# Patient Record
Sex: MALE | Race: BLACK OR AFRICAN AMERICAN | NOT HISPANIC OR LATINO | Employment: OTHER | RURAL
[De-identification: names, ages, dates, MRNs, and addresses within clinical notes are randomized per-mention and may not be internally consistent; named-entity substitution may affect disease eponyms.]

---

## 2018-03-07 ENCOUNTER — HISTORICAL (OUTPATIENT)
Dept: ADMINISTRATIVE | Facility: HOSPITAL | Age: 78
End: 2018-03-07

## 2018-03-08 LAB
LAB AP CLINICAL INFORMATION: NORMAL
LAB AP COMMENTS: NORMAL
LAB AP DIAGNOSIS - HISTORICAL: NORMAL
LAB AP GROSS PATHOLOGY - HISTORICAL: NORMAL
LAB AP SPECIMEN SUBMITTED - HISTORICAL: NORMAL

## 2020-10-07 ENCOUNTER — HISTORICAL (OUTPATIENT)
Dept: ADMINISTRATIVE | Facility: HOSPITAL | Age: 80
End: 2020-10-07

## 2020-10-07 LAB
CHOLEST SERPL-MCNC: 245 MG/DL
CHOLEST/HDLC SERPL: 4.6 {RATIO}
HDLC SERPL-MCNC: 53 MG/DL
LDLC SERPL CALC-MCNC: 142 MG/DL
TRIGL SERPL-MCNC: 250 MG/DL

## 2020-11-10 ENCOUNTER — HISTORICAL (OUTPATIENT)
Dept: ADMINISTRATIVE | Facility: HOSPITAL | Age: 80
End: 2020-11-10

## 2020-11-10 LAB — SARS-COV+SARS-COV-2 AG RESP QL IA.RAPID: NEGATIVE

## 2021-07-10 ENCOUNTER — HOSPITAL ENCOUNTER (OUTPATIENT)
Facility: HOSPITAL | Age: 81
Discharge: HOME | End: 2021-07-14
Attending: HOSPITALIST | Admitting: HOSPITALIST
Payer: COMMERCIAL

## 2021-07-10 ENCOUNTER — HOSPITAL ENCOUNTER (EMERGENCY)
Facility: HOSPITAL | Age: 81
Discharge: ANOTHER HOSPITAL | End: 2021-07-10
Payer: COMMERCIAL

## 2021-07-10 VITALS
BODY MASS INDEX: 30.61 KG/M2 | OXYGEN SATURATION: 99 % | HEIGHT: 72 IN | WEIGHT: 226 LBS | RESPIRATION RATE: 20 BRPM | HEART RATE: 62 BPM | DIASTOLIC BLOOD PRESSURE: 73 MMHG | TEMPERATURE: 98 F | SYSTOLIC BLOOD PRESSURE: 159 MMHG

## 2021-07-10 DIAGNOSIS — R79.89 ELEVATED TROPONIN: Primary | ICD-10-CM

## 2021-07-10 DIAGNOSIS — Z72.0 TOBACCO USE: ICD-10-CM

## 2021-07-10 DIAGNOSIS — N18.31 STAGE 3A CHRONIC KIDNEY DISEASE: ICD-10-CM

## 2021-07-10 DIAGNOSIS — M10.072 ACUTE IDIOPATHIC GOUT OF LEFT FOOT: ICD-10-CM

## 2021-07-10 DIAGNOSIS — I21.4 NSTEMI (NON-ST ELEVATED MYOCARDIAL INFARCTION): ICD-10-CM

## 2021-07-10 DIAGNOSIS — R29.810 FACIAL DROOP: ICD-10-CM

## 2021-07-10 DIAGNOSIS — I10 HYPERTENSION: ICD-10-CM

## 2021-07-10 DIAGNOSIS — R22.0 LIP SWELLING: ICD-10-CM

## 2021-07-10 DIAGNOSIS — G45.9 TIA (TRANSIENT ISCHEMIC ATTACK): ICD-10-CM

## 2021-07-10 DIAGNOSIS — Q67.0 FACIAL ASYMMETRY: ICD-10-CM

## 2021-07-10 DIAGNOSIS — R07.9 CHEST PAIN: ICD-10-CM

## 2021-07-10 DIAGNOSIS — I10 ESSENTIAL HYPERTENSION: ICD-10-CM

## 2021-07-10 DIAGNOSIS — N17.9 ACUTE RENAL FAILURE SUPERIMPOSED ON CHRONIC KIDNEY DISEASE, UNSPECIFIED CKD STAGE, UNSPECIFIED ACUTE RENAL FAILURE TYPE: ICD-10-CM

## 2021-07-10 DIAGNOSIS — N18.9 ACUTE RENAL FAILURE SUPERIMPOSED ON CHRONIC KIDNEY DISEASE, UNSPECIFIED CKD STAGE, UNSPECIFIED ACUTE RENAL FAILURE TYPE: ICD-10-CM

## 2021-07-10 DIAGNOSIS — E78.2 MIXED HYPERLIPIDEMIA: ICD-10-CM

## 2021-07-10 DIAGNOSIS — R94.31 ABNORMAL ECG DURING EXERCISE STRESS TEST: ICD-10-CM

## 2021-07-10 PROBLEM — M10.071 ACUTE IDIOPATHIC GOUT OF RIGHT FOOT: Status: ACTIVE | Noted: 2021-07-10

## 2021-07-10 PROBLEM — E78.5 HYPERLIPIDEMIA: Status: ACTIVE | Noted: 2021-07-10

## 2021-07-10 PROBLEM — N18.30 CKD (CHRONIC KIDNEY DISEASE) STAGE 3, GFR 30-59 ML/MIN: Status: ACTIVE | Noted: 2021-07-10

## 2021-07-10 LAB
ALBUMIN SERPL BCP-MCNC: 3.7 G/DL (ref 3.5–5)
ALBUMIN/GLOB SERPL: 1 {RATIO}
ALP SERPL-CCNC: 105 U/L (ref 45–115)
ALT SERPL W P-5'-P-CCNC: 22 U/L (ref 16–61)
ANION GAP SERPL CALCULATED.3IONS-SCNC: 11 MMOL/L (ref 7–16)
APTT PPP: 32.9 SECONDS (ref 25.2–37.3)
AST SERPL W P-5'-P-CCNC: 28 U/L (ref 15–37)
BASOPHILS # BLD AUTO: 0.02 K/UL (ref 0–0.2)
BASOPHILS NFR BLD AUTO: 0.4 % (ref 0–1)
BILIRUB SERPL-MCNC: 0.5 MG/DL (ref 0–1.2)
BUN SERPL-MCNC: 28 MG/DL (ref 7–18)
BUN/CREAT SERPL: 14 (ref 6–20)
CALCIUM SERPL-MCNC: 9.2 MG/DL (ref 8.5–10.1)
CHLORIDE SERPL-SCNC: 103 MMOL/L (ref 98–107)
CO2 SERPL-SCNC: 31 MMOL/L (ref 21–32)
CREAT SERPL-MCNC: 1.98 MG/DL (ref 0.7–1.3)
D DIMER PPP FEU-MCNC: 1.78 ΜG/ML (ref 0–0.47)
DIFFERENTIAL METHOD BLD: ABNORMAL
EOSINOPHIL # BLD AUTO: 0.04 K/UL (ref 0–0.5)
EOSINOPHIL NFR BLD AUTO: 0.7 % (ref 1–4)
ERYTHROCYTE [DISTWIDTH] IN BLOOD BY AUTOMATED COUNT: 12.1 % (ref 11.5–14.5)
EST. AVERAGE GLUCOSE BLD GHB EST-MCNC: 107 MG/DL
GLOBULIN SER-MCNC: 3.7 G/DL (ref 2–4)
GLUCOSE SERPL-MCNC: 173 MG/DL (ref 74–106)
HBA1C MFR BLD HPLC: 5.8 % (ref 4.5–6.6)
HCT VFR BLD AUTO: 41.1 % (ref 40–54)
HGB BLD-MCNC: 14.5 G/DL (ref 13.5–18)
INR BLD: 1.03 (ref 0.9–1.1)
LYMPHOCYTES # BLD AUTO: 1.94 K/UL (ref 1–4.8)
LYMPHOCYTES NFR BLD AUTO: 34.5 % (ref 27–41)
MAGNESIUM SERPL-MCNC: 1.9 MG/DL (ref 1.7–2.3)
MCH RBC QN AUTO: 32.7 PG (ref 27–31)
MCHC RBC AUTO-ENTMCNC: 35.3 G/DL (ref 32–36)
MCV RBC AUTO: 92.6 FL (ref 80–96)
MONOCYTES # BLD AUTO: 0.57 K/UL (ref 0–0.8)
MONOCYTES NFR BLD AUTO: 10.1 % (ref 2–6)
MPC BLD CALC-MCNC: 9 FL (ref 9.4–12.4)
NEUTROPHILS # BLD AUTO: 3.06 K/UL (ref 1.8–7.7)
NEUTROPHILS NFR BLD AUTO: 54.3 % (ref 53–65)
PLATELET # BLD AUTO: 291 K/UL (ref 150–400)
POTASSIUM SERPL-SCNC: 4.1 MMOL/L (ref 3.5–5.1)
PROT SERPL-MCNC: 7.4 G/DL (ref 6.4–8.2)
PROTHROMBIN TIME: 13.7 SECONDS (ref 11.7–14.7)
RBC # BLD AUTO: 4.44 M/UL (ref 4.6–6.2)
SODIUM SERPL-SCNC: 141 MMOL/L (ref 136–145)
TROPONIN I SERPL-MCNC: 0.1 NG/ML
TROPONIN I SERPL-MCNC: 0.13 NG/ML
TROPONIN I SERPL-MCNC: 0.14 NG/ML
TSH SERPL DL<=0.005 MIU/L-ACNC: 1.22 UIU/ML (ref 0.36–3.74)
URATE SERPL-MCNC: 7.7 MG/DL (ref 3.5–7.2)
WBC # BLD AUTO: 5.63 K/UL (ref 4.5–11)

## 2021-07-10 PROCEDURE — 83036 HEMOGLOBIN GLYCOSYLATED A1C: CPT | Performed by: HOSPITALIST

## 2021-07-10 PROCEDURE — 85025 COMPLETE CBC W/AUTO DIFF WBC: CPT | Performed by: NURSE PRACTITIONER

## 2021-07-10 PROCEDURE — 99285 PR EMERGENCY DEPT VISIT,LEVEL V: ICD-10-PCS | Mod: ,,, | Performed by: NURSE PRACTITIONER

## 2021-07-10 PROCEDURE — 83735 ASSAY OF MAGNESIUM: CPT | Performed by: NURSE PRACTITIONER

## 2021-07-10 PROCEDURE — 63600175 PHARM REV CODE 636 W HCPCS: Performed by: HOSPITALIST

## 2021-07-10 PROCEDURE — 84550 ASSAY OF BLOOD/URIC ACID: CPT | Performed by: HOSPITALIST

## 2021-07-10 PROCEDURE — 93010 EKG 12-LEAD: ICD-10-PCS | Mod: 77,,, | Performed by: INTERNAL MEDICINE

## 2021-07-10 PROCEDURE — 93005 ELECTROCARDIOGRAM TRACING: CPT

## 2021-07-10 PROCEDURE — 99220 PR INITIAL OBSERVATION CARE,LEVL III: CPT | Mod: ,,, | Performed by: HOSPITALIST

## 2021-07-10 PROCEDURE — 84443 ASSAY THYROID STIM HORMONE: CPT | Performed by: NURSE PRACTITIONER

## 2021-07-10 PROCEDURE — 85610 PROTHROMBIN TIME: CPT | Performed by: NURSE PRACTITIONER

## 2021-07-10 PROCEDURE — 85730 THROMBOPLASTIN TIME PARTIAL: CPT | Performed by: NURSE PRACTITIONER

## 2021-07-10 PROCEDURE — 25000003 PHARM REV CODE 250: Performed by: HOSPITALIST

## 2021-07-10 PROCEDURE — 99220 PR INITIAL OBSERVATION CARE,LEVL III: ICD-10-PCS | Mod: ,,, | Performed by: HOSPITALIST

## 2021-07-10 PROCEDURE — 84484 ASSAY OF TROPONIN QUANT: CPT | Mod: 91 | Performed by: HOSPITALIST

## 2021-07-10 PROCEDURE — 99285 EMERGENCY DEPT VISIT HI MDM: CPT | Mod: ,,, | Performed by: NURSE PRACTITIONER

## 2021-07-10 PROCEDURE — 99285 EMERGENCY DEPT VISIT HI MDM: CPT | Mod: 25

## 2021-07-10 PROCEDURE — 96372 THER/PROPH/DIAG INJ SC/IM: CPT | Mod: 59

## 2021-07-10 PROCEDURE — 80053 COMPREHEN METABOLIC PANEL: CPT | Performed by: NURSE PRACTITIONER

## 2021-07-10 PROCEDURE — 93010 ELECTROCARDIOGRAM REPORT: CPT | Mod: ,,, | Performed by: HOSPITALIST

## 2021-07-10 PROCEDURE — 85378 FIBRIN DEGRADE SEMIQUANT: CPT | Performed by: HOSPITALIST

## 2021-07-10 PROCEDURE — 25000003 PHARM REV CODE 250: Performed by: NURSE PRACTITIONER

## 2021-07-10 PROCEDURE — 84484 ASSAY OF TROPONIN QUANT: CPT | Performed by: NURSE PRACTITIONER

## 2021-07-10 PROCEDURE — 93010 ELECTROCARDIOGRAM REPORT: CPT | Mod: 77,,, | Performed by: INTERNAL MEDICINE

## 2021-07-10 PROCEDURE — 96360 HYDRATION IV INFUSION INIT: CPT

## 2021-07-10 PROCEDURE — G0427 INPT/ED TELECONSULT70: HCPCS | Mod: GT,,, | Performed by: PSYCHIATRY & NEUROLOGY

## 2021-07-10 PROCEDURE — G0427 PR INPT TELEHEALTH CON 70/>M: ICD-10-PCS | Mod: GT,,, | Performed by: PSYCHIATRY & NEUROLOGY

## 2021-07-10 PROCEDURE — 93010 EKG 12-LEAD: ICD-10-PCS | Mod: ,,, | Performed by: HOSPITALIST

## 2021-07-10 PROCEDURE — 36415 COLL VENOUS BLD VENIPUNCTURE: CPT | Performed by: NURSE PRACTITIONER

## 2021-07-10 PROCEDURE — G0378 HOSPITAL OBSERVATION PER HR: HCPCS

## 2021-07-10 PROCEDURE — 94761 N-INVAS EAR/PLS OXIMETRY MLT: CPT

## 2021-07-10 PROCEDURE — 83880 ASSAY OF NATRIURETIC PEPTIDE: CPT | Performed by: HOSPITALIST

## 2021-07-10 PROCEDURE — G0379 DIRECT REFER HOSPITAL OBSERV: HCPCS

## 2021-07-10 RX ORDER — NITROGLYCERIN 0.4 MG/1
0.4 TABLET SUBLINGUAL EVERY 5 MIN PRN
Status: DISCONTINUED | OUTPATIENT
Start: 2021-07-10 | End: 2021-07-14 | Stop reason: HOSPADM

## 2021-07-10 RX ORDER — SODIUM CHLORIDE 9 MG/ML
INJECTION, SOLUTION INTRAVENOUS
Status: COMPLETED | OUTPATIENT
Start: 2021-07-10 | End: 2021-07-10

## 2021-07-10 RX ORDER — ONDANSETRON 4 MG/1
8 TABLET, ORALLY DISINTEGRATING ORAL EVERY 8 HOURS PRN
Status: DISCONTINUED | OUTPATIENT
Start: 2021-07-10 | End: 2021-07-13

## 2021-07-10 RX ORDER — ATORVASTATIN CALCIUM 40 MG/1
40 TABLET, FILM COATED ORAL DAILY
Status: ON HOLD | COMMUNITY
End: 2022-01-02 | Stop reason: HOSPADM

## 2021-07-10 RX ORDER — LISINOPRIL AND HYDROCHLOROTHIAZIDE 20; 25 MG/1; MG/1
1 TABLET ORAL DAILY
Status: ON HOLD | COMMUNITY
End: 2021-07-14 | Stop reason: HOSPADM

## 2021-07-10 RX ORDER — METOPROLOL TARTRATE 25 MG/1
12.5 TABLET ORAL 2 TIMES DAILY
Status: DISCONTINUED | OUTPATIENT
Start: 2021-07-10 | End: 2021-07-12

## 2021-07-10 RX ORDER — HEPARIN SODIUM 5000 [USP'U]/ML
5000 INJECTION, SOLUTION INTRAVENOUS; SUBCUTANEOUS EVERY 12 HOURS
Status: DISCONTINUED | OUTPATIENT
Start: 2021-07-10 | End: 2021-07-14 | Stop reason: HOSPADM

## 2021-07-10 RX ORDER — ACETAMINOPHEN 500 MG
1000 TABLET ORAL EVERY 8 HOURS PRN
Status: DISCONTINUED | OUTPATIENT
Start: 2021-07-10 | End: 2021-07-13

## 2021-07-10 RX ORDER — ATORVASTATIN CALCIUM 40 MG/1
40 TABLET, FILM COATED ORAL DAILY
Status: DISCONTINUED | OUTPATIENT
Start: 2021-07-11 | End: 2021-07-14 | Stop reason: HOSPADM

## 2021-07-10 RX ORDER — SODIUM CHLORIDE 0.9 % (FLUSH) 0.9 %
3 SYRINGE (ML) INJECTION
Status: DISCONTINUED | OUTPATIENT
Start: 2021-07-10 | End: 2021-07-14 | Stop reason: HOSPADM

## 2021-07-10 RX ADMIN — HEPARIN SODIUM 5000 UNITS: 5000 INJECTION INTRAVENOUS; SUBCUTANEOUS at 09:07

## 2021-07-10 RX ADMIN — SODIUM CHLORIDE: 9 INJECTION, SOLUTION INTRAVENOUS at 11:07

## 2021-07-10 RX ADMIN — METOPROLOL TARTRATE 12.5 MG: 25 TABLET, FILM COATED ORAL at 09:07

## 2021-07-11 LAB
ALBUMIN SERPL BCP-MCNC: 3 G/DL (ref 3.5–5)
ALBUMIN/GLOB SERPL: 0.9 {RATIO}
ALP SERPL-CCNC: 103 U/L (ref 45–115)
ALT SERPL W P-5'-P-CCNC: 23 U/L (ref 16–61)
ANION GAP SERPL CALCULATED.3IONS-SCNC: 8 MMOL/L (ref 7–16)
AORTIC ROOT ANNULUS: 2.9 CM
AORTIC VALVE CUSP SEPERATION: 2.2 CM
AST SERPL W P-5'-P-CCNC: 24 U/L (ref 15–37)
AV INDEX (PROSTH): 1.06
AV MEAN GRADIENT: 2 MMHG
AV PEAK GRADIENT: 3 MMHG
AV VALVE AREA: 3.65 CM2
AV VELOCITY RATIO: 0.89
BASOPHILS # BLD AUTO: 0.02 K/UL (ref 0–0.2)
BASOPHILS NFR BLD AUTO: 0.4 % (ref 0–1)
BILIRUB SERPL-MCNC: 0.4 MG/DL (ref 0–1.2)
BILIRUB UR QL STRIP: NEGATIVE
BSA FOR ECHO PROCEDURE: 2.28 M2
BUN SERPL-MCNC: 23 MG/DL (ref 7–18)
BUN/CREAT SERPL: 14 (ref 6–20)
CALCIUM SERPL-MCNC: 8.8 MG/DL (ref 8.5–10.1)
CHLORIDE SERPL-SCNC: 108 MMOL/L (ref 98–107)
CHOLEST SERPL-MCNC: 153 MG/DL (ref 0–200)
CHOLEST/HDLC SERPL: 3.2 {RATIO}
CLARITY UR: CLEAR
CO2 SERPL-SCNC: 30 MMOL/L (ref 21–32)
COLOR UR: NORMAL
CREAT SERPL-MCNC: 1.67 MG/DL (ref 0.7–1.3)
CV ECHO LV RWT: 0.87 CM
DIFFERENTIAL METHOD BLD: ABNORMAL
DOP CALC AO PEAK VEL: 0.9 M/S
DOP CALC AO VTI: 18 CM
DOP CALC LVOT AREA: 3.5 CM2
DOP CALC LVOT DIAMETER: 2.1 CM
DOP CALC LVOT PEAK VEL: 0.8 M/S
DOP CALC LVOT STROKE VOLUME: 65.78 CM3
DOP CALCLVOT PEAK VEL VTI: 19 CM
E WAVE DECELERATION TIME: 171 MSEC
ECHO EF ESTIMATED: 55 %
ECHO LV POSTERIOR WALL: 1.94 CM (ref 0.6–1.1)
EJECTION FRACTION: 65 %
EOSINOPHIL # BLD AUTO: 0.09 K/UL (ref 0–0.5)
EOSINOPHIL NFR BLD AUTO: 1.7 % (ref 1–4)
ERYTHROCYTE [DISTWIDTH] IN BLOOD BY AUTOMATED COUNT: 11.9 % (ref 11.5–14.5)
FRACTIONAL SHORTENING: 27 % (ref 28–44)
GLOBULIN SER-MCNC: 3.4 G/DL (ref 2–4)
GLUCOSE SERPL-MCNC: 122 MG/DL (ref 74–106)
GLUCOSE UR STRIP-MCNC: NEGATIVE MG/DL
HCT VFR BLD AUTO: 38.9 % (ref 40–54)
HDLC SERPL-MCNC: 48 MG/DL (ref 40–60)
HGB BLD-MCNC: 13.1 G/DL (ref 13.5–18)
IMM GRANULOCYTES # BLD AUTO: 0.01 K/UL (ref 0–0.04)
IMM GRANULOCYTES NFR BLD: 0.2 % (ref 0–0.4)
INTERVENTRICULAR SEPTUM: 1.77 CM (ref 0.6–1.1)
IVC OSTIUM: 1.3 CM
KETONES UR STRIP-SCNC: NEGATIVE MG/DL
LDLC SERPL CALC-MCNC: 71 MG/DL
LDLC/HDLC SERPL: 1.5 {RATIO}
LEFT ATRIUM SIZE: 3.8 CM
LEFT INTERNAL DIMENSION IN SYSTOLE: 3.26 CM (ref 2.1–4)
LEFT VENTRICLE MASS INDEX: 169 G/M2
LEFT VENTRICULAR INTERNAL DIMENSION IN DIASTOLE: 4.45 CM (ref 3.5–6)
LEFT VENTRICULAR MASS: 379.34 G
LEUKOCYTE ESTERASE UR QL STRIP: NEGATIVE
LVOT MG: 1.5 MMHG
LYMPHOCYTES # BLD AUTO: 1.92 K/UL (ref 1–4.8)
LYMPHOCYTES NFR BLD AUTO: 36.6 % (ref 27–41)
MCH RBC QN AUTO: 32.9 PG (ref 27–31)
MCHC RBC AUTO-ENTMCNC: 33.7 G/DL (ref 32–36)
MCV RBC AUTO: 97.7 FL (ref 80–96)
MONOCYTES # BLD AUTO: 0.61 K/UL (ref 0–0.8)
MONOCYTES NFR BLD AUTO: 11.6 % (ref 2–6)
MPC BLD CALC-MCNC: 9.7 FL (ref 9.4–12.4)
MV PEAK E VEL: 0.65 M/S
NEUTROPHILS # BLD AUTO: 2.59 K/UL (ref 1.8–7.7)
NEUTROPHILS NFR BLD AUTO: 49.5 % (ref 53–65)
NITRITE UR QL STRIP: NEGATIVE
NONHDLC SERPL-MCNC: 105 MG/DL
NRBC # BLD AUTO: 0 X10E3/UL
NRBC, AUTO (.00): 0 %
NT-PROBNP SERPL-MCNC: 816 PG/ML (ref 1–450)
PH UR STRIP: 6.5 PH UNITS
PISA TR MAX VEL: 2.8 M/S
PLATELET # BLD AUTO: 257 K/UL (ref 150–400)
POTASSIUM SERPL-SCNC: 4.2 MMOL/L (ref 3.5–5.1)
PROT SERPL-MCNC: 6.4 G/DL (ref 6.4–8.2)
PROT UR QL STRIP: NEGATIVE
RA MAJOR: 4 CM
RA PRESSURE: 3 MMHG
RBC # BLD AUTO: 3.98 M/UL (ref 4.6–6.2)
RBC # UR STRIP: NEGATIVE /UL
RIGHT VENTRICULAR END-DIASTOLIC DIMENSION: 3.3 CM
SODIUM SERPL-SCNC: 142 MMOL/L (ref 136–145)
SP GR UR STRIP: <=1.005
TR MAX PG: 31 MMHG
TRICUSPID ANNULAR PLANE SYSTOLIC EXCURSION: 2.2 CM
TRIGL SERPL-MCNC: 169 MG/DL (ref 35–150)
TROPONIN I SERPL-MCNC: 0.12 NG/ML
TV REST PULMONARY ARTERY PRESSURE: 34 MMHG
UROBILINOGEN UR STRIP-ACNC: 1 MG/DL
VLDLC SERPL-MCNC: 34 MG/DL
WBC # BLD AUTO: 5.24 K/UL (ref 4.5–11)

## 2021-07-11 PROCEDURE — 85025 COMPLETE CBC W/AUTO DIFF WBC: CPT | Performed by: HOSPITALIST

## 2021-07-11 PROCEDURE — 25000003 PHARM REV CODE 250: Performed by: HOSPITALIST

## 2021-07-11 PROCEDURE — 81003 URINALYSIS AUTO W/O SCOPE: CPT | Performed by: HOSPITALIST

## 2021-07-11 PROCEDURE — 99225 PR SUBSEQUENT OBSERVATION CARE,LEVEL II: CPT | Mod: ,,, | Performed by: HOSPITALIST

## 2021-07-11 PROCEDURE — 63600175 PHARM REV CODE 636 W HCPCS: Performed by: HOSPITALIST

## 2021-07-11 PROCEDURE — 80053 COMPREHEN METABOLIC PANEL: CPT | Performed by: HOSPITALIST

## 2021-07-11 PROCEDURE — 80061 LIPID PANEL: CPT | Performed by: HOSPITALIST

## 2021-07-11 PROCEDURE — 36415 COLL VENOUS BLD VENIPUNCTURE: CPT | Performed by: HOSPITALIST

## 2021-07-11 PROCEDURE — 84484 ASSAY OF TROPONIN QUANT: CPT | Performed by: HOSPITALIST

## 2021-07-11 PROCEDURE — G0378 HOSPITAL OBSERVATION PER HR: HCPCS

## 2021-07-11 PROCEDURE — 96372 THER/PROPH/DIAG INJ SC/IM: CPT | Mod: 59

## 2021-07-11 PROCEDURE — 25500020 PHARM REV CODE 255: Performed by: HOSPITALIST

## 2021-07-11 PROCEDURE — 99225 PR SUBSEQUENT OBSERVATION CARE,LEVEL II: ICD-10-PCS | Mod: ,,, | Performed by: HOSPITALIST

## 2021-07-11 RX ORDER — LISINOPRIL 10 MG/1
10 TABLET ORAL DAILY
Status: DISCONTINUED | OUTPATIENT
Start: 2021-07-11 | End: 2021-07-12

## 2021-07-11 RX ORDER — HYDRALAZINE HYDROCHLORIDE 25 MG/1
25 TABLET, FILM COATED ORAL EVERY 8 HOURS PRN
Status: DISCONTINUED | OUTPATIENT
Start: 2021-07-11 | End: 2021-07-13

## 2021-07-11 RX ORDER — ASPIRIN 81 MG/1
81 TABLET ORAL DAILY
Status: DISCONTINUED | OUTPATIENT
Start: 2021-07-12 | End: 2021-07-14 | Stop reason: HOSPADM

## 2021-07-11 RX ADMIN — METOPROLOL TARTRATE 12.5 MG: 25 TABLET, FILM COATED ORAL at 08:07

## 2021-07-11 RX ADMIN — ATORVASTATIN CALCIUM 40 MG: 40 TABLET, FILM COATED ORAL at 08:07

## 2021-07-11 RX ADMIN — HEPARIN SODIUM 5000 UNITS: 5000 INJECTION INTRAVENOUS; SUBCUTANEOUS at 08:07

## 2021-07-11 RX ADMIN — LISINOPRIL 10 MG: 10 TABLET ORAL at 12:07

## 2021-07-11 RX ADMIN — IOPAMIDOL 100 ML: 755 INJECTION, SOLUTION INTRAVENOUS at 02:07

## 2021-07-12 LAB
ANION GAP SERPL CALCULATED.3IONS-SCNC: 10 MMOL/L (ref 7–16)
BUN SERPL-MCNC: 21 MG/DL (ref 7–18)
BUN/CREAT SERPL: 13 (ref 6–20)
CALCIUM SERPL-MCNC: 9.3 MG/DL (ref 8.5–10.1)
CHLORIDE SERPL-SCNC: 106 MMOL/L (ref 98–107)
CO2 SERPL-SCNC: 28 MMOL/L (ref 21–32)
CREAT SERPL-MCNC: 1.65 MG/DL (ref 0.7–1.3)
FOLATE SERPL-MCNC: 10.3 NG/ML (ref 3.1–17.5)
GLUCOSE SERPL-MCNC: 133 MG/DL (ref 74–106)
MAGNESIUM SERPL-MCNC: 2.1 MG/DL (ref 1.7–2.3)
PHOSPHATE SERPL-MCNC: 3 MG/DL (ref 2.5–4.5)
POTASSIUM SERPL-SCNC: 4.4 MMOL/L (ref 3.5–5.1)
SODIUM SERPL-SCNC: 140 MMOL/L (ref 136–145)
VIT B12 SERPL-MCNC: 427 PG/ML (ref 193–986)

## 2021-07-12 PROCEDURE — 84100 ASSAY OF PHOSPHORUS: CPT | Performed by: HOSPITALIST

## 2021-07-12 PROCEDURE — 96372 THER/PROPH/DIAG INJ SC/IM: CPT | Mod: 59

## 2021-07-12 PROCEDURE — 99226 PR SUBSEQUENT OBSERVATION CARE,LEVEL III: CPT | Mod: ,,, | Performed by: HOSPITALIST

## 2021-07-12 PROCEDURE — 80048 BASIC METABOLIC PNL TOTAL CA: CPT | Performed by: HOSPITALIST

## 2021-07-12 PROCEDURE — G0378 HOSPITAL OBSERVATION PER HR: HCPCS

## 2021-07-12 PROCEDURE — 36415 COLL VENOUS BLD VENIPUNCTURE: CPT | Performed by: HOSPITALIST

## 2021-07-12 PROCEDURE — 83735 ASSAY OF MAGNESIUM: CPT | Performed by: HOSPITALIST

## 2021-07-12 PROCEDURE — 25000003 PHARM REV CODE 250: Performed by: HOSPITALIST

## 2021-07-12 PROCEDURE — 96374 THER/PROPH/DIAG INJ IV PUSH: CPT

## 2021-07-12 PROCEDURE — 82607 VITAMIN B-12: CPT | Performed by: HOSPITALIST

## 2021-07-12 PROCEDURE — 99226 PR SUBSEQUENT OBSERVATION CARE,LEVEL III: ICD-10-PCS | Mod: ,,, | Performed by: HOSPITALIST

## 2021-07-12 PROCEDURE — 63600175 PHARM REV CODE 636 W HCPCS: Performed by: HOSPITALIST

## 2021-07-12 RX ORDER — LORAZEPAM 2 MG/ML
2 INJECTION INTRAMUSCULAR ONCE
Status: COMPLETED | OUTPATIENT
Start: 2021-07-12 | End: 2021-07-12

## 2021-07-12 RX ORDER — LISINOPRIL 20 MG/1
20 TABLET ORAL DAILY
Status: DISCONTINUED | OUTPATIENT
Start: 2021-07-13 | End: 2021-07-13

## 2021-07-12 RX ORDER — HYDROCHLOROTHIAZIDE 12.5 MG/1
12.5 TABLET ORAL DAILY
Status: DISCONTINUED | OUTPATIENT
Start: 2021-07-12 | End: 2021-07-13

## 2021-07-12 RX ADMIN — LISINOPRIL 10 MG: 10 TABLET ORAL at 09:07

## 2021-07-12 RX ADMIN — ASPIRIN 81 MG: 81 TABLET, COATED ORAL at 09:07

## 2021-07-12 RX ADMIN — ATORVASTATIN CALCIUM 40 MG: 40 TABLET, FILM COATED ORAL at 09:07

## 2021-07-12 RX ADMIN — LORAZEPAM 2 MG: 2 INJECTION INTRAMUSCULAR; INTRAVENOUS at 02:07

## 2021-07-12 RX ADMIN — METOPROLOL TARTRATE 12.5 MG: 25 TABLET, FILM COATED ORAL at 09:07

## 2021-07-12 RX ADMIN — HEPARIN SODIUM 5000 UNITS: 5000 INJECTION INTRAVENOUS; SUBCUTANEOUS at 09:07

## 2021-07-12 RX ADMIN — HYDROCHLOROTHIAZIDE 12.5 MG: 12.5 TABLET ORAL at 05:07

## 2021-07-13 PROBLEM — I21.4 NSTEMI (NON-ST ELEVATED MYOCARDIAL INFARCTION): Status: ACTIVE | Noted: 2021-07-13

## 2021-07-13 PROBLEM — R22.0 LIP SWELLING: Status: RESOLVED | Noted: 2021-07-10 | Resolved: 2021-07-13

## 2021-07-13 LAB
ANION GAP SERPL CALCULATED.3IONS-SCNC: 12 MMOL/L (ref 7–16)
BASOPHILS # BLD AUTO: 0.02 K/UL (ref 0–0.2)
BASOPHILS NFR BLD AUTO: 0.3 % (ref 0–1)
BUN SERPL-MCNC: 21 MG/DL (ref 7–18)
BUN/CREAT SERPL: 13 (ref 6–20)
CALCIUM SERPL-MCNC: 9.3 MG/DL (ref 8.5–10.1)
CHLORIDE SERPL-SCNC: 106 MMOL/L (ref 98–107)
CO2 SERPL-SCNC: 29 MMOL/L (ref 21–32)
CREAT SERPL-MCNC: 1.59 MG/DL (ref 0.7–1.3)
CV STRESS BASE HR: 54 BPM
DIASTOLIC BLOOD PRESSURE: 80 MMHG
DIFFERENTIAL METHOD BLD: ABNORMAL
EOSINOPHIL # BLD AUTO: 0.08 K/UL (ref 0–0.5)
EOSINOPHIL NFR BLD AUTO: 1.3 % (ref 1–4)
ERYTHROCYTE [DISTWIDTH] IN BLOOD BY AUTOMATED COUNT: 12.1 % (ref 11.5–14.5)
GLUCOSE SERPL-MCNC: 138 MG/DL (ref 74–106)
HCT VFR BLD AUTO: 43.6 % (ref 40–54)
HGB BLD-MCNC: 14.7 G/DL (ref 13.5–18)
IMM GRANULOCYTES # BLD AUTO: 0.03 K/UL (ref 0–0.04)
IMM GRANULOCYTES NFR BLD: 0.5 % (ref 0–0.4)
LYMPHOCYTES # BLD AUTO: 1.81 K/UL (ref 1–4.8)
LYMPHOCYTES NFR BLD AUTO: 30 % (ref 27–41)
MCH RBC QN AUTO: 32.7 PG (ref 27–31)
MCHC RBC AUTO-ENTMCNC: 33.7 G/DL (ref 32–36)
MCV RBC AUTO: 96.9 FL (ref 80–96)
MONOCYTES # BLD AUTO: 0.5 K/UL (ref 0–0.8)
MONOCYTES NFR BLD AUTO: 8.3 % (ref 2–6)
MPC BLD CALC-MCNC: 9 FL (ref 9.4–12.4)
NEUTROPHILS # BLD AUTO: 3.59 K/UL (ref 1.8–7.7)
NEUTROPHILS NFR BLD AUTO: 59.6 % (ref 53–65)
NRBC # BLD AUTO: 0 X10E3/UL
NRBC, AUTO (.00): 0 %
OHS CV CPX 1 MINUTE RECOVERY HEART RATE: 63 BPM
OHS CV CPX 85 PERCENT MAX PREDICTED HEART RATE MALE: 118
OHS CV CPX ESTIMATED METS: 5
OHS CV CPX MAX PREDICTED HEART RATE: 139
OHS CV CPX PATIENT IS FEMALE: 0
OHS CV CPX PATIENT IS MALE: 1
OHS CV CPX PEAK DIASTOLIC BLOOD PRESSURE: 82 MMHG
OHS CV CPX PEAK HEAR RATE: 113 BPM
OHS CV CPX PEAK RATE PRESSURE PRODUCT: NORMAL
OHS CV CPX PEAK SYSTOLIC BLOOD PRESSURE: 221 MMHG
OHS CV CPX PERCENT MAX PREDICTED HEART RATE ACHIEVED: 81
OHS CV CPX RATE PRESSURE PRODUCT PRESENTING: NORMAL
PLATELET # BLD AUTO: 256 K/UL (ref 150–400)
POTASSIUM SERPL-SCNC: 4.8 MMOL/L (ref 3.5–5.1)
RBC # BLD AUTO: 4.5 M/UL (ref 4.6–6.2)
SODIUM SERPL-SCNC: 142 MMOL/L (ref 136–145)
STRESS ECHO POST EXERCISE DUR MIN: 3 MINUTES
STRESS ECHO POST EXERCISE DUR SEC: 21 SECONDS
SYSTOLIC BLOOD PRESSURE: 190 MMHG
WBC # BLD AUTO: 6.03 K/UL (ref 4.5–11)

## 2021-07-13 PROCEDURE — 25000003 PHARM REV CODE 250: Performed by: HOSPITALIST

## 2021-07-13 PROCEDURE — 27000080 OPTIME MED/SURG SUP & DEVICES GENERAL CLASSIFICATION: Performed by: STUDENT IN AN ORGANIZED HEALTH CARE EDUCATION/TRAINING PROGRAM

## 2021-07-13 PROCEDURE — 92978 ENDOLUMINL IVUS OCT C 1ST: CPT | Mod: RC | Performed by: STUDENT IN AN ORGANIZED HEALTH CARE EDUCATION/TRAINING PROGRAM

## 2021-07-13 PROCEDURE — 27201423 OPTIME MED/SURG SUP & DEVICES STERILE SUPPLY: Performed by: STUDENT IN AN ORGANIZED HEALTH CARE EDUCATION/TRAINING PROGRAM

## 2021-07-13 PROCEDURE — C1874 STENT, COATED/COV W/DEL SYS: HCPCS | Performed by: STUDENT IN AN ORGANIZED HEALTH CARE EDUCATION/TRAINING PROGRAM

## 2021-07-13 PROCEDURE — 99152 MOD SED SAME PHYS/QHP 5/>YRS: CPT | Performed by: STUDENT IN AN ORGANIZED HEALTH CARE EDUCATION/TRAINING PROGRAM

## 2021-07-13 PROCEDURE — C1725 CATH, TRANSLUMIN NON-LASER: HCPCS | Performed by: STUDENT IN AN ORGANIZED HEALTH CARE EDUCATION/TRAINING PROGRAM

## 2021-07-13 PROCEDURE — 63600175 PHARM REV CODE 636 W HCPCS: Performed by: HOSPITALIST

## 2021-07-13 PROCEDURE — 25500020 PHARM REV CODE 255: Performed by: STUDENT IN AN ORGANIZED HEALTH CARE EDUCATION/TRAINING PROGRAM

## 2021-07-13 PROCEDURE — 92978 PR IVUS, CORONARY, 1ST VESSEL: ICD-10-PCS | Mod: 26,RC,, | Performed by: STUDENT IN AN ORGANIZED HEALTH CARE EDUCATION/TRAINING PROGRAM

## 2021-07-13 PROCEDURE — C1894 INTRO/SHEATH, NON-LASER: HCPCS | Performed by: STUDENT IN AN ORGANIZED HEALTH CARE EDUCATION/TRAINING PROGRAM

## 2021-07-13 PROCEDURE — C1753 CATH, INTRAVAS ULTRASOUND: HCPCS | Performed by: STUDENT IN AN ORGANIZED HEALTH CARE EDUCATION/TRAINING PROGRAM

## 2021-07-13 PROCEDURE — 92928 PR STENT: ICD-10-PCS | Mod: RC,,, | Performed by: STUDENT IN AN ORGANIZED HEALTH CARE EDUCATION/TRAINING PROGRAM

## 2021-07-13 PROCEDURE — 99153 MOD SED SAME PHYS/QHP EA: CPT | Performed by: STUDENT IN AN ORGANIZED HEALTH CARE EDUCATION/TRAINING PROGRAM

## 2021-07-13 PROCEDURE — 85025 COMPLETE CBC W/AUTO DIFF WBC: CPT | Performed by: HOSPITALIST

## 2021-07-13 PROCEDURE — 63600175 PHARM REV CODE 636 W HCPCS: Performed by: STUDENT IN AN ORGANIZED HEALTH CARE EDUCATION/TRAINING PROGRAM

## 2021-07-13 PROCEDURE — 99226 PR SUBSEQUENT OBSERVATION CARE,LEVEL III: CPT | Mod: ,,, | Performed by: HOSPITALIST

## 2021-07-13 PROCEDURE — 80048 BASIC METABOLIC PNL TOTAL CA: CPT | Performed by: HOSPITALIST

## 2021-07-13 PROCEDURE — G0378 HOSPITAL OBSERVATION PER HR: HCPCS

## 2021-07-13 PROCEDURE — 92978 ENDOLUMINL IVUS OCT C 1ST: CPT | Mod: 26,RC,, | Performed by: STUDENT IN AN ORGANIZED HEALTH CARE EDUCATION/TRAINING PROGRAM

## 2021-07-13 PROCEDURE — 25000003 PHARM REV CODE 250: Performed by: STUDENT IN AN ORGANIZED HEALTH CARE EDUCATION/TRAINING PROGRAM

## 2021-07-13 PROCEDURE — 93458 PR CATH PLACE/CORON ANGIO, IMG SUPER/INTERP,W LEFT HEART VENTRICULOGRAPHY: ICD-10-PCS | Mod: 26,XU,, | Performed by: STUDENT IN AN ORGANIZED HEALTH CARE EDUCATION/TRAINING PROGRAM

## 2021-07-13 PROCEDURE — 27800903 OPTIME MED/SURG SUP & DEVICES OTHER IMPLANTS: Performed by: STUDENT IN AN ORGANIZED HEALTH CARE EDUCATION/TRAINING PROGRAM

## 2021-07-13 PROCEDURE — C1769 GUIDE WIRE: HCPCS | Performed by: STUDENT IN AN ORGANIZED HEALTH CARE EDUCATION/TRAINING PROGRAM

## 2021-07-13 PROCEDURE — C1887 CATHETER, GUIDING: HCPCS | Performed by: STUDENT IN AN ORGANIZED HEALTH CARE EDUCATION/TRAINING PROGRAM

## 2021-07-13 PROCEDURE — 99205 PR OFFICE/OUTPT VISIT, NEW, LEVL V, 60-74 MIN: ICD-10-PCS | Mod: 25,,, | Performed by: INTERNAL MEDICINE

## 2021-07-13 PROCEDURE — 96372 THER/PROPH/DIAG INJ SC/IM: CPT

## 2021-07-13 PROCEDURE — 92928 PRQ TCAT PLMT NTRAC ST 1 LES: CPT | Mod: RC,,, | Performed by: STUDENT IN AN ORGANIZED HEALTH CARE EDUCATION/TRAINING PROGRAM

## 2021-07-13 PROCEDURE — 27100168 OPTIME MED/SURG SUP & DEVICES NON-STERILE SUPPLY: Performed by: STUDENT IN AN ORGANIZED HEALTH CARE EDUCATION/TRAINING PROGRAM

## 2021-07-13 PROCEDURE — 93458 L HRT ARTERY/VENTRICLE ANGIO: CPT | Mod: 26,XU,, | Performed by: STUDENT IN AN ORGANIZED HEALTH CARE EDUCATION/TRAINING PROGRAM

## 2021-07-13 PROCEDURE — 93458 L HRT ARTERY/VENTRICLE ANGIO: CPT | Mod: 59 | Performed by: STUDENT IN AN ORGANIZED HEALTH CARE EDUCATION/TRAINING PROGRAM

## 2021-07-13 PROCEDURE — C9600 PERC DRUG-EL COR STENT SING: HCPCS | Mod: RC | Performed by: STUDENT IN AN ORGANIZED HEALTH CARE EDUCATION/TRAINING PROGRAM

## 2021-07-13 PROCEDURE — 99205 OFFICE O/P NEW HI 60 MIN: CPT | Mod: 25,,, | Performed by: INTERNAL MEDICINE

## 2021-07-13 PROCEDURE — 36415 COLL VENOUS BLD VENIPUNCTURE: CPT | Performed by: HOSPITALIST

## 2021-07-13 PROCEDURE — 99226 PR SUBSEQUENT OBSERVATION CARE,LEVEL III: ICD-10-PCS | Mod: ,,, | Performed by: HOSPITALIST

## 2021-07-13 DEVICE — IMPLANTABLE DEVICE: Type: IMPLANTABLE DEVICE | Site: CORONARY | Status: FUNCTIONAL

## 2021-07-13 RX ORDER — ONDANSETRON 4 MG/1
8 TABLET, ORALLY DISINTEGRATING ORAL EVERY 8 HOURS PRN
Status: DISCONTINUED | OUTPATIENT
Start: 2021-07-13 | End: 2021-07-14 | Stop reason: HOSPADM

## 2021-07-13 RX ORDER — FENTANYL CITRATE 50 UG/ML
INJECTION, SOLUTION INTRAMUSCULAR; INTRAVENOUS
Status: DISCONTINUED | OUTPATIENT
Start: 2021-07-13 | End: 2021-07-13 | Stop reason: HOSPADM

## 2021-07-13 RX ORDER — ACETAMINOPHEN 325 MG/1
650 TABLET ORAL EVERY 4 HOURS PRN
Status: DISCONTINUED | OUTPATIENT
Start: 2021-07-13 | End: 2021-07-14 | Stop reason: HOSPADM

## 2021-07-13 RX ORDER — HYDRALAZINE HYDROCHLORIDE 20 MG/ML
10 INJECTION INTRAMUSCULAR; INTRAVENOUS EVERY 6 HOURS PRN
Status: DISCONTINUED | OUTPATIENT
Start: 2021-07-13 | End: 2021-07-14 | Stop reason: HOSPADM

## 2021-07-13 RX ORDER — SODIUM CHLORIDE 450 MG/100ML
INJECTION, SOLUTION INTRAVENOUS
Status: DISCONTINUED | OUTPATIENT
Start: 2021-07-13 | End: 2021-07-13

## 2021-07-13 RX ORDER — LIDOCAINE HYDROCHLORIDE 10 MG/ML
INJECTION, SOLUTION EPIDURAL; INFILTRATION; INTRACAUDAL; PERINEURAL
Status: DISCONTINUED | OUTPATIENT
Start: 2021-07-13 | End: 2021-07-13 | Stop reason: HOSPADM

## 2021-07-13 RX ORDER — HYDRALAZINE HYDROCHLORIDE 20 MG/ML
INJECTION INTRAMUSCULAR; INTRAVENOUS
Status: DISCONTINUED | OUTPATIENT
Start: 2021-07-13 | End: 2021-07-13 | Stop reason: HOSPADM

## 2021-07-13 RX ORDER — MIDAZOLAM HYDROCHLORIDE 1 MG/ML
INJECTION INTRAMUSCULAR; INTRAVENOUS
Status: DISCONTINUED | OUTPATIENT
Start: 2021-07-13 | End: 2021-07-13 | Stop reason: HOSPADM

## 2021-07-13 RX ORDER — AMLODIPINE BESYLATE 2.5 MG/1
2.5 TABLET ORAL DAILY
Status: DISCONTINUED | OUTPATIENT
Start: 2021-07-13 | End: 2021-07-14

## 2021-07-13 RX ORDER — ASPIRIN 325 MG
TABLET ORAL
Status: DISCONTINUED | OUTPATIENT
Start: 2021-07-13 | End: 2021-07-13 | Stop reason: HOSPADM

## 2021-07-13 RX ADMIN — HEPARIN SODIUM 5000 UNITS: 5000 INJECTION INTRAVENOUS; SUBCUTANEOUS at 09:07

## 2021-07-13 RX ADMIN — HYDROCHLOROTHIAZIDE 12.5 MG: 12.5 TABLET ORAL at 09:07

## 2021-07-13 RX ADMIN — ASPIRIN 81 MG: 81 TABLET, COATED ORAL at 09:07

## 2021-07-13 RX ADMIN — TICAGRELOR 90 MG: 90 TABLET ORAL at 10:07

## 2021-07-13 RX ADMIN — ONDANSETRON 8 MG: 4 TABLET, ORALLY DISINTEGRATING ORAL at 10:07

## 2021-07-13 RX ADMIN — HEPARIN SODIUM 5000 UNITS: 5000 INJECTION INTRAVENOUS; SUBCUTANEOUS at 10:07

## 2021-07-13 RX ADMIN — ATORVASTATIN CALCIUM 40 MG: 40 TABLET, FILM COATED ORAL at 09:07

## 2021-07-13 RX ADMIN — LISINOPRIL 20 MG: 20 TABLET ORAL at 09:07

## 2021-07-14 VITALS
HEIGHT: 71 IN | TEMPERATURE: 98 F | WEIGHT: 224.88 LBS | BODY MASS INDEX: 31.48 KG/M2 | OXYGEN SATURATION: 97 % | HEART RATE: 60 BPM | RESPIRATION RATE: 18 BRPM | SYSTOLIC BLOOD PRESSURE: 137 MMHG | DIASTOLIC BLOOD PRESSURE: 59 MMHG

## 2021-07-14 PROBLEM — Q67.0 FACIAL ASYMMETRY: Status: RESOLVED | Noted: 2021-07-10 | Resolved: 2021-07-14

## 2021-07-14 PROBLEM — R79.89 ELEVATED TROPONIN: Status: RESOLVED | Noted: 2021-07-10 | Resolved: 2021-07-14

## 2021-07-14 PROBLEM — M10.072 ACUTE IDIOPATHIC GOUT OF LEFT FOOT: Status: RESOLVED | Noted: 2021-07-10 | Resolved: 2021-07-14

## 2021-07-14 LAB
ANION GAP SERPL CALCULATED.3IONS-SCNC: 17 MMOL/L (ref 7–16)
BUN SERPL-MCNC: 22 MG/DL (ref 7–18)
BUN/CREAT SERPL: 13 (ref 6–20)
CALCIUM SERPL-MCNC: 9.3 MG/DL (ref 8.5–10.1)
CHLORIDE SERPL-SCNC: 102 MMOL/L (ref 98–107)
CO2 SERPL-SCNC: 24 MMOL/L (ref 21–32)
CREAT SERPL-MCNC: 1.72 MG/DL (ref 0.7–1.3)
GLUCOSE SERPL-MCNC: 159 MG/DL (ref 74–106)
POTASSIUM SERPL-SCNC: 5.2 MMOL/L (ref 3.5–5.1)
SODIUM SERPL-SCNC: 138 MMOL/L (ref 136–145)

## 2021-07-14 PROCEDURE — 25000003 PHARM REV CODE 250: Performed by: HOSPITALIST

## 2021-07-14 PROCEDURE — 36415 COLL VENOUS BLD VENIPUNCTURE: CPT | Performed by: NURSE PRACTITIONER

## 2021-07-14 PROCEDURE — 25000003 PHARM REV CODE 250: Performed by: NURSE PRACTITIONER

## 2021-07-14 PROCEDURE — G0378 HOSPITAL OBSERVATION PER HR: HCPCS

## 2021-07-14 PROCEDURE — 96372 THER/PROPH/DIAG INJ SC/IM: CPT | Mod: 59

## 2021-07-14 PROCEDURE — 99217 PR OBSERVATION CARE DISCHARGE: ICD-10-PCS | Mod: ,,, | Performed by: HOSPITALIST

## 2021-07-14 PROCEDURE — 99217 PR OBSERVATION CARE DISCHARGE: CPT | Mod: ,,, | Performed by: HOSPITALIST

## 2021-07-14 PROCEDURE — 25000003 PHARM REV CODE 250: Performed by: STUDENT IN AN ORGANIZED HEALTH CARE EDUCATION/TRAINING PROGRAM

## 2021-07-14 PROCEDURE — 80048 BASIC METABOLIC PNL TOTAL CA: CPT | Performed by: NURSE PRACTITIONER

## 2021-07-14 PROCEDURE — 63600175 PHARM REV CODE 636 W HCPCS: Performed by: HOSPITALIST

## 2021-07-14 RX ORDER — CARVEDILOL 3.12 MG/1
3.12 TABLET ORAL 2 TIMES DAILY
Status: DISCONTINUED | OUTPATIENT
Start: 2021-07-14 | End: 2021-07-14 | Stop reason: HOSPADM

## 2021-07-14 RX ORDER — CARVEDILOL 3.12 MG/1
3.12 TABLET ORAL 2 TIMES DAILY
Qty: 60 TABLET | Refills: 0 | Status: ON HOLD | OUTPATIENT
Start: 2021-07-14 | End: 2022-01-02 | Stop reason: HOSPADM

## 2021-07-14 RX ORDER — NITROGLYCERIN 0.4 MG/1
0.4 TABLET SUBLINGUAL EVERY 5 MIN PRN
Qty: 30 TABLET | Refills: 0 | Status: SHIPPED | OUTPATIENT
Start: 2021-07-14 | End: 2022-02-24

## 2021-07-14 RX ORDER — ASPIRIN 81 MG/1
81 TABLET ORAL DAILY
Qty: 30 TABLET | Refills: 0 | Status: SHIPPED | OUTPATIENT
Start: 2021-07-15

## 2021-07-14 RX ADMIN — AMLODIPINE BESYLATE 2.5 MG: 2.5 TABLET ORAL at 09:07

## 2021-07-14 RX ADMIN — ASPIRIN 81 MG: 81 TABLET, COATED ORAL at 09:07

## 2021-07-14 RX ADMIN — ONDANSETRON 8 MG: 4 TABLET, ORALLY DISINTEGRATING ORAL at 05:07

## 2021-07-14 RX ADMIN — CARVEDILOL 3.12 MG: 3.12 TABLET, FILM COATED ORAL at 10:07

## 2021-07-14 RX ADMIN — HEPARIN SODIUM 5000 UNITS: 5000 INJECTION INTRAVENOUS; SUBCUTANEOUS at 09:07

## 2021-07-14 RX ADMIN — TICAGRELOR 90 MG: 90 TABLET ORAL at 09:07

## 2021-07-14 RX ADMIN — ATORVASTATIN CALCIUM 40 MG: 40 TABLET, FILM COATED ORAL at 09:07

## 2021-07-15 ENCOUNTER — TELEPHONE (OUTPATIENT)
Dept: FAMILY MEDICINE | Facility: CLINIC | Age: 81
End: 2021-07-15

## 2021-07-16 LAB
POC ACTIVATED CLOTTING TIME K: 241 SEC
POC ACTIVATED CLOTTING TIME K: 267 SEC

## 2021-07-21 ENCOUNTER — OFFICE VISIT (OUTPATIENT)
Dept: FAMILY MEDICINE | Facility: CLINIC | Age: 81
End: 2021-07-21
Payer: COMMERCIAL

## 2021-07-21 VITALS
HEART RATE: 63 BPM | DIASTOLIC BLOOD PRESSURE: 64 MMHG | WEIGHT: 221.19 LBS | TEMPERATURE: 97 F | RESPIRATION RATE: 18 BRPM | OXYGEN SATURATION: 98 % | BODY MASS INDEX: 30.97 KG/M2 | HEIGHT: 71 IN | SYSTOLIC BLOOD PRESSURE: 130 MMHG

## 2021-07-21 DIAGNOSIS — I21.4 NSTEMI (NON-ST ELEVATED MYOCARDIAL INFARCTION): ICD-10-CM

## 2021-07-21 DIAGNOSIS — I10 ESSENTIAL HYPERTENSION: Primary | ICD-10-CM

## 2021-07-21 DIAGNOSIS — N18.31 STAGE 3A CHRONIC KIDNEY DISEASE: ICD-10-CM

## 2021-07-21 DIAGNOSIS — I25.2 HISTORY OF MI (MYOCARDIAL INFARCTION): ICD-10-CM

## 2021-07-21 PROCEDURE — 99213 PR OFFICE/OUTPT VISIT, EST, LEVL III, 20-29 MIN: ICD-10-PCS | Mod: ,,, | Performed by: NURSE PRACTITIONER

## 2021-07-21 PROCEDURE — 99213 OFFICE O/P EST LOW 20 MIN: CPT | Mod: ,,, | Performed by: NURSE PRACTITIONER

## 2021-07-25 ENCOUNTER — HOSPITAL ENCOUNTER (EMERGENCY)
Facility: HOSPITAL | Age: 81
Discharge: HOME | End: 2021-07-25
Attending: EMERGENCY MEDICINE
Payer: COMMERCIAL

## 2021-07-25 VITALS
SYSTOLIC BLOOD PRESSURE: 181 MMHG | BODY MASS INDEX: 31.08 KG/M2 | HEART RATE: 60 BPM | DIASTOLIC BLOOD PRESSURE: 89 MMHG | RESPIRATION RATE: 16 BRPM | TEMPERATURE: 99 F | HEIGHT: 71 IN | OXYGEN SATURATION: 99 % | WEIGHT: 222 LBS

## 2021-07-25 DIAGNOSIS — G45.9 TIA (TRANSIENT ISCHEMIC ATTACK): ICD-10-CM

## 2021-07-25 DIAGNOSIS — Z72.0 TOBACCO USE: ICD-10-CM

## 2021-07-25 DIAGNOSIS — I21.4 NSTEMI (NON-ST ELEVATED MYOCARDIAL INFARCTION): ICD-10-CM

## 2021-07-25 DIAGNOSIS — M10.9 ACUTE GOUTY ARTHRITIS: Primary | ICD-10-CM

## 2021-07-25 DIAGNOSIS — I25.2 HISTORY OF MI (MYOCARDIAL INFARCTION): ICD-10-CM

## 2021-07-25 DIAGNOSIS — I10 ESSENTIAL HYPERTENSION: ICD-10-CM

## 2021-07-25 DIAGNOSIS — E78.5 HYPERLIPIDEMIA: ICD-10-CM

## 2021-07-25 DIAGNOSIS — N18.30 CKD (CHRONIC KIDNEY DISEASE) STAGE 3, GFR 30-59 ML/MIN: ICD-10-CM

## 2021-07-25 LAB
ANION GAP SERPL CALCULATED.3IONS-SCNC: 14 MMOL/L (ref 7–16)
BASOPHILS # BLD AUTO: 0.02 K/UL (ref 0–0.2)
BASOPHILS NFR BLD AUTO: 0.3 % (ref 0–1)
BUN SERPL-MCNC: 25 MG/DL (ref 7–18)
BUN/CREAT SERPL: 17 (ref 6–20)
CALCIUM SERPL-MCNC: 8.7 MG/DL (ref 8.5–10.1)
CHLORIDE SERPL-SCNC: 108 MMOL/L (ref 98–107)
CO2 SERPL-SCNC: 24 MMOL/L (ref 21–32)
CREAT SERPL-MCNC: 1.48 MG/DL (ref 0.7–1.3)
DIFFERENTIAL METHOD BLD: ABNORMAL
EOSINOPHIL # BLD AUTO: 0.1 K/UL (ref 0–0.5)
EOSINOPHIL NFR BLD AUTO: 1.3 % (ref 1–4)
ERYTHROCYTE [DISTWIDTH] IN BLOOD BY AUTOMATED COUNT: 11.9 % (ref 11.5–14.5)
GLUCOSE SERPL-MCNC: 98 MG/DL (ref 74–106)
HCT VFR BLD AUTO: 38.6 % (ref 40–54)
HGB BLD-MCNC: 13.4 G/DL (ref 13.5–18)
IMM GRANULOCYTES # BLD AUTO: 0.02 K/UL (ref 0–0.04)
IMM GRANULOCYTES NFR BLD: 0.3 % (ref 0–0.4)
LYMPHOCYTES # BLD AUTO: 1.69 K/UL (ref 1–4.8)
LYMPHOCYTES NFR BLD AUTO: 22.5 % (ref 27–41)
MCH RBC QN AUTO: 32.6 PG (ref 27–31)
MCHC RBC AUTO-ENTMCNC: 34.7 G/DL (ref 32–36)
MCV RBC AUTO: 93.9 FL (ref 80–96)
MONOCYTES # BLD AUTO: 0.79 K/UL (ref 0–0.8)
MONOCYTES NFR BLD AUTO: 10.5 % (ref 2–6)
MPC BLD CALC-MCNC: 8.7 FL (ref 9.4–12.4)
NEUTROPHILS # BLD AUTO: 4.9 K/UL (ref 1.8–7.7)
NEUTROPHILS NFR BLD AUTO: 65.1 % (ref 53–65)
NRBC # BLD AUTO: 0 X10E3/UL
NRBC, AUTO (.00): 0 %
PLATELET # BLD AUTO: 268 K/UL (ref 150–400)
POTASSIUM SERPL-SCNC: 4.3 MMOL/L (ref 3.5–5.1)
RBC # BLD AUTO: 4.11 M/UL (ref 4.6–6.2)
SODIUM SERPL-SCNC: 142 MMOL/L (ref 136–145)
URATE SERPL-MCNC: 7.2 MG/DL (ref 3.5–7.2)
WBC # BLD AUTO: 7.52 K/UL (ref 4.5–11)

## 2021-07-25 PROCEDURE — 85025 COMPLETE CBC W/AUTO DIFF WBC: CPT | Performed by: EMERGENCY MEDICINE

## 2021-07-25 PROCEDURE — 36415 COLL VENOUS BLD VENIPUNCTURE: CPT | Performed by: EMERGENCY MEDICINE

## 2021-07-25 PROCEDURE — 96372 THER/PROPH/DIAG INJ SC/IM: CPT

## 2021-07-25 PROCEDURE — 99283 EMERGENCY DEPT VISIT LOW MDM: CPT | Mod: ,,, | Performed by: EMERGENCY MEDICINE

## 2021-07-25 PROCEDURE — 80048 BASIC METABOLIC PNL TOTAL CA: CPT | Performed by: EMERGENCY MEDICINE

## 2021-07-25 PROCEDURE — 63600175 PHARM REV CODE 636 W HCPCS: Performed by: EMERGENCY MEDICINE

## 2021-07-25 PROCEDURE — 84550 ASSAY OF BLOOD/URIC ACID: CPT | Performed by: EMERGENCY MEDICINE

## 2021-07-25 PROCEDURE — 99284 EMERGENCY DEPT VISIT MOD MDM: CPT

## 2021-07-25 PROCEDURE — 99283 PR EMERGENCY DEPT VISIT,LEVEL III: ICD-10-PCS | Mod: ,,, | Performed by: EMERGENCY MEDICINE

## 2021-07-25 RX ORDER — DEXAMETHASONE SODIUM PHOSPHATE 4 MG/ML
8 INJECTION, SOLUTION INTRA-ARTICULAR; INTRALESIONAL; INTRAMUSCULAR; INTRAVENOUS; SOFT TISSUE
Status: COMPLETED | OUTPATIENT
Start: 2021-07-25 | End: 2021-07-25

## 2021-07-25 RX ORDER — KETOROLAC TROMETHAMINE 30 MG/ML
15 INJECTION, SOLUTION INTRAMUSCULAR; INTRAVENOUS
Status: COMPLETED | OUTPATIENT
Start: 2021-07-25 | End: 2021-07-25

## 2021-07-25 RX ORDER — COLCHICINE 0.6 MG/1
TABLET ORAL
Qty: 30 TABLET | Refills: 11 | Status: SHIPPED | OUTPATIENT
Start: 2021-07-25 | End: 2022-01-26 | Stop reason: SDUPTHER

## 2021-07-25 RX ORDER — KETOROLAC TROMETHAMINE 15 MG/ML
15 INJECTION, SOLUTION INTRAMUSCULAR; INTRAVENOUS
Status: DISCONTINUED | OUTPATIENT
Start: 2021-07-25 | End: 2021-07-25

## 2021-07-25 RX ADMIN — DEXAMETHASONE SODIUM PHOSPHATE 8 MG: 4 INJECTION, SOLUTION INTRAMUSCULAR; INTRAVENOUS at 11:07

## 2021-07-25 RX ADMIN — KETOROLAC TROMETHAMINE 15 MG: 30 INJECTION, SOLUTION INTRAMUSCULAR at 12:07

## 2021-08-09 ENCOUNTER — OFFICE VISIT (OUTPATIENT)
Dept: CARDIOLOGY | Facility: CLINIC | Age: 81
End: 2021-08-09
Payer: COMMERCIAL

## 2021-08-09 VITALS
OXYGEN SATURATION: 98 % | SYSTOLIC BLOOD PRESSURE: 120 MMHG | BODY MASS INDEX: 30.38 KG/M2 | HEIGHT: 71 IN | HEART RATE: 62 BPM | DIASTOLIC BLOOD PRESSURE: 80 MMHG | WEIGHT: 217 LBS

## 2021-08-09 DIAGNOSIS — I10 ESSENTIAL HYPERTENSION: ICD-10-CM

## 2021-08-09 DIAGNOSIS — R07.9 CHEST PAIN, UNSPECIFIED TYPE: Primary | ICD-10-CM

## 2021-08-09 DIAGNOSIS — I25.10 CORONARY ARTERY DISEASE INVOLVING NATIVE CORONARY ARTERY OF NATIVE HEART WITHOUT ANGINA PECTORIS: ICD-10-CM

## 2021-08-09 PROCEDURE — 99213 PR OFFICE/OUTPT VISIT, EST, LEVL III, 20-29 MIN: ICD-10-PCS | Mod: S$PBB,,, | Performed by: INTERNAL MEDICINE

## 2021-08-09 PROCEDURE — 99213 OFFICE O/P EST LOW 20 MIN: CPT | Mod: S$PBB,,, | Performed by: INTERNAL MEDICINE

## 2021-08-09 PROCEDURE — 99214 OFFICE O/P EST MOD 30 MIN: CPT | Mod: PBBFAC | Performed by: INTERNAL MEDICINE

## 2021-08-13 ENCOUNTER — DOCUMENTATION ONLY (OUTPATIENT)
Dept: CARDIOLOGY | Facility: CLINIC | Age: 81
End: 2021-08-13

## 2021-09-04 PROBLEM — I25.10 CORONARY ARTERY DISEASE INVOLVING NATIVE CORONARY ARTERY OF NATIVE HEART WITHOUT ANGINA PECTORIS: Status: ACTIVE | Noted: 2021-09-04

## 2021-09-04 PROBLEM — R07.9 CHEST PAIN: Status: ACTIVE | Noted: 2021-09-04

## 2021-09-28 ENCOUNTER — OFFICE VISIT (OUTPATIENT)
Dept: UROLOGY | Facility: CLINIC | Age: 81
End: 2021-09-28
Payer: COMMERCIAL

## 2021-09-28 VITALS
DIASTOLIC BLOOD PRESSURE: 100 MMHG | HEIGHT: 72 IN | SYSTOLIC BLOOD PRESSURE: 165 MMHG | BODY MASS INDEX: 29.8 KG/M2 | WEIGHT: 220 LBS | HEART RATE: 63 BPM

## 2021-09-28 DIAGNOSIS — N13.8 BENIGN PROSTATIC HYPERPLASIA WITH URINARY OBSTRUCTION: ICD-10-CM

## 2021-09-28 DIAGNOSIS — I10 HYPERTENSION, UNSPECIFIED TYPE: ICD-10-CM

## 2021-09-28 DIAGNOSIS — R97.20 ELEVATED PSA: Primary | ICD-10-CM

## 2021-09-28 DIAGNOSIS — N40.1 BENIGN PROSTATIC HYPERPLASIA WITH URINARY OBSTRUCTION: ICD-10-CM

## 2021-09-28 DIAGNOSIS — N32.0 BLADDER OUTLET OBSTRUCTION: ICD-10-CM

## 2021-09-28 PROCEDURE — 99214 PR OFFICE/OUTPT VISIT, EST, LEVL IV, 30-39 MIN: ICD-10-PCS | Mod: S$PBB,,, | Performed by: UROLOGY

## 2021-09-28 PROCEDURE — 99214 OFFICE O/P EST MOD 30 MIN: CPT | Mod: S$PBB,,, | Performed by: UROLOGY

## 2021-09-28 PROCEDURE — 99214 OFFICE O/P EST MOD 30 MIN: CPT | Mod: PBBFAC | Performed by: UROLOGY

## 2021-11-09 ENCOUNTER — IMMUNIZATION (OUTPATIENT)
Dept: FAMILY MEDICINE | Facility: CLINIC | Age: 81
End: 2021-11-09
Payer: MEDICARE

## 2021-11-09 DIAGNOSIS — Z23 NEED FOR VACCINATION: Primary | ICD-10-CM

## 2021-11-09 PROCEDURE — 91306 COVID-19, MRNA, LNP-S, PF, 100 MCG/0.25 ML DOSE VACCINE (MODERNA BOOSTER): ICD-10-PCS | Mod: ,,, | Performed by: NURSE PRACTITIONER

## 2021-11-09 PROCEDURE — 0064A COVID-19, MRNA, LNP-S, PF, 100 MCG/0.25 ML DOSE VACCINE (MODERNA BOOSTER): CPT | Mod: ,,, | Performed by: NURSE PRACTITIONER

## 2021-11-09 PROCEDURE — 91306 COVID-19, MRNA, LNP-S, PF, 100 MCG/0.25 ML DOSE VACCINE (MODERNA BOOSTER): CPT | Mod: ,,, | Performed by: NURSE PRACTITIONER

## 2021-11-09 PROCEDURE — 0064A COVID-19, MRNA, LNP-S, PF, 100 MCG/0.25 ML DOSE VACCINE (MODERNA BOOSTER): ICD-10-PCS | Mod: ,,, | Performed by: NURSE PRACTITIONER

## 2021-12-30 ENCOUNTER — HOSPITAL ENCOUNTER (INPATIENT)
Facility: HOSPITAL | Age: 81
LOS: 3 days | Discharge: HOME | DRG: 287 | End: 2022-01-02
Attending: FAMILY MEDICINE | Admitting: HOSPITALIST
Payer: COMMERCIAL

## 2021-12-30 DIAGNOSIS — I21.4 NSTEMI (NON-ST ELEVATED MYOCARDIAL INFARCTION): Primary | ICD-10-CM

## 2021-12-30 DIAGNOSIS — G45.9 TIA (TRANSIENT ISCHEMIC ATTACK): ICD-10-CM

## 2021-12-30 DIAGNOSIS — E78.5 HYPERLIPIDEMIA: ICD-10-CM

## 2021-12-30 DIAGNOSIS — M10.9 GOUT, UNSPECIFIED CAUSE, UNSPECIFIED CHRONICITY, UNSPECIFIED SITE: ICD-10-CM

## 2021-12-30 DIAGNOSIS — I10 ESSENTIAL HYPERTENSION: ICD-10-CM

## 2021-12-30 DIAGNOSIS — N32.0 BLADDER OUTLET OBSTRUCTION: ICD-10-CM

## 2021-12-30 DIAGNOSIS — I25.2 HISTORY OF MI (MYOCARDIAL INFARCTION): ICD-10-CM

## 2021-12-30 DIAGNOSIS — Z72.0 TOBACCO USE: ICD-10-CM

## 2021-12-30 DIAGNOSIS — R07.9 CHEST PAIN: ICD-10-CM

## 2021-12-30 DIAGNOSIS — N40.0 BPH (BENIGN PROSTATIC HYPERPLASIA): ICD-10-CM

## 2021-12-30 DIAGNOSIS — I20.0 UNSTABLE ANGINA PECTORIS: ICD-10-CM

## 2021-12-30 DIAGNOSIS — N18.30 CKD (CHRONIC KIDNEY DISEASE) STAGE 3, GFR 30-59 ML/MIN: ICD-10-CM

## 2021-12-30 DIAGNOSIS — N18.31 STAGE 3A CHRONIC KIDNEY DISEASE: ICD-10-CM

## 2021-12-30 DIAGNOSIS — E78.5 HYPERLIPIDEMIA, UNSPECIFIED HYPERLIPIDEMIA TYPE: ICD-10-CM

## 2021-12-30 DIAGNOSIS — I25.10 CORONARY ARTERY DISEASE INVOLVING NATIVE CORONARY ARTERY OF NATIVE HEART WITHOUT ANGINA PECTORIS: ICD-10-CM

## 2021-12-30 DIAGNOSIS — R06.02 SOB (SHORTNESS OF BREATH): ICD-10-CM

## 2021-12-30 DIAGNOSIS — M10.9 GOUT: ICD-10-CM

## 2021-12-30 DIAGNOSIS — N40.0 BENIGN PROSTATIC HYPERPLASIA, UNSPECIFIED WHETHER LOWER URINARY TRACT SYMPTOMS PRESENT: ICD-10-CM

## 2021-12-30 DIAGNOSIS — R97.20 ELEVATED PSA: ICD-10-CM

## 2021-12-30 LAB
ALBUMIN SERPL BCP-MCNC: 3.5 G/DL (ref 3.5–5)
ALBUMIN/GLOB SERPL: 1 {RATIO}
ALP SERPL-CCNC: 97 U/L (ref 45–115)
ALT SERPL W P-5'-P-CCNC: 24 U/L (ref 16–61)
ANION GAP SERPL CALCULATED.3IONS-SCNC: 12 MMOL/L (ref 7–16)
APTT PPP: 28.3 SECONDS (ref 25.2–37.3)
APTT PPP: >200 SECONDS (ref 25.2–37.3)
AST SERPL W P-5'-P-CCNC: 34 U/L (ref 15–37)
BACTERIA #/AREA URNS HPF: NORMAL /HPF
BASOPHILS # BLD AUTO: 0.03 K/UL (ref 0–0.2)
BASOPHILS NFR BLD AUTO: 0.5 % (ref 0–1)
BILIRUB SERPL-MCNC: 1.1 MG/DL (ref 0–1.2)
BILIRUB UR QL STRIP: NEGATIVE
BUN SERPL-MCNC: 19 MG/DL (ref 7–18)
BUN/CREAT SERPL: 13 (ref 6–20)
CALCIUM SERPL-MCNC: 8.8 MG/DL (ref 8.5–10.1)
CHLORIDE SERPL-SCNC: 109 MMOL/L (ref 98–107)
CLARITY UR: CLEAR
CO2 SERPL-SCNC: 23 MMOL/L (ref 21–32)
COLOR UR: ABNORMAL
CREAT SERPL-MCNC: 1.43 MG/DL (ref 0.7–1.3)
DIFFERENTIAL METHOD BLD: ABNORMAL
EOSINOPHIL # BLD AUTO: 0.08 K/UL (ref 0–0.5)
EOSINOPHIL NFR BLD AUTO: 1.3 % (ref 1–4)
ERYTHROCYTE [DISTWIDTH] IN BLOOD BY AUTOMATED COUNT: 12.5 % (ref 11.5–14.5)
FLUAV AG UPPER RESP QL IA.RAPID: NEGATIVE
FLUBV AG UPPER RESP QL IA.RAPID: NEGATIVE
GLOBULIN SER-MCNC: 3.5 G/DL (ref 2–4)
GLUCOSE SERPL-MCNC: 121 MG/DL (ref 74–106)
GLUCOSE UR STRIP-MCNC: NEGATIVE MG/DL
HCT VFR BLD AUTO: 39.8 % (ref 40–54)
HGB BLD-MCNC: 14.2 G/DL (ref 13.5–18)
IMM GRANULOCYTES # BLD AUTO: 0.06 K/UL (ref 0–0.04)
IMM GRANULOCYTES NFR BLD: 1 % (ref 0–0.4)
INR BLD: 1.04 (ref 0.9–1.1)
KETONES UR STRIP-SCNC: NEGATIVE MG/DL
LEUKOCYTE ESTERASE UR QL STRIP: NEGATIVE
LYMPHOCYTES # BLD AUTO: 1.96 K/UL (ref 1–4.8)
LYMPHOCYTES NFR BLD AUTO: 31.4 % (ref 27–41)
MCH RBC QN AUTO: 32.1 PG (ref 27–31)
MCHC RBC AUTO-ENTMCNC: 35.7 G/DL (ref 32–36)
MCV RBC AUTO: 89.8 FL (ref 80–96)
MONOCYTES # BLD AUTO: 0.54 K/UL (ref 0–0.8)
MONOCYTES NFR BLD AUTO: 8.6 % (ref 2–6)
MPC BLD CALC-MCNC: 9 FL (ref 9.4–12.4)
MUCOUS THREADS #/AREA URNS HPF: NORMAL /HPF
NEUTROPHILS # BLD AUTO: 3.58 K/UL (ref 1.8–7.7)
NEUTROPHILS NFR BLD AUTO: 57.2 % (ref 53–65)
NITRITE UR QL STRIP: NEGATIVE
NRBC # BLD AUTO: 0 X10E3/UL
NRBC, AUTO (.00): 0 %
NT-PROBNP SERPL-MCNC: 2316 PG/ML (ref 1–450)
PH UR STRIP: 6 PH UNITS
PLATELET # BLD AUTO: 210 K/UL (ref 150–400)
POTASSIUM SERPL-SCNC: 4.2 MMOL/L (ref 3.5–5.1)
PROT SERPL-MCNC: 7 G/DL (ref 6.4–8.2)
PROT UR QL STRIP: NEGATIVE
PROTHROMBIN TIME: 13.6 SECONDS (ref 11.7–14.7)
RBC # BLD AUTO: 4.43 M/UL (ref 4.6–6.2)
RBC # UR STRIP: ABNORMAL /UL
RBC #/AREA URNS HPF: NORMAL /HPF
SARS-COV+SARS-COV-2 AG RESP QL IA.RAPID: NEGATIVE
SODIUM SERPL-SCNC: 140 MMOL/L (ref 136–145)
SP GR UR STRIP: <=1.005
SQUAMOUS #/AREA URNS LPF: NORMAL /LPF
TRICHOMONAS #/AREA URNS HPF: NORMAL /HPF
TROPONIN I SERPL HS-MCNC: 261.6 PG/ML
TROPONIN I SERPL HS-MCNC: 288.1 PG/ML
TROPONIN I SERPL HS-MCNC: 7.5 PG/ML
UROBILINOGEN UR STRIP-ACNC: 0.2 MG/DL
WBC # BLD AUTO: 6.25 K/UL (ref 4.5–11)
WBC #/AREA URNS HPF: NORMAL /HPF
YEAST #/AREA URNS HPF: NORMAL /HPF

## 2021-12-30 PROCEDURE — 93010 EKG 12-LEAD: ICD-10-PCS | Mod: 77,,, | Performed by: INTERNAL MEDICINE

## 2021-12-30 PROCEDURE — 96375 TX/PRO/DX INJ NEW DRUG ADDON: CPT | Performed by: FAMILY MEDICINE

## 2021-12-30 PROCEDURE — 93010 ELECTROCARDIOGRAM REPORT: CPT | Mod: ,,, | Performed by: HOSPITALIST

## 2021-12-30 PROCEDURE — 83880 ASSAY OF NATRIURETIC PEPTIDE: CPT | Performed by: FAMILY MEDICINE

## 2021-12-30 PROCEDURE — 93010 EKG 12-LEAD: ICD-10-PCS | Mod: ,,, | Performed by: HOSPITALIST

## 2021-12-30 PROCEDURE — 93005 ELECTROCARDIOGRAM TRACING: CPT

## 2021-12-30 PROCEDURE — 93010 ELECTROCARDIOGRAM REPORT: CPT | Mod: 77,,, | Performed by: INTERNAL MEDICINE

## 2021-12-30 PROCEDURE — 25000003 PHARM REV CODE 250: Performed by: FAMILY MEDICINE

## 2021-12-30 PROCEDURE — 63600175 PHARM REV CODE 636 W HCPCS: Performed by: FAMILY MEDICINE

## 2021-12-30 PROCEDURE — 81001 URINALYSIS AUTO W/SCOPE: CPT | Performed by: HOSPITALIST

## 2021-12-30 PROCEDURE — 63600175 PHARM REV CODE 636 W HCPCS: Performed by: HOSPITALIST

## 2021-12-30 PROCEDURE — 99285 EMERGENCY DEPT VISIT HI MDM: CPT | Mod: ,,, | Performed by: FAMILY MEDICINE

## 2021-12-30 PROCEDURE — 85610 PROTHROMBIN TIME: CPT | Performed by: HOSPITALIST

## 2021-12-30 PROCEDURE — 36415 COLL VENOUS BLD VENIPUNCTURE: CPT | Performed by: FAMILY MEDICINE

## 2021-12-30 PROCEDURE — 11000001 HC ACUTE MED/SURG PRIVATE ROOM

## 2021-12-30 PROCEDURE — 25000242 PHARM REV CODE 250 ALT 637 W/ HCPCS: Performed by: FAMILY MEDICINE

## 2021-12-30 PROCEDURE — 99285 EMERGENCY DEPT VISIT HI MDM: CPT | Mod: 25

## 2021-12-30 PROCEDURE — 99223 PR INITIAL HOSPITAL CARE,LEVL III: ICD-10-PCS | Mod: ,,, | Performed by: HOSPITALIST

## 2021-12-30 PROCEDURE — 99223 1ST HOSP IP/OBS HIGH 75: CPT | Mod: ,,, | Performed by: HOSPITALIST

## 2021-12-30 PROCEDURE — 99285 PR EMERGENCY DEPT VISIT,LEVEL V: ICD-10-PCS | Mod: ,,, | Performed by: FAMILY MEDICINE

## 2021-12-30 PROCEDURE — G0378 HOSPITAL OBSERVATION PER HR: HCPCS

## 2021-12-30 PROCEDURE — 25000003 PHARM REV CODE 250: Performed by: HOSPITALIST

## 2021-12-30 PROCEDURE — 84484 ASSAY OF TROPONIN QUANT: CPT | Mod: 91 | Performed by: FAMILY MEDICINE

## 2021-12-30 PROCEDURE — 96374 THER/PROPH/DIAG INJ IV PUSH: CPT | Performed by: FAMILY MEDICINE

## 2021-12-30 PROCEDURE — 87428 SARSCOV & INF VIR A&B AG IA: CPT | Performed by: FAMILY MEDICINE

## 2021-12-30 PROCEDURE — 80053 COMPREHEN METABOLIC PANEL: CPT | Performed by: FAMILY MEDICINE

## 2021-12-30 PROCEDURE — 85025 COMPLETE CBC W/AUTO DIFF WBC: CPT | Performed by: FAMILY MEDICINE

## 2021-12-30 PROCEDURE — 84484 ASSAY OF TROPONIN QUANT: CPT | Performed by: HOSPITALIST

## 2021-12-30 PROCEDURE — 85730 THROMBOPLASTIN TIME PARTIAL: CPT | Performed by: HOSPITALIST

## 2021-12-30 RX ORDER — HYDRALAZINE HYDROCHLORIDE 20 MG/ML
20 INJECTION INTRAMUSCULAR; INTRAVENOUS
Status: COMPLETED | OUTPATIENT
Start: 2021-12-30 | End: 2021-12-30

## 2021-12-30 RX ORDER — ASPIRIN 325 MG
325 TABLET ORAL
Status: DISCONTINUED | OUTPATIENT
Start: 2021-12-30 | End: 2021-12-30

## 2021-12-30 RX ORDER — SODIUM CHLORIDE 0.9 % (FLUSH) 0.9 %
10 SYRINGE (ML) INJECTION
Status: DISCONTINUED | OUTPATIENT
Start: 2021-12-30 | End: 2022-01-02 | Stop reason: HOSPADM

## 2021-12-30 RX ORDER — POLYETHYLENE GLYCOL 3350 17 G/17G
17 POWDER, FOR SOLUTION ORAL DAILY
Status: DISCONTINUED | OUTPATIENT
Start: 2021-12-31 | End: 2022-01-02 | Stop reason: HOSPADM

## 2021-12-30 RX ORDER — PANTOPRAZOLE SODIUM 40 MG/1
40 TABLET, DELAYED RELEASE ORAL DAILY
Status: DISCONTINUED | OUTPATIENT
Start: 2021-12-31 | End: 2022-01-02 | Stop reason: HOSPADM

## 2021-12-30 RX ORDER — ATORVASTATIN CALCIUM 80 MG/1
80 TABLET, FILM COATED ORAL NIGHTLY
Status: DISCONTINUED | OUTPATIENT
Start: 2021-12-30 | End: 2022-01-02 | Stop reason: HOSPADM

## 2021-12-30 RX ORDER — NITROGLYCERIN 0.4 MG/1
0.4 TABLET SUBLINGUAL EVERY 5 MIN PRN
Status: DISCONTINUED | OUTPATIENT
Start: 2021-12-30 | End: 2022-01-02 | Stop reason: HOSPADM

## 2021-12-30 RX ORDER — FUROSEMIDE 20 MG/1
20 TABLET ORAL DAILY
Qty: 15 TABLET | Refills: 0 | Status: SHIPPED | OUTPATIENT
Start: 2021-12-30 | End: 2021-12-30 | Stop reason: CLARIF

## 2021-12-30 RX ORDER — ASPIRIN 325 MG
325 TABLET ORAL
Status: COMPLETED | OUTPATIENT
Start: 2021-12-30 | End: 2021-12-30

## 2021-12-30 RX ORDER — ACETAMINOPHEN 500 MG
1000 TABLET ORAL EVERY 8 HOURS PRN
Status: DISCONTINUED | OUTPATIENT
Start: 2021-12-30 | End: 2022-01-02 | Stop reason: HOSPADM

## 2021-12-30 RX ORDER — PROMETHAZINE HYDROCHLORIDE 25 MG/1
25 TABLET ORAL EVERY 6 HOURS PRN
Status: DISCONTINUED | OUTPATIENT
Start: 2021-12-30 | End: 2022-01-02 | Stop reason: HOSPADM

## 2021-12-30 RX ORDER — FUROSEMIDE 10 MG/ML
40 INJECTION INTRAMUSCULAR; INTRAVENOUS
Status: COMPLETED | OUTPATIENT
Start: 2021-12-30 | End: 2021-12-30

## 2021-12-30 RX ORDER — IBUPROFEN 200 MG
16 TABLET ORAL
Status: DISCONTINUED | OUTPATIENT
Start: 2021-12-30 | End: 2022-01-02 | Stop reason: HOSPADM

## 2021-12-30 RX ORDER — CARVEDILOL 3.12 MG/1
3.12 TABLET ORAL 2 TIMES DAILY
Status: DISCONTINUED | OUTPATIENT
Start: 2021-12-30 | End: 2021-12-31

## 2021-12-30 RX ORDER — NALOXONE HCL 0.4 MG/ML
0.02 VIAL (ML) INJECTION
Status: DISCONTINUED | OUTPATIENT
Start: 2021-12-30 | End: 2022-01-02 | Stop reason: HOSPADM

## 2021-12-30 RX ORDER — NITROGLYCERIN 20 MG/100ML
0-400 INJECTION INTRAVENOUS CONTINUOUS
Status: DISCONTINUED | OUTPATIENT
Start: 2021-12-30 | End: 2021-12-31

## 2021-12-30 RX ORDER — ASPIRIN 81 MG/1
81 TABLET ORAL DAILY
Status: DISCONTINUED | OUTPATIENT
Start: 2021-12-31 | End: 2022-01-02 | Stop reason: HOSPADM

## 2021-12-30 RX ORDER — GLUCAGON 1 MG
1 KIT INJECTION
Status: DISCONTINUED | OUTPATIENT
Start: 2021-12-30 | End: 2022-01-02 | Stop reason: HOSPADM

## 2021-12-30 RX ORDER — COLCHICINE 0.6 MG/1
0.6 TABLET, FILM COATED ORAL DAILY
Status: DISCONTINUED | OUTPATIENT
Start: 2021-12-31 | End: 2022-01-02 | Stop reason: HOSPADM

## 2021-12-30 RX ORDER — ONDANSETRON 2 MG/ML
4 INJECTION INTRAMUSCULAR; INTRAVENOUS EVERY 8 HOURS PRN
Status: DISCONTINUED | OUTPATIENT
Start: 2021-12-30 | End: 2021-12-31

## 2021-12-30 RX ORDER — HEPARIN SODIUM,PORCINE/D5W 25000/250
0-40 INTRAVENOUS SOLUTION INTRAVENOUS CONTINUOUS
Status: DISCONTINUED | OUTPATIENT
Start: 2021-12-30 | End: 2022-01-01

## 2021-12-30 RX ADMIN — ASPIRIN 325 MG ORAL TABLET 325 MG: 325 PILL ORAL at 11:12

## 2021-12-30 RX ADMIN — NITROGLYCERIN 2.5 MCG/MIN: 20 INJECTION INTRAVENOUS at 05:12

## 2021-12-30 RX ADMIN — NITROGLYCERIN 1 INCH: 20 OINTMENT TOPICAL at 03:12

## 2021-12-30 RX ADMIN — HEPARIN SODIUM AND DEXTROSE 34.6 UNITS/KG/HR: 10000; 5 INJECTION INTRAVENOUS at 04:12

## 2021-12-30 RX ADMIN — NITROGLYCERIN 0.4 MG: 0.4 TABLET SUBLINGUAL at 11:12

## 2021-12-30 RX ADMIN — FUROSEMIDE 40 MG: 10 INJECTION INTRAMUSCULAR; INTRAVENOUS at 12:12

## 2021-12-30 RX ADMIN — NITROGLYCERIN 0.4 MG: 0.4 TABLET SUBLINGUAL at 03:12

## 2021-12-30 RX ADMIN — HYDRALAZINE HYDROCHLORIDE 20 MG: 20 INJECTION INTRAMUSCULAR; INTRAVENOUS at 02:12

## 2021-12-30 NOTE — Clinical Note
50 ml of contrast were injected throughout the case. 50 mL of contrast was the total wasted during the case. 100 mL was the total amount used during the case.

## 2021-12-30 NOTE — Clinical Note
The catheter was inserted over the wire into the ostium   left main. An angiography was performed of the left coronary arteries. Multiple views were taken.

## 2021-12-30 NOTE — Clinical Note
Diagnosis: Chest pain [802976]   Future Attending Provider: MATT ANDERSEN [[id number]]   Admitting Provider:: MATT ANDERSEN [[id number]]

## 2021-12-30 NOTE — Clinical Note
A handoff report was given to ELLY Gallo RN 6N, right groin soft and without hematoma, right distal pulse 2+, transferred to bed without difficulty.

## 2021-12-30 NOTE — Clinical Note
An angiography of the  right femoral artery was performed to evaluate for the placement of a closure device.

## 2021-12-30 NOTE — Clinical Note
The right groin was prepped. The site was prepped with ChloraPrep. The site was clipped. The patient was draped. The patient was positioned supine. The patient was secured using safety straps and with ulnar pads.

## 2021-12-30 NOTE — ED PROVIDER NOTES
Encounter Date: 12/30/2021    SCRIBE #1 NOTE: I, Grace Villareal, am scribing for, and in the presence of,  Apurva Linares MD. I have scribed the entire note.       History     Chief Complaint   Patient presents with    Chest Pain     Patient is an 81 year old male who presents to the emergency department with his daughter due to intermittent episodes of chest pain that onset 2 days prior to arrival. Patient states that as of the past 2 days he has experienced multiple episodes of chest pain and shortness of breath. This shortness of breath does not have any worsening nor relieving factors, including lying flat. He also denies any recent cough or other symptoms of illness. Patient reports that he had stents placed by Dr. Torres (cardiologist) in July and currently takes Nitroglycerin and Asprin daily. He denies any history of strokes, diabetes, or any other significnat medical history.     The history is provided by the patient. No  was used.     Review of patient's allergies indicates:   Allergen Reactions    Lisinopril Swelling     Past Medical History:   Diagnosis Date    Benign localized prostatic hyperplasia with lower urinary tract symptoms (LUTS)     Coronary artery disease     Elevated PSA     Hypercholesterolemia     Hyperlipidemia     Hypertension     Obesity (BMI 30-39.9)      Past Surgical History:   Procedure Laterality Date    ANGIOGRAM, CORONARY, WITH LEFT HEART CATHETERIZATION N/A 12/31/2021    Procedure: ANGIOGRAM,CORONARY,WITH LEFT HEART CATHETERIZATION;  Surgeon: Mahad Villaseñor DO;  Location: [facility] CATH LAB;  Service: Cardiology;  Laterality: N/A;    BIOPSY WITH TRANSRECTAL ULTRASOUND (TRUS) GUIDANCE Bilateral 03/07/2018    CHOLECYSTECTOMY      LEFT HEART CATHETERIZATION Left 7/13/2021    Procedure: Left heart cath;  Surgeon: Philip Torres MD;  Location: [facility] CATH LAB;  Service: Cardiology;  Laterality: Left;     Family History   Problem  Relation Age of Onset    No Known Problems Mother     No Known Problems Father     No Known Problems Sister     No Known Problems Brother     No Known Problems Daughter     No Known Problems Son      Social History     Tobacco Use    Smoking status: Former Smoker     Packs/day: 0.50     Quit date: 1988     Years since quittin.5    Smokeless tobacco: Former User     Types: Chew   Substance Use Topics    Alcohol use: Yes     Comment: can of beer one or twice a month    Drug use: Never     Review of Systems   HENT: Negative.    Eyes: Negative.    Respiratory: Positive for shortness of breath. Negative for cough.    Cardiovascular: Positive for chest pain.   Gastrointestinal: Negative.    Genitourinary: Negative.    Musculoskeletal: Negative.    Skin: Negative.    Allergic/Immunologic: Negative.    Neurological: Negative.    Hematological: Negative.    Psychiatric/Behavioral: Negative.    All other systems reviewed and are negative.      Physical Exam     Initial Vitals [21 1057]   BP Pulse Resp Temp SpO2   (!) 214/99 69 (!) 22 99 °F (37.2 °C) 100 %      MAP       --         Physical Exam    Vitals reviewed.  Constitutional: He appears well-developed and well-nourished. No distress.   HENT:   Head: Normocephalic.   Eyes: Conjunctivae are normal. Pupils are equal, round, and reactive to light.   Cardiovascular: Normal rate, regular rhythm, normal heart sounds and intact distal pulses.   Pulmonary/Chest: Breath sounds normal.   Abdominal: Abdomen is soft. Bowel sounds are normal.     Neurological: He is alert and oriented to person, place, and time.   Skin: Skin is warm and dry.   Psychiatric: He has a normal mood and affect.         ED Course   Procedures  Labs Reviewed   COMPREHENSIVE METABOLIC PANEL - Abnormal; Notable for the following components:       Result Value    Chloride 109 (*)     Glucose 121 (*)     BUN 19 (*)     Creatinine 1.43 (*)     eGFR 50 (*)     All other components within  normal limits   NT-PRO NATRIURETIC PEPTIDE - Abnormal; Notable for the following components:    ProBNP 2,316 (*)     All other components within normal limits   CBC WITH DIFFERENTIAL - Abnormal; Notable for the following components:    RBC 4.43 (*)     Hematocrit 39.8 (*)     MCH 32.1 (*)     MPV 9.0 (*)     Monocytes % 8.6 (*)     Immature Granulocytes % 1.0 (*)     Immature Granulocytes, Absolute 0.06 (*)     All other components within normal limits   TROPONIN I - Abnormal; Notable for the following components:    Troponin I High Sensitivity 288.1 (*)     All other components within normal limits   TROPONIN I - Abnormal; Notable for the following components:    Troponin I High Sensitivity 261.6 (*)     All other components within normal limits   URINALYSIS, REFLEX TO URINE CULTURE - Abnormal; Notable for the following components:    Blood, UA Trace-Intact (*)     All other components within normal limits   TROPONIN I - Normal   SARS-COV2 (COVID) W/ FLU ANTIGEN - Normal    Narrative:     Negative SARS-CoV results should not be used as the sole basis for treatment or patient management decisions; negative results should be considered in the context of a patient's recent exposures, history and the presene of clinical signs and symptoms consistent with COVID-19.  Negative results should be treated as presumptive and confirmed by molecular assay, if necessary for patient management.   APTT - Normal   PROTIME-INR - Normal   URINALYSIS, MICROSCOPIC - Normal   CBC W/ AUTO DIFFERENTIAL    Narrative:     The following orders were created for panel order CBC auto differential.  Procedure                               Abnormality         Status                     ---------                               -----------         ------                     CBC with Differential[[id number]]        Abnormal            Final result               Manual Differential[[id number]]                                                            Please view results for these tests on the individual orders.        ECG Results          EKG 12-lead (In process)  Result time 12/30/21 14:31:49    In process by Interface, Lab In [facility] (12/30/21 14:31:49)                 Narrative:    Test Reason : R07.9,    Vent. Rate : 069 BPM     Atrial Rate : 000 BPM     P-R Int : 266 ms          QRS Dur : 130 ms      QT Int : 422 ms       P-R-T Axes : 052 -13 080 degrees     QTc Int : 438 ms    Sinus rhythm  with 1st degree A-V block  IV conduction defect  Lateral ST abnormality  is nonspecific  Abnormal ECG      Referred By: [email or web address]   SELF           Confirmed By:                             Imaging Results          X-Ray Chest AP Portable (Final result)  Result time 12/30/21 11:29:44    Final result by Casey Lorenzo MD (12/30/21 11:29:44)                 Impression:      Cardiomegaly and evidence of CHF      Electronically signed by: Casey Lorenzo  Date:    12/30/2021  Time:    11:29             Narrative:    EXAMINATION:  XR CHEST AP PORTABLE    CLINICAL HISTORY:  Chest Pain;.    TECHNIQUE:  Chest x-ray AP portable semi erect    COMPARISON:  No previous similar    FINDINGS:  There is cardiomegaly and mild pulmonary vascular prominence.  There is no mediastinal mass.  There is mild hazy pulmonary edema in the mid to lower lungs.  There is no significant pleural fluid accumulation.  There is mild to moderate thoracic spondylosis                                 Medications   0.45% NaCl infusion (has no administration in time range)   aspirin tablet 325 mg (325 mg Oral Given 12/30/21 1106)   furosemide injection 40 mg (40 mg Intravenous Given 12/30/21 1257)   hydrALAZINE injection 20 mg (20 mg Intravenous Given 12/30/21 1430)   nitroGLYCERIN 2% TD oint ointment 1 inch (1 inch Transdermal Given 12/30/21 1512)   heparin 25,000 units in dextrose 5% (100 units/ml) IV bolus from bag INITIAL BOLUS (max bolus 4000 units) (4,000 Units Intravenous Bolus from Bag  12/30/21 1615)     Medical Decision Making:   Other:   I have discussed this case with another health care provider.       <> Summary of the Discussion: 15:15: Pt's repeat troponin is significantly elevated from previous. Dr. Pinzon discussed patient admission to the floor with Dr. Rowe, the on-call hospitalist. Dr. Rowe has agreed to admit this patient.             Attending Attestation:           Physician Attestation for Scribe:  Physician Attestation Statement for Scribe #1: I, Apurva Linares M.D., reviewed documentation, as scribed by Grace Villareal in my presence, and it is both accurate and complete.                      Clinical Impression:   Final diagnoses:  [R07.9] Chest pain          ED Disposition Condition    Admit               Apurva Pinzon MD  01/06/22 2016

## 2021-12-30 NOTE — Clinical Note
The catheter was repositioned into the ostium   right coronary artery. An angiography was performed of the right coronary arteries. Multiple views were taken.

## 2021-12-31 LAB
APTT PPP: 40.2 SECONDS (ref 25.2–37.3)
APTT PPP: 65.3 SECONDS (ref 25.2–37.3)
AV INDEX (PROSTH): 0.95
AV VALVE AREA: 3.29 CM2
BASOPHILS # BLD AUTO: 0.01 K/UL (ref 0–0.2)
BASOPHILS NFR BLD AUTO: 0.2 % (ref 0–1)
BSA FOR ECHO PROCEDURE: 2.25 M2
CATH EF ESTIMATED: 60 %
CATH EF QUANTITATIVE: 55 %
CV ECHO LV RWT: 1.09 CM
DIFFERENTIAL METHOD BLD: ABNORMAL
DOP CALC AO VTI: 24.62 CM
DOP CALC LVOT AREA: 3.5 CM2
DOP CALC LVOT DIAMETER: 2.1 CM
DOP CALC LVOT STROKE VOLUME: 81.04 CM3
DOP CALCLVOT PEAK VEL VTI: 23.41 CM
ECHO LV POSTERIOR WALL: 1.9 CM (ref 0.6–1.1)
EJECTION FRACTION: 55 %
EOSINOPHIL # BLD AUTO: 0.06 K/UL (ref 0–0.5)
EOSINOPHIL NFR BLD AUTO: 1 % (ref 1–4)
ERYTHROCYTE [DISTWIDTH] IN BLOOD BY AUTOMATED COUNT: 13 % (ref 11.5–14.5)
FRACTIONAL SHORTENING: 23 % (ref 28–44)
HCT VFR BLD AUTO: 35.1 % (ref 40–54)
HGB BLD-MCNC: 12.3 G/DL (ref 13.5–18)
IMM GRANULOCYTES # BLD AUTO: 0.01 K/UL (ref 0–0.04)
IMM GRANULOCYTES NFR BLD: 0.2 % (ref 0–0.4)
INR BLD: 1.11 (ref 0.9–1.1)
INTERVENTRICULAR SEPTUM: 1.9 CM (ref 0.6–1.1)
IVC DIAMETER: 1.8 CM
LEFT ATRIUM SIZE: 4.7 CM
LEFT INTERNAL DIMENSION IN SYSTOLE: 2.7 CM (ref 2.1–4)
LEFT VENTRICLE MASS INDEX: 131 G/M2
LEFT VENTRICULAR INTERNAL DIMENSION IN DIASTOLE: 3.5 CM (ref 3.5–6)
LEFT VENTRICULAR MASS: 288.59 G
LYMPHOCYTES # BLD AUTO: 1.73 K/UL (ref 1–4.8)
LYMPHOCYTES NFR BLD AUTO: 29.6 % (ref 27–41)
MCH RBC QN AUTO: 31.9 PG (ref 27–31)
MCHC RBC AUTO-ENTMCNC: 35 G/DL (ref 32–36)
MCV RBC AUTO: 91.2 FL (ref 80–96)
MONOCYTES # BLD AUTO: 0.54 K/UL (ref 0–0.8)
MONOCYTES NFR BLD AUTO: 9.2 % (ref 2–6)
MPC BLD CALC-MCNC: 9.6 FL (ref 9.4–12.4)
NEUTROPHILS # BLD AUTO: 3.5 K/UL (ref 1.8–7.7)
NEUTROPHILS NFR BLD AUTO: 59.8 % (ref 53–65)
NRBC # BLD AUTO: 0 X10E3/UL
NRBC, AUTO (.00): 0 %
PLATELET # BLD AUTO: 191 K/UL (ref 150–400)
PROTHROMBIN TIME: 14.3 SECONDS (ref 11.7–14.7)
RBC # BLD AUTO: 3.85 M/UL (ref 4.6–6.2)
T4 SERPL-MCNC: 7.6 ΜG/DL (ref 4.5–12.1)
TROPONIN I SERPL HS-MCNC: 243.2 PG/ML
TSH SERPL DL<=0.005 MIU/L-ACNC: 2.59 UIU/ML (ref 0.36–3.74)
WBC # BLD AUTO: 5.85 K/UL (ref 4.5–11)

## 2021-12-31 PROCEDURE — 63600175 PHARM REV CODE 636 W HCPCS: Performed by: INTERNAL MEDICINE

## 2021-12-31 PROCEDURE — 27201423 OPTIME MED/SURG SUP & DEVICES STERILE SUPPLY: Performed by: INTERNAL MEDICINE

## 2021-12-31 PROCEDURE — 84484 ASSAY OF TROPONIN QUANT: CPT | Performed by: HOSPITALIST

## 2021-12-31 PROCEDURE — 25000003 PHARM REV CODE 250: Performed by: HOSPITALIST

## 2021-12-31 PROCEDURE — 63600175 PHARM REV CODE 636 W HCPCS: Performed by: HOSPITALIST

## 2021-12-31 PROCEDURE — 93458 PR CATH PLACE/CORON ANGIO, IMG SUPER/INTERP,W LEFT HEART VENTRICULOGRAPHY: ICD-10-PCS | Mod: 26,,, | Performed by: INTERNAL MEDICINE

## 2021-12-31 PROCEDURE — 27100168 OPTIME MED/SURG SUP & DEVICES NON-STERILE SUPPLY: Performed by: INTERNAL MEDICINE

## 2021-12-31 PROCEDURE — C1760 CLOSURE DEV, VASC: HCPCS | Performed by: INTERNAL MEDICINE

## 2021-12-31 PROCEDURE — 36415 COLL VENOUS BLD VENIPUNCTURE: CPT | Performed by: HOSPITALIST

## 2021-12-31 PROCEDURE — 99223 PR INITIAL HOSPITAL CARE,LEVL III: ICD-10-PCS | Mod: 25,,, | Performed by: INTERNAL MEDICINE

## 2021-12-31 PROCEDURE — 85730 THROMBOPLASTIN TIME PARTIAL: CPT | Performed by: HOSPITALIST

## 2021-12-31 PROCEDURE — 25500020 PHARM REV CODE 255: Performed by: INTERNAL MEDICINE

## 2021-12-31 PROCEDURE — C1894 INTRO/SHEATH, NON-LASER: HCPCS | Performed by: INTERNAL MEDICINE

## 2021-12-31 PROCEDURE — 93458 L HRT ARTERY/VENTRICLE ANGIO: CPT | Mod: 26,,, | Performed by: INTERNAL MEDICINE

## 2021-12-31 PROCEDURE — 27000080 OPTIME MED/SURG SUP & DEVICES GENERAL CLASSIFICATION: Performed by: INTERNAL MEDICINE

## 2021-12-31 PROCEDURE — 25000003 PHARM REV CODE 250: Performed by: INTERNAL MEDICINE

## 2021-12-31 PROCEDURE — 93458 L HRT ARTERY/VENTRICLE ANGIO: CPT | Performed by: INTERNAL MEDICINE

## 2021-12-31 PROCEDURE — 99232 SBSQ HOSP IP/OBS MODERATE 35: CPT | Mod: ,,, | Performed by: HOSPITALIST

## 2021-12-31 PROCEDURE — 84436 ASSAY OF TOTAL THYROXINE: CPT | Performed by: HOSPITALIST

## 2021-12-31 PROCEDURE — 99152 MOD SED SAME PHYS/QHP 5/>YRS: CPT | Performed by: INTERNAL MEDICINE

## 2021-12-31 PROCEDURE — 99232 PR SUBSEQUENT HOSPITAL CARE,LEVL II: ICD-10-PCS | Mod: ,,, | Performed by: HOSPITALIST

## 2021-12-31 PROCEDURE — 99223 1ST HOSP IP/OBS HIGH 75: CPT | Mod: 25,,, | Performed by: INTERNAL MEDICINE

## 2021-12-31 PROCEDURE — 11000001 HC ACUTE MED/SURG PRIVATE ROOM

## 2021-12-31 PROCEDURE — C1887 CATHETER, GUIDING: HCPCS | Performed by: INTERNAL MEDICINE

## 2021-12-31 PROCEDURE — 27000221 HC OXYGEN, UP TO 24 HOURS

## 2021-12-31 PROCEDURE — 27800903 OPTIME MED/SURG SUP & DEVICES OTHER IMPLANTS: Performed by: INTERNAL MEDICINE

## 2021-12-31 PROCEDURE — 84443 ASSAY THYROID STIM HORMONE: CPT | Performed by: HOSPITALIST

## 2021-12-31 PROCEDURE — 85025 COMPLETE CBC W/AUTO DIFF WBC: CPT | Performed by: HOSPITALIST

## 2021-12-31 RX ORDER — LIDOCAINE HYDROCHLORIDE 10 MG/ML
INJECTION, SOLUTION EPIDURAL; INFILTRATION; INTRACAUDAL; PERINEURAL
Status: DISCONTINUED | OUTPATIENT
Start: 2021-12-31 | End: 2021-12-31 | Stop reason: HOSPADM

## 2021-12-31 RX ORDER — CARVEDILOL 12.5 MG/1
12.5 TABLET ORAL 2 TIMES DAILY
Status: DISCONTINUED | OUTPATIENT
Start: 2021-12-31 | End: 2021-12-31

## 2021-12-31 RX ORDER — FENTANYL CITRATE 50 UG/ML
INJECTION, SOLUTION INTRAMUSCULAR; INTRAVENOUS
Status: DISCONTINUED | OUTPATIENT
Start: 2021-12-31 | End: 2021-12-31 | Stop reason: HOSPADM

## 2021-12-31 RX ORDER — SODIUM CHLORIDE 450 MG/100ML
125 INJECTION, SOLUTION INTRAVENOUS CONTINUOUS
Status: ACTIVE | OUTPATIENT
Start: 2021-12-31 | End: 2021-12-31

## 2021-12-31 RX ORDER — LABETALOL HYDROCHLORIDE 5 MG/ML
10 INJECTION, SOLUTION INTRAVENOUS EVERY 6 HOURS PRN
Status: DISCONTINUED | OUTPATIENT
Start: 2021-12-31 | End: 2022-01-02 | Stop reason: HOSPADM

## 2021-12-31 RX ORDER — LOSARTAN POTASSIUM 50 MG/1
50 TABLET ORAL DAILY
Status: DISCONTINUED | OUTPATIENT
Start: 2021-12-31 | End: 2022-01-02 | Stop reason: HOSPADM

## 2021-12-31 RX ORDER — ONDANSETRON 4 MG/1
8 TABLET, ORALLY DISINTEGRATING ORAL EVERY 8 HOURS PRN
Status: DISCONTINUED | OUTPATIENT
Start: 2021-12-31 | End: 2022-01-02 | Stop reason: HOSPADM

## 2021-12-31 RX ORDER — HYDRALAZINE HYDROCHLORIDE 20 MG/ML
10 INJECTION INTRAMUSCULAR; INTRAVENOUS 3 TIMES DAILY PRN
Status: DISCONTINUED | OUTPATIENT
Start: 2021-12-31 | End: 2022-01-02 | Stop reason: HOSPADM

## 2021-12-31 RX ORDER — ISOSORBIDE MONONITRATE 60 MG/1
60 TABLET, EXTENDED RELEASE ORAL DAILY
Status: DISCONTINUED | OUTPATIENT
Start: 2021-12-31 | End: 2022-01-02 | Stop reason: HOSPADM

## 2021-12-31 RX ORDER — METOPROLOL TARTRATE 1 MG/ML
INJECTION, SOLUTION INTRAVENOUS
Status: DISCONTINUED | OUTPATIENT
Start: 2021-12-31 | End: 2021-12-31 | Stop reason: HOSPADM

## 2021-12-31 RX ORDER — MIDAZOLAM HYDROCHLORIDE 1 MG/ML
INJECTION INTRAMUSCULAR; INTRAVENOUS
Status: DISCONTINUED | OUTPATIENT
Start: 2021-12-31 | End: 2021-12-31 | Stop reason: HOSPADM

## 2021-12-31 RX ORDER — HEPARIN SOD,PORCINE/0.9 % NACL 1000/500ML
INTRAVENOUS SOLUTION INTRAVENOUS
Status: DISCONTINUED | OUTPATIENT
Start: 2021-12-31 | End: 2021-12-31 | Stop reason: HOSPADM

## 2021-12-31 RX ORDER — SODIUM CHLORIDE 9 MG/ML
125 INJECTION, SOLUTION INTRAVENOUS ONCE
Status: CANCELLED | OUTPATIENT
Start: 2021-12-31 | End: 2021-12-31

## 2021-12-31 RX ORDER — ACETAMINOPHEN 325 MG/1
650 TABLET ORAL EVERY 4 HOURS PRN
Status: DISCONTINUED | OUTPATIENT
Start: 2021-12-31 | End: 2022-01-02 | Stop reason: HOSPADM

## 2021-12-31 RX ORDER — SODIUM CHLORIDE 450 MG/100ML
INJECTION, SOLUTION INTRAVENOUS
Status: DISCONTINUED | OUTPATIENT
Start: 2021-12-31 | End: 2022-01-02 | Stop reason: HOSPADM

## 2021-12-31 RX ADMIN — LOSARTAN POTASSIUM 50 MG: 50 TABLET, FILM COATED ORAL at 01:12

## 2021-12-31 RX ADMIN — ISOSORBIDE MONONITRATE 60 MG: 60 TABLET, EXTENDED RELEASE ORAL at 01:12

## 2021-12-31 RX ADMIN — CARVEDILOL 12.5 MG: 12.5 TABLET, FILM COATED ORAL at 01:12

## 2021-12-31 RX ADMIN — TICAGRELOR 90 MG: 90 TABLET ORAL at 09:12

## 2021-12-31 RX ADMIN — ASPIRIN 81 MG: 81 TABLET, COATED ORAL at 09:12

## 2021-12-31 RX ADMIN — PANTOPRAZOLE SODIUM 40 MG: 40 TABLET, DELAYED RELEASE ORAL at 09:12

## 2021-12-31 RX ADMIN — POLYETHYLENE GLYCOL 3350 17 G: 17 POWDER, FOR SOLUTION ORAL at 09:12

## 2021-12-31 RX ADMIN — HEPARIN SODIUM AND DEXTROSE 34.57 UNITS/KG/HR: 10000; 5 INJECTION INTRAVENOUS at 06:12

## 2021-12-31 RX ADMIN — ATORVASTATIN CALCIUM 80 MG: 80 TABLET, FILM COATED ORAL at 08:12

## 2021-12-31 RX ADMIN — COLCHICINE 0.6 MG: 0.6 TABLET, FILM COATED ORAL at 09:12

## 2021-12-31 NOTE — ASSESSMENT & PLAN NOTE
Cardiology consult  Heparin infusion and nitroglycerin infusion started  Chest pain later resolved

## 2021-12-31 NOTE — PLAN OF CARE
Problem: Adult Inpatient Plan of Care  Goal: Plan of Care Review  Outcome: Ongoing, Progressing  Flowsheets (Taken 12/31/2021 0040)  Plan of Care Reviewed With: patient  Goal: Absence of Hospital-Acquired Illness or Injury  Outcome: Ongoing, Progressing  Intervention: Prevent Skin Injury  Flowsheets (Taken 12/31/2021 0040)  Body Position: position changed independently  Skin Protection:   adhesive use limited   incontinence pads utilized   transparent dressing maintained   tubing/devices free from skin contact  Goal: Optimal Comfort and Wellbeing  Outcome: Ongoing, Progressing  Intervention: Monitor Pain and Promote Comfort  Flowsheets (Taken 12/31/2021 0040)  Pain Management Interventions:   pain management plan reviewed with patient/caregiver   relaxation techniques promoted  Intervention: Provide Person-Centered Care  Flowsheets (Taken 12/31/2021 0040)  Trust Relationship/Rapport:   care explained   choices provided   emotional support provided   empathic listening provided   questions answered   questions encouraged   reassurance provided   thoughts/feelings acknowledged      SOB

## 2021-12-31 NOTE — HPI
Chief complaint:  Chest pain    History of present illness:  Mr. Callahan presented to the emergency room with 2 day history of intermittent chest pain.  Granddaughter stated see had had an episode chest pain several days before that that he had reported while she was at work but resolved.  Symptoms seem worse today and came to the emergency department.  Some increased shortness of breath for the vent.  No complained of diaphoresis, nausea or radiation of discomfort.  Chest pain had been lasting a few minutes but today longer.  Had some more persistent chest pain when came to the emergency room and relieved by 2 nitroglycerin sublingual.  States however chest pain had recurred and was having 8 on a 0-10 scale when seen by me initially.  States chest pain had been worse with exertion.  No change in chest pain with oral intake.    [facility]  Summary  1. Three vessel CAD ( 70% LAD, 70% OM1, 99% pRCA)  2. Low left sided filling pressures  3. S/P successful IVUS guided PCI of RCA with two KEHINDE.    Plan:  1. DAPT (aspirin and brillinta) for atleast 1 year  2. Can consider staged PCI of LAD, and OM1 if still symptomatic.   3. Risk factor modification and life style changes.    The procedure log was documented by No documenter listed and verified by Philip Torres MD.   Date: 7/13/2021  Time: 8:39 PM    Review of systems:  States he had been chest pain-free till recently.  Nocturia x3 episodes nightly.  Some issues with his prostate that he has seen Dr. Brown in the past that he was not sure details.  Review of systems otherwise negative.    Past medical history:  -hypertension times 20 years  -coronary artery disease as above mention with stents placed in July this year  -gout  -BPH    Past surgical history:  -Cholecystectomy.    Social history:  Patient lives with wife  No history of tobacco or  alcohol use    Health maintenance issues:  Primary care providers Dr. Huerta  Patient has not had flu shot yet this year  He has  had previous Pneumovax  No prior COVID infection but has had COVID vaccination and had booster shot 2 months ago    Allergies:  Lisinopril causes swelling lips    Family history:  No one with premature coronary artery disease

## 2021-12-31 NOTE — NURSING
0740: pt recd in bed awake and alert. Hepatin and nitro ggt intact as previously charted. Pt denies not sob or cp. States he is comfortable. Pt states he can ambulate on own without issue. Assessment completed.     0836: pt calls out stating he is SOB. RN to bedside. Pt POLY 175/90. Pt states he just got up to go to the bathroom and became sob with ambulation. HR noted to be 54, sats 93%. Heparin and nitro ggt infusing. Will follow up with cardiology     0844: no response from cardiology. Dr Rowe messaged via epic     0945: am meds given. Coreg held for bradycardia. Pt assisted to bathroom for BM. Instructed pt to call when finished.     1030: pt in bed awake. Son at bedside updated. Nitro ggt increased to 10mcg/min per verbal telephone order from Dr Rowe.    1050: Dr Villaseñor at [facility]. Pt consented for heart cath    1125: pt off floor to cath lab.     1255: pt recd back from cathlab on 150mcg/min of nitro titrating to BP. Pt awake. Groin site intact. Nursing supervisor and floor management notified pt is back to floor on titrating nitro.RN remaining at bedside.     1320: nitro ggt down to 25mcg/min.     1440: pt in bed awake and alert. Flat time intact. Groin site clean and dry.     1500:; groin site clean dry and intact. Pt HOB elevated to eat lunch. Denies needs. Family at bedside.     1800: pt in bed awake and alert. Flat time completed. Post cath checks completed. Pt awake and alert. Nadn. Site clean and dry.     1900: report given. Care released.

## 2021-12-31 NOTE — H&P
[facility] Emergency Department  Hospital Medicine  History & Physical    Patient Name: Trung Barboza  MRN: [id number]  Patient Class: IP- Inpatient  Admission Date: 12/30/2021  Attending Physician: Timothy Rowe MD   Primary Care Provider: Lawrence Huerta DO         Patient information was obtained from patient, relative(s), past medical records and ER records.     Subjective:     Principal Problem:Chest pain    Chief Complaint:   Chief Complaint   Patient presents with    Chest Pain        HPI: Chief complaint:  Chest pain    History of present illness:  Mr. Callahan presented to the emergency room with 2 day history of intermittent chest pain.  Granddaughter stated see had had an episode chest pain several days before that that he had reported while she was at work but resolved.  Symptoms seem worse today and came to the emergency department.  Some increased shortness of breath for the vent.  No complained of diaphoresis, nausea or radiation of discomfort.  Chest pain had been lasting a few minutes but today longer.  Had some more persistent chest pain when came to the emergency room and relieved by 2 nitroglycerin sublingual.  States however chest pain had recurred and was having 8 on a 0-10 scale when seen by me initially.  States chest pain had been worse with exertion.  No change in chest pain with oral intake.    [facility]  Summary  1. Three vessel CAD ( 70% LAD, 70% OM1, 99% pRCA)  2. Low left sided filling pressures  3. S/P successful IVUS guided PCI of RCA with two KEHINDE.    Plan:  1. DAPT (aspirin and brillinta) for atleast 1 year  2. Can consider staged PCI of LAD, and OM1 if still symptomatic.   3. Risk factor modification and life style changes.    The procedure log was documented by No documenter listed and verified by Philip Torres MD.   Date: 7/13/2021  Time: 8:39 PM    Review of systems:  States he had been chest pain-free till recently.  Nocturia x3 episodes nightly.  Some issues with  his prostate that he has seen Dr. Brown in the past that he was not sure details.  Review of systems otherwise negative.    Past medical history:  -hypertension times 20 years  -coronary artery disease as above mention with stents placed in July this year  -gout  -BPH    Past surgical history:  -Cholecystectomy.    Social history:  Patient lives with wife  No history of tobacco or  alcohol use    Health maintenance issues:  Primary care providers Dr. Huerta  Patient has not had flu shot yet this year  He has had previous Pneumovax  No prior COVID infection but has had COVID vaccination and had booster shot 2 months ago    Allergies:  Lisinopril causes swelling lips    Family history:  No one with premature coronary artery disease          Past Medical History:   Diagnosis Date    Benign localized prostatic hyperplasia with lower urinary tract symptoms (LUTS)     Coronary artery disease     Elevated PSA     Hypercholesterolemia     Hyperlipidemia     Hypertension     Obesity (BMI 30-39.9)        Past Surgical History:   Procedure Laterality Date    BIOPSY WITH TRANSRECTAL ULTRASOUND (TRUS) GUIDANCE Bilateral 03/07/2018    CHOLECYSTECTOMY      LEFT HEART CATHETERIZATION Left 7/13/2021    Procedure: Left heart cath;  Surgeon: Philip Torres MD;  Location: [facility] CATH LAB;  Service: Cardiology;  Laterality: Left;       Review of patient's allergies indicates:   Allergen Reactions    Lisinopril Swelling       No current facility-administered medications on file prior to encounter.     Current Outpatient Medications on File Prior to Encounter   Medication Sig    aspirin (ECOTRIN) 81 MG EC tablet Take 1 tablet (81 mg total) by mouth once daily.    atorvastatin (LIPITOR) 40 MG tablet Take 40 mg by mouth once daily.    carvediloL (COREG) 3.125 MG tablet Take 1 tablet (3.125 mg total) by mouth 2 (two) times daily.    colchicine (COLCRYS) 0.6 mg tablet TAKE 2 TABLETS TODAY, AND THEN TAKE ONE DAILY UNTIL  GOUT ATTACK RESOLVING.    nitroGLYCERIN (NITROSTAT) 0.4 MG SL tablet Place 1 tablet (0.4 mg total) under the tongue every 5 (five) minutes as needed for Chest pain.    ticagrelor (BRILINTA) 90 mg tablet Take 1 tablet (90 mg total) by mouth 2 (two) times daily.     Family History     Problem Relation (Age of Onset)    No Known Problems Mother, Father, Sister, Brother, Daughter, Son        Tobacco Use    Smoking status: Former Smoker     Packs/day: 0.50     Quit date: 1988     Years since quittin.5    Smokeless tobacco: Former User     Types: Chew   Substance and Sexual Activity    Alcohol use: Yes     Comment: can of beer one or twice a month    Drug use: Never    Sexual activity: Not Currently     Review of Systems   Constitutional: Negative for appetite change, fatigue and fever.   HENT: Negative for congestion, hearing loss and trouble swallowing.    Respiratory: Positive for chest tightness and shortness of breath. Negative for wheezing.    Cardiovascular: Negative for chest pain and palpitations.   Gastrointestinal: Negative for abdominal pain, constipation and nausea.   Genitourinary: Negative for difficulty urinating and dysuria.   Musculoskeletal: Negative for back pain and neck stiffness.   Skin: Negative for pallor and rash.   Neurological: Negative for dizziness, speech difficulty and headaches.   Psychiatric/Behavioral: Negative for confusion and suicidal ideas.     Objective:     Vital Signs (Most Recent):  Temp: 99 °F (37.2 °C) (21 1057)  Pulse: 71 (21 184)  Resp: 13 (21)  BP: (!) 179/74 (21 184)  SpO2: 96 % (21) Vital Signs (24h Range):  Temp:  [99 °F (37.2 °C)] 99 °F (37.2 °C)  Pulse:  [48-71] 71  Resp:  [11-22] 13  SpO2:  [96 %-100 %] 96 %  BP: (119-214)/() 179/74     Weight: 99.8 kg (220 lb)  Body mass index is 29.84 kg/m².    Physical Exam  Vitals reviewed.   Constitutional:       General: He is not in acute distress.  Eyes:       Pupils: Pupils are equal, round, and reactive to light.   Cardiovascular:      Rate and Rhythm: Normal rate and regular rhythm.      Pulses: Normal pulses.   Pulmonary:      Effort: Pulmonary effort is normal. No respiratory distress.      Breath sounds: Normal breath sounds. No wheezing.   Abdominal:      General: Bowel sounds are normal. There is no distension.      Tenderness: There is no abdominal tenderness.   Skin:     General: Skin is warm.   Neurological:      General: No focal deficit present.      Mental Status: He is alert, oriented to person, place, and time and easily aroused. Mental status is at baseline.   Psychiatric:         Mood and Affect: Mood normal.         Behavior: Behavior normal.           CRANIAL NERVES     CN III, IV, VI   Pupils are equal, round, and reactive to light.       Significant Labs:   All pertinent labs within the past 24 hours have been reviewed.  BMP:   Recent Labs   Lab 12/30/21  1114   *      K 4.2   *   CO2 23   BUN 19*   CREATININE 1.43*   CALCIUM 8.8     CBC:   Recent Labs   Lab 12/30/21  1416   WBC 6.25   HGB 14.2   HCT 39.8*        CMP:   Recent Labs   Lab 12/30/21  1114      K 4.2   *   CO2 23   *   BUN 19*   CREATININE 1.43*   CALCIUM 8.8   PROT 7.0   ALBUMIN 3.5   BILITOT 1.1   ALKPHOS 97   AST 34   ALT 24   ANIONGAP 12   EGFRNONAA 50*       Imaging Results          X-Ray Chest AP Portable (Final result)  Result time 12/30/21 11:29:44    Final result by Casey Lorenzo MD (12/30/21 11:29:44)                 Impression:      Cardiomegaly and evidence of CHF      Electronically signed by: Casey Lorenzo  Date:    12/30/2021  Time:    11:29             Narrative:    EXAMINATION:  XR CHEST AP PORTABLE    CLINICAL HISTORY:  Chest Pain;.    TECHNIQUE:  Chest x-ray AP portable semi erect    COMPARISON:  No previous similar    FINDINGS:  There is cardiomegaly and mild pulmonary vascular prominence.  There is no mediastinal  mass.  There is mild hazy pulmonary edema in the mid to lower lungs.  There is no significant pleural fluid accumulation.  There is mild to moderate thoracic spondylosis                                  Assessment/Plan:     * Chest pain  Recurrent but resolved      BPH (benign prostatic hyperplasia)        Coronary artery disease involving native coronary artery of native heart without angina pectoris  Cardiology consult  Heparin infusion and nitroglycerin infusion started  Chest pain later resolved      Gout  No flare up currently      CKD (chronic kidney disease) stage 3, GFR 30-59 ml/min  Monitor      Essential hypertension    Continue current medications and monitor      VTE Risk Mitigation (From admission, onward)         Ordered     IP VTE HIGH RISK PATIENT  Once         12/30/21 1900     heparin 25,000 units in dextrose 5% 250 mL (100 units/mL) infusion LOW INTENSITY nomogram - [facility]  Continuous        Question:  Begin at (in units/kg/hr)  Answer:  12    12/30/21 1607     Place sequential compression device  Until discontinued         12/30/21 1620     heparin 25,000 units in dextrose 5% (100 units/ml) IV bolus from bag INITIAL BOLUS (max bolus 4000 units)  As needed (PRN)        Question:  Heparin Infusion Adjustment (DO NOT MODIFY ANSWER)  Answer:  \\[employer]\epic\Images\Pharmacy\HeparinInfusions\heparin LOW INTENSITY nomogram for [facility] [id number].pdf    12/30/21 1607     heparin 25,000 units in dextrose 5% (100 units/ml) IV bolus from bag - ADDITIONAL PRN BOLUS - 30 units/kg (max bolus 4000 units)  As needed (PRN)        Question:  Heparin Infusion Adjustment (DO NOT MODIFY ANSWER)  Answer:  \\[facility]\epic\Images\Pharmacy\HeparinInfusions\heparin LOW INTENSITY nomogram for [facility] [id number].pdf    12/30/21 1607                   Timothy Rowe MD  Department of Hospital Medicine   [facility] - Emergency Department

## 2021-12-31 NOTE — SUBJECTIVE & OBJECTIVE
Past Medical History:   Diagnosis Date    Benign localized prostatic hyperplasia with lower urinary tract symptoms (LUTS)     Coronary artery disease     Elevated PSA     Hypercholesterolemia     Hyperlipidemia     Hypertension     Obesity (BMI 30-39.9)        Past Surgical History:   Procedure Laterality Date    BIOPSY WITH TRANSRECTAL ULTRASOUND (TRUS) GUIDANCE Bilateral 2018    CHOLECYSTECTOMY      LEFT HEART CATHETERIZATION Left 2021    Procedure: Left heart cath;  Surgeon: Philip Torres MD;  Location: [facility] CATH LAB;  Service: Cardiology;  Laterality: Left;       Review of patient's allergies indicates:   Allergen Reactions    Lisinopril Swelling       No current facility-administered medications on file prior to encounter.     Current Outpatient Medications on File Prior to Encounter   Medication Sig    aspirin (ECOTRIN) 81 MG EC tablet Take 1 tablet (81 mg total) by mouth once daily.    atorvastatin (LIPITOR) 40 MG tablet Take 40 mg by mouth once daily.    carvediloL (COREG) 3.125 MG tablet Take 1 tablet (3.125 mg total) by mouth 2 (two) times daily.    colchicine (COLCRYS) 0.6 mg tablet TAKE 2 TABLETS TODAY, AND THEN TAKE ONE DAILY UNTIL GOUT ATTACK RESOLVING.    nitroGLYCERIN (NITROSTAT) 0.4 MG SL tablet Place 1 tablet (0.4 mg total) under the tongue every 5 (five) minutes as needed for Chest pain.    ticagrelor (BRILINTA) 90 mg tablet Take 1 tablet (90 mg total) by mouth 2 (two) times daily.     Family History     Problem Relation (Age of Onset)    No Known Problems Mother, Father, Sister, Brother, Daughter, Son        Tobacco Use    Smoking status: Former Smoker     Packs/day: 0.50     Quit date: 1988     Years since quittin.5    Smokeless tobacco: Former User     Types: Chew   Substance and Sexual Activity    Alcohol use: Yes     Comment: can of beer one or twice a month    Drug use: Never    Sexual activity: Not Currently     Review of Systems    Constitutional: Negative for appetite change, fatigue and fever.   HENT: Negative for congestion, hearing loss and trouble swallowing.    Respiratory: Positive for chest tightness and shortness of breath. Negative for wheezing.    Cardiovascular: Negative for chest pain and palpitations.   Gastrointestinal: Negative for abdominal pain, constipation and nausea.   Genitourinary: Negative for difficulty urinating and dysuria.   Musculoskeletal: Negative for back pain and neck stiffness.   Skin: Negative for pallor and rash.   Neurological: Negative for dizziness, speech difficulty and headaches.   Psychiatric/Behavioral: Negative for confusion and suicidal ideas.     Objective:     Vital Signs (Most Recent):  Temp: 99 °F (37.2 °C) (12/30/21 1057)  Pulse: 71 (12/30/21 1846)  Resp: 13 (12/30/21 1846)  BP: (!) 179/74 (12/30/21 1846)  SpO2: 96 % (12/30/21 1846) Vital Signs (24h Range):  Temp:  [99 °F (37.2 °C)] 99 °F (37.2 °C)  Pulse:  [48-71] 71  Resp:  [11-22] 13  SpO2:  [96 %-100 %] 96 %  BP: (119-214)/() 179/74     Weight: 99.8 kg (220 lb)  Body mass index is 29.84 kg/m².    Physical Exam  Vitals reviewed.   Constitutional:       General: He is not in acute distress.  Eyes:      Pupils: Pupils are equal, round, and reactive to light.   Cardiovascular:      Rate and Rhythm: Normal rate and regular rhythm.      Pulses: Normal pulses.   Pulmonary:      Effort: Pulmonary effort is normal. No respiratory distress.      Breath sounds: Normal breath sounds. No wheezing.   Abdominal:      General: Bowel sounds are normal. There is no distension.      Tenderness: There is no abdominal tenderness.   Skin:     General: Skin is warm.   Neurological:      General: No focal deficit present.      Mental Status: He is alert, oriented to person, place, and time and easily aroused. Mental status is at baseline.   Psychiatric:         Mood and Affect: Mood normal.         Behavior: Behavior normal.           CRANIAL NERVES     CN  III, IV, VI   Pupils are equal, round, and reactive to light.       Significant Labs:   All pertinent labs within the past 24 hours have been reviewed.  BMP:   Recent Labs   Lab 12/30/21  1114   *      K 4.2   *   CO2 23   BUN 19*   CREATININE 1.43*   CALCIUM 8.8     CBC:   Recent Labs   Lab 12/30/21  1416   WBC 6.25   HGB 14.2   HCT 39.8*        CMP:   Recent Labs   Lab 12/30/21  1114      K 4.2   *   CO2 23   *   BUN 19*   CREATININE 1.43*   CALCIUM 8.8   PROT 7.0   ALBUMIN 3.5   BILITOT 1.1   ALKPHOS 97   AST 34   ALT 24   ANIONGAP 12   EGFRNONAA 50*       Imaging Results          X-Ray Chest AP Portable (Final result)  Result time 12/30/21 11:29:44    Final result by Casey Lorenzo MD (12/30/21 11:29:44)                 Impression:      Cardiomegaly and evidence of CHF      Electronically signed by: Casey Lorenzo  Date:    12/30/2021  Time:    11:29             Narrative:    EXAMINATION:  XR CHEST AP PORTABLE    CLINICAL HISTORY:  Chest Pain;.    TECHNIQUE:  Chest x-ray AP portable semi erect    COMPARISON:  No previous similar    FINDINGS:  There is cardiomegaly and mild pulmonary vascular prominence.  There is no mediastinal mass.  There is mild hazy pulmonary edema in the mid to lower lungs.  There is no significant pleural fluid accumulation.  There is mild to moderate thoracic spondylosis

## 2021-12-31 NOTE — CONSULTS
Cardiology Consultation Note    Referring Physician: Dr. Huerta  Primary Care Physician:Reason for Referral: Chest pain    Chief Complaint: Chest pain    History of Present Illness: PT presents to ER with several week history of mid sternal chest pain, 8/10 at most severe, associated with shortness of breath, lasting five minutes with rest resolving pain.  He has taken sl ntg several times in last two weeks for prolonged chest pain, with relief.  Pt repots chest pain is the same as he experienced before his previous MI 7/2021.    Past Cardiac History:  NSTEMI with previous PCI with KEHINDE x 2 RCA, residual moderate stenosis in LAD and CX.     Echocardiogram:   Results for orders placed during the hospital encounter of 07/10/21    Echo    Interpretation Summary  · The left ventricle is normal in size with mild concentric hypertrophy and normal systolic function.  · The estimated ejection fraction is 65%.  · Normal left ventricular diastolic function.  · Normal right ventricular size with normal right ventricular systolic function.  · Mild mitral regurgitation.  · Mild tricuspid regurgitation.  · Normal central venous pressure (3 mmHg).      Stress Test:  Results for orders placed during the hospital encounter of 07/10/21    Nuclear Stress Test    Interpretation Summary    The EKG portion of this study is positive for ischemia.    The patient reported chest pain during the stress test.    During stress, occasional PVCs are noted.       Last catheterization:  Results for orders placed during the hospital encounter of 07/10/21    Cardiac catheterization    Conclusion  1. Three vessel CAD ( 70% LAD, 70% OM1, 99% pRCA)  2. Low left sided filling pressures  3. S/P successful IVUS guided PCI of RCA with two KEHINDE.    Plan:  1. DAPT (aspirin and brillinta) for atleast 1 year  2. Can consider staged PCI of LAD, and OM1 if still symptomatic.  3. Risk factor modification and life style changes.    The procedure log was  documented by No documenter listed and verified by Philip Torres MD.    Date: 2021  Time: 8:39 PM       Past Medical History :   Past Medical History:   Diagnosis Date    Benign localized prostatic hyperplasia with lower urinary tract symptoms (LUTS)     Coronary artery disease     Elevated PSA     Hypercholesterolemia     Hyperlipidemia     Hypertension     Obesity (BMI 30-39.9)         Past Surgical History:  Past Surgical History:   Procedure Laterality Date    BIOPSY WITH TRANSRECTAL ULTRASOUND (TRUS) GUIDANCE Bilateral 2018    CHOLECYSTECTOMY      LEFT HEART CATHETERIZATION Left 2021    Procedure: Left heart cath;  Surgeon: Philip Torres MD;  Location: [facility] CATH LAB;  Service: Cardiology;  Laterality: Left;        Family History: no premature CVD or SCD  Family History   Problem Relation Age of Onset    No Known Problems Mother     No Known Problems Father     No Known Problems Sister     No Known Problems Brother     No Known Problems Daughter     No Known Problems Son         Social History:   Social History     Socioeconomic History    Marital status:    Occupational History    Occupation: retired   Tobacco Use    Smoking status: Former Smoker     Packs/day: 0.50     Quit date: 1988     Years since quittin.5    Smokeless tobacco: Former User     Types: Chew   Substance and Sexual Activity    Alcohol use: Yes     Comment: can of beer one or twice a month    Drug use: Never    Sexual activity: Not Currently        Medications:  Current Facility-Administered Medications   Medication Dose Route Frequency Provider Last Rate Last Admin    acetaminophen tablet 1,000 mg  1,000 mg Oral Q8H PRN Timothy Rowe MD        aspirin EC tablet 81 mg  81 mg Oral Daily Timothy Rowe MD   81 mg at 21 0946    atorvastatin tablet 80 mg  80 mg Oral QHS Timothy Rowe MD        carvediloL tablet 3.125 mg  3.125 mg Oral BID Timothy Rowe MD         colchicine capsule/tablet 0.6 mg  0.6 mg Oral Daily Timothy Rowe MD   0.6 mg at 12/31/21 0945    dextrose 50% injection 12.5 g  12.5 g Intravenous PRN Timothy Rowe MD        dextrose 50% injection 25 g  25 g Intravenous PRN Timothy Rowe MD        glucagon (human recombinant) injection 1 mg  1 mg Intramuscular PRN Timothy Rowe MD        glucose chewable tablet 16 g  16 g Oral PRN Timothy Rowe MD        heparin 25,000 units in dextrose 5% (100 units/ml) IV bolus from bag - ADDITIONAL PRN BOLUS - 30 units/kg (max bolus 4000 units)  30 Units/kg (Adjusted) Intravenous PRN Timothy Rowe MD        heparin 25,000 units in dextrose 5% (100 units/ml) IV bolus from bag INITIAL BOLUS (max bolus 4000 units)  46.2 Units/kg (Adjusted) Intravenous PRN Timothy Rowe MD        heparin 25,000 units in dextrose 5% 250 mL (100 units/mL) infusion LOW INTENSITY nomogram - [facility]  0-40 Units/kg/hr (Adjusted) Intravenous Continuous Timothy Rowe MD 29.9 mL/hr at 12/31/21 0633 34.566 Units/kg/hr at 12/31/21 0633    naloxone 0.4 mg/mL injection 0.02 mg  0.02 mg Intravenous PRN Timothy Rowe MD        nitroGLYCERIN 50 mg in dextrose 5 % 250 mL infusion (TITRATING)  0-400 mcg/min Intravenous Continuous Timothy Rowe MD 3 mL/hr at 12/31/21 1030 10 mcg/min at 12/31/21 1030    nitroGLYCERIN SL tablet 0.4 mg  0.4 mg Sublingual Q5 Min PRN Timothy Rowe MD   0.4 mg at 12/30/21 1544    ondansetron injection 4 mg  4 mg Intravenous Q8H PRN Timothy Rowe MD        pantoprazole EC tablet 40 mg  40 mg Oral Daily Timothy Rowe MD   40 mg at 12/31/21 0900    polyethylene glycol packet 17 g  17 g Oral Daily Timothy Rowe MD   17 g at 12/31/21 0945    promethazine tablet 25 mg  25 mg Oral Q6H PRN Timothy Rowe MD        sodium chloride 0.9% flush 10 mL  10 mL Intravenous PRN Timothy Rowe MD        ticagrelor tablet 90 mg  90 mg Oral BID Timothy Rowe MD   90 mg at 12/31/21 0937        Allergies:   Review of patient's  allergies indicates:   Allergen Reactions    Lisinopril Swelling        Review of Systems:   Review of Systems   Constitutional: Negative.    HENT: Negative.    Eyes: Negative.    Respiratory: Positive for shortness of breath.    Cardiovascular: Positive for chest pain and palpitations.   Gastrointestinal: Positive for heartburn.   Genitourinary: Negative.    Musculoskeletal: Negative.    Skin: Negative.    Neurological: Negative.    Endo/Heme/Allergies: Negative.    Psychiatric/Behavioral: Negative.        Physical Exam:  VS: BP (!) 172/77   Pulse (!) 55   Temp 98 °F (36.7 °C)   Resp 18   Ht 6' (1.829 m)   Wt 99.3 kg (219 lb)   SpO2 99%   BMI 29.70 kg/m²   Physical Exam   HENT:   Head: Normocephalic and atraumatic.   Right Ear: Tympanic membrane normal.   Left Ear: Tympanic membrane normal.   Nose: Nose normal.   Mouth/Throat: Mucous membranes are moist. Oropharynx is clear.   Eyes: Pupils are equal, round, and reactive to light. Conjunctivae are normal.   Neck: Carotid bruit is not present.   Cardiovascular: Normal rate, regular rhythm and normal pulses. Exam reveals no gallop and no friction rub.   No murmur heard.  Pulmonary/Chest: Effort normal and breath sounds normal.   Abdominal: Soft. Normal appearance and bowel sounds are normal.   Musculoskeletal:         General: Normal range of motion.      Cervical back: Normal range of motion and neck supple. No tenderness.      Right lower leg: No edema.      Left lower leg: No edema.   Neurological: He is alert. Gait normal.   Skin: Skin is warm and dry. Capillary refill takes less than 2 seconds.        Data:  Diagnostic studies reviewed up until the time of this note.     ECG and cardiac telemetry reviewed.     Assessment and Plan:    1. NSTEMI: continues to have chest pain on and off, despite medical tx, known residual stenosis on LAD and CX, recommend LHC/POss, risks and benefits discussed, pt elects to proceed.  2. CAD: severe three vessel CAD  3.  Hypertension: controlled  4. Hyperlipidemia: on statin tx  5. Morbid obesity, with unsuccessful weight loss.             Mahad Villaseñor DO, FACC, FACOI  [facility]

## 2022-01-01 LAB
APTT PPP: 33.2 SECONDS (ref 25.2–37.3)
BASOPHILS # BLD AUTO: 0.03 K/UL (ref 0–0.2)
BASOPHILS NFR BLD AUTO: 0.5 % (ref 0–1)
DIFFERENTIAL METHOD BLD: ABNORMAL
EOSINOPHIL # BLD AUTO: 0.11 K/UL (ref 0–0.5)
EOSINOPHIL NFR BLD AUTO: 1.9 % (ref 1–4)
ERYTHROCYTE [DISTWIDTH] IN BLOOD BY AUTOMATED COUNT: 13.1 % (ref 11.5–14.5)
HCT VFR BLD AUTO: 39.6 % (ref 40–54)
HGB BLD-MCNC: 13.8 G/DL (ref 13.5–18)
IMM GRANULOCYTES # BLD AUTO: 0.03 K/UL (ref 0–0.04)
IMM GRANULOCYTES NFR BLD: 0.5 % (ref 0–0.4)
LYMPHOCYTES # BLD AUTO: 1.83 K/UL (ref 1–4.8)
LYMPHOCYTES NFR BLD AUTO: 31.1 % (ref 27–41)
MCH RBC QN AUTO: 32.4 PG (ref 27–31)
MCHC RBC AUTO-ENTMCNC: 34.8 G/DL (ref 32–36)
MCV RBC AUTO: 93 FL (ref 80–96)
MONOCYTES # BLD AUTO: 0.62 K/UL (ref 0–0.8)
MONOCYTES NFR BLD AUTO: 10.5 % (ref 2–6)
MPC BLD CALC-MCNC: 9.6 FL (ref 9.4–12.4)
NEUTROPHILS # BLD AUTO: 3.27 K/UL (ref 1.8–7.7)
NEUTROPHILS NFR BLD AUTO: 55.5 % (ref 53–65)
NRBC # BLD AUTO: 0 X10E3/UL
NRBC, AUTO (.00): 0 %
PLATELET # BLD AUTO: 210 K/UL (ref 150–400)
RBC # BLD AUTO: 4.26 M/UL (ref 4.6–6.2)
WBC # BLD AUTO: 5.89 K/UL (ref 4.5–11)

## 2022-01-01 PROCEDURE — 25000003 PHARM REV CODE 250: Performed by: HOSPITALIST

## 2022-01-01 PROCEDURE — 99232 PR SUBSEQUENT HOSPITAL CARE,LEVL II: ICD-10-PCS | Mod: ,,, | Performed by: HOSPITALIST

## 2022-01-01 PROCEDURE — 94761 N-INVAS EAR/PLS OXIMETRY MLT: CPT

## 2022-01-01 PROCEDURE — 85730 THROMBOPLASTIN TIME PARTIAL: CPT | Performed by: HOSPITALIST

## 2022-01-01 PROCEDURE — 11000001 HC ACUTE MED/SURG PRIVATE ROOM

## 2022-01-01 PROCEDURE — 36415 COLL VENOUS BLD VENIPUNCTURE: CPT | Performed by: HOSPITALIST

## 2022-01-01 PROCEDURE — 99232 SBSQ HOSP IP/OBS MODERATE 35: CPT | Mod: ,,, | Performed by: HOSPITALIST

## 2022-01-01 PROCEDURE — 25000003 PHARM REV CODE 250: Performed by: INTERNAL MEDICINE

## 2022-01-01 PROCEDURE — 85025 COMPLETE CBC W/AUTO DIFF WBC: CPT | Performed by: HOSPITALIST

## 2022-01-01 RX ORDER — AMLODIPINE BESYLATE 5 MG/1
5 TABLET ORAL DAILY
Status: DISCONTINUED | OUTPATIENT
Start: 2022-01-01 | End: 2022-01-02 | Stop reason: HOSPADM

## 2022-01-01 RX ADMIN — PANTOPRAZOLE SODIUM 40 MG: 40 TABLET, DELAYED RELEASE ORAL at 08:01

## 2022-01-01 RX ADMIN — COLCHICINE 0.6 MG: 0.6 TABLET, FILM COATED ORAL at 08:01

## 2022-01-01 RX ADMIN — ACETAMINOPHEN 1000 MG: 500 TABLET ORAL at 08:01

## 2022-01-01 RX ADMIN — ISOSORBIDE MONONITRATE 60 MG: 60 TABLET, EXTENDED RELEASE ORAL at 08:01

## 2022-01-01 RX ADMIN — POLYETHYLENE GLYCOL 3350 17 G: 17 POWDER, FOR SOLUTION ORAL at 08:01

## 2022-01-01 RX ADMIN — LOSARTAN POTASSIUM 50 MG: 50 TABLET, FILM COATED ORAL at 08:01

## 2022-01-01 RX ADMIN — ASPIRIN 81 MG: 81 TABLET, COATED ORAL at 08:01

## 2022-01-01 RX ADMIN — ATORVASTATIN CALCIUM 80 MG: 80 TABLET, FILM COATED ORAL at 09:01

## 2022-01-01 RX ADMIN — AMLODIPINE BESYLATE 5 MG: 5 TABLET ORAL at 12:01

## 2022-01-01 NOTE — HOSPITAL COURSE
12/31- SOB with exertion. No prolonged CP. LHC done and CV surgery to evaluate. Family in room. No other issues. Discussed with Dr Villaseñor earlier.    01/01 - Plan admit [facility] 01/06 for CABG. No CP but MCCALLUM. BP up. Adjust meds. Possible home if doing well prior to reporting for surgery.    01/02-- Mr. Barboza was admitted on 12/30/21 after presenting to the ED with 2 day history of intermittent chest pain. He endorsed shortness of breath and worsening chest pain with exertion; however, denies diaphoresis, nausea, or radiation of discomfort. He had a LHC in July of 2021 with 2 stents placed and three vessel CAD noted at that time. He was placed on Brilinta for one year. On admission, high sensitivity troponin was elevated and cardiology was consulted which led to repeat LHC due to NSTEMI. During LHC no interventions were performed and severe three vessel CAD with progression of disease in distal RCA was noted. Dr. Shen reviewed case and discussed CABG with patient. Patient agreed to that plan of care and patient will admitted on 1/6/22 to [facility] for CABG on 1/7/22. Patient has been instructed to hold Brilinta for surgery preparation.

## 2022-01-01 NOTE — NURSING
Rec'd awake in the bed. Resp even nonlabored. Denies any Cp or sob. O2 in use. VS stable. Afebrile. NAD.Right groin puncture site c/d/i. No bleeding or hematoma.Fall prevention measures in progress

## 2022-01-01 NOTE — NURSING
Awake in the bed. Resp even nonlabored. No CP or SOB during the night. Tegaderm drsg to right groin d/i. Pulses palpable in BLE. Skin w/d. VS stable. Afebrile. No acute changes during the night

## 2022-01-01 NOTE — SUBJECTIVE & OBJECTIVE
Past Medical History:   Diagnosis Date    Benign localized prostatic hyperplasia with lower urinary tract symptoms (LUTS)     Coronary artery disease     Elevated PSA     Hypercholesterolemia     Hyperlipidemia     Hypertension     Obesity (BMI 30-39.9)        Past Surgical History:   Procedure Laterality Date    BIOPSY WITH TRANSRECTAL ULTRASOUND (TRUS) GUIDANCE Bilateral 2018    CHOLECYSTECTOMY      LEFT HEART CATHETERIZATION Left 2021    Procedure: Left heart cath;  Surgeon: Philip Torres MD;  Location: [facility] CATH LAB;  Service: Cardiology;  Laterality: Left;       Review of patient's allergies indicates:   Allergen Reactions    Lisinopril Swelling       No current facility-administered medications on file prior to encounter.     Current Outpatient Medications on File Prior to Encounter   Medication Sig    aspirin (ECOTRIN) 81 MG EC tablet Take 1 tablet (81 mg total) by mouth once daily.    atorvastatin (LIPITOR) 40 MG tablet Take 40 mg by mouth once daily.    carvediloL (COREG) 3.125 MG tablet Take 1 tablet (3.125 mg total) by mouth 2 (two) times daily.    colchicine (COLCRYS) 0.6 mg tablet TAKE 2 TABLETS TODAY, AND THEN TAKE ONE DAILY UNTIL GOUT ATTACK RESOLVING.    nitroGLYCERIN (NITROSTAT) 0.4 MG SL tablet Place 1 tablet (0.4 mg total) under the tongue every 5 (five) minutes as needed for Chest pain.    ticagrelor (BRILINTA) 90 mg tablet Take 1 tablet (90 mg total) by mouth 2 (two) times daily.     Family History     Problem Relation (Age of Onset)    No Known Problems Mother, Father, Sister, Brother, Daughter, Son        Tobacco Use    Smoking status: Former Smoker     Packs/day: 0.50     Quit date: 1988     Years since quittin.5    Smokeless tobacco: Former User     Types: Chew   Substance and Sexual Activity    Alcohol use: Yes     Comment: can of beer one or twice a month    Drug use: Never    Sexual activity: Not Currently     Review of Systems    Constitutional: Negative for appetite change, fatigue and fever.   HENT: Negative for congestion, hearing loss and trouble swallowing.    Respiratory: Positive for chest tightness and shortness of breath. Negative for wheezing.    Cardiovascular: Negative for chest pain and palpitations.   Gastrointestinal: Negative for abdominal pain, constipation and nausea.   Genitourinary: Negative for difficulty urinating and dysuria.   Musculoskeletal: Negative for back pain and neck stiffness.   Skin: Negative for pallor and rash.   Neurological: Negative for dizziness, speech difficulty and headaches.   Psychiatric/Behavioral: Negative for confusion and suicidal ideas.     Objective:     Vital Signs (Most Recent):  Temp: 98.1 °F (36.7 °C) (12/31/21 1900)  Pulse: (!) 57 (12/31/21 1900)  Resp: 20 (12/31/21 1900)  BP: (!) 165/81 (12/31/21 1900)  SpO2: 100 % (12/31/21 1900) Vital Signs (24h Range):  Temp:  [98 °F (36.7 °C)-98.2 °F (36.8 °C)] 98.1 °F (36.7 °C)  Pulse:  [42-62] 57  Resp:  [18-20] 20  SpO2:  [97 %-100 %] 100 %  BP: (126-172)/() 165/81     Weight: 99.3 kg (219 lb)  Body mass index is 29.7 kg/m².    Physical Exam  Vitals reviewed.   Constitutional:       General: He is not in acute distress.  Eyes:      Pupils: Pupils are equal, round, and reactive to light.   Cardiovascular:      Rate and Rhythm: Normal rate and regular rhythm.      Pulses: Normal pulses.   Pulmonary:      Effort: Pulmonary effort is normal. No respiratory distress.      Breath sounds: Normal breath sounds. No wheezing.   Abdominal:      General: Bowel sounds are normal. There is no distension.      Tenderness: There is no abdominal tenderness.   Skin:     General: Skin is warm.   Neurological:      General: No focal deficit present.      Mental Status: He is alert, oriented to person, place, and time and easily aroused. Mental status is at baseline.   Psychiatric:         Mood and Affect: Mood normal.         Behavior: Behavior normal.            CRANIAL NERVES     CN III, IV, VI   Pupils are equal, round, and reactive to light.       Significant Labs:   All pertinent labs within the past 24 hours have been reviewed.  BMP:   Recent Labs   Lab 12/30/21  1114   *      K 4.2   *   CO2 23   BUN 19*   CREATININE 1.43*   CALCIUM 8.8     CBC:   Recent Labs   Lab 12/30/21  1416 12/31/21  0249   WBC 6.25 5.85   HGB 14.2 12.3*   HCT 39.8* 35.1*    191     CMP:   Recent Labs   Lab 12/30/21  1114      K 4.2   *   CO2 23   *   BUN 19*   CREATININE 1.43*   CALCIUM 8.8   PROT 7.0   ALBUMIN 3.5   BILITOT 1.1   ALKPHOS 97   AST 34   ALT 24   ANIONGAP 12   EGFRNONAA 50*       Imaging Results          X-Ray Chest AP Portable (Final result)  Result time 12/30/21 11:29:44    Final result by Casey Lorenzo MD (12/30/21 11:29:44)                 Impression:      Cardiomegaly and evidence of CHF      Electronically signed by: Casey Lorenzo  Date:    12/30/2021  Time:    11:29             Narrative:    EXAMINATION:  XR CHEST AP PORTABLE    CLINICAL HISTORY:  Chest Pain;.    TECHNIQUE:  Chest x-ray AP portable semi erect    COMPARISON:  No previous similar    FINDINGS:  There is cardiomegaly and mild pulmonary vascular prominence.  There is no mediastinal mass.  There is mild hazy pulmonary edema in the mid to lower lungs.  There is no significant pleural fluid accumulation.  There is mild to moderate thoracic spondylosis

## 2022-01-01 NOTE — PROGRESS NOTES
[address] Medicine  Progress Note    Patient Name: Trung Barboza  MRN: [id number]  Patient Class: IP- Inpatient   Admission Date: 12/30/2021  Length of Stay: 1 days  Attending Physician: Timothy Rowe MD  Primary Care Provider: Lawrence Huerta DO        Subjective:     Principal Problem:Chest pain        HPI:  Chief complaint:  Chest pain    History of present illness:  Mr. Callahan presented to the emergency room with 2 day history of intermittent chest pain.  Granddaughter stated see had had an episode chest pain several days before that that he had reported while she was at work but resolved.  Symptoms seem worse today and came to the emergency department.  Some increased shortness of breath for the vent.  No complained of diaphoresis, nausea or radiation of discomfort.  Chest pain had been lasting a few minutes but today longer.  Had some more persistent chest pain when came to the emergency room and relieved by 2 nitroglycerin sublingual.  States however chest pain had recurred and was having 8 on a 0-10 scale when seen by me initially.  States chest pain had been worse with exertion.  No change in chest pain with oral intake.    [facility]  Summary  1. Three vessel CAD ( 70% LAD, 70% OM1, 99% pRCA)  2. Low left sided filling pressures  3. S/P successful IVUS guided PCI of RCA with two KEHINDE.    Plan:  1. DAPT (aspirin and brillinta) for atleast 1 year  2. Can consider staged PCI of LAD, and OM1 if still symptomatic.   3. Risk factor modification and life style changes.    The procedure log was documented by No documenter listed and verified by Philip Torres MD.   Date: 7/13/2021  Time: 8:39 PM    Review of systems:  States he had been chest pain-free till recently.  Nocturia x3 episodes nightly.  Some issues with his prostate that he has seen Dr. Brown in the past that he was not sure details.  Review of systems otherwise negative.    Past medical  history:  -hypertension times 20 years  -coronary artery disease as above mention with stents placed in July this year  -gout  -BPH    Past surgical history:  -Cholecystectomy.    Social history:  Patient lives with wife  No history of tobacco or  alcohol use    Health maintenance issues:  Primary care providers Dr. Huerta  Patient has not had flu shot yet this year  He has had previous Pneumovax  No prior COVID infection but has had COVID vaccination and had booster shot 2 months ago    Allergies:  Lisinopril causes swelling lips    Family history:  No one with premature coronary artery disease          Overview/Hospital Course:  12/31- SOB with exertion. No prolonged CP. LHC done and CV surgery to evaluate. Family in room. No other issues. Discussed with Dr Vilalseñor earlier.      Past Medical History:   Diagnosis Date    Benign localized prostatic hyperplasia with lower urinary tract symptoms (LUTS)     Coronary artery disease     Elevated PSA     Hypercholesterolemia     Hyperlipidemia     Hypertension     Obesity (BMI 30-39.9)        Past Surgical History:   Procedure Laterality Date    BIOPSY WITH TRANSRECTAL ULTRASOUND (TRUS) GUIDANCE Bilateral 03/07/2018    CHOLECYSTECTOMY      LEFT HEART CATHETERIZATION Left 7/13/2021    Procedure: Left heart cath;  Surgeon: Philip Torres MD;  Location[facility] C[facility] LAB;  Service: Cardiology;  Laterality: Left;       Review of patient's allergies indicates:   Allergen Reactions    Lisinopril Swelling       No current facility-administered medications on file prior to encounter.     Current Outpatient Medications on File Prior to Encounter   Medication Sig    aspirin (ECOTRIN) 81 MG EC tablet Take 1 tablet (81 mg total) by mouth once daily.    atorvastatin (LIPITOR) 40 MG tablet Take 40 mg by mouth once daily.    carvediloL (COREG) 3.125 MG tablet Take 1 tablet (3.125 mg total) by mouth 2 (two) times daily.    colchicine (COLCRYS) 0.6 mg tablet TAKE 2  TABLETS TODAY, AND THEN TAKE ONE DAILY UNTIL GOUT ATTACK RESOLVING.    nitroGLYCERIN (NITROSTAT) 0.4 MG SL tablet Place 1 tablet (0.4 mg total) under the tongue every 5 (five) minutes as needed for Chest pain.    ticagrelor (BRILINTA) 90 mg tablet Take 1 tablet (90 mg total) by mouth 2 (two) times daily.     Family History     Problem Relation (Age of Onset)    No Known Problems Mother, Father, Sister, Brother, Daughter, Son        Tobacco Use    Smoking status: Former Smoker     Packs/day: 0.50     Quit date: 1988     Years since quittin.5    Smokeless tobacco: Former User     Types: Chew   Substance and Sexual Activity    Alcohol use: Yes     Comment: can of beer one or twice a month    Drug use: Never    Sexual activity: Not Currently     Review of Systems   Constitutional: Negative for appetite change, fatigue and fever.   HENT: Negative for congestion, hearing loss and trouble swallowing.    Respiratory: Positive for chest tightness and shortness of breath. Negative for wheezing.    Cardiovascular: Negative for chest pain and palpitations.   Gastrointestinal: Negative for abdominal pain, constipation and nausea.   Genitourinary: Negative for difficulty urinating and dysuria.   Musculoskeletal: Negative for back pain and neck stiffness.   Skin: Negative for pallor and rash.   Neurological: Negative for dizziness, speech difficulty and headaches.   Psychiatric/Behavioral: Negative for confusion and suicidal ideas.     Objective:     Vital Signs (Most Recent):  Temp: 98.1 °F (36.7 °C) (21)  Pulse: (!) 57 (21)  Resp: 20 (21)  BP: (!) 165/81 (21)  SpO2: 100 % (21) Vital Signs (24h Range):  Temp:  [98 °F (36.7 °C)-98.2 °F (36.8 °C)] 98.1 °F (36.7 °C)  Pulse:  [42-62] 57  Resp:  [18-20] 20  SpO2:  [97 %-100 %] 100 %  BP: (126-172)/() 165/81     Weight: 99.3 kg (219 lb)  Body mass index is 29.7 kg/m².    Physical Exam  Vitals reviewed.    Constitutional:       General: He is not in acute distress.  Eyes:      Pupils: Pupils are equal, round, and reactive to light.   Cardiovascular:      Rate and Rhythm: Normal rate and regular rhythm.      Pulses: Normal pulses.   Pulmonary:      Effort: Pulmonary effort is normal. No respiratory distress.      Breath sounds: Normal breath sounds. No wheezing.   Abdominal:      General: Bowel sounds are normal. There is no distension.      Tenderness: There is no abdominal tenderness.   Skin:     General: Skin is warm.   Neurological:      General: No focal deficit present.      Mental Status: He is alert, oriented to person, place, and time and easily aroused. Mental status is at baseline.   Psychiatric:         Mood and Affect: Mood normal.         Behavior: Behavior normal.           CRANIAL NERVES     CN III, IV, VI   Pupils are equal, round, and reactive to light.       Significant Labs:   All pertinent labs within the past 24 hours have been reviewed.  BMP:   Recent Labs   Lab 12/30/21  1114   *      K 4.2   *   CO2 23   BUN 19*   CREATININE 1.43*   CALCIUM 8.8     CBC:   Recent Labs   Lab 12/30/21  1416 12/31/21  0249   WBC 6.25 5.85   HGB 14.2 12.3*   HCT 39.8* 35.1*    191     CMP:   Recent Labs   Lab 12/30/21  1114      K 4.2   *   CO2 23   *   BUN 19*   CREATININE 1.43*   CALCIUM 8.8   PROT 7.0   ALBUMIN 3.5   BILITOT 1.1   ALKPHOS 97   AST 34   ALT 24   ANIONGAP 12   EGFRNONAA 50*       Imaging Results          X-Ray Chest AP Portable (Final result)  Result time 12/30/21 11:29:44    Final result by Casey Lorenzo MD (12/30/21 11:29:44)                 Impression:      Cardiomegaly and evidence of CHF      Electronically signed by: Casey Lorenzo  Date:    12/30/2021  Time:    11:29             Narrative:    EXAMINATION:  XR CHEST AP PORTABLE    CLINICAL HISTORY:  Chest Pain;.    TECHNIQUE:  Chest x-ray AP portable semi erect    COMPARISON:  No previous  similar    FINDINGS:  There is cardiomegaly and mild pulmonary vascular prominence.  There is no mediastinal mass.  There is mild hazy pulmonary edema in the mid to lower lungs.  There is no significant pleural fluid accumulation.  There is mild to moderate thoracic spondylosis                                    Assessment/Plan:      * Chest pain  Recurrent but resolved      BPH (benign prostatic hyperplasia)        Coronary artery disease involving native coronary artery of native heart without angina pectoris  Cardiology consult  Heparin infusion and nitroglycerin infusion started  Chest pain later resolved      Gout  No flare up currently      CKD (chronic kidney disease) stage 3, GFR 30-59 ml/min  Monitor      Essential hypertension    Continue current medications and monitor      VTE Risk Mitigation (From admission, onward)         Ordered     IP VTE HIGH RISK PATIENT  Once         12/30/21 1900     heparin 25,000 units in dextrose 5% 250 mL (100 units/mL) infusion LOW INTENSITY nomogram - [facility]  Continuous        Question:  Begin at (in units/kg/hr)  Answer:  12    12/30/21 1607     Place sequential compression device  Until discontinued         12/30/21 1620     heparin 25,000 units in dextrose 5% (100 units/ml) IV bolus from bag INITIAL BOLUS (max bolus 4000 units)  As needed (PRN)        Question:  Heparin Infusion Adjustment (DO NOT MODIFY ANSWER)  Answer:  [email or web address]\[email or web address]\Images\Pharmacy\HeparinInfusions\heparin LOW INTENSITY nomogram for [facility] [id number]    12/30/21 1607     heparin 25,000 units in dextrose 5% (100 units/ml) IV bolus from bag - ADDITIONAL PRN BOLUS - 30 units/kg (max bolus 4000 units)  As needed (PRN)        Question:  Heparin Infusion Adjustment (DO NOT MODIFY ANSWER)  Answer:  \[email or web address]\[email or web address]\Images\Pharmacy\HeparinInfusions\heparin LOW INTENSITY nomogram for [facility] [id number]    12/30/21 1607                Discharge Planning   SYED:      Code Status: Full Code   Is the patient  medically ready for discharge?:     Reason for patient still in hospital (select all that apply): Patient trending condition and Laboratory test                     Timothy Rowe MD  Department of Hospital Medicine   [address]

## 2022-01-02 VITALS
WEIGHT: 218.94 LBS | HEIGHT: 72 IN | RESPIRATION RATE: 18 BRPM | SYSTOLIC BLOOD PRESSURE: 169 MMHG | OXYGEN SATURATION: 95 % | TEMPERATURE: 98 F | HEART RATE: 64 BPM | BODY MASS INDEX: 29.65 KG/M2 | DIASTOLIC BLOOD PRESSURE: 77 MMHG

## 2022-01-02 PROBLEM — R07.9 CHEST PAIN: Status: RESOLVED | Noted: 2021-09-04 | Resolved: 2022-01-02

## 2022-01-02 PROCEDURE — 25000003 PHARM REV CODE 250: Performed by: INTERNAL MEDICINE

## 2022-01-02 PROCEDURE — 99239 PR HOSPITAL DISCHARGE DAY,>30 MIN: ICD-10-PCS | Mod: ,,, | Performed by: HOSPITALIST

## 2022-01-02 PROCEDURE — 25000003 PHARM REV CODE 250: Performed by: HOSPITALIST

## 2022-01-02 PROCEDURE — 99239 HOSP IP/OBS DSCHRG MGMT >30: CPT | Mod: ,,, | Performed by: HOSPITALIST

## 2022-01-02 RX ORDER — HYDROCHLOROTHIAZIDE 12.5 MG/1
12.5 TABLET ORAL DAILY
Status: DISCONTINUED | OUTPATIENT
Start: 2022-01-02 | End: 2022-01-02 | Stop reason: HOSPADM

## 2022-01-02 RX ORDER — ATORVASTATIN CALCIUM 80 MG/1
80 TABLET, FILM COATED ORAL NIGHTLY
Qty: 90 TABLET | Refills: 3 | Status: SHIPPED | OUTPATIENT
Start: 2022-01-02 | End: 2023-02-10 | Stop reason: SDUPTHER

## 2022-01-02 RX ORDER — PANTOPRAZOLE SODIUM 40 MG/1
40 TABLET, DELAYED RELEASE ORAL DAILY
Qty: 30 TABLET | Refills: 0 | Status: SHIPPED | OUTPATIENT
Start: 2022-01-03 | End: 2022-01-26 | Stop reason: SDUPTHER

## 2022-01-02 RX ORDER — HYDROCHLOROTHIAZIDE 12.5 MG/1
12.5 TABLET ORAL DAILY
Qty: 30 TABLET | Refills: 3 | Status: SHIPPED | OUTPATIENT
Start: 2022-01-03 | End: 2022-06-13 | Stop reason: SDUPTHER

## 2022-01-02 RX ORDER — AMLODIPINE BESYLATE 5 MG/1
5 TABLET ORAL DAILY
Qty: 30 TABLET | Refills: 3 | Status: SHIPPED | OUTPATIENT
Start: 2022-01-03 | End: 2022-04-20 | Stop reason: SDUPTHER

## 2022-01-02 RX ORDER — ISOSORBIDE MONONITRATE 60 MG/1
60 TABLET, EXTENDED RELEASE ORAL DAILY
Qty: 30 TABLET | Refills: 3 | Status: SHIPPED | OUTPATIENT
Start: 2022-01-03 | End: 2022-02-24

## 2022-01-02 RX ORDER — LOSARTAN POTASSIUM 50 MG/1
50 TABLET ORAL DAILY
Qty: 90 TABLET | Refills: 3 | Status: SHIPPED | OUTPATIENT
Start: 2022-01-03 | End: 2022-01-26 | Stop reason: ALTCHOICE

## 2022-01-02 RX ADMIN — AMLODIPINE BESYLATE 5 MG: 5 TABLET ORAL at 09:01

## 2022-01-02 RX ADMIN — HYDROCHLOROTHIAZIDE 12.5 MG: 12.5 TABLET ORAL at 09:01

## 2022-01-02 RX ADMIN — LOSARTAN POTASSIUM 50 MG: 50 TABLET, FILM COATED ORAL at 09:01

## 2022-01-02 RX ADMIN — ISOSORBIDE MONONITRATE 60 MG: 60 TABLET, EXTENDED RELEASE ORAL at 09:01

## 2022-01-02 RX ADMIN — PANTOPRAZOLE SODIUM 40 MG: 40 TABLET, DELAYED RELEASE ORAL at 09:01

## 2022-01-02 RX ADMIN — COLCHICINE 0.6 MG: 0.6 TABLET, FILM COATED ORAL at 09:01

## 2022-01-02 RX ADMIN — ASPIRIN 81 MG: 81 TABLET, COATED ORAL at 09:01

## 2022-01-02 NOTE — ASSESSMENT & PLAN NOTE
Cardiology consulted  Chest pain later resolved  -[facility] showed   Summary       · The left ventricular systolic function was normal.  · The left ventricular end diastolic pressure was normal.  · The pre-procedure left ventricular end diastolic pressure was 11.  · The ejection fraction was calculated to be 55%.  · The ejection fraction was greater than 55% by visual estimate.  · The Acute Mrg lesion was 80% stenosed.  · The Dist RCA lesion was 50% stenosed.  · The 1st Diag lesion was 75% stenosed.  · The Ost LAD to Prox LAD lesion was 80% stenosed.  · The 2nd Diag lesion was 80% stenosed.  · The Prox Cx to Mid Cx lesion was 80% stenosed.  · The 2nd Mrg lesion was 60% stenosed.  · The estimated blood loss was none.  · There was three vessel coronary artery disease.     Severe three vessel CAD, progression of disease in distal RCA, consider CABG versus multiple vessel PCI, normal LV  Recommendations     Consult CV Surgery.     Dr. Shen discussed CABG with patient; and plan is for surgery on 1/7/2022 with admittance on 1/6/2022

## 2022-01-02 NOTE — PROGRESS NOTES
[address] Medicine  Progress Note    Patient Name: Trung Barboza  MRN: [id number]  Patient Class: IP- Inpatient   Admission Date: 12/30/2021  Length of Stay: 2 days  Attending Physician: Timothy Rowe MD  Primary Care Provider: Lawrence Huerta DO        Subjective:     Principal Problem:Chest pain        HPI:  Chief complaint:  Chest pain    History of present illness:  Mr. Callahan presented to the emergency room with 2 day history of intermittent chest pain.  Granddaughter stated see had had an episode chest pain several days before that that he had reported while she was at work but resolved.  Symptoms seem worse today and came to the emergency department.  Some increased shortness of breath for the vent.  No complained of diaphoresis, nausea or radiation of discomfort.  Chest pain had been lasting a few minutes but today longer.  Had some more persistent chest pain when came to the emergency room and relieved by 2 nitroglycerin sublingual.  States however chest pain had recurred and was having 8 on a 0-10 scale when seen by me initially.  States chest pain had been worse with exertion.  No change in chest pain with oral intake.    [facility]  Summary  1. Three vessel CAD ( 70% LAD, 70% OM1, 99% pRCA)  2. Low left sided filling pressures  3. S/P successful IVUS guided PCI of RCA with two KEHINDE.    Plan:  1. DAPT (aspirin and brillinta) for atleast 1 year  2. Can consider staged PCI of LAD, and OM1 if still symptomatic.   3. Risk factor modification and life style changes.    The procedure log was documented by No documenter listed and verified by Philip Torres MD.   Date: 7/13/2021  Time: 8:39 PM    Review of systems:  States he had been chest pain-free till recently.  Nocturia x3 episodes nightly.  Some issues with his prostate that he has seen Dr. Brown in the past that he was not sure details.  Review of systems otherwise negative.    Past medical  history:  -hypertension times 20 years  -coronary artery disease as above mention with stents placed in July this year  -gout  -BPH    Past surgical history:  -Cholecystectomy.    Social history:  Patient lives with wife  No history of tobacco or  alcohol use    Health maintenance issues:  Primary care providers Dr. Huerta  Patient has not had flu shot yet this year  He has had previous Pneumovax  No prior COVID infection but has had COVID vaccination and had booster shot 2 months ago    Allergies:  Lisinopril causes swelling lips    Family history:  No one with premature coronary artery disease          Overview/Hospital Course:  12/31- SOB with exertion. No prolonged CP. LHC done and CV surgery to evaluate. Family in room. No other issues. Discussed with Dr Villaseñor earlier.    01/01 - Plan admit [facility] 01/06 for CABG. No CP but MCCALLUM. BP up. Adjust meds. Possible home if doing well prior to reporting for surgery.      Past Medical History:   Diagnosis Date    Benign localized prostatic hyperplasia with lower urinary tract symptoms (LUTS)     Coronary artery disease     Elevated PSA     Hypercholesterolemia     Hyperlipidemia     Hypertension     Obesity (BMI 30-39.9)        Past Surgical History:   Procedure Laterality Date    BIOPSY WITH TRANSRECTAL ULTRASOUND (TRUS) GUIDANCE Bilateral 03/07/2018    CHOLECYSTECTOMY      LEFT HEART CATHETERIZATION Left 7/13/2021    Procedure: Left heart cath;  Surgeon: Philip Torres MD;  Location: [facility] CATH LAB;  Service: Cardiology;  Laterality: Left;       Review of patient's allergies indicates:   Allergen Reactions    Lisinopril Swelling       No current facility-administered medications on file prior to encounter.     Current Outpatient Medications on File Prior to Encounter   Medication Sig    aspirin (ECOTRIN) 81 MG EC tablet Take 1 tablet (81 mg total) by mouth once daily.    atorvastatin (LIPITOR) 40 MG tablet Take 40 mg by mouth once daily.     carvediloL (COREG) 3.125 MG tablet Take 1 tablet (3.125 mg total) by mouth 2 (two) times daily.    colchicine (COLCRYS) 0.6 mg tablet TAKE 2 TABLETS TODAY, AND THEN TAKE ONE DAILY UNTIL GOUT ATTACK RESOLVING.    nitroGLYCERIN (NITROSTAT) 0.4 MG SL tablet Place 1 tablet (0.4 mg total) under the tongue every 5 (five) minutes as needed for Chest pain.    ticagrelor (BRILINTA) 90 mg tablet Take 1 tablet (90 mg total) by mouth 2 (two) times daily.     Family History     Problem Relation (Age of Onset)    No Known Problems Mother, Father, Sister, Brother, Daughter, Son        Tobacco Use    Smoking status: Former Smoker     Packs/day: 0.50     Quit date: 1988     Years since quittin.5    Smokeless tobacco: Former User     Types: Chew   Substance and Sexual Activity    Alcohol use: Yes     Comment: can of beer one or twice a month    Drug use: Never    Sexual activity: Not Currently     Review of Systems   Constitutional: Negative for appetite change, fatigue and fever.   HENT: Negative for congestion, hearing loss and trouble swallowing.    Respiratory: Positive for chest tightness and shortness of breath. Negative for wheezing.    Cardiovascular: Negative for chest pain and palpitations.   Gastrointestinal: Negative for abdominal pain, constipation and nausea.   Genitourinary: Negative for difficulty urinating and dysuria.   Musculoskeletal: Negative for back pain and neck stiffness.   Skin: Negative for pallor and rash.   Neurological: Negative for dizziness, speech difficulty and headaches.   Psychiatric/Behavioral: Negative for confusion and suicidal ideas.     Objective:     Vital Signs (Most Recent):  Temp: 96.1 °F (35.6 °C) (22)  Pulse: (!) 56 (22)  Resp: 18 (22)  BP: (!) 140/67 (22)  SpO2: 99 % (22) Vital Signs (24h Range):  Temp:  [96.1 °F (35.6 °C)-97.9 °F (36.6 °C)] 96.1 °F (35.6 °C)  Pulse:  [51-62] 56  Resp:  [18-20] 18  SpO2:  [98 %-99  %] 99 %  BP: (140-182)/(67-84) 140/67     Weight: 99.3 kg (218 lb 14.7 oz)  Body mass index is 29.69 kg/m².    Physical Exam  Vitals reviewed.   Constitutional:       General: He is not in acute distress.  Eyes:      Pupils: Pupils are equal, round, and reactive to light.   Cardiovascular:      Rate and Rhythm: Normal rate and regular rhythm.      Pulses: Normal pulses.   Pulmonary:      Effort: Pulmonary effort is normal. No respiratory distress.      Breath sounds: Normal breath sounds. No wheezing.   Abdominal:      General: Bowel sounds are normal. There is no distension.      Tenderness: There is no abdominal tenderness.   Skin:     General: Skin is warm.   Neurological:      General: No focal deficit present.      Mental Status: He is alert, oriented to person, place, and time and easily aroused. Mental status is at baseline.   Psychiatric:         Mood and Affect: Mood normal.         Behavior: Behavior normal.           CRANIAL NERVES     CN III, IV, VI   Pupils are equal, round, and reactive to light.       Significant Labs:   All pertinent labs within the past 24 hours have been reviewed.  BMP:   No results for input(s): GLU, NA, K, CL, CO2, BUN, CREATININE, CALCIUM, MG in the last 48 hours.  CBC:   Recent Labs   Lab 12/31/21  0249 01/01/22  0447   WBC 5.85 5.89   HGB 12.3* 13.8   HCT 35.1* 39.6*    210     CMP:   No results for input(s): NA, K, CL, CO2, GLU, BUN, CREATININE, CALCIUM, PROT, ALBUMIN, BILITOT, ALKPHOS, AST, ALT, ANIONGAP, EGFRNONAA in the last 48 hours.    Invalid input(s): ESTGFAFR[place]    Imaging Results          X-Ray Chest AP Portable (Final result)  Result time 12/30/21 11:29:44    Final result by Casey Lorenzo MD (12/30/21 11:29:44)                 Impression:      Cardiomegaly and evidence of CHF      Electronically signed by: Casey Lorenzo  Date:    12/30/2021  Time:    11:29             Narrative:    EXAMINATION:  XR CHEST AP PORTABLE    CLINICAL HISTORY:  Chest  Pain;.    TECHNIQUE:  Chest x-ray AP portable semi erect    COMPARISON:  No previous similar    FINDINGS:  There is cardiomegaly and mild pulmonary vascular prominence.  There is no mediastinal mass.  There is mild hazy pulmonary edema in the mid to lower lungs.  There is no significant pleural fluid accumulation.  There is mild to moderate thoracic spondylosis                                  Intake/Output - Last 3 Shifts       12/31 0700  01/01 0659 01/01 0700  01/02 0659    P.O. 240     Total Intake(mL/kg) 240 (2.4)     Urine (mL/kg/hr) 700 (0.3)     Stool 0     Total Output 700     Net -460           Urine Occurrence 2 x             Assessment/Plan:      * Chest pain  Recurrent but resolved      BPH (benign prostatic hyperplasia)        Coronary artery disease involving native coronary artery of native heart without angina pectoris  Cardiology consult  Heparin infusion and nitroglycerin infusion started  Chest pain later resolved      Gout  No flare up currently      CKD (chronic kidney disease) stage 3, GFR 30-59 ml/min  Monitor      Essential hypertension    Continue current medications and monitor      VTE Risk Mitigation (From admission, onward)         Ordered     IP VTE HIGH RISK PATIENT  Once         12/30/21 1900     Place sequential compression device  Until discontinued         12/30/21 1620                Discharge Planning   SYED:      Code Status: Full Code   Is the patient medically ready for discharge?:     Reason for patient still in hospital (select all that apply): Laboratory test, Treatment and Imaging                     Timothy Rowe MD  Department of Hospital Medicine   [address]

## 2022-01-02 NOTE — PLAN OF CARE
Problem: Adult Inpatient Plan of Care  Goal: Plan of Care Review  Outcome: Ongoing, Progressing  Goal: Patient-Specific Goal (Individualized)  Outcome: Ongoing, Progressing  Goal: Absence of Hospital-Acquired Illness or Injury  Outcome: Ongoing, Progressing  Goal: Optimal Comfort and Wellbeing  Outcome: Ongoing, Progressing  Goal: Readiness for Transition of Care  Outcome: Ongoing, Progressing     Problem: Fall Injury Risk  Goal: Absence of Fall and Fall-Related Injury  Outcome: Ongoing, Progressing

## 2022-01-02 NOTE — DISCHARGE SUMMARY
[address] Medicine  Discharge Summary      Patient Name: Trung Barboza  MRN: [id number]  Patient Class: IP- Inpatient  Admission Date: 12/30/2021  Hospital Length of Stay: 3 days  Discharge Date and Time:  01/02/2022 10:20 AM  Attending Physician: Timothy Rowe MD   Discharging Provider: JONAH Zhong  Primary Care Provider: Lawrence Huerat DO      HPI:   Chief complaint:  Chest pain    History of present illness:  Mr. Callahan presented to the emergency room with 2 day history of intermittent chest pain.  Granddaughter stated see had had an episode chest pain several days before that that he had reported while she was at work but resolved.  Symptoms seem worse today and came to the emergency department.  Some increased shortness of breath for the vent.  No complained of diaphoresis, nausea or radiation of discomfort.  Chest pain had been lasting a few minutes but today longer.  Had some more persistent chest pain when came to the emergency room and relieved by 2 nitroglycerin sublingual.  States however chest pain had recurred and was having 8 on a 0-10 scale when seen by me initially.  States chest pain had been worse with exertion.  No change in chest pain with oral intake.    [facility]  Summary  1. Three vessel CAD ( 70% LAD, 70% OM1, 99% pRCA)  2. Low left sided filling pressures  3. S/P successful IVUS guided PCI of RCA with two KEHINDE.    Plan:  1. DAPT (aspirin and brillinta) for atleast 1 year  2. Can consider staged PCI of LAD, and OM1 if still symptomatic.   3. Risk factor modification and life style changes.    The procedure log was documented by No documenter listed and verified by Philip Torres MD.   Date: 7/13/2021  Time: 8:39 PM    Review of systems:  States he had been chest pain-free till recently.  Nocturia x3 episodes nightly.  Some issues with his prostate that he has seen Dr. Brown in the past that he was not sure details.  Review of systems  otherwise negative.    Past medical history:  -hypertension times 20 years  -coronary artery disease as above mention with stents placed in July this year  -gout  -BPH    Past surgical history:  -Cholecystectomy.    Social history:  Patient lives with wife  No history of tobacco or  alcohol use    Health maintenance issues:  Primary care providers Dr. Huerta  Patient has not had flu shot yet this year  He has had previous Pneumovax  No prior COVID infection but has had COVID vaccination and had booster shot 2 months ago    Allergies:  Lisinopril causes swelling lips    Family history:  No one with premature coronary artery disease          Procedure(s) (LRB):  ANGIOGRAM,CORONARY,WITH LEFT HEART CATHETERIZATION (N/A)      Hospital Course:   12/31- SOB with exertion. No prolonged CP. LHC done and CV surgery to evaluate. Family in room. No other issues. Discussed with Dr Villaseñor earlier.    01/01 - Plan admit [facility] 01/06 for CABG. No CP but MCCALLUM. BP up. Adjust meds. Possible home if doing well prior to reporting for surgery.    01/02-- Mr. Barboza was admitted on 12/30/21 after presenting to the ED with 2 day history of intermittent chest pain. He endorsed shortness of breath and worsening chest pain with exertion; however, denies diaphoresis, nausea, or radiation of discomfort. He had a LHC in July of 2021 with 2 stents placed and three vessel CAD noted at that time. He was placed on Brilinta for one year. On admission, high sensitivity troponin was elevated and cardiology was consulted which led to repeat LHC due to NSTEMI. During LHC no interventions were performed and severe three vessel CAD with progression of disease in distal RCA was noted. Dr. Shen reviewed case and discussed CABG with patient. Patient agreed to that plan of care and patient will admitted on 1/6/22 to [facility] for CABG on 1/7/22. Patient has been instructed to hold Brilinta for surgery preparation.        Goals of Care Treatment Preferences:  Code  Status: Full Code      Consults:   Consults (From admission, onward)        Status Ordering Provider     Inpatient consult to Cardiothoracic Surgery  Once        Provider:  (Not yet assigned)    Acknowledged ODESSA MELVIN     Inpatient consult to Cardiology  Once        Provider:  DO Inés Ovalle RON C          Coronary artery disease involving native coronary artery of native heart without angina pectoris  Cardiology consulted  Chest pain later resolved  -[facility] showed   Summary       · The left ventricular systolic function was normal.  · The left ventricular end diastolic pressure was normal.  · The pre-procedure left ventricular end diastolic pressure was 11.  · The ejection fraction was calculated to be 55%.  · The ejection fraction was greater than 55% by visual estimate.  · The Acute Mrg lesion was 80% stenosed.  · The Dist RCA lesion was 50% stenosed.  · The 1st Diag lesion was 75% stenosed.  · The Ost LAD to Prox LAD lesion was 80% stenosed.  · The 2nd Diag lesion was 80% stenosed.  · The Prox Cx to Mid Cx lesion was 80% stenosed.  · The 2nd Mrg lesion was 60% stenosed.  · The estimated blood loss was none.  · There was three vessel coronary artery disease.     Severe three vessel CAD, progression of disease in distal RCA, consider CABG versus multiple vessel PCI, normal LV  Recommendations     Consult CV Surgery.     Dr. Shen discussed CABG with patient; and plan is for surgery on 1/7/2022 with admittance on 1/6/2022    Gout  No flare up currently; Continue Colchicine 0.6 mg PO daily       CKD (chronic kidney disease) stage 3, GFR 30-59 ml/min  Creatinine trending down and is 1.43      Essential hypertension  Patient is currently taking   -Amlodipine 5 mg po daily  -Hydrochlorothiazide 12.5 mg PO daily  -Imdur 60 mg 24 hour tablet PO daily  -Losartan 50 mg PO daily  -Lipitor 80 mg PO daily  -ASA 81 mg PO daily       Final Active Diagnoses:    Diagnosis Date Noted POA    BPH  (benign prostatic hyperplasia) [N40.0] 09/28/2021 Yes    Coronary artery disease involving native coronary artery of native heart without angina pectoris [I25.10] 09/04/2021 Yes    Gout [M10.9] 07/25/2021 Yes    Essential hypertension [I10] 07/10/2021 Yes    CKD (chronic kidney disease) stage 3, GFR 30-59 ml/min [N18.30] 07/10/2021 Yes      Problems Resolved During this Admission:    Diagnosis Date Noted Date Resolved POA    PRINCIPAL PROBLEM:  Chest pain [R07.9] 09/04/2021 01/02/2022 Yes       Discharged Condition: stable    Disposition: Home or Self Care    Follow Up:   Follow-up Information     Gabriel Shen MD On 1/6/2022.    Specialties: Cardiothoracic Surgery, Vascular Surgery  Why: For admission  Contact information:  [address]  [address] [facility]  [facility] [address]  [phone]             Lawrence Huerta DO In 1 month.    Specialty: Family Medicine  Why: Hospital follow up after surgery   Contact information:  [address]  [facility] [address]  [phone]                       Patient Instructions:      Diet Cardiac     Notify your health care provider if you experience any of the following:  difficulty breathing or increased cough     Notify your health care provider if you experience any of the following:  increased confusion or weakness     Notify your health care provider if you experience any of the following:  persistent dizziness, light-headedness, or visual disturbances     Activity as tolerated       Significant Diagnostic Studies: Labs: All labs within the past 24 hours have been reviewed    Pending Diagnostic Studies:     Procedure Component Value Units Date/Time    EKG 12-lead [[id number]]     Order Status: Sent Lab Status: No result     EKG 12-lead [[id number]] Collected: 12/30/21 1058    Order Status: Sent Lab Status: In process Updated: 12/30/21 1431    Narrative:      Test Reason : R07.9,    Vent. Rate : 069 BPM     Atrial Rate : 000  BPM     P-R Int : 266 ms          QRS Dur : 130 ms      QT Int : 422 ms       P-R-T Axes : 052 -13 080 degrees     QTc Int : 438 ms    Sinus rhythm  with 1st degree A-V block  IV conduction defect  Lateral ST abnormality  is nonspecific  Abnormal ECG      Referred By: NOLVIA   SELF           Confirmed By:          Medications:  Reconciled Home Medications:      Medication List      START taking these medications    amLODIPine 5 MG tablet  Commonly known as: NORVASC  Take 1 tablet (5 mg total) by mouth once daily.  Start taking on: January 3, 2022     hydroCHLOROthiazide 12.5 MG Tab  Commonly known as: HYDRODIURIL  Take 1 tablet (12.5 mg total) by mouth once daily.  Start taking on: January 3, 2022     isosorbide mononitrate 60 MG 24 hr tablet  Commonly known as: IMDUR  Take 1 tablet (60 mg total) by mouth once daily.  Start taking on: January 3, 2022     losartan 50 MG tablet  Commonly known as: COZAAR  Take 1 tablet (50 mg total) by mouth once daily.  Start taking on: January 3, 2022     pantoprazole 40 MG tablet  Commonly known as: PROTONIX  Take 1 tablet (40 mg total) by mouth once daily.  Start taking on: January 3, 2022        CHANGE how you take these medications    atorvastatin 80 MG tablet  Commonly known as: LIPITOR  Take 1 tablet (80 mg total) by mouth every evening.  What changed:   · medication strength  · how much to take  · when to take this        CONTINUE taking these medications    aspirin 81 MG EC tablet  Commonly known as: ECOTRIN  Take 1 tablet (81 mg total) by mouth once daily.     colchicine 0.6 mg tablet  Commonly known as: COLCRYS  TAKE 2 TABLETS TODAY, AND THEN TAKE ONE DAILY UNTIL GOUT ATTACK RESOLVING.     nitroGLYCERIN 0.4 MG SL tablet  Commonly known as: NITROSTAT  Place 1 tablet (0.4 mg total) under the tongue every 5 (five) minutes as needed for Chest pain.        STOP taking these medications    carvediloL 3.125 MG tablet  Commonly known as: COREG     ticagrelor 90 mg  tablet  Commonly known as: BRILINTA            Indwelling Lines/Drains at time of discharge:   Lines/Drains/Airways     None                 Time spent on the discharge of patient: Greater than 30 minutes         JONAH Zhong  Department of Hospital Medicine  [address]

## 2022-01-02 NOTE — SUBJECTIVE & OBJECTIVE
Past Medical History:   Diagnosis Date    Benign localized prostatic hyperplasia with lower urinary tract symptoms (LUTS)     Coronary artery disease     Elevated PSA     Hypercholesterolemia     Hyperlipidemia     Hypertension     Obesity (BMI 30-39.9)        Past Surgical History:   Procedure Laterality Date    BIOPSY WITH TRANSRECTAL ULTRASOUND (TRUS) GUIDANCE Bilateral 2018    CHOLECYSTECTOMY      LEFT HEART CATHETERIZATION Left 2021    Procedure: Left heart cath;  Surgeon: Philip Torres MD;  Location: [facility] CATH LAB;  Service: Cardiology;  Laterality: Left;       Review of patient's allergies indicates:   Allergen Reactions    Lisinopril Swelling       No current facility-administered medications on file prior to encounter.     Current Outpatient Medications on File Prior to Encounter   Medication Sig    aspirin (ECOTRIN) 81 MG EC tablet Take 1 tablet (81 mg total) by mouth once daily.    atorvastatin (LIPITOR) 40 MG tablet Take 40 mg by mouth once daily.    carvediloL (COREG) 3.125 MG tablet Take 1 tablet (3.125 mg total) by mouth 2 (two) times daily.    colchicine (COLCRYS) 0.6 mg tablet TAKE 2 TABLETS TODAY, AND THEN TAKE ONE DAILY UNTIL GOUT ATTACK RESOLVING.    nitroGLYCERIN (NITROSTAT) 0.4 MG SL tablet Place 1 tablet (0.4 mg total) under the tongue every 5 (five) minutes as needed for Chest pain.    ticagrelor (BRILINTA) 90 mg tablet Take 1 tablet (90 mg total) by mouth 2 (two) times daily.     Family History     Problem Relation (Age of Onset)    No Known Problems Mother, Father, Sister, Brother, Daughter, Son        Tobacco Use    Smoking status: Former Smoker     Packs/day: 0.50     Quit date: 1988     Years since quittin.5    Smokeless tobacco: Former User     Types: Chew   Substance and Sexual Activity    Alcohol use: Yes     Comment: can of beer one or twice a month    Drug use: Never    Sexual activity: Not Currently     Review of Systems    Constitutional: Negative for appetite change, fatigue and fever.   HENT: Negative for congestion, hearing loss and trouble swallowing.    Respiratory: Positive for chest tightness and shortness of breath. Negative for wheezing.    Cardiovascular: Negative for chest pain and palpitations.   Gastrointestinal: Negative for abdominal pain, constipation and nausea.   Genitourinary: Negative for difficulty urinating and dysuria.   Musculoskeletal: Negative for back pain and neck stiffness.   Skin: Negative for pallor and rash.   Neurological: Negative for dizziness, speech difficulty and headaches.   Psychiatric/Behavioral: Negative for confusion and suicidal ideas.     Objective:     Vital Signs (Most Recent):  Temp: 96.1 °F (35.6 °C) (01/01/22 1930)  Pulse: (!) 56 (01/01/22 1930)  Resp: 18 (01/01/22 1930)  BP: (!) 140/67 (01/01/22 1930)  SpO2: 99 % (01/01/22 1930) Vital Signs (24h Range):  Temp:  [96.1 °F (35.6 °C)-97.9 °F (36.6 °C)] 96.1 °F (35.6 °C)  Pulse:  [51-62] 56  Resp:  [18-20] 18  SpO2:  [98 %-99 %] 99 %  BP: (140-182)/(67-84) 140/67     Weight: 99.3 kg (218 lb 14.7 oz)  Body mass index is 29.69 kg/m².    Physical Exam  Vitals reviewed.   Constitutional:       General: He is not in acute distress.  Eyes:      Pupils: Pupils are equal, round, and reactive to light.   Cardiovascular:      Rate and Rhythm: Normal rate and regular rhythm.      Pulses: Normal pulses.   Pulmonary:      Effort: Pulmonary effort is normal. No respiratory distress.      Breath sounds: Normal breath sounds. No wheezing.   Abdominal:      General: Bowel sounds are normal. There is no distension.      Tenderness: There is no abdominal tenderness.   Skin:     General: Skin is warm.   Neurological:      General: No focal deficit present.      Mental Status: He is alert, oriented to person, place, and time and easily aroused. Mental status is at baseline.   Psychiatric:         Mood and Affect: Mood normal.         Behavior: Behavior  normal.           CRANIAL NERVES     CN III, IV, VI   Pupils are equal, round, and reactive to light.       Significant Labs:   All pertinent labs within the past 24 hours have been reviewed.  BMP:   No results for input(s): GLU, NA, K, CL, CO2, BUN, CREATININE, CALCIUM, MG in the last 48 hours.  CBC:   Recent Labs   Lab 12/31/21  0249 01/01/22  0447   WBC 5.85 5.89   HGB 12.3* 13.8   HCT 35.1* 39.6*    210     CMP:   No results for input(s): NA, K, CL, CO2, GLU, BUN, CREATININE, CALCIUM, PROT, ALBUMIN, BILITOT, ALKPHOS, AST, ALT, ANIONGAP, EGFRNONAA in the last 48 hours.    Invalid input(s): ESTGFAFRICA    Imaging Results          X-Ray Chest AP Portable (Final result)  Result time 12/30/21 11:29:44    Final result by Casey Lorenzo MD (12/30/21 11:29:44)                 Impression:      Cardiomegaly and evidence of CHF      Electronically signed by: Casey Lorenzo  Date:    12/30/2021  Time:    11:29             Narrative:    EXAMINATION:  XR CHEST AP PORTABLE    CLINICAL HISTORY:  Chest Pain;.    TECHNIQUE:  Chest x-ray AP portable semi erect    COMPARISON:  No previous similar    FINDINGS:  There is cardiomegaly and mild pulmonary vascular prominence.  There is no mediastinal mass.  There is mild hazy pulmonary edema in the mid to lower lungs.  There is no significant pleural fluid accumulation.  There is mild to moderate thoracic spondylosis

## 2022-01-02 NOTE — ASSESSMENT & PLAN NOTE
Patient is currently taking   -Amlodipine 5 mg po daily  -Hydrochlorothiazide 12.5 mg PO daily  -Imdur 60 mg 24 hour tablet PO daily  -Losartan 50 mg PO daily  -Lipitor 80 mg PO daily  -ASA 81 mg PO daily

## 2022-01-02 NOTE — PLAN OF CARE
Problem: Adult Inpatient Plan of Care  Goal: Plan of Care Review  Outcome: Ongoing, Progressing  Goal: Patient-Specific Goal (Individualized)  Outcome: Ongoing, Progressing  Goal: Absence of Hospital-Acquired Illness or Injury  Outcome: Ongoing, Progressing  Goal: Optimal Comfort and Wellbeing  Outcome: Ongoing, Progressing  Goal: Readiness for Transition of Care  Outcome: Ongoing, Progressing     Problem: Fall Injury Risk  Goal: Absence of Fall and Fall-Related Injury  Outcome: Ongoing, Progressing   Plan of care reviewed and continues

## 2022-01-18 ENCOUNTER — TELEPHONE (OUTPATIENT)
Dept: FAMILY MEDICINE | Facility: CLINIC | Age: 82
End: 2022-01-18
Payer: COMMERCIAL

## 2022-01-18 RX ORDER — OXYCODONE AND ACETAMINOPHEN 5; 325 MG/1; MG/1
1 TABLET ORAL EVERY 6 HOURS PRN
COMMUNITY
Start: 2022-01-17 | End: 2022-09-15 | Stop reason: ALTCHOICE

## 2022-01-18 RX ORDER — TICAGRELOR 90 MG/1
90 TABLET ORAL 2 TIMES DAILY
COMMUNITY
Start: 2022-01-16 | End: 2022-06-13 | Stop reason: SDUPTHER

## 2022-01-18 RX ORDER — POTASSIUM CHLORIDE 750 MG/1
10 TABLET, EXTENDED RELEASE ORAL DAILY
COMMUNITY
Start: 2022-01-16 | End: 2022-01-26 | Stop reason: SDUPTHER

## 2022-01-18 RX ORDER — FUROSEMIDE 20 MG/1
20 TABLET ORAL DAILY
COMMUNITY
Start: 2022-01-16 | End: 2022-01-26 | Stop reason: SDUPTHER

## 2022-01-18 RX ORDER — ASCORBIC ACID 500 MG
1000 TABLET ORAL 2 TIMES DAILY
COMMUNITY
Start: 2022-01-16 | End: 2022-09-15 | Stop reason: ALTCHOICE

## 2022-01-18 NOTE — TELEPHONE ENCOUNTER
Spoke with pt's daughter for TCC call. She reports pt is feeling okay. She denies uncontrolled pain. She reports moderate bleeding at leg wound site and HH nurse is coming today to check it. She vu of all f/u appts, diet, activity and when to notify HH or dr for changes. She denies any needs. Medications reviewed. She vu of medications. Instructed to take meds to all f/u appts.

## 2022-01-26 ENCOUNTER — OFFICE VISIT (OUTPATIENT)
Dept: FAMILY MEDICINE | Facility: CLINIC | Age: 82
End: 2022-01-26
Payer: COMMERCIAL

## 2022-01-26 VITALS
SYSTOLIC BLOOD PRESSURE: 122 MMHG | BODY MASS INDEX: 29.53 KG/M2 | HEART RATE: 80 BPM | HEIGHT: 72 IN | RESPIRATION RATE: 18 BRPM | DIASTOLIC BLOOD PRESSURE: 72 MMHG | OXYGEN SATURATION: 99 % | WEIGHT: 218 LBS

## 2022-01-26 DIAGNOSIS — I25.10 CORONARY ARTERY DISEASE INVOLVING NATIVE CORONARY ARTERY OF NATIVE HEART WITHOUT ANGINA PECTORIS: ICD-10-CM

## 2022-01-26 DIAGNOSIS — I25.2 HISTORY OF MI (MYOCARDIAL INFARCTION): ICD-10-CM

## 2022-01-26 DIAGNOSIS — I10 ESSENTIAL HYPERTENSION: Primary | ICD-10-CM

## 2022-01-26 DIAGNOSIS — E78.5 HYPERLIPIDEMIA, UNSPECIFIED HYPERLIPIDEMIA TYPE: ICD-10-CM

## 2022-01-26 DIAGNOSIS — N18.31 STAGE 3A CHRONIC KIDNEY DISEASE: ICD-10-CM

## 2022-01-26 PROCEDURE — 1111F DSCHRG MED/CURRENT MED MERGE: CPT | Mod: ,,, | Performed by: FAMILY MEDICINE

## 2022-01-26 PROCEDURE — 3074F SYST BP LT 130 MM HG: CPT | Mod: ,,, | Performed by: FAMILY MEDICINE

## 2022-01-26 PROCEDURE — 99495 TRANSJ CARE MGMT MOD F2F 14D: CPT | Mod: ,,, | Performed by: FAMILY MEDICINE

## 2022-01-26 PROCEDURE — 3074F PR MOST RECENT SYSTOLIC BLOOD PRESSURE < 130 MM HG: ICD-10-PCS | Mod: ,,, | Performed by: FAMILY MEDICINE

## 2022-01-26 PROCEDURE — 3078F PR MOST RECENT DIASTOLIC BLOOD PRESSURE < 80 MM HG: ICD-10-PCS | Mod: ,,, | Performed by: FAMILY MEDICINE

## 2022-01-26 PROCEDURE — 1159F PR MEDICATION LIST DOCUMENTED IN MEDICAL RECORD: ICD-10-PCS | Mod: ,,, | Performed by: FAMILY MEDICINE

## 2022-01-26 PROCEDURE — 1111F PR DISCHARGE MEDS RECONCILED W/ CURRENT OUTPATIENT MED LIST: ICD-10-PCS | Mod: ,,, | Performed by: FAMILY MEDICINE

## 2022-01-26 PROCEDURE — 1159F MED LIST DOCD IN RCRD: CPT | Mod: ,,, | Performed by: FAMILY MEDICINE

## 2022-01-26 PROCEDURE — 1160F PR REVIEW ALL MEDS BY PRESCRIBER/CLIN PHARMACIST DOCUMENTED: ICD-10-PCS | Mod: ,,, | Performed by: FAMILY MEDICINE

## 2022-01-26 PROCEDURE — 99495 TCM SERVICES (MODERATE COMPLEXITY): ICD-10-PCS | Mod: ,,, | Performed by: FAMILY MEDICINE

## 2022-01-26 PROCEDURE — 1160F RVW MEDS BY RX/DR IN RCRD: CPT | Mod: ,,, | Performed by: FAMILY MEDICINE

## 2022-01-26 PROCEDURE — 3078F DIAST BP <80 MM HG: CPT | Mod: ,,, | Performed by: FAMILY MEDICINE

## 2022-01-26 RX ORDER — POTASSIUM CHLORIDE 750 MG/1
10 TABLET, EXTENDED RELEASE ORAL DAILY
Qty: 30 TABLET | Refills: 2 | Status: SHIPPED | OUTPATIENT
Start: 2022-01-26 | End: 2022-04-20 | Stop reason: SDUPTHER

## 2022-01-26 RX ORDER — PANTOPRAZOLE SODIUM 40 MG/1
40 TABLET, DELAYED RELEASE ORAL DAILY
Qty: 30 TABLET | Refills: 2 | Status: SHIPPED | OUTPATIENT
Start: 2022-01-26 | End: 2022-04-20 | Stop reason: SDUPTHER

## 2022-01-26 RX ORDER — FUROSEMIDE 20 MG/1
20 TABLET ORAL DAILY
Qty: 30 TABLET | Refills: 2 | Status: SHIPPED | OUTPATIENT
Start: 2022-01-26 | End: 2022-05-10 | Stop reason: SDUPTHER

## 2022-01-26 RX ORDER — COLCHICINE 0.6 MG/1
TABLET ORAL
Qty: 30 TABLET | Refills: 11 | Status: SHIPPED | OUTPATIENT
Start: 2022-01-26 | End: 2022-06-30 | Stop reason: SDUPTHER

## 2022-01-26 NOTE — PROGRESS NOTES
"It   Lawrence Huerta DO   [address]  [place]  [address]      PATIENT NAME: Trung Barboza  : 1940  DATE: 22  MRN: [id number]      Billing Provider: Lawrence Huerta DO  Level of Service:   Patient PCP Information     Provider PCP Type    Lawrence Huerta DO General          Reason for Visit / Chief Complaint: Hospital Follow Up (TCC visit, Pt was discharged from [facility]. Had "open heart surgery" performed by Dr. Qureshi. Currently on services with [facility]. )       Update PCP  Update Chief Complaint         History of Present Illness / Problem Focused Workflow     Trung Barboza presents to the clinic with Hospital Follow Up (TCC visit, Pt was discharged from [facility]. Had "open heart surgery" performed by Dr. Qureshi. Currently on services with [facility]. )     Patient status post CABG at [facility].  He is doing well.  His wounds are healing well except he has some pain at the lower edge of his incision on his right leg.  There is minimal drainage.  He has is sutures removed.  Patient is to follow-up with his surgeon next week.  Patient's medications were reviewed and reconciled.  Having no chest pain shortness of breath or palpitations PND or orthopnea.      Review of Systems     Review of Systems   Constitutional: Negative for activity change, appetite change, chills, fatigue and fever.   HENT: Negative for nasal congestion, ear discharge, ear pain, mouth dryness, mouth sores, postnasal drip, sinus pressure/congestion, sore throat and voice change.    Eyes: Negative for pain, discharge, redness, itching and visual disturbance.   Respiratory: Negative for apnea, cough, chest tightness, shortness of breath and wheezing.    Cardiovascular: Negative for chest pain, palpitations and leg swelling.   Gastrointestinal: Negative for abdominal distention, abdominal pain, anal bleeding, blood in stool, change in bowel habit, constipation, diarrhea, " nausea, vomiting, reflux and change in bowel habit.   Endocrine: Negative for cold intolerance, heat intolerance, polydipsia, polyphagia and polyuria.   Genitourinary: Negative for difficulty urinating, enuresis, erectile dysfunction, frequency, genital sores, hematuria, hot flashes, menstrual irregularity, urgency and vaginal dryness.   Musculoskeletal: Negative for arthralgias, back pain, gait problem, leg pain, myalgias and neck pain.   Integumentary:  Positive for wound. Negative for rash, mole/lesion, breast mass, breast discharge and breast tenderness.   Allergic/Immunologic: Negative for environmental allergies and food allergies.   Neurological: Negative for dizziness, vertigo, tremors, seizures, syncope, facial asymmetry, speech difficulty, weakness, light-headedness, numbness, headaches, disturbances in coordination, memory loss and coordination difficulties.   Hematological: Negative for adenopathy. Does not bruise/bleed easily.   Psychiatric/Behavioral: Negative for agitation, behavioral problems, confusion, decreased concentration, dysphoric mood, hallucinations, self-injury, sleep disturbance and suicidal ideas. The patient is not nervous/anxious and is not hyperactive.    Breast: Negative for mass and tenderness      Medical / Social / Family History     Past Medical History:   Diagnosis Date    Benign localized prostatic hyperplasia with lower urinary tract symptoms (LUTS)     Coronary artery disease     Elevated PSA     Hypercholesterolemia     Hyperlipidemia     Hypertension     Obesity (BMI 30-39.9)        Past Surgical History:   Procedure Laterality Date    ANGIOGRAM, CORONARY, WITH LEFT HEART CATHETERIZATION N/A 12/31/2021    Procedure: ANGIOGRAM,CORONARY,WITH LEFT HEART CATHETERIZATION;  Surgeon: Mahad Villaseñor DO;  Location: [facility] CATH LAB;  Service: Cardiology;  Laterality: N/A;    BIOPSY WITH TRANSRECTAL ULTRASOUND (TRUS) GUIDANCE Bilateral 03/07/2018    CHOLECYSTECTOMY       CORONARY ARTERY BYPASS GRAFT  01/07/2022    [facility] Qureshi    LEFT HEART CATHETERIZATION Left 7/13/2021    Procedure: Left heart cath;  Surgeon: Philip Torres MD;  Location: [facility] CATH LAB;  Service: Cardiology;  Laterality: Left;       Social History    reports that he quit smoking about 33 years ago. He smoked 0.50 packs per day. He has quit using smokeless tobacco.  His smokeless tobacco use included chew. He reports current alcohol use. He reports that he does not use drugs.    Family History  's family history includes No Known Problems in his brother, daughter, father, mother, sister, and son.    Medications and Allergies     Medications  Outpatient Medications Marked as Taking for the 1/26/22 encounter (Office Visit) with Lawrence Huerta, DO   Medication Sig Dispense Refill    amLODIPine (NORVASC) 5 MG tablet Take 1 tablet (5 mg total) by mouth once daily. 30 tablet 3    ascorbic acid, vitamin C, (VITAMIN C) 500 MG tablet Take 1,000 mg by mouth 2 (two) times daily.      aspirin (ECOTRIN) 81 MG EC tablet Take 1 tablet (81 mg total) by mouth once daily. 30 tablet 0    atorvastatin (LIPITOR) 80 MG tablet Take 1 tablet (80 mg total) by mouth every evening. 90 tablet 3    BRILINTA 90 mg tablet Take 90 mg by mouth 2 (two) times daily.      hydroCHLOROthiazide (HYDRODIURIL) 12.5 MG Tab Take 1 tablet (12.5 mg total) by mouth once daily. 30 tablet 3    isosorbide mononitrate (IMDUR) 60 MG 24 hr tablet Take 1 tablet (60 mg total) by mouth once daily. 30 tablet 3    oxyCODONE-acetaminophen (PERCOCET) 5-325 mg per tablet Take 1 tablet by mouth every 6 (six) hours as needed.      [DISCONTINUED] colchicine (COLCRYS) 0.6 mg tablet TAKE 2 TABLETS TODAY, AND THEN TAKE ONE DAILY UNTIL GOUT ATTACK RESOLVING. (Patient taking differently: Take 0.6 mg by mouth as needed. TAKE 2 TABLETS TODAY, AND THEN TAKE ONE DAILY UNTIL GOUT ATTACK RESOLVING.) 30 tablet 11    [DISCONTINUED] furosemide  (LASIX) 20 MG tablet Take 20 mg by mouth once daily.      [DISCONTINUED] pantoprazole (PROTONIX) 40 MG tablet Take 1 tablet (40 mg total) by mouth once daily. 30 tablet 0    [DISCONTINUED] potassium chloride (KLOR-CON) 10 MEQ TbSR Take 10 mEq by mouth once daily.         Allergies  Review of patient's allergies indicates:   Allergen Reactions    Lisinopril Swelling       Physical Examination     Vitals:    01/26/22 1506   BP: 122/72   Pulse: 80   Resp: 18     Physical Exam  Constitutional:       General: He is not in acute distress.     Appearance: Normal appearance. He is obese.   HENT:      Head: Normocephalic.      Nose: Nose normal.      Mouth/Throat:      Mouth: Mucous membranes are moist.      Pharynx: Oropharynx is clear. No oropharyngeal exudate or posterior oropharyngeal erythema.   Eyes:      General: No scleral icterus.        Right eye: No discharge.         Left eye: No discharge.      Conjunctiva/sclera: Conjunctivae normal.      Pupils: Pupils are equal, round, and reactive to light.   Neck:      Vascular: Carotid bruit present.   Cardiovascular:      Rate and Rhythm: Normal rate and regular rhythm.      Pulses: Normal pulses.      Heart sounds: Normal heart sounds. No murmur heard.  No friction rub. No gallop.    Pulmonary:      Effort: Pulmonary effort is normal. No respiratory distress.      Breath sounds: Normal breath sounds. No wheezing.   Abdominal:      General: Abdomen is flat. Bowel sounds are normal. There is no distension.      Tenderness: There is no abdominal tenderness.   Musculoskeletal:         General: Normal range of motion.      Cervical back: Normal range of motion and neck supple.      Right lower leg: No edema.      Left lower leg: No edema.   Skin:     General: Skin is warm.      Capillary Refill: Capillary refill takes less than 2 seconds.      Findings: Lesion present.   Neurological:      General: No focal deficit present.      Mental Status: He is alert and oriented to  person, place, and time. Mental status is at baseline.      Cranial Nerves: No cranial nerve deficit.      Sensory: No sensory deficit.      Motor: No weakness.      Coordination: Coordination normal.      Gait: Gait normal.      Deep Tendon Reflexes: Reflexes normal.   Psychiatric:         Mood and Affect: Mood normal.         Behavior: Behavior normal.         Thought Content: Thought content normal.         Judgment: Judgment normal.               Lab Results   Component Value Date    WBC 5.89 01/01/2022    HGB 13.8 01/01/2022    HCT 39.6 (L) 01/01/2022    MCV 93.0 01/01/2022     01/01/2022          Sodium   Date Value Ref Range Status   12/30/2021 140 136 - 145 mmol/L Final     Potassium   Date Value Ref Range Status   12/30/2021 4.2 3.5 - 5.1 mmol/L Final     Chloride   Date Value Ref Range Status   12/30/2021 109 (H) 98 - 107 mmol/L Final     CO2   Date Value Ref Range Status   12/30/2021 23 21 - 32 mmol/L Final     Glucose   Date Value Ref Range Status   12/30/2021 121 (H) 74 - 106 mg/dL Final     BUN   Date Value Ref Range Status   12/30/2021 19 (H) 7 - 18 mg/dL Final     Creatinine   Date Value Ref Range Status   12/30/2021 1.43 (H) 0.70 - 1.30 mg/dL Final     Calcium   Date Value Ref Range Status   12/30/2021 8.8 8.5 - 10.1 mg/dL Final     Total Protein   Date Value Ref Range Status   12/30/2021 7.0 6.4 - 8.2 g/dL Final     Albumin   Date Value Ref Range Status   12/30/2021 3.5 3.5 - 5.0 g/dL Final     Bilirubin, Total   Date Value Ref Range Status   12/30/2021 1.1 0.0 - 1.2 mg/dL Final     Alk Phos   Date Value Ref Range Status   12/30/2021 97 45 - 115 U/L Final     AST   Date Value Ref Range Status   12/30/2021 34 15 - 37 U/L Final     ALT   Date Value Ref Range Status   12/30/2021 24 16 - 61 U/L Final     Anion Gap   Date Value Ref Range Status   12/30/2021 12 7 - 16 mmol/L Final     eGFR    Date Value Ref Range Status   07/25/2021 59 (L) >=60 mL/min/1.73m² Final     eGFR   Date  Value Ref Range Status   12/30/2021 50 (L) >=60 mL/min/1.73m² Final      Cardiac catheterization  · The left ventricular systolic function was normal.  · The left ventricular end diastolic pressure was normal.  · The pre-procedure left ventricular end diastolic pressure was 11.  · The ejection fraction was calculated to be 55%.  · The ejection fraction was greater than 55% by visual estimate.  · The Acute Mrg lesion was 80% stenosed.  · The Dist RCA lesion was 50% stenosed.  · The 1st Diag lesion was 75% stenosed.  · The Ost LAD to Prox LAD lesion was 80% stenosed.  · The 2nd Diag lesion was 80% stenosed.  · The Prox Cx to Mid Cx lesion was 80% stenosed.  · The 2nd Mrg lesion was 60% stenosed.  · The estimated blood loss was none.  · There was three vessel coronary artery disease.     The procedure log was documented by No documenter listed and verified by   Mahad Villaseñor DO.    Date: 12/31/2021  Time: 12:22 PM\    Severe three vessel CAD, progression of disease in distal RCA, consider   CABG versus multiple vessel PCI, normal LV     Procedures   Assessment and Plan (including Health Maintenance)      Problem List  Smart Sets  Document Outside HM   :    Plan:         Health Maintenance Due   Topic Date Due    TETANUS VACCINE  Never done    Shingles Vaccine (1 of 2) Never done    Pneumococcal Vaccines (Age 65+) (1 of 1 - PPSV23) Never done    Influenza Vaccine (1) Never done       Problem List Items Addressed This Visit        Cardiac/Vascular    Essential hypertension - Primary    Current Assessment & Plan     Blood pressure currently well controlled.  No change in medications at this time         Hyperlipidemia    Current Assessment & Plan     Will monitor his lipids every 6 months but he is to maintain his current medications.  Follow-up         History of MI (myocardial infarction)    Current Assessment & Plan     Recent history of an MI with the bypass surgery.  Patient is in today for follow-up in doing  well.  No chest pain.  He has maintained his medications without any side effects.  The wounds are healing well.  Follow-up in 1 month         Coronary artery disease involving native coronary artery of native heart without angina pectoris    Current Assessment & Plan     Patient is in for follow-up from hospitalization and we have reconciled all his medications.  He did have bypass surgery is doing well.  He has no chest pain and his wounds are healing well.  No change in medications at this time.  Follow-up in 1 month and follow-up with surgery on his wounds            Renal/    CKD (chronic kidney disease) stage 3, GFR 30-59 ml/min    Current Assessment & Plan     Patient has chronic kidney disease is stable.  No change in medications at this time.  He is to avoid NSAIDs.  Follow-up in 1 month or p.r.n.               Health Maintenance Topics with due status: Not Due       Topic Last Completion Date    Lipid Panel 07/11/2021       Future Appointments   Date Time Provider Department Center   2/21/2022  9:15 AM Mahad Villaseñor DO [facility] CARD [facility]   2/28/2022 10:15 AM Lawrence Huerta DO [facility]   6/23/2022  3:00 PM [facility]   9/27/2022  8:30 AM Kb Brown Jr., MD [facility] UROL [facility]        Follow up in about 4 weeks (around 2/23/2022).     Signature:  DO [facility]  [address] [place] [address]  [place]  [address]    Date of encounter: 1/26/22

## 2022-01-27 NOTE — ASSESSMENT & PLAN NOTE
Patient is in for follow-up from hospitalization and we have reconciled all his medications.  He did have bypass surgery is doing well.  He has no chest pain and his wounds are healing well.  No change in medications at this time.  Follow-up in 1 month and follow-up with surgery on his wounds

## 2022-01-27 NOTE — ASSESSMENT & PLAN NOTE
Patient has chronic kidney disease is stable.  No change in medications at this time.  He is to avoid NSAIDs.  Follow-up in 1 month or p.r.n.

## 2022-01-27 NOTE — ASSESSMENT & PLAN NOTE
Recent history of an MI with the bypass surgery.  Patient is in today for follow-up in doing well.  No chest pain.  He has maintained his medications without any side effects.  The wounds are healing well.  Follow-up in 1 month

## 2022-02-18 NOTE — PROGRESS NOTES
Cardiology Clinic Note:    PCP: Lawrence Huerta DO    REFERRING PHYSICIAN: Lawrence Huerta DO    CHIEF COMPLAINT:      HISTORY OF PRESENT ILLNESS:  Trung Barboza is a 82 y.o. male who presents for evaluation of chest pain.  Pt underwent PCI with KEHINDE RCA 12/2021 and  CABG 1/2022.  He denies chest pain, tightness, pressure or squeezing, symptoms resolved post CABG.  Patient states he is feeling better.  He continues to experience mild sternal wound pain with movement, however is improving.  Patient states he walks daily and does exercise with therapy.    Pt has draining wound to right lower extremity.  States he saw Dr. Shen 3 weeks ago.     Review of Systems   Constitutional: Negative for diaphoresis, malaise/fatigue, night sweats and weight gain.   HENT: Negative for congestion, ear pain, hearing loss, nosebleeds and sore throat.    Eyes: Negative for blurred vision, double vision, pain, photophobia and visual disturbance.   Cardiovascular: Positive for chest pain. Negative for claudication, dyspnea on exertion, irregular heartbeat, leg swelling, near-syncope, orthopnea, palpitations and syncope.   Respiratory: Negative for cough, shortness of breath, sleep disturbances due to breathing, snoring and wheezing.    Endocrine: Negative for cold intolerance, heat intolerance, polydipsia, polyphagia and polyuria.   Hematologic/Lymphatic: Negative for bleeding problem. Does not bruise/bleed easily.   Skin: Negative for dry skin, flushing, itching, rash and skin cancer.   Musculoskeletal: Negative for arthritis, back pain, falls, joint pain, muscle cramps, muscle weakness and myalgias.   Gastrointestinal: Negative for abdominal pain, change in bowel habit, constipation, diarrhea, dysphagia, heartburn, nausea and vomiting.   Genitourinary: Negative for bladder incontinence, dysuria, flank pain, frequency and nocturia.   Neurological: Negative for dizziness, focal weakness, headaches, light-headedness, loss of balance,  numbness, paresthesias and seizures.   Psychiatric/Behavioral: Negative for depression, memory loss and substance abuse. The patient is not nervous/anxious.    Allergic/Immunologic: Negative for environmental allergies.          PAST MEDICAL HISTORY:  Past Medical History:   Diagnosis Date    Benign localized prostatic hyperplasia with lower urinary tract symptoms (LUTS)     Coronary artery disease     Elevated PSA     Hypercholesterolemia     Hyperlipidemia     Hypertension     Obesity (BMI 30-39.9)        PAST SURGICAL HISTORY:  Past Surgical History:   Procedure Laterality Date    ANGIOGRAM, CORONARY, WITH LEFT HEART CATHETERIZATION N/A 2021    Procedure: ANGIOGRAM,CORONARY,WITH LEFT HEART CATHETERIZATION;  Surgeon: Mahad Villaseñor DO;  Location: [facility] CATH LAB;  Service: Cardiology;  Laterality: N/A;    BIOPSY WITH TRANSRECTAL ULTRASOUND (TRUS) GUIDANCE Bilateral 2018    CHOLECYSTECTOMY      CORONARY ARTERY BYPASS GRAFT  2022    [facility]-Dr. Qureshi    LEFT HEART CATHETERIZATION Left 2021    Procedure: Left heart cath;  Surgeon: Philip Torres MD;  Location: [facility] CATH LAB;  Service: Cardiology;  Laterality: Left;       SOCIAL HISTORY:  Social History     Socioeconomic History    Marital status:    Occupational History    Occupation: retired   Tobacco Use    Smoking status: Former Smoker     Packs/day: 0.50     Quit date: 1988     Years since quittin.6    Smokeless tobacco: Former User     Types: Chew   Substance and Sexual Activity    Alcohol use: Yes     Comment: can of beer one or twice a month    Drug use: Never    Sexual activity: Not Currently       FAMILY HISTORY:  Family History   Problem Relation Age of Onset    No Known Problems Mother     No Known Problems Father     No Known Problems Sister     No Known Problems Brother     No Known Problems Daughter     No Known Problems Son        ALLERGIES:  Allergies as of  02/21/2022 - Reviewed 02/21/2022   Allergen Reaction Noted    Lisinopril Swelling 07/13/2021         MEDICATIONS:  Current Outpatient Medications on File Prior to Visit   Medication Sig Dispense Refill    amLODIPine (NORVASC) 5 MG tablet Take 1 tablet (5 mg total) by mouth once daily. 30 tablet 3    ascorbic acid, vitamin C, (VITAMIN C) 500 MG tablet Take 1,000 mg by mouth 2 (two) times daily.      aspirin (ECOTRIN) 81 MG EC tablet Take 1 tablet (81 mg total) by mouth once daily. 30 tablet 0    atorvastatin (LIPITOR) 80 MG tablet Take 1 tablet (80 mg total) by mouth every evening. 90 tablet 3    furosemide (LASIX) 20 MG tablet Take 1 tablet (20 mg total) by mouth once daily. 30 tablet 2    hydroCHLOROthiazide (HYDRODIURIL) 12.5 MG Tab Take 1 tablet (12.5 mg total) by mouth once daily. 30 tablet 3    isosorbide mononitrate (IMDUR) 60 MG 24 hr tablet Take 1 tablet (60 mg total) by mouth once daily. 30 tablet 3    pantoprazole (PROTONIX) 40 MG tablet Take 1 tablet (40 mg total) by mouth once daily. 30 tablet 2    potassium chloride (KLOR-CON) 10 MEQ TbSR Take 1 tablet (10 mEq total) by mouth once daily. 30 tablet 2    BRILINTA 90 mg tablet Take 90 mg by mouth 2 (two) times daily.      colchicine (COLCRYS) 0.6 mg tablet TAKE 2 TABLETS TODAY, AND THEN TAKE ONE DAILY UNTIL GOUT ATTACK RESOLVING. 30 tablet 11    nitroGLYCERIN (NITROSTAT) 0.4 MG SL tablet Place 1 tablet (0.4 mg total) under the tongue every 5 (five) minutes as needed for Chest pain. (Patient not taking: No sig reported) 30 tablet 0    oxyCODONE-acetaminophen (PERCOCET) 5-325 mg per tablet Take 1 tablet by mouth every 6 (six) hours as needed.       No current facility-administered medications on file prior to visit.          PHYSICAL EXAM:  Blood pressure 122/68, pulse 87, height 6' (1.829 m), weight 94.3 kg (208 lb), SpO2 98 %.  Wt Readings from Last 3 Encounters:   02/21/22 94.3 kg (208 lb)   01/26/22 98.9 kg (218 lb)   01/01/22 99.3 kg (218  lb 14.7 oz)      Body mass index is 28.21 kg/m².    Physical Exam  Vitals and nursing note reviewed.   Constitutional:       Appearance: Normal appearance. He is normal weight.   HENT:      Head: Normocephalic and atraumatic.      Right Ear: External ear normal.      Left Ear: External ear normal.   Eyes:      General: No scleral icterus.        Right eye: No discharge.         Left eye: No discharge.      Extraocular Movements: Extraocular movements intact.      Conjunctiva/sclera: Conjunctivae normal.      Pupils: Pupils are equal, round, and reactive to light.   Cardiovascular:      Rate and Rhythm: Normal rate and regular rhythm.      Pulses: Normal pulses.      Heart sounds: Normal heart sounds. No murmur heard.    No friction rub. No gallop.   Pulmonary:      Effort: Pulmonary effort is normal.      Breath sounds: Normal breath sounds. No wheezing, rhonchi or rales.   Chest:      Chest wall: No tenderness.   Abdominal:      General: Abdomen is flat. Bowel sounds are normal. There is no distension.      Palpations: Abdomen is soft.      Tenderness: There is no abdominal tenderness. There is no guarding or rebound.   Musculoskeletal:         General: No swelling or tenderness. Normal range of motion.      Cervical back: Normal range of motion and neck supple.   Skin:     General: Skin is warm and dry.      Findings: No erythema or rash.      Comments: Draining wound to right lower extremity, vein harvest site.    Neurological:      General: No focal deficit present.      Mental Status: He is alert and oriented to person, place, and time.      Cranial Nerves: No cranial nerve deficit.      Motor: No weakness.      Gait: Gait normal.   Psychiatric:         Mood and Affect: Mood normal.         Behavior: Behavior normal.         Thought Content: Thought content normal.         Judgment: Judgment normal.          LABS REVIEWED:  Lab Results   Component Value Date    WBC 5.89 01/01/2022    RBC 4.26 (L) 01/01/2022     HGB 13.8 01/01/2022    HCT 39.6 (L) 01/01/2022    MCV 93.0 01/01/2022    MCH 32.4 (H) 01/01/2022    MCHC 34.8 01/01/2022    RDW 13.1 01/01/2022     01/01/2022    MPV 9.6 01/01/2022    NRBC 0.0 01/01/2022    INR 1.11 (H) 12/31/2021     Lab Results   Component Value Date     12/30/2021    K 4.2 12/30/2021     (H) 12/30/2021    CO2 23 12/30/2021    BUN 19 (H) 12/30/2021    MG 2.1 07/12/2021    PHOS 3.0 07/12/2021     Lab Results   Component Value Date    AST 34 12/30/2021    ALT 24 12/30/2021     Lab Results   Component Value Date     (H) 12/30/2021    HGBA1C 5.8 07/10/2021     Lab Results   Component Value Date    CHOL 153 07/11/2021    HDL 48 07/11/2021    TRIG 169 (H) 07/11/2021    CHOLHDL 3.2 07/11/2021       CARDIAC STUDIES REVIEWED:  [facility] 12/31/21 -  Three vessel CAD ( 70% LAD, 70% OM1, 99% pRCA)  S/P successful IVUS guided PCI of RCA with two KEHINDE.   1/22/22 - CABG, Dr. Shen    OTHER IMAGING STUDIES REVIEWED:    ASSESSMENT:   There are no diagnoses linked to this encounter.      PLAN:  1. CAD - severe 3 vessel disease, post CABG x 3, 1/22/22, chest pain has resolved. Pt declines cardiac rehab.  Discontinue Imdur   2. Right lower extremityy, healing wound, vein harvest site, following with Dr. Shen  3.  Hypertension controlled   3. Hyperlipidemia, on statin, following with primary care.

## 2022-02-21 ENCOUNTER — DOCUMENTATION ONLY (OUTPATIENT)
Dept: CARDIOLOGY | Facility: CLINIC | Age: 82
End: 2022-02-21
Payer: COMMERCIAL

## 2022-02-21 ENCOUNTER — OFFICE VISIT (OUTPATIENT)
Dept: CARDIOLOGY | Facility: CLINIC | Age: 82
End: 2022-02-21
Payer: COMMERCIAL

## 2022-02-21 VITALS
WEIGHT: 208 LBS | SYSTOLIC BLOOD PRESSURE: 122 MMHG | DIASTOLIC BLOOD PRESSURE: 68 MMHG | HEART RATE: 87 BPM | BODY MASS INDEX: 28.17 KG/M2 | OXYGEN SATURATION: 98 % | HEIGHT: 72 IN

## 2022-02-21 DIAGNOSIS — E78.5 HYPERLIPIDEMIA, UNSPECIFIED HYPERLIPIDEMIA TYPE: ICD-10-CM

## 2022-02-21 DIAGNOSIS — I10 ESSENTIAL HYPERTENSION: ICD-10-CM

## 2022-02-21 DIAGNOSIS — I25.10 CORONARY ARTERY DISEASE INVOLVING NATIVE CORONARY ARTERY OF NATIVE HEART WITHOUT ANGINA PECTORIS: Primary | ICD-10-CM

## 2022-02-21 PROCEDURE — 3074F SYST BP LT 130 MM HG: CPT | Mod: CPTII,,, | Performed by: INTERNAL MEDICINE

## 2022-02-21 PROCEDURE — 1160F PR REVIEW ALL MEDS BY PRESCRIBER/CLIN PHARMACIST DOCUMENTED: ICD-10-PCS | Mod: CPTII,,, | Performed by: INTERNAL MEDICINE

## 2022-02-21 PROCEDURE — 3074F PR MOST RECENT SYSTOLIC BLOOD PRESSURE < 130 MM HG: ICD-10-PCS | Mod: CPTII,,, | Performed by: INTERNAL MEDICINE

## 2022-02-21 PROCEDURE — 99214 OFFICE O/P EST MOD 30 MIN: CPT | Mod: S$PBB,,, | Performed by: INTERNAL MEDICINE

## 2022-02-21 PROCEDURE — 99214 PR OFFICE/OUTPT VISIT, EST, LEVL IV, 30-39 MIN: ICD-10-PCS | Mod: S$PBB,,, | Performed by: INTERNAL MEDICINE

## 2022-02-21 PROCEDURE — 1159F PR MEDICATION LIST DOCUMENTED IN MEDICAL RECORD: ICD-10-PCS | Mod: CPTII,,, | Performed by: INTERNAL MEDICINE

## 2022-02-21 PROCEDURE — 1160F RVW MEDS BY RX/DR IN RCRD: CPT | Mod: CPTII,,, | Performed by: INTERNAL MEDICINE

## 2022-02-21 PROCEDURE — 3078F DIAST BP <80 MM HG: CPT | Mod: CPTII,,, | Performed by: INTERNAL MEDICINE

## 2022-02-21 PROCEDURE — 3078F PR MOST RECENT DIASTOLIC BLOOD PRESSURE < 80 MM HG: ICD-10-PCS | Mod: CPTII,,, | Performed by: INTERNAL MEDICINE

## 2022-02-21 PROCEDURE — 99214 OFFICE O/P EST MOD 30 MIN: CPT | Mod: PBBFAC | Performed by: INTERNAL MEDICINE

## 2022-02-21 PROCEDURE — 1159F MED LIST DOCD IN RCRD: CPT | Mod: CPTII,,, | Performed by: INTERNAL MEDICINE

## 2022-02-21 NOTE — PROGRESS NOTES
Pt  Imdur was d/c'd today. Called [facility] pharmacy to stop the order.

## 2022-02-22 ENCOUNTER — TELEPHONE (OUTPATIENT)
Dept: CARDIOLOGY | Facility: CLINIC | Age: 82
End: 2022-02-22
Payer: COMMERCIAL

## 2022-02-23 ENCOUNTER — OFFICE VISIT (OUTPATIENT)
Dept: FAMILY MEDICINE | Facility: CLINIC | Age: 82
End: 2022-02-23
Payer: COMMERCIAL

## 2022-02-23 VITALS
HEIGHT: 72 IN | BODY MASS INDEX: 28.28 KG/M2 | HEART RATE: 80 BPM | OXYGEN SATURATION: 99 % | WEIGHT: 208.81 LBS | DIASTOLIC BLOOD PRESSURE: 78 MMHG | RESPIRATION RATE: 18 BRPM | SYSTOLIC BLOOD PRESSURE: 136 MMHG | TEMPERATURE: 98 F

## 2022-02-23 DIAGNOSIS — I25.2 HISTORY OF MI (MYOCARDIAL INFARCTION): ICD-10-CM

## 2022-02-23 DIAGNOSIS — I10 ESSENTIAL HYPERTENSION: ICD-10-CM

## 2022-02-23 DIAGNOSIS — T81.49XA INFECTED SURGICAL WOUND: Primary | ICD-10-CM

## 2022-02-23 PROCEDURE — 3078F DIAST BP <80 MM HG: CPT | Mod: ,,, | Performed by: FAMILY MEDICINE

## 2022-02-23 PROCEDURE — 87070 CULTURE, WOUND: ICD-10-PCS | Mod: ,,, | Performed by: CLINICAL MEDICAL LABORATORY

## 2022-02-23 PROCEDURE — 3075F PR MOST RECENT SYSTOLIC BLOOD PRESS GE 130-139MM HG: ICD-10-PCS | Mod: ,,, | Performed by: FAMILY MEDICINE

## 2022-02-23 PROCEDURE — 87077 CULTURE, WOUND: ICD-10-PCS | Mod: ,,, | Performed by: CLINICAL MEDICAL LABORATORY

## 2022-02-23 PROCEDURE — 1159F PR MEDICATION LIST DOCUMENTED IN MEDICAL RECORD: ICD-10-PCS | Mod: ,,, | Performed by: FAMILY MEDICINE

## 2022-02-23 PROCEDURE — 3075F SYST BP GE 130 - 139MM HG: CPT | Mod: ,,, | Performed by: FAMILY MEDICINE

## 2022-02-23 PROCEDURE — 99214 PR OFFICE/OUTPT VISIT, EST, LEVL IV, 30-39 MIN: ICD-10-PCS | Mod: ,,, | Performed by: FAMILY MEDICINE

## 2022-02-23 PROCEDURE — 87186 SC STD MICRODIL/AGAR DIL: CPT | Mod: ,,, | Performed by: CLINICAL MEDICAL LABORATORY

## 2022-02-23 PROCEDURE — 3078F PR MOST RECENT DIASTOLIC BLOOD PRESSURE < 80 MM HG: ICD-10-PCS | Mod: ,,, | Performed by: FAMILY MEDICINE

## 2022-02-23 PROCEDURE — 87077 CULTURE AEROBIC IDENTIFY: CPT | Mod: ,,, | Performed by: CLINICAL MEDICAL LABORATORY

## 2022-02-23 PROCEDURE — 99214 OFFICE O/P EST MOD 30 MIN: CPT | Mod: ,,, | Performed by: FAMILY MEDICINE

## 2022-02-23 PROCEDURE — 87070 CULTURE OTHR SPECIMN AEROBIC: CPT | Mod: ,,, | Performed by: CLINICAL MEDICAL LABORATORY

## 2022-02-23 PROCEDURE — 87186 CULTURE, WOUND: ICD-10-PCS | Mod: ,,, | Performed by: CLINICAL MEDICAL LABORATORY

## 2022-02-23 PROCEDURE — 1159F MED LIST DOCD IN RCRD: CPT | Mod: ,,, | Performed by: FAMILY MEDICINE

## 2022-02-23 RX ORDER — SULFAMETHOXAZOLE AND TRIMETHOPRIM 800; 160 MG/1; MG/1
1 TABLET ORAL 2 TIMES DAILY
Qty: 20 TABLET | Refills: 0 | Status: SHIPPED | OUTPATIENT
Start: 2022-02-23 | End: 2022-03-28 | Stop reason: ALTCHOICE

## 2022-02-23 RX ORDER — COLLAGENASE SANTYL 250 [ARB'U]/G
OINTMENT TOPICAL DAILY
Qty: 30 G | Refills: 1 | Status: SHIPPED | OUTPATIENT
Start: 2022-02-23 | End: 2022-09-15 | Stop reason: ALTCHOICE

## 2022-02-23 NOTE — ASSESSMENT & PLAN NOTE
Recent history of MI with the bypass surgery and doing well.  No chest pain other than pain with deep breathing at times.  No change in medications.  Continue his current meds.  Follow-up here in 1 month

## 2022-02-23 NOTE — ASSESSMENT & PLAN NOTE
Patient is status post CABG in his right leg with a graft came from he has 2 open areas on the lower leg he 1 that is 4 cm the other 1 is is 6 cm.  It has brownish yellow exudate in the wound and it is open in both areas.  There is no increased heat around the area but there is some mild tenderness around the area.  Nonhealing surgical wound requiring debridement with Santyl now and will refer to Wound Care.  Culture was obtained of wet exudate from the inferior wound.  Started Bactrim DS twice daily.  Discussed this with the wound care nurse-practitioner.  He is to follow-up with her tomorrow.  Will dress the wound today in the clinic using calcium alginate and Santyl.  Follow-up with us in 2 weeks.

## 2022-02-23 NOTE — PROGRESS NOTES
Lawrence Huerta DO   [address]  [place]  [address]      PATIENT NAME: Trung Barboza  : 1940  DATE: 22  MRN: [id number]      Billing Provider: Lawrence Huerta DO  Level of Service:   Patient PCP Information     Provider PCP Type    Lawrence Huerta DO General          Reason for Visit / Chief Complaint: Wound Check (Had surgery back in 2022; upper part of incision on leg has healed, but lower part is still opened and drainage. Very painful. Does see would care in [place] 2022)       Update PCP  Update Chief Complaint         History of Present Illness / Problem Focused Workflow     Trung Barboza presents to the clinic with Wound Check (Had surgery back in 2022; upper part of incision on leg has healed, but lower part is still opened and drainage. Very painful. Does see would care in [place] 2022)     Patient presents today with the open wound on his right leg from his surgery last month.  He had bypass surgery and had graft from his right leg and wounds are open now.  He has had some drainage from them.  Home health has been taking care of the wounds and has been seeing his surgeon.  He denies any pain or swelling in the legs.  He has had some drainage from the wounds.  He denies any chest pain shortness of breath palpitations other than pain when he takes deep breaths.  He denies any PND orthopnea.      Review of Systems     Review of Systems   Constitutional: Negative for activity change, appetite change, chills, fatigue and fever.   HENT: Negative for nasal congestion, ear discharge, ear pain, mouth dryness, mouth sores, postnasal drip, sinus pressure/congestion, sore throat and voice change.    Eyes: Negative for pain, discharge, redness, itching and visual disturbance.   Respiratory: Negative for apnea, cough, chest tightness, shortness of breath and wheezing.    Cardiovascular: Negative for chest pain, palpitations and leg  swelling.   Gastrointestinal: Negative for abdominal distention, abdominal pain, anal bleeding, blood in stool, change in bowel habit, constipation, diarrhea, nausea, vomiting, reflux and change in bowel habit.   Endocrine: Negative for cold intolerance, heat intolerance, polydipsia, polyphagia and polyuria.   Genitourinary: Negative for difficulty urinating, enuresis, erectile dysfunction, frequency, genital sores, hematuria and urgency.   Musculoskeletal: Negative for arthralgias, back pain, gait problem, leg pain, myalgias and neck pain.   Integumentary:  Positive for color change and wound. Negative for rash, mole/lesion, breast mass and breast discharge.   Allergic/Immunologic: Negative for environmental allergies and food allergies.   Neurological: Negative for dizziness, vertigo, tremors, seizures, syncope, facial asymmetry, speech difficulty, weakness, light-headedness, numbness, headaches, disturbances in coordination, memory loss and coordination difficulties.   Hematological: Negative for adenopathy. Does not bruise/bleed easily.   Psychiatric/Behavioral: Negative for agitation, behavioral problems, confusion, decreased concentration, dysphoric mood, hallucinations, self-injury, sleep disturbance and suicidal ideas. The patient is not nervous/anxious and is not hyperactive.    Breast: Negative for mass      Medical / Social / Family History     Past Medical History:   Diagnosis Date    Benign localized prostatic hyperplasia with lower urinary tract symptoms (LUTS)     Coronary artery disease     Elevated PSA     Hypercholesterolemia     Hyperlipidemia     Hypertension     Obesity (BMI 30-39.9)        Past Surgical History:   Procedure Laterality Date    ANGIOGRAM, CORONARY, WITH LEFT HEART CATHETERIZATION N/A 12/31/2021    Procedure: ANGIOGRAM,CORONARY,WITH LEFT HEART CATHETERIZATION;  Surgeon: Mahad Villaseñor DO;  [facility] LAB;  Service: Cardiology;  Laterality: N/A;    BIOPSY  WITH TRANSRECTAL ULTRASOUND (TRUS) GUIDANCE Bilateral 03/07/2018    CHOLECYSTECTOMY      CORONARY ARTERY BYPASS GRAFT  01/07/2022    [facility]-Dr. Qureshi    LEFT HEART CATHETERIZATION Left 7/13/2021    Procedure: Left heart cath;  Surgeon: Philip Torres MD;  Location: [facility] CATH LAB;  Service: Cardiology;  Laterality: Left;       Social History    reports that he quit smoking about 33 years ago. He smoked 0.50 packs per day. He has quit using smokeless tobacco.  His smokeless tobacco use included chew. He reports current alcohol use. He reports that he does not use drugs.    Family History  's family history includes No Known Problems in his brother, daughter, father, mother, sister, and son.    Medications and Allergies     Medications  Outpatient Medications Marked as Taking for the 2/23/22 encounter (Office Visit) with Lawrence Huerta, DO   Medication Sig Dispense Refill    amLODIPine (NORVASC) 5 MG tablet Take 1 tablet (5 mg total) by mouth once daily. 30 tablet 3    ascorbic acid, vitamin C, (VITAMIN C) 500 MG tablet Take 1,000 mg by mouth 2 (two) times daily.      aspirin (ECOTRIN) 81 MG EC tablet Take 1 tablet (81 mg total) by mouth once daily. 30 tablet 0    atorvastatin (LIPITOR) 80 MG tablet Take 1 tablet (80 mg total) by mouth every evening. 90 tablet 3    BRILINTA 90 mg tablet Take 90 mg by mouth 2 (two) times daily.      colchicine (COLCRYS) 0.6 mg tablet TAKE 2 TABLETS TODAY, AND THEN TAKE ONE DAILY UNTIL GOUT ATTACK RESOLVING. 30 tablet 11    furosemide (LASIX) 20 MG tablet Take 1 tablet (20 mg total) by mouth once daily. 30 tablet 2    hydroCHLOROthiazide (HYDRODIURIL) 12.5 MG Tab Take 1 tablet (12.5 mg total) by mouth once daily. 30 tablet 3    oxyCODONE-acetaminophen (PERCOCET) 5-325 mg per tablet Take 1 tablet by mouth every 6 (six) hours as needed.      pantoprazole (PROTONIX) 40 MG tablet Take 1 tablet (40 mg total) by mouth once daily. 30 tablet 2     potassium chloride (KLOR-CON) 10 MEQ TbSR Take 1 tablet (10 mEq total) by mouth once daily. 30 tablet 2       Allergies  Review of patient's allergies indicates:   Allergen Reactions    Lisinopril Swelling       Physical Examination     Vitals:    02/23/22 1311   BP: 136/78   Pulse: 80   Resp: 18   Temp: 97.7 °F (36.5 °C)     Physical Exam  Constitutional:       General: He is not in acute distress.     Appearance: Normal appearance. He is normal weight. He is not toxic-appearing.   HENT:      Head: Normocephalic.      Nose: Nose normal.      Mouth/Throat:      Mouth: Mucous membranes are moist.      Pharynx: Oropharynx is clear. No oropharyngeal exudate or posterior oropharyngeal erythema.   Eyes:      General: No scleral icterus.        Right eye: No discharge.         Left eye: No discharge.      Conjunctiva/sclera: Conjunctivae normal.      Pupils: Pupils are equal, round, and reactive to light.   Neck:      Vascular: No carotid bruit.   Cardiovascular:      Rate and Rhythm: Normal rate and regular rhythm.      Pulses: Normal pulses.      Heart sounds: Normal heart sounds. No murmur heard.    No friction rub.      Comments: Midline sternal scar normal without any drainage  Pulmonary:      Effort: Pulmonary effort is normal. No respiratory distress.      Breath sounds: Normal breath sounds. No wheezing.   Abdominal:      General: Abdomen is flat. Bowel sounds are normal.      Tenderness: There is no abdominal tenderness.   Musculoskeletal:         General: Tenderness present. Normal range of motion.      Cervical back: Normal range of motion and neck supple.      Right lower leg: No edema.      Left lower leg: No edema.      Comments: Her right lower leg there was noted to be 1 4 cm long open wound on the medial aspect of the calf and a closed space of approximately 4 cm between that and the 2nd wound on the lower leg that is proximally 6 cm.  Both her proximally 2 cm wide.  There is yellow brownish exudate with  clear to yellow drainage coming from the inferior wound.  Culture was obtained.   Skin:     General: Skin is warm.      Capillary Refill: Capillary refill takes less than 2 seconds.      Findings: Lesion present.   Neurological:      General: No focal deficit present.      Mental Status: He is alert and oriented to person, place, and time. Mental status is at baseline.      Cranial Nerves: No cranial nerve deficit.      Sensory: No sensory deficit.      Motor: No weakness.      Coordination: Coordination normal.      Gait: Gait normal.      Deep Tendon Reflexes: Reflexes normal.   Psychiatric:         Mood and Affect: Mood normal.         Behavior: Behavior normal.         Thought Content: Thought content normal.         Judgment: Judgment normal.               Lab Results   Component Value Date    WBC 5.89 01/01/2022    HGB 13.8 01/01/2022    HCT 39.6 (L) 01/01/2022    MCV 93.0 01/01/2022     01/01/2022          Sodium   Date Value Ref Range Status   12/30/2021 140 136 - 145 mmol/L Final     Potassium   Date Value Ref Range Status   12/30/2021 4.2 3.5 - 5.1 mmol/L Final     Chloride   Date Value Ref Range Status   12/30/2021 109 (H) 98 - 107 mmol/L Final     CO2   Date Value Ref Range Status   12/30/2021 23 21 - 32 mmol/L Final     Glucose   Date Value Ref Range Status   12/30/2021 121 (H) 74 - 106 mg/dL Final     BUN   Date Value Ref Range Status   12/30/2021 19 (H) 7 - 18 mg/dL Final     Creatinine   Date Value Ref Range Status   12/30/2021 1.43 (H) 0.70 - 1.30 mg/dL Final     Calcium   Date Value Ref Range Status   12/30/2021 8.8 8.5 - 10.1 mg/dL Final     Total Protein   Date Value Ref Range Status   12/30/2021 7.0 6.4 - 8.2 g/dL Final     Albumin   Date Value Ref Range Status   12/30/2021 3.5 3.5 - 5.0 g/dL Final     Bilirubin, Total   Date Value Ref Range Status   12/30/2021 1.1 0.0 - 1.2 mg/dL Final     Alk Phos   Date Value Ref Range Status   12/30/2021 97 45 - 115 U/L Final     AST   Date Value Ref  Range Status   12/30/2021 34 15 - 37 U/L Final     ALT   Date Value Ref Range Status   12/30/2021 24 16 - 61 U/L Final     Anion Gap   Date Value Ref Range Status   12/30/2021 12 7 - 16 mmol/L Final     eGFR    Date Value Ref Range Status   07/25/2021 59 (L) >=60 mL/min/1.73m² Final     eGFR   Date Value Ref Range Status   12/30/2021 50 (L) >=60 mL/min/1.73m² Final      Cardiac catheterization  · The left ventricular systolic function was normal.  · The left ventricular end diastolic pressure was normal.  · The pre-procedure left ventricular end diastolic pressure was 11.  · The ejection fraction was calculated to be 55%.  · The ejection fraction was greater than 55% by visual estimate.  · The Acute Mrg lesion was 80% stenosed.  · The Dist RCA lesion was 50% stenosed.  · The 1st Diag lesion was 75% stenosed.  · The Ost LAD to Prox LAD lesion was 80% stenosed.  · The 2nd Diag lesion was 80% stenosed.  · The Prox Cx to Mid Cx lesion was 80% stenosed.  · The 2nd Mrg lesion was 60% stenosed.  · The estimated blood loss was none.  · There was three vessel coronary artery disease.     The procedure log was documented by No documenter listed and verified by   Mahad Villaseñor DO.    Date: 12/31/2021  Time: 12:22 PM\    Severe three vessel CAD, progression of disease in distal RCA, consider   CABG versus multiple vessel PCI, normal LV     Procedures   Assessment and Plan (including Health Maintenance)      Problem List  Smart Sets  Document Outside HM   :    Plan:         Health Maintenance Due   Topic Date Due    TETANUS VACCINE  Never done    Shingles Vaccine (1 of 2) Never done    Pneumococcal Vaccines (Age 65+) (1 of 1 - PPSV23) Never done    Influenza Vaccine (1) Never done       Problem List Items Addressed This Visit        Cardiac/Vascular    Essential hypertension    Current Assessment & Plan     Blood pressure is well controlled.  No change in medication.  Follow-up here in 3 months.            History of MI (myocardial infarction)    Current Assessment & Plan     Recent history of MI with the bypass surgery and doing well.  No chest pain other than pain with deep breathing at times.  No change in medications.  Continue his current meds.  Follow-up here in 1 month              ID    Infected surgical wound - Primary    Current Assessment & Plan     Patient is status post CABG in his right leg with a graft came from he has 2 open areas on the lower leg he 1 that is 4 cm the other 1 is is 6 cm.  It has brownish yellow exudate in the wound and it is open in both areas.  There is no increased heat around the area but there is some mild tenderness around the area.  Nonhealing surgical wound requiring debridement with Santyl now and will refer to Wound Care.  Culture was obtained of wet exudate from the inferior wound.  Started Bactrim DS twice daily.  Discussed this with the wound care nurse-practitioner.  He is to follow-up with her tomorrow.  Will dress the wound today in the clinic using calcium alginate and Santyl.  Follow-up with us in 2 weeks.             Relevant Medications    sulfamethoxazole-trimethoprim 800-160mg (BACTRIM DS) 800-160 mg Tab    collagenase (SANTYL) ointment    Other Relevant Orders    Culture, Wound    Ambulatory referral/consult to Wound Clinic    Basic Metabolic Panel    Hemoglobin A1C    CBC Auto Differential    Sedimentation Rate    C-Reactive Protein          Health Maintenance Topics with due status: Not Due       Topic Last Completion Date    Lipid Panel 07/11/2021       Future Appointments   Date Time Provider Department Center   2/28/2022 10:15 AM Lawrence Huerta DO [facility] FAMMED [place]   6/23/2022  3:00 PM AWV NURSE [place] [facility] FAMMED [place]   8/24/2022  8:30 AM Mahad Villaseñor DO [facility] C[facility] [place] [facility]   9/27/2022  8:30 AM Kb Brown Jr., MD [facility]        Follow up in about 4 weeks (around 3/23/2022).     Signature:  Lawrence Huerta,    [address] [id number]  [place]  [address]    Date of encounter: 2/23/22

## 2022-02-23 NOTE — LETTER
February 23, 2022      [facility]  [address]  [place] [address]  Phone: [phone]  Fax: [phone]       Patient: Trung Barboza   YOB: 1940  Date of Visit: 02/23/2022    To Whom It May Concern:    Milena Barboza  was at [facility] on 02/23/2022. The patient may return to work/school on 02/25/2022 with no restrictions. If you have any questions or concerns, or if I can be of further assistance, please do not hesitate to contact me.Jennifer Page excused from work to care for patient.    Sincerely,    Miranda Cordova LPN

## 2022-02-24 ENCOUNTER — CLINICAL SUPPORT (OUTPATIENT)
Dept: WOUND CARE | Facility: HOSPITAL | Age: 82
End: 2022-02-24
Attending: NURSE PRACTITIONER
Payer: COMMERCIAL

## 2022-02-24 VITALS
RESPIRATION RATE: 20 BRPM | DIASTOLIC BLOOD PRESSURE: 64 MMHG | SYSTOLIC BLOOD PRESSURE: 104 MMHG | TEMPERATURE: 98 F | HEART RATE: 82 BPM

## 2022-02-24 DIAGNOSIS — T81.89XA NON-HEALING SURGICAL WOUND, INITIAL ENCOUNTER: ICD-10-CM

## 2022-02-24 DIAGNOSIS — I10 ESSENTIAL HYPERTENSION: Primary | ICD-10-CM

## 2022-02-24 LAB
ALBUMIN SERPL BCP-MCNC: 3.4 G/DL (ref 3.5–5)
ALBUMIN/GLOB SERPL: 0.8 {RATIO}
ALP SERPL-CCNC: 178 U/L (ref 45–115)
ALT SERPL W P-5'-P-CCNC: 24 U/L (ref 16–61)
ANION GAP SERPL CALCULATED.3IONS-SCNC: 14 MMOL/L (ref 7–16)
AST SERPL W P-5'-P-CCNC: 24 U/L (ref 15–37)
BASOPHILS # BLD AUTO: 0.01 K/UL (ref 0–0.2)
BASOPHILS NFR BLD AUTO: 0.1 % (ref 0–1)
BILIRUB SERPL-MCNC: 0.7 MG/DL (ref 0–1.2)
BUN SERPL-MCNC: 30 MG/DL (ref 7–18)
BUN/CREAT SERPL: 15 (ref 6–20)
CALCIUM SERPL-MCNC: 9.1 MG/DL (ref 8.5–10.1)
CHLORIDE SERPL-SCNC: 101 MMOL/L (ref 98–107)
CO2 SERPL-SCNC: 26 MMOL/L (ref 21–32)
CREAT SERPL-MCNC: 2.04 MG/DL (ref 0.7–1.3)
DIFFERENTIAL METHOD BLD: ABNORMAL
EOSINOPHIL # BLD AUTO: 0.15 K/UL (ref 0–0.5)
EOSINOPHIL NFR BLD AUTO: 1.9 % (ref 1–4)
ERYTHROCYTE [DISTWIDTH] IN BLOOD BY AUTOMATED COUNT: 13.8 % (ref 11.5–14.5)
ERYTHROCYTE [SEDIMENTATION RATE] IN BLOOD BY WESTERGREN METHOD: 55 MM/HR (ref 0–20)
GLOBULIN SER-MCNC: 4.1 G/DL (ref 2–4)
GLUCOSE SERPL-MCNC: 151 MG/DL (ref 74–106)
HCT VFR BLD AUTO: 34.9 % (ref 40–54)
HGB BLD-MCNC: 12 G/DL (ref 13.5–18)
LYMPHOCYTES # BLD AUTO: 0.65 K/UL (ref 1–4.8)
LYMPHOCYTES NFR BLD AUTO: 8 % (ref 27–41)
MCH RBC QN AUTO: 31.3 PG (ref 27–31)
MCHC RBC AUTO-ENTMCNC: 34.4 G/DL (ref 32–36)
MCV RBC AUTO: 91.1 FL (ref 80–96)
MONOCYTES # BLD AUTO: 0.54 K/UL (ref 0–0.8)
MONOCYTES NFR BLD AUTO: 6.7 % (ref 2–6)
MPC BLD CALC-MCNC: 9.4 FL (ref 9.4–12.4)
NEUTROPHILS # BLD AUTO: 6.75 K/UL (ref 1.8–7.7)
NEUTROPHILS NFR BLD AUTO: 83.3 % (ref 53–65)
PLATELET # BLD AUTO: 290 K/UL (ref 150–400)
POTASSIUM SERPL-SCNC: 4.3 MMOL/L (ref 3.5–5.1)
PREALB SERPL NEPH-MCNC: 31 MG/DL (ref 20–40)
PROT SERPL-MCNC: 7.5 G/DL (ref 6.4–8.2)
RBC # BLD AUTO: 3.83 M/UL (ref 4.6–6.2)
SODIUM SERPL-SCNC: 137 MMOL/L (ref 136–145)
WBC # BLD AUTO: 8.1 K/UL (ref 4.5–11)

## 2022-02-24 PROCEDURE — 36415 COLL VENOUS BLD VENIPUNCTURE: CPT

## 2022-02-24 PROCEDURE — 80053 COMPREHEN METABOLIC PANEL: CPT

## 2022-02-24 PROCEDURE — 84134 ASSAY OF PREALBUMIN: CPT

## 2022-02-24 PROCEDURE — 99203 OFFICE O/P NEW LOW 30 MIN: CPT | Performed by: NURSE PRACTITIONER

## 2022-02-24 PROCEDURE — 85025 COMPLETE CBC W/AUTO DIFF WBC: CPT

## 2022-02-24 PROCEDURE — 85651 RBC SED RATE NONAUTOMATED: CPT

## 2022-02-24 PROCEDURE — 99213 OFFICE O/P EST LOW 20 MIN: CPT

## 2022-02-24 NOTE — PROGRESS NOTES
WOUND CARE, [facility]  [address]  [place] [address]     PATIENT NAME: Trung Barboza  : 1940  DATE: 22  MRN: [id number]      Billing Provider: WOUND CARE, [facility]  Level of Service: AR OFFICE/OUTPT VISIT, FLORESITA MAYNARD III, 30-44 MIN  Patient PCP Information     Provider PCP Type    Lawrence Huerta DO General          Reason for Visit / Chief Complaint: No chief complaint on file.       Update PCP  Update Chief Complaint         History of Present Illness / Problem Focused Workflow     Trung Barboza presents to the clinic for evaluation of post up wound RLE; s/p CABG with vein harvest RLE. Was seen by PCP yesterday, wound culture obtained and pt started on Bactrim DS and santyl to wound bed.   [facility]  Duration: 2022  Severity: 4/10  Context: S/P vein harvest RLE  Modifiers: CAD, HTN, hyperlipidemia.      Review of Systems     Review of Systems   Constitutional: Negative.  Negative for activity change, appetite change, chills and fever.   Respiratory: Negative for shortness of breath.    Cardiovascular: Negative for chest pain.   Gastrointestinal: Negative for abdominal pain, nausea and vomiting.   Integumentary:  Positive for wound (RLE).   Neurological: Negative for weakness and headaches.        Medical / Social / Family History     Past Medical History:   Diagnosis Date    Benign localized prostatic hyperplasia with lower urinary tract symptoms (LUTS)     Coronary artery disease     Elevated PSA     Hypercholesterolemia     Hyperlipidemia     Hypertension     Obesity (BMI 30-39.9)        Past Surgical History:   Procedure Laterality Date    ANGIOGRAM, CORONARY, WITH LEFT HEART CATHETERIZATION N/A 2021    Procedure: ANGIOGRAM,CORONARY,WITH LEFT HEART CATHETERIZATION;  Surgeon: Mahad Villaseñor DO;  Location[facility] C[facility] LAB;  Service: Cardiology;  Laterality: N/A;    BIOPSY WITH TRANSRECTAL ULTRASOUND (TRUS) GUIDANCE Bilateral  03/07/2018    CHOLECYSTECTOMY      CORONARY ARTERY BYPASS GRAFT  01/07/2022    [facility]-Dr. Qureshi    LEFT HEART CATHETERIZATION Left 7/13/2021    Procedure: Left heart cath;  Surgeon: Philip Torres MD;  Location: [facility] CATH LAB;  Service: Cardiology;  Laterality: Left;       Social History    reports that he quit smoking about 33 years ago. He smoked 0.50 packs per day. He has quit using smokeless tobacco.  His smokeless tobacco use included chew. He reports current alcohol use. He reports that he does not use drugs.    Family History  's family history includes No Known Problems in his brother, daughter, father, mother, sister, and son.    Medications and Allergies     Medications  No outpatient medications have been marked as taking for the 2/24/22 encounter (Clinical Support) with WOUND CARE, [facility].       Allergies  Review of patient's allergies indicates:   Allergen Reactions    Lisinopril Swelling       Physical Examination   There were no vitals filed for this visit.   Physical Exam  Constitutional:       Appearance: Normal appearance.   Eyes:      Pupils: Pupils are equal, round, and reactive to light.   Cardiovascular:      Rate and Rhythm: Normal rate and regular rhythm.      Pulses: Normal pulses.      Heart sounds: Normal heart sounds.   Pulmonary:      Effort: Pulmonary effort is normal.      Breath sounds: Normal breath sounds.   Abdominal:      General: Bowel sounds are normal.   Skin:     Findings: Wound present.          Neurological:      Mental Status: He is alert.          Assessment and Plan (including Health Maintenance)      Problem List  Smart Sets  Document Outside HM   :    Plan: See wound docs for treatment and plan. Clean daily with NS and apply hydrofera blue to wound bed and cover with abd pads, change daily or more often if soiled.        Health Maintenance Due   Topic Date Due    TETANUS VACCINE  Never done    Shingles Vaccine (1 of 2) Never done     Pneumococcal Vaccines (Age 65+) (1 of 1 - PPSV23) Never done    Influenza Vaccine (1) Never done       Problem List Items Addressed This Visit        Cardiac/Vascular    Essential hypertension - Primary    Relevant Orders    CBC Auto Differential    Comprehensive Metabolic Panel      Other Visit Diagnoses     Non-healing surgical wound, initial encounter        Relevant Orders    CBC Auto Differential    Comprehensive Metabolic Panel    Sedimentation Rate    Prealbumin        Essential hypertension  -     CBC Auto Differential; Future; Expected date: 02/24/2022  -     Comprehensive Metabolic Panel; Future; Expected date: 02/24/2022    Non-healing surgical wound, initial encounter  -     CBC Auto Differential; Future; Expected date: 02/24/2022  -     Comprehensive Metabolic Panel; Future; Expected date: 02/24/2022  -     Sedimentation Rate; Future; Expected date: 02/24/2022  -     Prealbumin; Future; Expected date: 02/24/2022       Health Maintenance Topics with due status: Not Due       Topic Last Completion Date    Lipid Panel 07/11/2021           Future Appointments   Date Time Provider Department Center   2/28/2022 10:15 AM Lawrence Huerta DO [facility] FAMMED [place]   6/23/2022  3:00 PM AW[facility] NURSE [place] [facility] FAMMED [place]   8/24/2022  8:30 AM Mahad Villaseñor DO [facility] C[facility]   9/27/2022  8:30 AM Kb Brown Jr., MD [facility]        Follow up in about 1 week (around 3/3/2022).     Signature:  JONAH Alvarez    Date of encounter: 2/24/22

## 2022-02-25 DIAGNOSIS — R19.7 DIARRHEA, UNSPECIFIED TYPE: ICD-10-CM

## 2022-02-25 DIAGNOSIS — R11.2 NON-INTRACTABLE VOMITING WITH NAUSEA, UNSPECIFIED VOMITING TYPE: Primary | ICD-10-CM

## 2022-02-25 RX ORDER — ONDANSETRON 4 MG/1
4 TABLET, ORALLY DISINTEGRATING ORAL
Qty: 30 TABLET | Refills: 0 | Status: SHIPPED | OUTPATIENT
Start: 2022-02-25 | End: 2022-03-28

## 2022-02-25 RX ORDER — BISMUTH SUBSALICYLATE 525 MG/30ML
15 LIQUID ORAL EVERY 6 HOURS PRN
Qty: 473 ML | Refills: 0 | Status: SHIPPED | OUTPATIENT
Start: 2022-02-25 | End: 2022-03-28 | Stop reason: ALTCHOICE

## 2022-02-25 RX ORDER — LOPERAMIDE HYDROCHLORIDE 2 MG/1
2 CAPSULE ORAL EVERY 6 HOURS PRN
Qty: 60 CAPSULE | Refills: 0 | Status: SHIPPED | OUTPATIENT
Start: 2022-02-25 | End: 2022-03-28 | Stop reason: ALTCHOICE

## 2022-02-25 NOTE — TELEPHONE ENCOUNTER
"Patient's son called stating that patient is having some nausea,vomiting and diarrhea with antibiotics. Also spoke with patient and he reports having some "shakes". Spoke with Dr. Huerta regarding some problems with patient's medication. He gave new orders for Kaopectate 15cc every 6 hours,Imodium 2mg every 6 hours and zofran 4mg every 6 hours.Continue current medicines.  "

## 2022-02-25 NOTE — TELEPHONE ENCOUNTER
I spoke with patient daughter and gave her information regarding new medication. Patient is to go to the ER if he gets any worse.Voiced understanding

## 2022-02-27 LAB
MICROORGANISM SPEC CULT: ABNORMAL
MICROORGANISM SPEC CULT: ABNORMAL

## 2022-02-28 ENCOUNTER — OFFICE VISIT (OUTPATIENT)
Dept: FAMILY MEDICINE | Facility: CLINIC | Age: 82
End: 2022-02-28
Payer: COMMERCIAL

## 2022-02-28 VITALS
HEART RATE: 65 BPM | BODY MASS INDEX: 28.26 KG/M2 | DIASTOLIC BLOOD PRESSURE: 80 MMHG | WEIGHT: 208.63 LBS | OXYGEN SATURATION: 99 % | SYSTOLIC BLOOD PRESSURE: 110 MMHG | HEIGHT: 72 IN | TEMPERATURE: 97 F | RESPIRATION RATE: 18 BRPM

## 2022-02-28 DIAGNOSIS — N18.31 STAGE 3A CHRONIC KIDNEY DISEASE: ICD-10-CM

## 2022-02-28 DIAGNOSIS — R73.9 HYPERGLYCEMIA: ICD-10-CM

## 2022-02-28 DIAGNOSIS — T81.49XA INFECTED SURGICAL WOUND: Primary | ICD-10-CM

## 2022-02-28 LAB
ANION GAP SERPL CALCULATED.3IONS-SCNC: 12 MMOL/L (ref 7–16)
BUN SERPL-MCNC: 35 MG/DL (ref 7–18)
BUN/CREAT SERPL: 13 (ref 6–20)
CALCIUM SERPL-MCNC: 9 MG/DL (ref 8.5–10.1)
CHLORIDE SERPL-SCNC: 99 MMOL/L (ref 98–107)
CO2 SERPL-SCNC: 25 MMOL/L (ref 21–32)
CREAT SERPL-MCNC: 2.65 MG/DL (ref 0.7–1.3)
GLUCOSE SERPL-MCNC: 122 MG/DL (ref 74–106)
POTASSIUM SERPL-SCNC: 4.4 MMOL/L (ref 3.5–5.1)
SODIUM SERPL-SCNC: 132 MMOL/L (ref 136–145)

## 2022-02-28 PROCEDURE — 1159F MED LIST DOCD IN RCRD: CPT | Mod: ,,, | Performed by: FAMILY MEDICINE

## 2022-02-28 PROCEDURE — 83036 HEMOGLOBIN GLYCOSYLATED A1C: CPT | Mod: GZ,,, | Performed by: CLINICAL MEDICAL LABORATORY

## 2022-02-28 PROCEDURE — 80048 BASIC METABOLIC PANEL: ICD-10-PCS | Mod: ,,, | Performed by: CLINICAL MEDICAL LABORATORY

## 2022-02-28 PROCEDURE — 3074F PR MOST RECENT SYSTOLIC BLOOD PRESSURE < 130 MM HG: ICD-10-PCS | Mod: ,,, | Performed by: FAMILY MEDICINE

## 2022-02-28 PROCEDURE — 1101F PT FALLS ASSESS-DOCD LE1/YR: CPT | Mod: ,,, | Performed by: FAMILY MEDICINE

## 2022-02-28 PROCEDURE — 83036 HEMOGLOBIN A1C: ICD-10-PCS | Mod: GZ,,, | Performed by: CLINICAL MEDICAL LABORATORY

## 2022-02-28 PROCEDURE — 3288F FALL RISK ASSESSMENT DOCD: CPT | Mod: ,,, | Performed by: FAMILY MEDICINE

## 2022-02-28 PROCEDURE — 80048 BASIC METABOLIC PNL TOTAL CA: CPT | Mod: ,,, | Performed by: CLINICAL MEDICAL LABORATORY

## 2022-02-28 PROCEDURE — 1159F PR MEDICATION LIST DOCUMENTED IN MEDICAL RECORD: ICD-10-PCS | Mod: ,,, | Performed by: FAMILY MEDICINE

## 2022-02-28 PROCEDURE — 3079F DIAST BP 80-89 MM HG: CPT | Mod: ,,, | Performed by: FAMILY MEDICINE

## 2022-02-28 PROCEDURE — 3079F PR MOST RECENT DIASTOLIC BLOOD PRESSURE 80-89 MM HG: ICD-10-PCS | Mod: ,,, | Performed by: FAMILY MEDICINE

## 2022-02-28 PROCEDURE — 3074F SYST BP LT 130 MM HG: CPT | Mod: ,,, | Performed by: FAMILY MEDICINE

## 2022-02-28 PROCEDURE — 1101F PR PT FALLS ASSESS DOC 0-1 FALLS W/OUT INJ PAST YR: ICD-10-PCS | Mod: ,,, | Performed by: FAMILY MEDICINE

## 2022-02-28 PROCEDURE — 3288F PR FALLS RISK ASSESSMENT DOCUMENTED: ICD-10-PCS | Mod: ,,, | Performed by: FAMILY MEDICINE

## 2022-02-28 PROCEDURE — 99214 PR OFFICE/OUTPT VISIT, EST, LEVL IV, 30-39 MIN: ICD-10-PCS | Mod: ,,, | Performed by: FAMILY MEDICINE

## 2022-02-28 PROCEDURE — 99214 OFFICE O/P EST MOD 30 MIN: CPT | Mod: ,,, | Performed by: FAMILY MEDICINE

## 2022-02-28 RX ORDER — ISOSORBIDE MONONITRATE 60 MG/1
TABLET, EXTENDED RELEASE ORAL
COMMUNITY
End: 2022-09-15 | Stop reason: ALTCHOICE

## 2022-02-28 RX ORDER — AMOXICILLIN AND CLAVULANATE POTASSIUM 875; 125 MG/1; MG/1
1 TABLET, FILM COATED ORAL 2 TIMES DAILY
Qty: 20 TABLET | Refills: 0 | Status: SHIPPED | OUTPATIENT
Start: 2022-02-28 | End: 2022-03-28 | Stop reason: ALTCHOICE

## 2022-02-28 NOTE — PROGRESS NOTES
Lawrence Huerta DO   [address]  [place]  [address]      PATIENT NAME: Trung Barboza  : 1940  DATE: 22  MRN: [id number]      Billing Provider: Lawrence Huerta DO  Level of Service:   Patient PCP Information     Provider PCP Type    Lawrence Huerta DO General          Reason for Visit / Chief Complaint: Wound Infection (Patient has had bandage on since 2022.) and Medication Reaction (Patient states antibiotic  Bactrim is making him feel weak and sick to his stomach. Patient states his blood pressure was up. Patient stated dizziness started day after taking.)       Update PCP  Update Chief Complaint         History of Present Illness / Problem Focused Workflow     Trung Barboza presents to the clinic with Wound Infection (Patient has had bandage on since 2022.) and Medication Reaction (Patient states antibiotic  Bactrim is making him feel weak and sick to his stomach. Patient states his blood pressure was up. Patient stated dizziness started day after taking.)     Patient is in today for follow-up on his wound care.  Was noted that the the 2 bacteria that grew out Klebsiella pneumoniae and Staph coccus aureus were sensitive to Bactrim DS.  Patient is having difficulty with nausea with the Bactrim DS so we switched him to Augmentin.  Patient has been dressing the wound daily with the silver jez dressing.  He denies fever chills.  He only complains of minimal pain.      Review of Systems     Review of Systems   Constitutional: Negative for activity change, appetite change, chills, fatigue and fever.   HENT: Negative for nasal congestion, ear discharge, ear pain, mouth dryness, mouth sores, postnasal drip, sinus pressure/congestion, sore throat and voice change.    Eyes: Negative for pain, discharge, redness, itching and visual disturbance.   Respiratory: Negative for apnea, cough, chest tightness, shortness of breath and wheezing.     Cardiovascular: Negative for chest pain, palpitations and leg swelling.   Gastrointestinal: Negative for abdominal distention, abdominal pain, anal bleeding, blood in stool, change in bowel habit, constipation, diarrhea, nausea, vomiting, reflux and change in bowel habit.   Endocrine: Negative for cold intolerance, heat intolerance, polydipsia, polyphagia and polyuria.   Genitourinary: Negative for difficulty urinating, enuresis, erectile dysfunction, frequency, genital sores, hematuria and urgency.   Musculoskeletal: Negative for arthralgias, back pain, gait problem, leg pain, myalgias and neck pain.   Integumentary:  Positive for color change and wound. Negative for rash, mole/lesion, breast mass and breast discharge.   Allergic/Immunologic: Negative for environmental allergies and food allergies.   Neurological: Negative for dizziness, vertigo, tremors, seizures, syncope, facial asymmetry, speech difficulty, weakness, light-headedness, numbness, headaches, disturbances in coordination, memory loss and coordination difficulties.   Hematological: Negative for adenopathy. Does not bruise/bleed easily.   Psychiatric/Behavioral: Negative for agitation, behavioral problems, confusion, decreased concentration, dysphoric mood, hallucinations, self-injury, sleep disturbance and suicidal ideas. The patient is not nervous/anxious and is not hyperactive.    Breast: Negative for mass      Medical / Social / Family History     Past Medical History:   Diagnosis Date    Benign localized prostatic hyperplasia with lower urinary tract symptoms (LUTS)     Coronary artery disease     Elevated PSA     Hypercholesterolemia     Hyperlipidemia     Hypertension     Obesity (BMI 30-39.9)        Past Surgical History:   Procedure Laterality Date    ANGIOGRAM, CORONARY, WITH LEFT HEART CATHETERIZATION N/A 12/31/2021    Procedure: ANGIOGRAM,CORONARY,WITH LEFT HEART CATHETERIZATION;  Surgeon: Mahad Villaseñor DO;  Location: [facility]  CATH LAB;  Service: Cardiology;  Laterality: N/A;    BIOPSY WITH TRANSRECTAL ULTRASOUND (TRUS) GUIDANCE Bilateral 03/07/2018    CHOLECYSTECTOMY      CORONARY ARTERY BYPASS GRAFT  01/07/2022    [facility]-Dr. Qureshi    LEFT HEART CATHETERIZATION Left 7/13/2021    Procedure: Left heart cath;  Surgeon: Philip Torres MD;  Location: [facility] CATH LAB;  Service: Cardiology;  Laterality: Left;       Social History    reports that he quit smoking about 33 years ago. He smoked 0.50 packs per day. He has quit using smokeless tobacco.  His smokeless tobacco use included chew. He reports current alcohol use. He reports that he does not use drugs.    Family History  's family history includes No Known Problems in his brother, daughter, father, mother, sister, and son.    Medications and Allergies     Medications  Outpatient Medications Marked as Taking for the 2/28/22 encounter (Office Visit) with Lawrence Huerta, DO   Medication Sig Dispense Refill    amLODIPine (NORVASC) 5 MG tablet Take 1 tablet (5 mg total) by mouth once daily. 30 tablet 3    ascorbic acid, vitamin C, (VITAMIN C) 500 MG tablet Take 1,000 mg by mouth 2 (two) times daily.      aspirin (ECOTRIN) 81 MG EC tablet Take 1 tablet (81 mg total) by mouth once daily. 30 tablet 0    atorvastatin (LIPITOR) 80 MG tablet Take 1 tablet (80 mg total) by mouth every evening. 90 tablet 3    bismuth subsalicylate (KAOPECTATE, BISMUTH SUBSALICY,) 262 mg/15 mL suspension Take 15 mLs by mouth every 6 (six) hours as needed for Indigestion. 473 mL 0    BRILINTA 90 mg tablet Take 90 mg by mouth 2 (two) times daily.      colchicine (COLCRYS) 0.6 mg tablet TAKE 2 TABLETS TODAY, AND THEN TAKE ONE DAILY UNTIL GOUT ATTACK RESOLVING. 30 tablet 11    furosemide (LASIX) 20 MG tablet Take 1 tablet (20 mg total) by mouth once daily. 30 tablet 2    hydroCHLOROthiazide (HYDRODIURIL) 12.5 MG Tab Take 1 tablet (12.5 mg total) by mouth once daily. 30 tablet 3     isosorbide mononitrate (IMDUR) 60 MG 24 hr tablet Take by mouth.      loperamide (IMODIUM) 2 mg capsule Take 1 capsule (2 mg total) by mouth every 6 (six) hours as needed for Diarrhea. 60 capsule 0    ondansetron (ZOFRAN-ODT) 4 MG TbDL Take 1 tablet (4 mg total) by mouth every 6 to 8 hours as needed (vomiting/nausea). 30 tablet 0    oxyCODONE-acetaminophen (PERCOCET) 5-325 mg per tablet Take 1 tablet by mouth every 6 (six) hours as needed.      pantoprazole (PROTONIX) 40 MG tablet Take 1 tablet (40 mg total) by mouth once daily. 30 tablet 2    potassium chloride (KLOR-CON) 10 MEQ TbSR Take 1 tablet (10 mEq total) by mouth once daily. 30 tablet 2    sulfamethoxazole-trimethoprim 800-160mg (BACTRIM DS) 800-160 mg Tab Take 1 tablet by mouth 2 (two) times daily. 20 tablet 0       Allergies  Review of patient's allergies indicates:   Allergen Reactions    Lisinopril Swelling       Physical Examination     Vitals:    02/28/22 1015   BP: 110/80   Pulse: 65   Resp: 18   Temp: 97.1 °F (36.2 °C)     Physical Exam  Constitutional:       Appearance: Normal appearance. He is normal weight.   Musculoskeletal:      Comments: Wounds on his lower leg open on the read right medial calf the upper 4 cm the lower 6 cm with granulation tissue but yellow white exudate in both the wound beds.  No increased heat around the wounds.  Noted culture results.  Patient has Klebsiella pneumoniae as well as Staph aureus growing out of the wounds.   Skin:     Findings: Lesion present.   Neurological:      General: No focal deficit present.      Mental Status: He is alert and oriented to person, place, and time. Mental status is at baseline.   Psychiatric:         Mood and Affect: Mood normal.         Behavior: Behavior normal.         Thought Content: Thought content normal.         Judgment: Judgment normal.               Lab Results   Component Value Date    WBC 8.10 02/24/2022    HGB 12.0 (L) 02/24/2022    HCT 34.9 (L) 02/24/2022    MCV  91.1 02/24/2022     02/24/2022          Sodium   Date Value Ref Range Status   02/24/2022 137 136 - 145 mmol/L Final     Potassium   Date Value Ref Range Status   02/24/2022 4.3 3.5 - 5.1 mmol/L Final     Chloride   Date Value Ref Range Status   02/24/2022 101 98 - 107 mmol/L Final     CO2   Date Value Ref Range Status   02/24/2022 26 21 - 32 mmol/L Final     Glucose   Date Value Ref Range Status   02/24/2022 151 (H) 74 - 106 mg/dL Final     BUN   Date Value Ref Range Status   02/24/2022 30 (H) 7 - 18 mg/dL Final     Creatinine   Date Value Ref Range Status   02/24/2022 2.04 (H) 0.70 - 1.30 mg/dL Final     Calcium   Date Value Ref Range Status   02/24/2022 9.1 8.5 - 10.1 mg/dL Final     Total Protein   Date Value Ref Range Status   02/24/2022 7.5 6.4 - 8.2 g/dL Final     Albumin   Date Value Ref Range Status   02/24/2022 3.4 (L) 3.5 - 5.0 g/dL Final     Bilirubin, Total   Date Value Ref Range Status   02/24/2022 0.7 0.0 - 1.2 mg/dL Final     Alk Phos   Date Value Ref Range Status   02/24/2022 178 (H) 45 - 115 U/L Final     AST   Date Value Ref Range Status   02/24/2022 24 15 - 37 U/L Final     ALT   Date Value Ref Range Status   02/24/2022 24 16 - 61 U/L Final     Anion Gap   Date Value Ref Range Status   02/24/2022 14 7 - 16 mmol/L Final     eGFR    Date Value Ref Range Status   07/25/2021 59 (L) >=60 mL/min/1.73m² Final     eGFR   Date Value Ref Range Status   02/24/2022 33 (L) >=60 mL/min/1.73m² Final   She has   Cardiac catheterization  · The left ventricular systolic function was normal.  · The left ventricular end diastolic pressure was normal.  · The pre-procedure left ventricular end diastolic pressure was 11.  · The ejection fraction was calculated to be 55%.  · The ejection fraction was greater than 55% by visual estimate.  · The Acute Mrg lesion was 80% stenosed.  · The Dist RCA lesion was 50% stenosed.  · The 1st Diag lesion was 75% stenosed.  · The Ost LAD to Prox LAD lesion was  80% stenosed.  · The 2nd Diag lesion was 80% stenosed.  · The Prox Cx to Mid Cx lesion was 80% stenosed.  · The 2nd Mrg lesion was 60% stenosed.  · The estimated blood loss was none.  · There was three vessel coronary artery disease.     The procedure log was documented by No documenter listed and verified by   Mahad Villaseñor DO.    Date: 12/31/2021  Time: 12:22 PM\    Severe three vessel CAD, progression of disease in distal RCA, consider   CABG versus multiple vessel PCI, normal LV     Procedures   Assessment and Plan (including Health Maintenance)      Problem List  Smart Sets  Document Outside HM   :    Plan:         Health Maintenance Due   Topic Date Due    TETANUS VACCINE  Never done    Shingles Vaccine (1 of 2) Never done    Pneumococcal Vaccines (Age 65+) (1 of 1 - PPSV23) Never done    Influenza Vaccine (1) Never done       Problem List Items Addressed This Visit        Renal/    CKD (chronic kidney disease) stage 3, GFR 30-59 ml/min    Current Assessment & Plan     No change in his EG are significantly.  His will monitor for renal failure.  Will check his diabetes status.              ID    Infected surgical wound - Primary    Current Assessment & Plan     Patient not able to tolerate the Bactrim DS.  We will switch him to Augmentin 1 twice a day.  Will check an A1c as well as the BMP on him today.  He is to continue debridement of the wound and wound care on Thursday TS.  We will have home health see him for wound care the next 3 days using Santyl daily with calcium alginate dressing.  Follow-up with us in 1 month or p.r.n..  Follow-up wound care weekly.  Wounds today are essentially unchanged from when seen previously and still have some whitish exudate in the wounds themselves.           Relevant Medications    amoxicillin-clavulanate 875-125mg (AUGMENTIN) 875-125 mg per tablet    Other Relevant Orders    Hemoglobin A1C    Basic Metabolic Panel       Endocrine    Hyperglycemia    Current  Assessment & Plan     Have noted a trend that his blood sugars have been in the 125-150 range on more than 1 occasion.  Will draw hemoglobin A1c on him today.  May need to treat him for diabetes after we get the results back.                 Health Maintenance Topics with due status: Not Due       Topic Last Completion Date    Lipid Panel 07/11/2021       Future Appointments   Date Time Provider Department Center   3/3/2022 11:00 AM WOUND CARE, [facility] [place]   3/7/2022  3:30 PM Lawrence Huerta DO [facility] [place]   6/23/2022  3[facility]00 [facility] [place]   8/24/2022  8:30 AM Mahad Villaseñor DO [facility]   9/27/2022  8:30 AM Kb Brown Jr., MD [facility]        Follow up in about 4 weeks (around 3/28/2022).     Signature:  DO [facility]  [id number] [place] [address], [place]  [address]    Date of encounter: 2/28/22

## 2022-02-28 NOTE — ASSESSMENT & PLAN NOTE
Have noted a trend that his blood sugars have been in the 125-150 range on more than 1 occasion.  Will draw hemoglobin A1c on him today.  May need to treat him for diabetes after we get the results back.

## 2022-02-28 NOTE — ASSESSMENT & PLAN NOTE
No change in his EG are significantly.  His will monitor for renal failure.  Will check his diabetes status.

## 2022-02-28 NOTE — ASSESSMENT & PLAN NOTE
Patient not able to tolerate the Bactrim DS.  We will switch him to Augmentin 1 twice a day.  Will check an A1c as well as the BMP on him today.  He is to continue debridement of the wound and wound care on Thursday TS.  We will have home health see him for wound care the next 3 days using Santyl daily with calcium alginate dressing.  Follow-up with us in 1 month or p.r.n..  Follow-up wound care weekly.  Wounds today are essentially unchanged from when seen previously and still have some whitish exudate in the wounds themselves.

## 2022-03-01 LAB
EST. AVERAGE GLUCOSE BLD GHB EST-MCNC: 134 MG/DL
HBA1C MFR BLD HPLC: 6.6 % (ref 4.5–6.6)

## 2022-03-02 DIAGNOSIS — N18.31 STAGE 3A CHRONIC KIDNEY DISEASE: Primary | ICD-10-CM

## 2022-03-03 ENCOUNTER — CLINICAL SUPPORT (OUTPATIENT)
Dept: WOUND CARE | Facility: HOSPITAL | Age: 82
End: 2022-03-03
Attending: NURSE PRACTITIONER
Payer: COMMERCIAL

## 2022-03-03 VITALS
TEMPERATURE: 97 F | HEART RATE: 67 BPM | RESPIRATION RATE: 18 BRPM | SYSTOLIC BLOOD PRESSURE: 169 MMHG | DIASTOLIC BLOOD PRESSURE: 87 MMHG

## 2022-03-03 DIAGNOSIS — T81.89XA NON-HEALING SURGICAL WOUND, INITIAL ENCOUNTER: Primary | ICD-10-CM

## 2022-03-03 PROCEDURE — 11042 DBRDMT SUBQ TIS 1ST 20SQCM/<: CPT

## 2022-03-03 PROCEDURE — 11042 DBRDMT SUBQ TIS 1ST 20SQCM/<: CPT | Performed by: NURSE PRACTITIONER

## 2022-03-03 NOTE — PROGRESS NOTES
WOUND CARE, [facility]  [address]  [place] [address]     PATIENT NAME: Trung Barboza  : 1940  DATE: 3/3/22  MRN: [id number]      Billing Provider: WOUND CARE, [facility]  Level of Service:   Patient PCP Information     Provider PCP Type    Lawrence Huerta DO General          Reason for Visit / Chief Complaint: Wound Care       Update PCP  Update Chief Complaint         History of Present Illness / Problem Focused Workflow     Trung Barboza presents to the clinic for evaluation of post up wound RLE; s/p CABG with vein harvest RLE. Was seen by PCP yesterday, wound culture obtained and pt started on Bactrim DS and santyl to wound bed.   [facility]  Duration: 2022  Severity: 4/10  Context: S/P vein harvest RLE  Modifiers: CAD, HTN, hyperlipidemia.      Review of Systems     Review of Systems   Constitutional: Negative.  Negative for activity change, appetite change, chills and fever.   Respiratory: Negative for shortness of breath.    Cardiovascular: Negative for chest pain.   Gastrointestinal: Negative for abdominal pain, nausea and vomiting.   Integumentary:  Positive for wound (RLE).   Neurological: Negative for weakness and headaches.        Medical / Social / Family History     Past Medical History:   Diagnosis Date    Benign localized prostatic hyperplasia with lower urinary tract symptoms (LUTS)     Coronary artery disease     Elevated PSA     Hypercholesterolemia     Hyperlipidemia     Hypertension     Obesity (BMI 30-39.9)        Past Surgical History:   Procedure Laterality Date    ANGIOGRAM, CORONARY, WITH LEFT HEART CATHETERIZATION N/A 2021    Procedure: ANGIOGRAM,CORONARY,WITH LEFT HEART CATHETERIZATION;  Surgeon: Mahad Villaseñor DO;  [facility] C[facility] LAB;  Service: Cardiology;  Laterality: N/A;    BIOPSY WITH TRANSRECTAL ULTRASOUND (TRUS) GUIDANCE Bilateral 2018    CHOLECYSTECTOMY      CORONARY ARTERY BYPASS  GRAFT  01/07/2022    [facility]-Dr. Qureshi    LEFT HEART CATHETERIZATION Left 7/13/2021    Procedure: Left heart cath;  Surgeon: Philip Torres MD;  Location: [facility] CATH LAB;  Service: Cardiology;  Laterality: Left;       Social History    reports that he quit smoking about 33 years ago. He smoked 0.50 packs per day. He has quit using smokeless tobacco.  His smokeless tobacco use included chew. He reports current alcohol use. He reports that he does not use drugs.    Family History  's family history includes No Known Problems in his brother, daughter, father, mother, sister, and son.    Medications and Allergies     Medications  No outpatient medications have been marked as taking for the 3/3/22 encounter (Clinical Support) with WOUND CARE, [facility].       Allergies  Review of patient's allergies indicates:   Allergen Reactions    Lisinopril Swelling       Physical Examination     Vitals:    03/03/22 1213   BP: (!) 169/87   Pulse: 67   Resp: 18   Temp: 97 °F (36.1 °C)      Physical Exam  Constitutional:       Appearance: Normal appearance.   Eyes:      Pupils: Pupils are equal, round, and reactive to light.   Cardiovascular:      Rate and Rhythm: Normal rate and regular rhythm.      Pulses: Normal pulses.      Heart sounds: Normal heart sounds.   Pulmonary:      Effort: Pulmonary effort is normal.      Breath sounds: Normal breath sounds.   Abdominal:      General: Bowel sounds are normal.   Skin:     Findings: Wound present.          Neurological:      Mental Status: He is alert.          Assessment and Plan (including Health Maintenance)      Problem List  Smart Sets  Document Outside HM   :    Plan: See wound docs for treatment and plan. Clean daily with NS and apply hydrofera blue to wound bed and cover with abd pads, change daily or more often if soiled.        Health Maintenance Due   Topic Date Due    TETANUS VACCINE  Never done    Shingles Vaccine (1 of 2) Never done     Pneumococcal Vaccines (Age 65+) (1 of 1 - PPSV23) Never done    Influenza Vaccine (1) Never done       Problem List Items Addressed This Visit    None     Visit Diagnoses     Non-healing surgical wound, initial encounter    -  Primary    Relevant Orders    US Lower Extrem Arteries Bilat with SHADY (xpd)        Non-healing surgical wound, initial encounter  -     US Lower Extrem Arteries Bilat with SHADY (xpd); Future; Expected date: 03/03/2022       Health Maintenance Topics with due status: Not Due       Topic Last Completion Date    Lipid Panel 07/11/2021           Future Appointments   Date Time Provider Department Center   3/10/2022 11:30 AM [facility] [place] [facility]   3/10/2022 12:30 PM [address] [place]   6/23/2022  3:00 PM [facility] [place]   8/24/2022  8:30 AM Mahad Villaseñor DO [facility]   9/27/2022  8:30 AM Kb Brown Jr., MD [facility]        Follow up in about 1 week (around 3/10/2022).     Signature:  JONAH Alvarez    Date of encounter: 3/3/22

## 2022-03-07 ENCOUNTER — OFFICE VISIT (OUTPATIENT)
Dept: FAMILY MEDICINE | Facility: CLINIC | Age: 82
End: 2022-03-07
Payer: COMMERCIAL

## 2022-03-07 VITALS
RESPIRATION RATE: 18 BRPM | OXYGEN SATURATION: 98 % | TEMPERATURE: 98 F | WEIGHT: 203.38 LBS | HEART RATE: 74 BPM | HEIGHT: 72 IN | BODY MASS INDEX: 27.55 KG/M2 | SYSTOLIC BLOOD PRESSURE: 120 MMHG | DIASTOLIC BLOOD PRESSURE: 78 MMHG

## 2022-03-07 DIAGNOSIS — T81.49XA INFECTED SURGICAL WOUND: Primary | ICD-10-CM

## 2022-03-07 PROCEDURE — 99213 PR OFFICE/OUTPT VISIT, EST, LEVL III, 20-29 MIN: ICD-10-PCS | Mod: ,,, | Performed by: FAMILY MEDICINE

## 2022-03-07 PROCEDURE — 3078F DIAST BP <80 MM HG: CPT | Mod: ,,, | Performed by: FAMILY MEDICINE

## 2022-03-07 PROCEDURE — 3074F PR MOST RECENT SYSTOLIC BLOOD PRESSURE < 130 MM HG: ICD-10-PCS | Mod: ,,, | Performed by: FAMILY MEDICINE

## 2022-03-07 PROCEDURE — 99213 OFFICE O/P EST LOW 20 MIN: CPT | Mod: ,,, | Performed by: FAMILY MEDICINE

## 2022-03-07 PROCEDURE — 3074F SYST BP LT 130 MM HG: CPT | Mod: ,,, | Performed by: FAMILY MEDICINE

## 2022-03-07 PROCEDURE — 3078F PR MOST RECENT DIASTOLIC BLOOD PRESSURE < 80 MM HG: ICD-10-PCS | Mod: ,,, | Performed by: FAMILY MEDICINE

## 2022-03-07 NOTE — ASSESSMENT & PLAN NOTE
Surgical wounds right leg from vein harvest are granulating.  He is to have Doppler ultrasounds with ABIs this week.  Likely will need referral to vascular surgery.  No changes in his treatment at this time.  Maintain his antibiotics.  Follow-up 1 week

## 2022-03-07 NOTE — PROGRESS NOTES
Lawrence Huerta DO   [address] [place]  [address]      PATIENT NAME: Trung Barboza  : 1940  DATE: 3/7/22  MRN: [id number]      Billing Provider: Lawrence Huerta DO  Level of Service:   Patient PCP Information     Provider PCP Type    Lawrence Huerta DO General          Reason for Visit / Chief Complaint: Wound Infection (Follow on wound. )       Update PCP  Update Chief Complaint         History of Present Illness / Problem Focused Workflow     Trung Barboza presents to the clinic with Wound Infection (Follow on wound. )     Is patient is in for follow-up on wound infection and dehiscence in his right lower leg from where his vein graft for his bypass surgery was done.  He is seen wound care and has been debrided last week.  He had use Santyl for a couple of days and then debrided in the Wound Clinic.  ABIs a been schedule for this week.  Patient denies any fever chills or increasing pain.      Review of Systems     Review of Systems   Constitutional: Negative for activity change, appetite change, chills, fatigue and fever.   HENT: Negative for nasal congestion, ear discharge, ear pain, mouth dryness, mouth sores, postnasal drip, sinus pressure/congestion, sore throat and voice change.    Eyes: Negative for pain, discharge, redness, itching and visual disturbance.   Respiratory: Negative for apnea, cough, chest tightness, shortness of breath and wheezing.    Cardiovascular: Negative for chest pain, palpitations and leg swelling.   Gastrointestinal: Negative for abdominal distention, abdominal pain, anal bleeding, blood in stool, change in bowel habit, constipation, diarrhea, nausea, vomiting, reflux and change in bowel habit.   Endocrine: Negative for cold intolerance, heat intolerance, polydipsia, polyphagia and polyuria.   Genitourinary: Negative for difficulty urinating, enuresis, erectile dysfunction, frequency, genital sores, hematuria and urgency.    Musculoskeletal: Negative for arthralgias, back pain, gait problem, leg pain, myalgias and neck pain.   Integumentary:  Positive for wound. Negative for rash, mole/lesion, breast mass and breast discharge.   Allergic/Immunologic: Negative for environmental allergies and food allergies.   Neurological: Negative for dizziness, vertigo, tremors, seizures, syncope, facial asymmetry, speech difficulty, weakness, light-headedness, numbness, headaches, disturbances in coordination, memory loss and coordination difficulties.   Hematological: Negative for adenopathy. Does not bruise/bleed easily.   Psychiatric/Behavioral: Negative for agitation, behavioral problems, confusion, decreased concentration, dysphoric mood, hallucinations, self-injury, sleep disturbance and suicidal ideas. The patient is not nervous/anxious and is not hyperactive.    Breast: Negative for mass      Medical / Social / Family History     Past Medical History:   Diagnosis Date    Benign localized prostatic hyperplasia with lower urinary tract symptoms (LUTS)     Coronary artery disease     Elevated PSA     Hypercholesterolemia     Hyperlipidemia     Hypertension     Obesity (BMI 30-39.9)        Past Surgical History:   Procedure Laterality Date    ANGIOGRAM, CORONARY, WITH LEFT HEART CATHETERIZATION N/A 12/31/2021    Procedure: ANGIOGRAM,CORONARY,WITH LEFT HEART CATHETERIZATION;  Surgeon: Mahad Villaseñor DO;  Location: [facility] CATH LAB;  Service: Cardiology;  Laterality: N/A;    BIOPSY WITH TRANSRECTAL ULTRASOUND (TRUS) GUIDANCE Bilateral 03/07/2018    CHOLECYSTECTOMY      CORONARY ARTERY BYPASS GRAFT  01/07/2022    [facility]-Dr. Qureshi    LEFT HEART CATHETERIZATION Left 7/13/2021    Procedure: Left heart cath;  Surgeon: Philip Torres MD;  Location: [facility] CATH LAB;  Service: Cardiology;  Laterality: Left;       Social History    reports that he quit smoking about 33 years ago. He smoked 0.50 packs per day. He has  quit using smokeless tobacco.  His smokeless tobacco use included chew. He reports current alcohol use. He reports that he does not use drugs.    Family History  MrBelle's family history includes No Known Problems in his brother, daughter, father, mother, sister, and son.    Medications and Allergies     Medications  Outpatient Medications Marked as Taking for the 3/7/22 encounter (Office Visit) with Lawrence Huerta, DO   Medication Sig Dispense Refill    amLODIPine (NORVASC) 5 MG tablet Take 1 tablet (5 mg total) by mouth once daily. 30 tablet 3    amoxicillin-clavulanate 875-125mg (AUGMENTIN) 875-125 mg per tablet Take 1 tablet by mouth 2 (two) times daily. 20 tablet 0    ascorbic acid, vitamin C, (VITAMIN C) 500 MG tablet Take 1,000 mg by mouth 2 (two) times daily.      aspirin (ECOTRIN) 81 MG EC tablet Take 1 tablet (81 mg total) by mouth once daily. 30 tablet 0    atorvastatin (LIPITOR) 80 MG tablet Take 1 tablet (80 mg total) by mouth every evening. 90 tablet 3    bismuth subsalicylate (KAOPECTATE, BISMUTH SUBSALICY,) 262 mg/15 mL suspension Take 15 mLs by mouth every 6 (six) hours as needed for Indigestion. 473 mL 0    BRILINTA 90 mg tablet Take 90 mg by mouth 2 (two) times daily.      colchicine (COLCRYS) 0.6 mg tablet TAKE 2 TABLETS TODAY, AND THEN TAKE ONE DAILY UNTIL GOUT ATTACK RESOLVING. 30 tablet 11    collagenase (SANTYL) ointment Apply topically once daily. 30 g 1    furosemide (LASIX) 20 MG tablet Take 1 tablet (20 mg total) by mouth once daily. 30 tablet 2    hydroCHLOROthiazide (HYDRODIURIL) 12.5 MG Tab Take 1 tablet (12.5 mg total) by mouth once daily. 30 tablet 3    isosorbide mononitrate (IMDUR) 60 MG 24 hr tablet Take by mouth.      loperamide (IMODIUM) 2 mg capsule Take 1 capsule (2 mg total) by mouth every 6 (six) hours as needed for Diarrhea. 60 capsule 0    ondansetron (ZOFRAN-ODT) 4 MG TbDL Take 1 tablet (4 mg total) by mouth every 6 to 8 hours as needed (vomiting/nausea). 30  tablet 0    oxyCODONE-acetaminophen (PERCOCET) 5-325 mg per tablet Take 1 tablet by mouth every 6 (six) hours as needed.      pantoprazole (PROTONIX) 40 MG tablet Take 1 tablet (40 mg total) by mouth once daily. 30 tablet 2    potassium chloride (KLOR-CON) 10 MEQ TbSR Take 1 tablet (10 mEq total) by mouth once daily. 30 tablet 2       Allergies  Review of patient's allergies indicates:   Allergen Reactions    Lisinopril Swelling       Physical Examination     Vitals:    03/07/22 1540   BP: 120/78   Pulse: 74   Resp: 18   Temp: 98.4 °F (36.9 °C)     Physical Exam  Constitutional:       General: He is not in acute distress.     Appearance: Normal appearance. He is normal weight.   Musculoskeletal:      Comments: Right lower leg there are 2 wounds 1 is 4 cm in 1 that is 7 cm along the medial aspect of the calf.  There is a granulation tissue with minimal yellow exudate in the wound itself.  The edges of the wounds are curled up and granulating.   Neurological:      General: No focal deficit present.      Mental Status: He is alert and oriented to person, place, and time. Mental status is at baseline.   Psychiatric:         Mood and Affect: Mood normal.         Behavior: Behavior normal.         Thought Content: Thought content normal.         Judgment: Judgment normal.               Lab Results   Component Value Date    WBC 8.10 02/24/2022    HGB 12.0 (L) 02/24/2022    HCT 34.9 (L) 02/24/2022    MCV 91.1 02/24/2022     02/24/2022          Sodium   Date Value Ref Range Status   02/28/2022 132 (L) 136 - 145 mmol/L Final     Potassium   Date Value Ref Range Status   02/28/2022 4.4 3.5 - 5.1 mmol/L Final     Chloride   Date Value Ref Range Status   02/28/2022 99 98 - 107 mmol/L Final     CO2   Date Value Ref Range Status   02/28/2022 25 21 - 32 mmol/L Final     Glucose   Date Value Ref Range Status   02/28/2022 122 (H) 74 - 106 mg/dL Final     BUN   Date Value Ref Range Status   02/28/2022 35 (H) 7 - 18 mg/dL  Final     Creatinine   Date Value Ref Range Status   02/28/2022 2.65 (H) 0.70 - 1.30 mg/dL Final     Calcium   Date Value Ref Range Status   02/28/2022 9.0 8.5 - 10.1 mg/dL Final     Total Protein   Date Value Ref Range Status   02/24/2022 7.5 6.4 - 8.2 g/dL Final     Albumin   Date Value Ref Range Status   02/24/2022 3.4 (L) 3.5 - 5.0 g/dL Final     Bilirubin, Total   Date Value Ref Range Status   02/24/2022 0.7 0.0 - 1.2 mg/dL Final     Alk Phos   Date Value Ref Range Status   02/24/2022 178 (H) 45 - 115 U/L Final     AST   Date Value Ref Range Status   02/24/2022 24 15 - 37 U/L Final     ALT   Date Value Ref Range Status   02/24/2022 24 16 - 61 U/L Final     Anion Gap   Date Value Ref Range Status   02/28/2022 12 7 - 16 mmol/L Final     eGFR    Date Value Ref Range Status   07/25/2021 59 (L) >=60 mL/min/1.73m² Final     eGFR   Date Value Ref Range Status   02/28/2022 25 (L) >=60 mL/min/1.73m² Final      Cardiac catheterization  · The left ventricular systolic function was normal.  · The left ventricular end diastolic pressure was normal.  · The pre-procedure left ventricular end diastolic pressure was 11.  · The ejection fraction was calculated to be 55%.  · The ejection fraction was greater than 55% by visual estimate.  · The Acute Mrg lesion was 80% stenosed.  · The Dist RCA lesion was 50% stenosed.  · The 1st Diag lesion was 75% stenosed.  · The Ost LAD to Prox LAD lesion was 80% stenosed.  · The 2nd Diag lesion was 80% stenosed.  · The Prox Cx to Mid Cx lesion was 80% stenosed.  · The 2nd Mrg lesion was 60% stenosed.  · The estimated blood loss was none.  · There was three vessel coronary artery disease.     The procedure log was documented by No documenter listed and verified by   Mahad Villaseñor DO.    Date: 12/31/2021  Time: 12:22 PM\    Severe three vessel CAD, progression of disease in distal RCA, consider   CABG versus multiple vessel PCI, normal LV     Procedures   Assessment and Plan  (including Health Maintenance)      Problem List  Smart Sets  Document Outside HM   :    Plan:         Health Maintenance Due   Topic Date Due    TETANUS VACCINE  Never done    Shingles Vaccine (1 of 2) Never done    Pneumococcal Vaccines (Age 65+) (1 of 1 - PPSV23) Never done    Influenza Vaccine (1) Never done       Problem List Items Addressed This Visit        ID    Infected surgical wound - Primary    Current Assessment & Plan     Surgical wounds right leg from vein harvest are granulating.  He is to have Doppler ultrasounds with ABIs this week.  Likely will need referral to vascular surgery.  No changes in his treatment at this time.  Maintain his antibiotics.  Follow-up 1 week                 Health Maintenance Topics with due status: Not Due       Topic Last Completion Date    Lipid Panel 07/11/2021       Future Appointments   Date Time Provider Department Center   3/10/2022 11:30 AM WOUND CARE, [facility] [place]    3/10/2022 12:30 PM [facility] [address] [place] [address] USND [place]   6/23/2022  3:00 PM AWV NURSE [facility] MARY [place]   8/24/2022  8:30 AM Mahad Villaseñor DO [facility]   9/27/2022  8:30 AM Kb Brown Jr., MD [facility] UROL [facility]        Follow up in about 1 week (around 3/14/2022).     Signature:  DO [facility]  [address] [place] [address], [place]  [address]    Date of encounter: 3/7/22

## 2022-03-10 ENCOUNTER — HOSPITAL ENCOUNTER (OUTPATIENT)
Dept: RADIOLOGY | Facility: HOSPITAL | Age: 82
Discharge: HOME | End: 2022-03-10
Attending: NURSE PRACTITIONER
Payer: COMMERCIAL

## 2022-03-10 ENCOUNTER — CLINICAL SUPPORT (OUTPATIENT)
Dept: WOUND CARE | Facility: HOSPITAL | Age: 82
End: 2022-03-10
Attending: NURSE PRACTITIONER
Payer: COMMERCIAL

## 2022-03-10 VITALS
RESPIRATION RATE: 18 BRPM | SYSTOLIC BLOOD PRESSURE: 152 MMHG | DIASTOLIC BLOOD PRESSURE: 83 MMHG | TEMPERATURE: 97 F | HEART RATE: 77 BPM

## 2022-03-10 DIAGNOSIS — T81.89XA NON-HEALING SURGICAL WOUND, INITIAL ENCOUNTER: ICD-10-CM

## 2022-03-10 DIAGNOSIS — T81.89XD NON-HEALING SURGICAL WOUND, SUBSEQUENT ENCOUNTER: Primary | ICD-10-CM

## 2022-03-10 PROCEDURE — 11042 DBRDMT SUBQ TIS 1ST 20SQCM/<: CPT

## 2022-03-10 PROCEDURE — 11045 DBRDMT SUBQ TISS EACH ADDL: CPT | Performed by: NURSE PRACTITIONER

## 2022-03-10 PROCEDURE — 93925 LOWER EXTREMITY STUDY: CPT | Mod: TC

## 2022-03-10 PROCEDURE — 11042 DBRDMT SUBQ TIS 1ST 20SQCM/<: CPT | Performed by: NURSE PRACTITIONER

## 2022-03-10 NOTE — PROGRESS NOTES
WOUND CARE, [facility]  [address]  [place] [address]     PATIENT NAME: Trung Barboza  : 1940  DATE: 3/10/22  MRN: [id number]      Billing Provider: WOUND CARE, [facility]  Level of Service:   Patient PCP Information     Provider PCP Type    Lawrence Huerta DO General          Reason for Visit / Chief Complaint: No chief complaint on file.       Update PCP  Update Chief Complaint         History of Present Illness / Problem Focused Workflow     Trung Barboza presents to the clinic for evaluation of post up wound RLE; s/p CABG with vein harvest RLE. Was seen by PCP 2 weeks ago, wound culture obtained and pt started on Bactrim DS and santyl to wound bed. Bactrim DS was causing nausea and was switched to Augmentin.  Pt has [facility].  Duration: 2022  Severity: 4/10  Context: S/P vein harvest RLE  Modifiers: CAD, HTN, hyperlipidemia.      Review of Systems     Review of Systems   Constitutional: Negative.  Negative for activity change, appetite change, chills and fever.   Respiratory: Negative for shortness of breath.    Cardiovascular: Negative for chest pain.   Gastrointestinal: Negative for abdominal pain, nausea and vomiting.   Integumentary:  Positive for wound (RLE).   Neurological: Negative for weakness and headaches.        Medical / Social / Family History     Past Medical History:   Diagnosis Date    Benign localized prostatic hyperplasia with lower urinary tract symptoms (LUTS)     Coronary artery disease     Elevated PSA     Hypercholesterolemia     Hyperlipidemia     Hypertension     Obesity (BMI 30-39.9)        Past Surgical History:   Procedure Laterality Date    ANGIOGRAM, CORONARY, WITH LEFT HEART CATHETERIZATION N/A 2021    Procedure: ANGIOGRAM,CORONARY,WITH LEFT HEART CATHETERIZATION;  Surgeon: Mahad Villaseñor DO;  Location: [facility] CATH LAB;  Service: Cardiology;  Laterality: N/A;    BIOPSY WITH TRANSRECTAL ULTRASOUND (TRUS)  GUIDANCE Bilateral 03/07/2018    CHOLECYSTECTOMY      CORONARY ARTERY BYPASS GRAFT  01/07/2022    [facility]-Dr. Qureshi    LEFT HEART CATHETERIZATION Left 7/13/2021    Procedure: Left heart cath;  Surgeon: Philip Torres MD;  Location: [facility] CATH LAB;  Service: Cardiology;  Laterality: Left;       Social History    reports that he quit smoking about 33 years ago. He smoked 0.50 packs per day. He has quit using smokeless tobacco.  His smokeless tobacco use included chew. He reports current alcohol use. He reports that he does not use drugs.    Family History  's family history includes No Known Problems in his brother, daughter, father, mother, sister, and son.    Medications and Allergies     Medications  No outpatient medications have been marked as taking for the 3/10/22 encounter (Clinical Support) with WOUND CARE, [facility].       Allergies  Review of patient's allergies indicates:   Allergen Reactions    Lisinopril Swelling       Physical Examination     There were no vitals filed for this visit.   Physical Exam  Constitutional:       Appearance: Normal appearance.   Eyes:      Pupils: Pupils are equal, round, and reactive to light.   Cardiovascular:      Rate and Rhythm: Normal rate and regular rhythm.      Pulses:           Dorsalis pedis pulses are 1+ on the right side.      Heart sounds: Normal heart sounds.   Pulmonary:      Effort: Pulmonary effort is normal.      Breath sounds: Normal breath sounds.   Abdominal:      General: Bowel sounds are normal.   Musculoskeletal:      Right lower leg: No edema.      Left lower leg: No edema.   Skin:     Findings: Wound present.          Neurological:      Mental Status: He is alert.          Assessment and Plan (including Health Maintenance)      Problem List  Smart Sets  Document Outside HM   :    Plan: See wound docs for treatment and plan. Clean daily with NS and apply santyl to wound bed and cover with silvercel and abd pads, change  daily or more often if soiled.  Pt to have arterial doppler today.        Health Maintenance Due   Topic Date Due    TETANUS VACCINE  Never done    Shingles Vaccine (1 of 2) Never done    Pneumococcal Vaccines (Age 65+) (1 of 1 - PPSV23) Never done    Influenza Vaccine (1) Never done       Problem List Items Addressed This Visit    None       There are no diagnoses linked to this encounter.   Health Maintenance Topics with due status: Not Due       Topic Last Completion Date    Lipid Panel 07/11/2021           Future Appointments   Date Time Provider Department Center   3/10/2022 11:30 AM [facility] [place]   3/10/2022 12:30 PM [facility] US[facility] USND [place]   6/23/2022  3:00 PM AWV NURSE [facility] FAMMED [place]   8/24/2022  8:30 AM Mahad Villaseñor DO [facility] CARD [facility]   9/27/2022  8:30 AM Kb Brown Jr., MD [facility] UROL [facility]        Follow up in about 1 week (around 3/17/2022).     Signature:  JONAH Alvarez    Date of encounter: 3/10/22

## 2022-03-14 ENCOUNTER — OFFICE VISIT (OUTPATIENT)
Dept: FAMILY MEDICINE | Facility: CLINIC | Age: 82
End: 2022-03-14
Payer: COMMERCIAL

## 2022-03-14 VITALS
BODY MASS INDEX: 27.9 KG/M2 | RESPIRATION RATE: 20 BRPM | HEIGHT: 72 IN | TEMPERATURE: 98 F | WEIGHT: 206 LBS | HEART RATE: 72 BPM | OXYGEN SATURATION: 99 % | SYSTOLIC BLOOD PRESSURE: 120 MMHG | DIASTOLIC BLOOD PRESSURE: 78 MMHG

## 2022-03-14 DIAGNOSIS — I10 ESSENTIAL HYPERTENSION: Primary | ICD-10-CM

## 2022-03-14 DIAGNOSIS — Z95.1 HX OF CABG: ICD-10-CM

## 2022-03-14 DIAGNOSIS — T81.49XA INFECTED SURGICAL WOUND: ICD-10-CM

## 2022-03-14 PROBLEM — N18.4 CKD (CHRONIC KIDNEY DISEASE) STAGE 4, GFR 15-29 ML/MIN: Status: ACTIVE | Noted: 2021-07-10

## 2022-03-14 PROCEDURE — 1159F MED LIST DOCD IN RCRD: CPT | Mod: ,,, | Performed by: FAMILY MEDICINE

## 2022-03-14 PROCEDURE — 99214 OFFICE O/P EST MOD 30 MIN: CPT | Mod: ,,, | Performed by: FAMILY MEDICINE

## 2022-03-14 PROCEDURE — 3074F PR MOST RECENT SYSTOLIC BLOOD PRESSURE < 130 MM HG: ICD-10-PCS | Mod: ,,, | Performed by: FAMILY MEDICINE

## 2022-03-14 PROCEDURE — 1159F PR MEDICATION LIST DOCUMENTED IN MEDICAL RECORD: ICD-10-PCS | Mod: ,,, | Performed by: FAMILY MEDICINE

## 2022-03-14 PROCEDURE — 99214 PR OFFICE/OUTPT VISIT, EST, LEVL IV, 30-39 MIN: ICD-10-PCS | Mod: ,,, | Performed by: FAMILY MEDICINE

## 2022-03-14 PROCEDURE — 3078F DIAST BP <80 MM HG: CPT | Mod: ,,, | Performed by: FAMILY MEDICINE

## 2022-03-14 PROCEDURE — 3074F SYST BP LT 130 MM HG: CPT | Mod: ,,, | Performed by: FAMILY MEDICINE

## 2022-03-14 PROCEDURE — 3078F PR MOST RECENT DIASTOLIC BLOOD PRESSURE < 80 MM HG: ICD-10-PCS | Mod: ,,, | Performed by: FAMILY MEDICINE

## 2022-03-14 NOTE — ASSESSMENT & PLAN NOTE
Patient is status post CABG with poorly healing vein graft harvest sites.  No recurrence of chest pain.

## 2022-03-14 NOTE — ASSESSMENT & PLAN NOTE
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  Avoid NSAIDs and will need to monitor closely if he is placed on any further antibiotics.

## 2022-03-14 NOTE — PROGRESS NOTES
"   Lawrence Huerta DO   [address] [place]  [address]      PATIENT NAME: Trung Barboza  : 1940  DATE: 3/14/22  MRN: [id number]      Billing Provider: Lawrence Huerta DO  Level of Service:   Patient PCP Information     Provider PCP Type    Lawrence Huerta DO General          Reason for Visit / Chief Complaint: Wound Check (Follow up wound to right lower leg. Patient went to wound care at [facility] on 22. He does report some pain at times. "Stinging pain".)       Update PCP  Update Chief Complaint         History of Present Illness / Problem Focused Workflow     Trung Barboza presents to the clinic with Wound Check (Follow up wound to right lower leg. Patient went to wound care at [facility] on 22. He does report some pain at times. "Stinging pain".)     Patient is in today with the continued drainage from the wounds on his legs but the he states they are having sharp pains at times.  He denies any fever or chills associated with an but does have some drainage.  Patient is continuing to see wound care who put him on Santyl for debridement.      Review of Systems     Review of Systems   Constitutional: Negative for activity change, appetite change, chills, fatigue and fever.   HENT: Negative for nasal congestion, ear discharge, ear pain, mouth dryness, mouth sores, postnasal drip, sinus pressure/congestion, sore throat and voice change.    Eyes: Negative for pain, discharge, redness, itching and visual disturbance.   Respiratory: Positive for shortness of breath. Negative for apnea, cough, chest tightness and wheezing.    Cardiovascular: Positive for chest pain. Negative for palpitations and leg swelling.        Chest wall pain   Gastrointestinal: Negative for abdominal distention, abdominal pain, anal bleeding, blood in stool, change in bowel habit, constipation, diarrhea, nausea, vomiting, reflux and change in bowel habit.   Endocrine: Negative " for cold intolerance, heat intolerance, polydipsia, polyphagia and polyuria.   Genitourinary: Negative for difficulty urinating, enuresis, erectile dysfunction, frequency, genital sores, hematuria and urgency.   Musculoskeletal: Negative for arthralgias, back pain, gait problem, leg pain, myalgias and neck pain.   Integumentary:  Positive for wound. Negative for rash, mole/lesion, breast mass and breast discharge.   Allergic/Immunologic: Negative for environmental allergies and food allergies.   Neurological: Positive for weakness, disturbances in coordination and coordination difficulties. Negative for dizziness, vertigo, tremors, seizures, syncope, facial asymmetry, speech difficulty, light-headedness, numbness, headaches and memory loss.   Hematological: Negative for adenopathy. Does not bruise/bleed easily.   Psychiatric/Behavioral: Negative for agitation, behavioral problems, confusion, decreased concentration, dysphoric mood, hallucinations, self-injury, sleep disturbance and suicidal ideas. The patient is not nervous/anxious and is not hyperactive.    Breast: Negative for mass      Medical / Social / Family History     Past Medical History:   Diagnosis Date    Benign localized prostatic hyperplasia with lower urinary tract symptoms (LUTS)     Coronary artery disease     Elevated PSA     Hypercholesterolemia     Hyperlipidemia     Hypertension     Obesity (BMI 30-39.9)        Past Surgical History:   Procedure Laterality Date    ANGIOGRAM, CORONARY, WITH LEFT HEART CATHETERIZATION N/A 12/31/2021    Procedure: ANGIOGRAM,CORONARY,WITH LEFT HEART CATHETERIZATION;  Surgeon: Mahad Villaseñor DO;  Location: [facility] CATH LAB;  Service: Cardiology;  Laterality: N/A;    BIOPSY WITH TRANSRECTAL ULTRASOUND (TRUS) GUIDANCE Bilateral 03/07/2018    CHOLECYSTECTOMY      CORONARY ARTERY BYPASS GRAFT  01/07/2022    [facility]-Dr. Qureshi    LEFT HEART CATHETERIZATION Left 7/13/2021    Procedure: Left heart  cath;  Surgeon: Philip Torres MD;  Location: [facility] CATH LAB;  Service: Cardiology;  Laterality: Left;       Social History    reports that he quit smoking about 33 years ago. He smoked 0.50 packs per day. He has quit using smokeless tobacco.  His smokeless tobacco use included chew. He reports current alcohol use. He reports that he does not use drugs.    Family History  's family history includes No Known Problems in his brother, daughter, father, mother, sister, and son.    Medications and Allergies     Medications  Outpatient Medications Marked as Taking for the 3/14/22 encounter (Office Visit) with Lawrence Huerta, DO   Medication Sig Dispense Refill    amLODIPine (NORVASC) 5 MG tablet Take 1 tablet (5 mg total) by mouth once daily. 30 tablet 3    ascorbic acid, vitamin C, (VITAMIN C) 500 MG tablet Take 1,000 mg by mouth 2 (two) times daily.      aspirin (ECOTRIN) 81 MG EC tablet Take 1 tablet (81 mg total) by mouth once daily. 30 tablet 0    atorvastatin (LIPITOR) 80 MG tablet Take 1 tablet (80 mg total) by mouth every evening. 90 tablet 3    BRILINTA 90 mg tablet Take 90 mg by mouth 2 (two) times daily.      colchicine (COLCRYS) 0.6 mg tablet TAKE 2 TABLETS TODAY, AND THEN TAKE ONE DAILY UNTIL GOUT ATTACK RESOLVING. 30 tablet 11    collagenase (SANTYL) ointment Apply topically once daily. 30 g 1    furosemide (LASIX) 20 MG tablet Take 1 tablet (20 mg total) by mouth once daily. 30 tablet 2    hydroCHLOROthiazide (HYDRODIURIL) 12.5 MG Tab Take 1 tablet (12.5 mg total) by mouth once daily. 30 tablet 3    pantoprazole (PROTONIX) 40 MG tablet Take 1 tablet (40 mg total) by mouth once daily. 30 tablet 2    potassium chloride (KLOR-CON) 10 MEQ TbSR Take 1 tablet (10 mEq total) by mouth once daily. 30 tablet 2       Allergies  Review of patient's allergies indicates:   Allergen Reactions    Lisinopril Swelling       Physical Examination     Vitals:    03/14/22 1330   BP: 120/78   Pulse:  72   Resp: 20   Temp: 97.9 °F (36.6 °C)     Physical Exam  Constitutional:       Appearance: Normal appearance.   HENT:      Head: Normocephalic.      Nose: Nose normal.      Mouth/Throat:      Mouth: Mucous membranes are moist.      Pharynx: Oropharynx is clear. No oropharyngeal exudate or posterior oropharyngeal erythema.   Eyes:      General: No scleral icterus.        Right eye: No discharge.         Left eye: No discharge.      Conjunctiva/sclera: Conjunctivae normal.      Pupils: Pupils are equal, round, and reactive to light.   Neck:      Vascular: No carotid bruit.   Cardiovascular:      Rate and Rhythm: Normal rate and regular rhythm.      Pulses: Normal pulses.      Heart sounds: Murmur heard.   Pulmonary:      Effort: Pulmonary effort is normal.      Breath sounds: Normal breath sounds. No wheezing.   Abdominal:      General: Abdomen is flat. Bowel sounds are normal.      Tenderness: There is no abdominal tenderness.   Musculoskeletal:         General: Normal range of motion.      Cervical back: Normal range of motion and neck supple.      Right lower leg: No edema.      Left lower leg: No edema.   Skin:     General: Skin is warm.      Capillary Refill: Capillary refill takes less than 2 seconds.      Findings: Lesion present.      Comments: Right lower leg medial aspect there is 2 wounds that are granulating 1 is 4 cm 1 that is 6 cm on the medial calf surgical wounds.  There is some yellowish exudate which was partially removed with peroxide and Q-tips is well as gauze and then Santyl was applied to the wounds.  Good granulation tissue was noted ABIs were noted in he had SHADY of .81 on the right and 0.80 on the left   Neurological:      General: No focal deficit present.      Mental Status: He is alert and oriented to person, place, and time.      Cranial Nerves: No cranial nerve deficit.      Sensory: No sensory deficit.      Motor: No weakness.      Coordination: Coordination normal.      Gait: Gait  abnormal.      Deep Tendon Reflexes: Reflexes normal.   Psychiatric:         Mood and Affect: Mood normal.         Behavior: Behavior normal.         Thought Content: Thought content normal.         Judgment: Judgment normal.     She          Lab Results   Component Value Date    WBC 8.10 02/24/2022    HGB 12.0 (L) 02/24/2022    HCT 34.9 (L) 02/24/2022    MCV 91.1 02/24/2022     02/24/2022          Sodium   Date Value Ref Range Status   02/28/2022 132 (L) 136 - 145 mmol/L Final     Potassium   Date Value Ref Range Status   02/28/2022 4.4 3.5 - 5.1 mmol/L Final     Chloride   Date Value Ref Range Status   02/28/2022 99 98 - 107 mmol/L Final     CO2   Date Value Ref Range Status   02/28/2022 25 21 - 32 mmol/L Final     Glucose   Date Value Ref Range Status   02/28/2022 122 (H) 74 - 106 mg/dL Final     BUN   Date Value Ref Range Status   02/28/2022 35 (H) 7 - 18 mg/dL Final     Creatinine   Date Value Ref Range Status   02/28/2022 2.65 (H) 0.70 - 1.30 mg/dL Final     Calcium   Date Value Ref Range Status   02/28/2022 9.0 8.5 - 10.1 mg/dL Final     Total Protein   Date Value Ref Range Status   02/24/2022 7.5 6.4 - 8.2 g/dL Final     Albumin   Date Value Ref Range Status   02/24/2022 3.4 (L) 3.5 - 5.0 g/dL Final     Bilirubin, Total   Date Value Ref Range Status   02/24/2022 0.7 0.0 - 1.2 mg/dL Final     Alk Phos   Date Value Ref Range Status   02/24/2022 178 (H) 45 - 115 U/L Final     AST   Date Value Ref Range Status   02/24/2022 24 15 - 37 U/L Final     ALT   Date Value Ref Range Status   02/24/2022 24 16 - 61 U/L Final     Anion Gap   Date Value Ref Range Status   02/28/2022 12 7 - 16 mmol/L Final     eGFR    Date Value Ref Range Status   07/25/2021 59 (L) >=60 mL/min/1.73m² Final     eGFR   Date Value Ref Range Status   02/28/2022 25 (L) >=60 mL/min/1.73m² Final      US Lower Extrem Arteries Bilat with SAHDY (xpd)  Narrative: EXAMINATION:  US ARTERIAL LOWER EXTREMITY BILAT WITH SHADY  (XPD)    CLINICAL HISTORY:  Other complications of procedures, not elsewhere classified, initial encounter    TECHNIQUE:  Ankle-brachial indices were calculated following measurement of the brachial systolic as well as the posterior tibial and dorsalis pedis systolic pressures.  Additionally, real-time ultrasound as well as Doppler spectral waveform analysis and color flow imaging was performed of the large vessels of both lower extremities.    COMPARISON:  None.    FINDINGS:  Ankle brachial index on the right measures 0.88.  SHADY on the left measures 0.8.    There is no vascular occlusion on the right or the left.  Triphasic waveforms are seen proximally on both sides with spectral broadening seen below the knee bilaterally as well as atherosclerotic disease.  The waveforms have a more broadened and biphasic appearance on the left.  There is poor flow but detectable flow seen of the left posterior tibial artery in the anterior tibial artery on the left.  Impression: Mild-to-moderate lower extremity atherosclerotic disease more so on the left below the knee.    Electronically signed by: Gabriel Vidales  Date:    03/10/2022  Time:    14:10     Procedures   Assessment and Plan (including Health Maintenance)      Problem List  Smart Sets  Document Outside HM   :    Plan:         Health Maintenance Due   Topic Date Due    TETANUS VACCINE  Never done    Shingles Vaccine (1 of 2) Never done    Pneumococcal Vaccines (Age 65+) (1 of 1 - PPSV23) Never done    Influenza Vaccine (1) Never done       Problem List Items Addressed This Visit        Cardiac/Vascular    Essential hypertension - Primary    Current Assessment & Plan     Blood pressure is well controlled on current medications.  No change in medicines.  Follow-up every 3 months.           Hx of CABG    Current Assessment & Plan     Patient is status post CABG with poorly healing vein graft harvest sites.  No recurrence of chest pain.              ID    Infected surgical  wound    Current Assessment & Plan     Patient is in today for follow-up on his wound and appears to be improved with increased granulation in the wounds and decreased depth of the wound.  Wound was then debrided using cause and Q-tips along with some peroxide.  Wound was then dressed with the Santyl and covered with calcium alginate.  Daily wound care and home health is coming to his home.  He is to follow-up with wound care on Thursday of this week.  Did view the Dopplers that shows some decreased flow left and right with the left being more obstructed than the right.  The ABIs on the right were 0.81 and on the left 0.80.  Will discuss with Dr. Yarbrough.                 Health Maintenance Topics with due status: Not Due       Topic Last Completion Date    Lipid Panel 07/11/2021       Future Appointments   Date Time Provider Department Center   3/17/2022 10:30 AM WOUND CARE, [facility] OP [facility] [place]   3/28/2022  2:15 PM Lawrence Huerta DO [facility] MARY [place]   6/23/2022  3:00 PM [facility]   8/24/2022  8:30 AM Mahad Villaseñor DO [facility] CARD [facility]   9/27/2022  8:30 AM Kb Brown Jr., MD [facility] UROL [facility]        Follow up in about 4 weeks (around 4/11/2022).     Signature:  DO [facility]  [address] [place] [address]  [place]  [address]    Date of encounter: 3/14/22

## 2022-03-14 NOTE — ASSESSMENT & PLAN NOTE
Blood pressure is well controlled on current medications.  No change in medicines.  Follow-up every 3 months.

## 2022-03-14 NOTE — ASSESSMENT & PLAN NOTE
Patient is in today for follow-up on his wound and appears to be improved with increased granulation in the wounds and decreased depth of the wound.  Wound was then debrided using cause and Q-tips along with some peroxide.  Wound was then dressed with the Santyl and covered with calcium alginate.  Daily wound care and home health is coming to his home.  He is to follow-up with wound care on Thursday of this week.  Did view the Dopplers that shows some decreased flow left and right with the left being more obstructed than the right.  The ABIs on the right were 0.81 and on the left 0.80.  Will discuss with Dr. Yarbrough.

## 2022-03-17 ENCOUNTER — CLINICAL SUPPORT (OUTPATIENT)
Dept: WOUND CARE | Facility: HOSPITAL | Age: 82
End: 2022-03-17
Attending: NURSE PRACTITIONER
Payer: COMMERCIAL

## 2022-03-17 VITALS — RESPIRATION RATE: 20 BRPM | HEART RATE: 85 BPM | DIASTOLIC BLOOD PRESSURE: 81 MMHG | SYSTOLIC BLOOD PRESSURE: 169 MMHG

## 2022-03-17 DIAGNOSIS — T81.89XD NON-HEALING SURGICAL WOUND, SUBSEQUENT ENCOUNTER: Primary | ICD-10-CM

## 2022-03-17 PROCEDURE — 11042 DBRDMT SUBQ TIS 1ST 20SQCM/<: CPT | Performed by: NURSE PRACTITIONER

## 2022-03-17 PROCEDURE — 11042 DBRDMT SUBQ TIS 1ST 20SQCM/<: CPT

## 2022-03-17 NOTE — PROGRESS NOTES
WOUND CARE, [facility]  [address]  [place] [address]     PATIENT NAME: Trung Barboza  : 1940  DATE: 3/17/22  MRN: [id number]      Billing Provider: WOUND CARE, [facility]  Level of Service:   Patient PCP Information     Provider PCP Type    Lawrence Huerta DO General          Reason for Visit / Chief Complaint: No chief complaint on file.       Update PCP  Update Chief Complaint         History of Present Illness / Problem Focused Workflow     Trung Barboza presents to the clinic for evaluation of post up wound RLE; s/p CABG with vein harvest RLE. Was seen by PCP 3 weeks ago, [facility]. Has completed abx  Duration: 2022  Severity: 4/10  Context: S/P vein harvest RLE  Modifiers: CAD, HTN, hyperlipidemia.      Review of Systems     Review of Systems   Constitutional: Negative.  Negative for activity change, appetite change, chills and fever.   Respiratory: Negative for shortness of breath.    Cardiovascular: Negative for chest pain.   Gastrointestinal: Negative for abdominal pain, nausea and vomiting.   Integumentary:  Positive for wound (RLE).   Neurological: Negative for weakness and headaches.        Medical / Social / Family History     Past Medical History:   Diagnosis Date    Benign localized prostatic hyperplasia with lower urinary tract symptoms (LUTS)     Coronary artery disease     Elevated PSA     Hypercholesterolemia     Hyperlipidemia     Hypertension     Obesity (BMI 30-39.9)        Past Surgical History:   Procedure Laterality Date    ANGIOGRAM, CORONARY, WITH LEFT HEART CATHETERIZATION N/A 2021    Procedure: ANGIOGRAM,CORONARY,WITH LEFT HEART CATHETERIZATION;  Surgeon: Mahad Villaseñor DO;  Location: [facility] CATH LAB;  Service: Cardiology;  Laterality: N/A;    BIOPSY WITH TRANSRECTAL ULTRASOUND (TRUS) GUIDANCE Bilateral 2018    CHOLECYSTECTOMY      [facility]    LEFT HEART CATHETERIZATION Left 7/13/2021    Procedure: Left heart cath;  Surgeon: Philip Torres MD;  Location: [facility] CATH LAB;  Service: Cardiology;  Laterality: Left;       Social History    reports that he quit smoking about 33 years ago. He smoked 0.50 packs per day. He has quit using smokeless tobacco.  His smokeless tobacco use included chew. He reports current alcohol use. He reports that he does not use drugs.    Family History  's family history includes No Known Problems in his brother, daughter, father, mother, sister, and son.    Medications and Allergies     Medications  No outpatient medications have been marked as taking for the 3/17/22 encounter (Clinical Support) with WOUND CARE, [facility].       Allergies  Review of patient's allergies indicates:   Allergen Reactions    Lisinopril Swelling       Physical Examination     There were no vitals filed for this visit.   Physical Exam  Constitutional:       Appearance: Normal appearance.   Eyes:      Pupils: Pupils are equal, round, and reactive to light.   Cardiovascular:      Rate and Rhythm: Normal rate and regular rhythm.      Pulses:           Dorsalis pedis pulses are 1+ on the right side.      Heart sounds: Normal heart sounds.   Pulmonary:      Effort: Pulmonary effort is normal.      Breath sounds: Normal breath sounds.   Abdominal:      General: Bowel sounds are normal.   Musculoskeletal:      Right lower leg: No edema.      Left lower leg: No edema.   Skin:     Findings: Wound present.             Comments: Wound Assessment(s)  Wound #1 Right, Superior Lower Leg is a chronic Full Thickness Surgical Wound and has received a status of Not Healed. Initial wound encounter measurements are 8.5cm length x 1.5cm width x 0.4cm depth, with an area of 12.75 sq cm and a volume of 5.1 cubic cm. Necrotic adipose is exposed. No tunneling has been noted. No sinus tract has been noted. No undermining has been noted. There is a  moderate amount of purulent drainage noted which has no odor. The patient reports a wound pain of level 0/10. The wound margin is well defined. Wound bed has Yes epithelialization, No eschar, Yes slough, Yes bright red, pink, spongy granulation.  The periwound skin exhibited: Hemosiderosis. The periwound skin did not exhibit: Brawny Induration, Edema, Excoriation, Induration, Callus, Crepitus, Fluctuance, Friable, Rash, Denuded, Dry/Scaly, Moist, Maceration, Atrophie [place], Cyanosis, Ecchymosis, Erythema, Pallor, Rubor. The temperature of the periwound skin is WNL. Periwound skin does not exhibit signs or symptoms of infection. Local Pulse is Weak.    Wound #2 Right, Inferior Lower Leg is a chronic Full Thickness Surgical Wound and has received a status of Not Healed. Initial wound encounter measurements are 5.4cm length x 1.1cm width x 0.3cm depth, with an area of 5.94 sq cm and a volume of 1.782 cubic cm. Necrotic adipose is exposed. No tunneling has been noted. No sinus tract has been noted. No undermining has been noted. There is a moderate amount of purulent drainage noted which has no odor. The patient reports a wound pain of level 0/10. The wound margin is well defined. Wound bed has No epithelialization, No eschar, Yes slough, Yes bright red, pink, firm granulation.  The periwound skin exhibited: Edema, Hemosiderosis. The periwound skin did not exhibit: Brawny Induration, Excoriation, Induration, Callus, Crepitus, Fluctuance, Friable, Rash, Denuded, Dry/Scaly, Moist, Maceration, Atrophie [place], Cyanosis, Ecchymosis, Erythema, Pallor, Rubor. The temperature of the periwound skin is WNL. Periwound skin does not exhibit signs or symptoms of infection. Local Pulse is Weak.     Neurological:      Mental Status: He is alert.          Assessment and Plan (including Health Maintenance)      Problem List  Smart Sets  Document Outside HM   :    Plan: See wound docs for treatment and plan. Clean daily with NS and  apply santyl to wound bed and cover with silvercel and abd pads, change daily or more often if soiled.  SHADY's on the right 0.88, Left 0.8- pt is suppose to follow up with Dr. Qureshi.        Health Maintenance Due   Topic Date Due    TETANUS VACCINE  Never done    Shingles Vaccine (1 of 2) Never done    Pneumococcal Vaccines (Age 65+) (1 of 1 - PPSV23) Never done    Influenza Vaccine (1) Never done       Problem List Items Addressed This Visit    None     Visit Diagnoses     Non-healing surgical wound, subsequent encounter    -  Primary        Non-healing surgical wound, subsequent encounter       Health Maintenance Topics with due status: Not Due       Topic Last Completion Date    Lipid Panel 07/11/2021           Future Appointments   Date Time Provider Department Center   3/28/2022  2:15 PM Lawrence Huerta DO [facility] FAM[facility] [place]   6/23/2022  3:00 PM AWV NURSE [place] FAM[facility] [place]   8/24/2022  8:30 AM Mahad Villaseñor DO [facility] CARD [facility]   9/27/2022  8:30 AM Kb Brown Jr., MD [facility] UROL [facility]        Follow up in about 1 week (around 3/24/2022).     Signature:  JONAH Alvarez    Date of encounter: 3/17/22

## 2022-03-23 ENCOUNTER — TELEPHONE (OUTPATIENT)
Dept: FAMILY MEDICINE | Facility: CLINIC | Age: 82
End: 2022-03-23
Payer: COMMERCIAL

## 2022-03-23 DIAGNOSIS — I70.203 ATHEROSCLEROSIS OF ARTERY OF BOTH LOWER EXTREMITIES: Primary | ICD-10-CM

## 2022-03-23 NOTE — TELEPHONE ENCOUNTER
Dr. Huerta recommended patient see Dr. Del Rosario for abnormal imaging(SHADY) in patient's legs. Patient's daughter notified that we would be doing a referral for patient. She asked that we contact her with appointment due to patient's memory is poor.

## 2022-03-24 ENCOUNTER — CLINICAL SUPPORT (OUTPATIENT)
Dept: WOUND CARE | Facility: HOSPITAL | Age: 82
End: 2022-03-24
Attending: NURSE PRACTITIONER
Payer: COMMERCIAL

## 2022-03-24 VITALS
SYSTOLIC BLOOD PRESSURE: 170 MMHG | RESPIRATION RATE: 20 BRPM | TEMPERATURE: 97 F | DIASTOLIC BLOOD PRESSURE: 90 MMHG | HEART RATE: 79 BPM

## 2022-03-24 DIAGNOSIS — T81.89XD NON-HEALING SURGICAL WOUND, SUBSEQUENT ENCOUNTER: Primary | ICD-10-CM

## 2022-03-24 PROCEDURE — 99212 OFFICE O/P EST SF 10 MIN: CPT

## 2022-03-24 PROCEDURE — 99213 OFFICE O/P EST LOW 20 MIN: CPT | Performed by: NURSE PRACTITIONER

## 2022-03-24 NOTE — PROGRESS NOTES
WOUND CARE, [facility]  [address]  [place] [address]     PATIENT NAME: Trung Barboza  : 1940  DATE: 3/24/22  MRN: [id number]      Billing Provider: WOUND CARE, [facility]  Level of Service:   Patient PCP Information     Provider PCP Type    Lawrence Huerta DO General          Reason for Visit / Chief Complaint: No chief complaint on file.       Update PCP  Update Chief Complaint         History of Present Illness / Problem Focused Workflow     Trung Barboza presents to the clinic for evaluation of post up wound RLE; s/p CABG with vein harvest RLE. Was seen by PCP 5 weeks ago, Pt has [facility]. Has completed abx  Duration: 2022  Severity: 4/10  Context: S/P vein harvest RLE  Modifiers: CAD, HTN, hyperlipidemia.      Review of Systems     Review of Systems   Constitutional: Negative.  Negative for activity change, appetite change, chills and fever.   Respiratory: Negative for shortness of breath.    Cardiovascular: Negative for chest pain.   Gastrointestinal: Negative for abdominal pain, nausea and vomiting.   Integumentary:  Positive for wound (RLE).   Neurological: Negative for weakness and headaches.        Medical / Social / Family History     Past Medical History:   Diagnosis Date    Benign localized prostatic hyperplasia with lower urinary tract symptoms (LUTS)     Coronary artery disease     Elevated PSA     Hypercholesterolemia     Hyperlipidemia     Hypertension     Obesity (BMI 30-39.9)        Past Surgical History:   Procedure Laterality Date    ANGIOGRAM, CORONARY, WITH LEFT HEART CATHETERIZATION N/A 2021    Procedure: ANGIOGRAM,CORONARY,WITH LEFT HEART CATHETERIZATION;  Surgeon: Mahad Villaseñor DO;  Location: [facility] CATH LAB;  Service: Cardiology;  Laterality: N/A;    BIOPSY WITH TRANSRECTAL ULTRASOUND (TRUS) GUIDANCE Bilateral 2018    CHOLECYSTECTOMY      [facility]    LEFT HEART CATHETERIZATION Left 7/13/2021    Procedure: Left heart cath;  Surgeon: Philip Torres MD;  Location: [facility] CATH LAB;  Service: Cardiology;  Laterality: Left;       Social History    reports that he quit smoking about 33 years ago. He smoked 0.50 packs per day. He has quit using smokeless tobacco.  His smokeless tobacco use included chew. He reports current alcohol use. He reports that he does not use drugs.    Family History  's family history includes No Known Problems in his brother, daughter, father, mother, sister, and son.    Medications and Allergies     Medications  No outpatient medications have been marked as taking for the 3/24/22 encounter (Clinical Support) with WOUND CARE, [facility].       Allergies  Review of patient's allergies indicates:   Allergen Reactions    Lisinopril Swelling       Physical Examination     There were no vitals filed for this visit.   Physical Exam  Constitutional:       Appearance: Normal appearance.   Eyes:      Pupils: Pupils are equal, round, and reactive to light.   Cardiovascular:      Rate and Rhythm: Normal rate and regular rhythm.      Pulses:           Dorsalis pedis pulses are 1+ on the right side.      Heart sounds: Normal heart sounds.   Pulmonary:      Effort: Pulmonary effort is normal.      Breath sounds: Normal breath sounds.   Abdominal:      General: Bowel sounds are normal.   Musculoskeletal:      Right lower leg: No edema.      Left lower leg: No edema.   Skin:     Findings: Wound present.             Comments: Wound Assessment(s)  Wound #1 Right, Superior Lower Leg is a chronic Full Thickness Surgical Wound and has received a status of Not Healed. Initial wound encounter measurements are 8.9cm length x 1.5cm width x 0.3cm depth, with an area of 13.35 sq cm and a volume of 4.005 cubic cm. Necrotic adipose is exposed. No tunneling has been noted. No sinus tract has been noted. No undermining has been noted. There is a  moderate amount of purulent drainage noted which has no odor. The patient reports a wound pain of level 0/10. The wound margin is well defined. Wound bed has Yes epithelialization, No eschar, Yes slough, Yes bright red, pink, spongy granulation.  The periwound skin exhibited: Hemosiderosis. The periwound skin did not exhibit: Brawny Induration, Edema, Excoriation, Induration, Callus, Crepitus, Fluctuance, Friable, Rash, Denuded, Dry/Scaly, Moist, Maceration, Atrophie Senath, Cyanosis, Ecchymosis, Erythema, Pallor, Rubor. The temperature of the periwound skin is WNL. Periwound skin does not exhibit signs or symptoms of infection. Local Pulse is Weak.    Wound #2 Right, Inferior Lower Leg is a chronic Full Thickness Surgical Wound and has received a status of Not Healed. Initial wound encounter measurements are 5.6cm length x 1.5cm width x 0.3cm depth, with an area of 8.4 sq cm and a volume of 2.52 cubic cm. Necrotic adipose is exposed. No tunneling has been noted. No sinus tract has been noted. No undermining has been noted. There is a moderate amount of purulent drainage noted which has no odor. The patient reports a wound pain of level 0/10. The wound margin is well defined. Wound bed has No epithelialization, No eschar, Yes slough, Yes bright red, pink, firm granulation.  The periwound skin exhibited: Edema, Hemosiderosis. The periwound skin did not exhibit: Brawny Induration, Excoriation, Induration, Callus, Crepitus, Fluctuance, Friable, Rash, Denuded, Dry/Scaly, Moist, Maceration, Atrophie Senath, Cyanosis, Ecchymosis, Erythema, Pallor, Rubor. The temperature of the periwound skin is WNL. Periwound skin does not exhibit signs or symptoms of infection. Local Pulse is   Neurological:      Mental Status: He is alert.          Assessment and Plan (including Health Maintenance)      Problem List  Smart Sets  Document Outside HM   :    Plan: See wound docs for treatment and plan. Clean daily with NS and apply  santyl to wound bed and cover with silvercel and abd pads, change daily or more often if soiled.  SHADY's on the right 0.88, Left 0.8- pt to follow up with Dr. Del Rosario next week.        Health Maintenance Due   Topic Date Due    TETANUS VACCINE  Never done    Shingles Vaccine (1 of 2) Never done    Pneumococcal Vaccines (Age 65+) (1 of 1 - PPSV23) Never done    Influenza Vaccine (1) Never done       Problem List Items Addressed This Visit    None     Visit Diagnoses     Non-healing surgical wound, subsequent encounter    -  Primary        Non-healing surgical wound, subsequent encounter       Health Maintenance Topics with due status: Not Due       Topic Last Completion Date    Lipid Panel 07/11/2021           Future Appointments   Date Time Provider Department Center   3/28/2022  2:15 PM [facility] [place]   4/7/2022  9:00 AM Margareth Del Rosario MD [facility]   6/23/2022  3:00 PM AWV NURSE [place] [facility] [place]   8/24/2022  8:30 AM Mahad Villaseñor DO [facility] CARD [facility]   9/27/2022  8:30 AM Kb Brown Jr., MD [facility] UROL [facility]        Follow up in about 2 weeks (around 4/7/2022).     Signature:  JONAH Alvarez    Date of encounter: 3/24/22

## 2022-03-28 ENCOUNTER — OFFICE VISIT (OUTPATIENT)
Dept: FAMILY MEDICINE | Facility: CLINIC | Age: 82
End: 2022-03-28
Payer: COMMERCIAL

## 2022-03-28 VITALS
WEIGHT: 206 LBS | HEIGHT: 72 IN | SYSTOLIC BLOOD PRESSURE: 130 MMHG | TEMPERATURE: 98 F | BODY MASS INDEX: 27.9 KG/M2 | RESPIRATION RATE: 22 BRPM | DIASTOLIC BLOOD PRESSURE: 74 MMHG | OXYGEN SATURATION: 98 % | HEART RATE: 80 BPM

## 2022-03-28 DIAGNOSIS — T81.49XA INFECTED SURGICAL WOUND: Primary | ICD-10-CM

## 2022-03-28 PROCEDURE — 87186 SC STD MICRODIL/AGAR DIL: CPT | Mod: ,,, | Performed by: CLINICAL MEDICAL LABORATORY

## 2022-03-28 PROCEDURE — 87070 CULTURE, WOUND: ICD-10-PCS | Mod: ,,, | Performed by: CLINICAL MEDICAL LABORATORY

## 2022-03-28 PROCEDURE — 3078F DIAST BP <80 MM HG: CPT | Mod: ,,, | Performed by: FAMILY MEDICINE

## 2022-03-28 PROCEDURE — 3075F SYST BP GE 130 - 139MM HG: CPT | Mod: ,,, | Performed by: FAMILY MEDICINE

## 2022-03-28 PROCEDURE — 87070 CULTURE OTHR SPECIMN AEROBIC: CPT | Mod: ,,, | Performed by: CLINICAL MEDICAL LABORATORY

## 2022-03-28 PROCEDURE — 87077 CULTURE AEROBIC IDENTIFY: CPT | Mod: ,,, | Performed by: CLINICAL MEDICAL LABORATORY

## 2022-03-28 PROCEDURE — 87186 CULTURE, WOUND: ICD-10-PCS | Mod: ,,, | Performed by: CLINICAL MEDICAL LABORATORY

## 2022-03-28 PROCEDURE — 3078F PR MOST RECENT DIASTOLIC BLOOD PRESSURE < 80 MM HG: ICD-10-PCS | Mod: ,,, | Performed by: FAMILY MEDICINE

## 2022-03-28 PROCEDURE — 1159F PR MEDICATION LIST DOCUMENTED IN MEDICAL RECORD: ICD-10-PCS | Mod: ,,, | Performed by: FAMILY MEDICINE

## 2022-03-28 PROCEDURE — 3075F PR MOST RECENT SYSTOLIC BLOOD PRESS GE 130-139MM HG: ICD-10-PCS | Mod: ,,, | Performed by: FAMILY MEDICINE

## 2022-03-28 PROCEDURE — 99214 PR OFFICE/OUTPT VISIT, EST, LEVL IV, 30-39 MIN: ICD-10-PCS | Mod: ,,, | Performed by: FAMILY MEDICINE

## 2022-03-28 PROCEDURE — 87077 CULTURE, WOUND: ICD-10-PCS | Mod: ,,, | Performed by: CLINICAL MEDICAL LABORATORY

## 2022-03-28 PROCEDURE — 99214 OFFICE O/P EST MOD 30 MIN: CPT | Mod: ,,, | Performed by: FAMILY MEDICINE

## 2022-03-28 PROCEDURE — 1159F MED LIST DOCD IN RCRD: CPT | Mod: ,,, | Performed by: FAMILY MEDICINE

## 2022-03-28 RX ORDER — CLINDAMYCIN HYDROCHLORIDE 300 MG/1
300 CAPSULE ORAL 3 TIMES DAILY
Qty: 21 CAPSULE | Refills: 0 | Status: SHIPPED | OUTPATIENT
Start: 2022-03-28 | End: 2022-09-15 | Stop reason: ALTCHOICE

## 2022-03-30 LAB — MICROORGANISM SPEC CULT: ABNORMAL

## 2022-03-30 NOTE — PROGRESS NOTES
Lawrence Huerta DO   [address] [place]  [address]      PATIENT NAME: Trung Barboza  : 1940  DATE: 3/28/22  MRN: [id number]      Billing Provider: Lawrence Huerta DO  Level of Service:   Patient PCP Information     Provider PCP Type    Lawrence Huerta DO General          Reason for Visit / Chief Complaint: Wound Check (Right lower leg wound check. Patient has an appointment scheduled with Dr. Del Rosario 2022)       Update PCP  Update Chief Complaint         History of Present Illness / Problem Focused Workflow     Trung Barboza presents to the clinic with Wound Check (Right lower leg wound check. Patient has an appointment scheduled with Dr. Del Rosario 2022)     Patient is in today for follow-up on his wounds on his the right lower leg where he had surgery for bypass.  Continues to have drainage and some now pain and redness around the wounds.  He does see wound care every week.  He is due to see Dr. Haji a vascular surgeon within the next 2 weeks.    Wound Check        Review of Systems     Review of Systems   Constitutional: Negative for activity change, appetite change, chills, fatigue and fever.   HENT: Negative for nasal congestion, ear discharge, ear pain, mouth dryness, mouth sores, postnasal drip, sinus pressure/congestion, sore throat and voice change.    Eyes: Negative for pain, discharge, redness, itching and visual disturbance.   Respiratory: Negative for apnea, cough, chest tightness, shortness of breath and wheezing.    Cardiovascular: Positive for chest pain. Negative for palpitations and leg swelling.        Patient has some burning in his left chest wall from the surgery.   Gastrointestinal: Negative for abdominal distention, abdominal pain, anal bleeding, blood in stool, change in bowel habit, constipation, diarrhea, nausea, vomiting, reflux and change in bowel habit.   Endocrine: Negative for cold intolerance, heat intolerance, polydipsia,  polyphagia and polyuria.   Genitourinary: Negative for difficulty urinating, enuresis, erectile dysfunction, frequency, genital sores, hematuria and urgency.   Musculoskeletal: Positive for leg pain. Negative for arthralgias, back pain, gait problem, myalgias and neck pain.   Integumentary:  Positive for wound. Negative for rash, mole/lesion, breast mass and breast discharge.   Allergic/Immunologic: Negative for environmental allergies and food allergies.   Neurological: Negative for dizziness, vertigo, tremors, seizures, syncope, facial asymmetry, speech difficulty, weakness, light-headedness, numbness, headaches, disturbances in coordination, memory loss and coordination difficulties.   Hematological: Negative for adenopathy. Does not bruise/bleed easily.   Psychiatric/Behavioral: Negative for agitation, behavioral problems, confusion, decreased concentration, dysphoric mood, hallucinations, self-injury, sleep disturbance and suicidal ideas. The patient is not nervous/anxious and is not hyperactive.    Breast: Negative for mass      Medical / Social / Family History     Past Medical History:   Diagnosis Date    Benign localized prostatic hyperplasia with lower urinary tract symptoms (LUTS)     Coronary artery disease     Elevated PSA     Hypercholesterolemia     Hyperlipidemia     Hypertension     Obesity (BMI 30-39.9)        Past Surgical History:   Procedure Laterality Date    ANGIOGRAM, CORONARY, WITH LEFT HEART CATHETERIZATION N/A 12/31/2021    Procedure: ANGIOGRAM,CORONARY,WITH LEFT HEART CATHETERIZATION;  Surgeon: Mahad Villaseñor DO;  Location: [facility] CATH LAB;  Service: Cardiology;  Laterality: N/A;    BIOPSY WITH TRANSRECTAL ULTRASOUND (TRUS) GUIDANCE Bilateral 03/07/2018    CHOLECYSTECTOMY      CORONARY ARTERY BYPASS GRAFT  [facility]/07/20[facility]    [facility]Dr[facility] Qureshi    LEFT HEART CATHETERIZATION Left 7/13/2021    Procedure: Left heart cath;  Surgeon: Philip Torres MD;  Location: [facility] CATH LAB;  Service: Cardiology;  Laterality: Left;       Social History    reports that he quit smoking about 33 years ago. He smoked 0.50 packs per day. He has quit using smokeless tobacco.  His smokeless tobacco use included chew. He reports current alcohol use. He reports that he does not use drugs.    Family History  's family history includes No Known Problems in his brother, daughter, father, mother, sister, and son.    Medications and Allergies     Medications  Outpatient Medications Marked as Taking for the 3/28/22 encounter (Office Visit) with Lawrence Huerta, DO   Medication Sig Dispense Refill    amLODIPine (NORVASC) 5 MG tablet Take 1 tablet (5 mg total) by mouth once daily. 30 tablet 3    ascorbic acid, vitamin C, (VITAMIN C) 500 MG tablet Take 1,000 mg by mouth 2 (two) times daily.      aspirin (ECOTRIN) 81 MG EC tablet Take 1 tablet (81 mg total) by mouth once daily. 30 tablet 0    atorvastatin (LIPITOR) 80 MG tablet Take 1 tablet (80 mg total) by mouth every evening. 90 tablet 3    BRILINTA 90 mg tablet Take 90 mg by mouth 2 (two) times daily.      colchicine (COLCRYS) 0.6 mg tablet TAKE 2 TABLETS TODAY, AND THEN TAKE ONE DAILY UNTIL GOUT ATTACK RESOLVING. 30 tablet 11    collagenase (SANTYL) ointment Apply topically once daily. 30 g 1    furosemide (LASIX) 20 MG tablet Take 1 tablet (20 mg total) by mouth once daily. 30 tablet 2    hydroCHLOROthiazide (HYDRODIURIL) 12.5 MG Tab Take 1 tablet (12.5 mg total) by mouth once daily. 30 tablet 3    pantoprazole (PROTONIX) 40 MG tablet Take 1 tablet (40 mg total) by mouth once daily. 30 tablet 2    potassium chloride (KLOR-CON) 10 MEQ TbSR Take 1 tablet (10 mEq total) by mouth once daily. 30 tablet 2       Allergies  Review of patient's allergies indicates:   Allergen Reactions    Lisinopril Swelling       Physical Examination     Vitals:    03/28/22 1450   BP: 130/74   Pulse: 80   Resp: (!) 22   Temp: 98 °F (36.7 °C)     Physical  Exam  Constitutional:       General: He is not in acute distress.     Appearance: Normal appearance. He is normal weight.   HENT:      Head: Normocephalic.      Nose: Nose normal.      Mouth/Throat:      Mouth: Mucous membranes are moist.      Pharynx: Oropharynx is clear. No oropharyngeal exudate or posterior oropharyngeal erythema.   Eyes:      General: No scleral icterus.        Right eye: No discharge.         Left eye: No discharge.      Conjunctiva/sclera: Conjunctivae normal.      Pupils: Pupils are equal, round, and reactive to light.   Cardiovascular:      Rate and Rhythm: Normal rate and regular rhythm.      Pulses: Normal pulses.      Heart sounds: Normal heart sounds. No murmur heard.  Pulmonary:      Effort: Pulmonary effort is normal.      Breath sounds: Normal breath sounds. No wheezing.   Abdominal:      General: Abdomen is flat. Bowel sounds are normal.      Tenderness: There is abdominal tenderness.   Musculoskeletal:         General: Signs of injury present. Normal range of motion.   Skin:     General: Skin is warm.      Capillary Refill: Capillary refill takes less than 2 seconds.      Findings: Lesion present.      Comments: Patient has 2 lesions on his right medial calf 1 that is the 5 cm 1 at 7 cm that it is open with yellow exudate and some granulation tissue noted in both of them.  Depth this approximately 1 cm.  Culture was obtained of the yellow exudate.     Neurological:      General: No focal deficit present.      Mental Status: He is alert and oriented to person, place, and time.   Psychiatric:         Mood and Affect: Mood normal.         Behavior: Behavior normal.               Lab Results   Component Value Date    WBC 8.10 02/24/2022    HGB 12.0 (L) 02/24/2022    HCT 34.9 (L) 02/24/2022    MCV 91.1 02/24/2022     02/24/2022          Sodium   Date Value Ref Range Status   02/28/2022 132 (L) 136 - 145 mmol/L Final     Potassium   Date Value Ref Range Status   02/28/2022 4.4 3.5  - 5.1 mmol/L Final     Chloride   Date Value Ref Range Status   02/28/2022 99 98 - 107 mmol/L Final     CO2   Date Value Ref Range Status   02/28/2022 25 21 - 32 mmol/L Final     Glucose   Date Value Ref Range Status   02/28/2022 122 (H) 74 - 106 mg/dL Final     BUN   Date Value Ref Range Status   02/28/2022 35 (H) 7 - 18 mg/dL Final     Creatinine   Date Value Ref Range Status   02/28/2022 2.65 (H) 0.70 - 1.30 mg/dL Final     Calcium   Date Value Ref Range Status   02/28/2022 9.0 8.5 - 10.1 mg/dL Final     Total Protein   Date Value Ref Range Status   02/24/2022 7.5 6.4 - 8.2 g/dL Final     Albumin   Date Value Ref Range Status   02/24/2022 3.4 (L) 3.5 - 5.0 g/dL Final     Bilirubin, Total   Date Value Ref Range Status   02/24/2022 0.7 0.0 - 1.2 mg/dL Final     Alk Phos   Date Value Ref Range Status   02/24/2022 178 (H) 45 - 115 U/L Final     AST   Date Value Ref Range Status   02/24/2022 24 15 - 37 U/L Final     ALT   Date Value Ref Range Status   02/24/2022 24 16 - 61 U/L Final     Anion Gap   Date Value Ref Range Status   02/28/2022 12 7 - 16 mmol/L Final     eGFR    Date Value Ref Range Status   07/25/2021 59 (L) >=60 mL/min/1.73m² Final     eGFR   Date Value Ref Range Status   02/28/2022 25 (L) >=60 mL/min/1.73m² Final      US Lower Extrem Arteries Bilat with SHADY (xpd)  Narrative: EXAMINATION:  US ARTERIAL LOWER EXTREMITY BILAT WITH SHADY (XPD)    CLINICAL HISTORY:  Other complications of procedures, not elsewhere classified, initial encounter    TECHNIQUE:  Ankle-brachial indices were calculated following measurement of the brachial systolic as well as the posterior tibial and dorsalis pedis systolic pressures.  Additionally, real-time ultrasound as well as Doppler spectral waveform analysis and color flow imaging was performed of the large vessels of both lower extremities.    COMPARISON:  None.    FINDINGS:  Ankle brachial index on the right measures 0.88.  SHADY on the left measures  0.8.    There is no vascular occlusion on the right or the left.  Triphasic waveforms are seen proximally on both sides with spectral broadening seen below the knee bilaterally as well as atherosclerotic disease.  The waveforms have a more broadened and biphasic appearance on the left.  There is poor flow but detectable flow seen of the left posterior tibial artery in the anterior tibial artery on the left.  Impression: Mild-to-moderate lower extremity atherosclerotic disease more so on the left below the knee.    Electronically signed by: Gabriel Vidales  Date:    03/10/2022  Time:    14:10     Procedures   Assessment and Plan (including Health Maintenance)      Problem List  Smart Sets  Document Outside HM   :    Plan:         Health Maintenance Due   Topic Date Due    TETANUS VACCINE  Never done    Shingles Vaccine (1 of 2) Never done    Pneumococcal Vaccines (Age 65+) (1 of 1 - PPSV23) Never done    Influenza Vaccine (1) Never done       Problem List Items Addressed This Visit        ID    Infected surgical wound - Primary    Current Assessment & Plan     Patient is to see Dr. Haji within 2 weeks.  Did obtain a culture of the wound today.  Will start him on clindamycin 3 times daily for 7 days..  I suspicion may still have an infection but healing is still not taking place and likely due to vascular compromise.  Follow-up after he has seen Dr. Haji.  Continue seeing wound care.  Will place the patient on silver jez dressing daily.           Relevant Medications    clindamycin (CLEOCIN) 300 MG capsule    Other Relevant Orders    Culture, Wound (Completed)          Health Maintenance Topics with due status: Not Due       Topic Last Completion Date    Lipid Panel 07/11/2021       Future Appointments   Date Time Provider Department Center   4/7/2022  9:00 AM Margareth Del Rosario MD [facility]   6/23/2022  3:00 PM AWV NURSE [facility]   8/24/2022  8:30 AM Mahad Villaseñor DO [facility]   9/27/2022  8:30 AM Kb Brown Jr., MD [facility]        Follow up in about 4 weeks (around 4/25/2022).     Signature:  Lawrence Huerta [address] [place]  [address]    Date of encounter: 3/28/22

## 2022-03-30 NOTE — ASSESSMENT & PLAN NOTE
Patient is to see Dr. Haji within 2 weeks.  Did obtain a culture of the wound today.  Will start him on clindamycin 3 times daily for 7 days..  I suspicion may still have an infection but healing is still not taking place and likely due to vascular compromise.  Follow-up after he has seen Dr. Haji.  Continue seeing wound care.  Will place the patient on silver jez dressing daily.

## 2022-03-31 ENCOUNTER — CLINICAL SUPPORT (OUTPATIENT)
Dept: WOUND CARE | Facility: HOSPITAL | Age: 82
End: 2022-03-31
Attending: NURSE PRACTITIONER
Payer: COMMERCIAL

## 2022-03-31 VITALS
SYSTOLIC BLOOD PRESSURE: 122 MMHG | TEMPERATURE: 98 F | HEART RATE: 75 BPM | RESPIRATION RATE: 18 BRPM | DIASTOLIC BLOOD PRESSURE: 80 MMHG

## 2022-03-31 DIAGNOSIS — T81.89XD NON-HEALING SURGICAL WOUND, SUBSEQUENT ENCOUNTER: Primary | ICD-10-CM

## 2022-03-31 PROCEDURE — 11042 DBRDMT SUBQ TIS 1ST 20SQCM/<: CPT

## 2022-03-31 PROCEDURE — 11042 DBRDMT SUBQ TIS 1ST 20SQCM/<: CPT | Performed by: NURSE PRACTITIONER

## 2022-03-31 NOTE — PROGRESS NOTES
WOUND CARE, [facility]  [address]  [place] [address]     PATIENT NAME: Trung Barboza  : 1940  DATE: 3/31/22  MRN: [id number]      Billing Provider: WOUND CARE, [facility]  Level of Service:   Patient PCP Information     Provider PCP Type    Lawrence Huerta DO General          Reason for Visit / Chief Complaint: No chief complaint on file.       Update PCP  Update Chief Complaint         History of Present Illness / Problem Focused Workflow     Trung Barboza presents to the clinic for evaluation of post up wound RLE; s/p CABG with vein harvest RLE. Was seen by PCP 5 weeks ago, Pt has [facility]. Has completed abx  Duration: 2022  Severity: 4/10  Context: S/P vein harvest RLE  Modifiers: CAD, HTN, hyperlipidemia.      Review of Systems     Review of Systems   Constitutional: Negative.  Negative for activity change, appetite change, chills and fever.   Respiratory: Negative for shortness of breath.    Cardiovascular: Negative for chest pain.   Gastrointestinal: Negative for abdominal pain, nausea and vomiting.   Integumentary:  Positive for wound (RLE).   Neurological: Negative for weakness and headaches.        Medical / Social / Family History     Past Medical History:   Diagnosis Date    Benign localized prostatic hyperplasia with lower urinary tract symptoms (LUTS)     Coronary artery disease     Elevated PSA     Hypercholesterolemia     Hyperlipidemia     Hypertension     Obesity (BMI 30-39.9)        Past Surgical History:   Procedure Laterality Date    ANGIOGRAM, CORONARY, WITH LEFT HEART CATHETERIZATION N/A 2021    Procedure: ANGIOGRAM,CORONARY,WITH LEFT HEART CATHETERIZATION;  Surgeon: Mahad Villaseñor DO;  Location: [facility] CATH LAB;  Service: Cardiology;  Laterality: N/A;    BIOPSY WITH TRANSRECTAL ULTRASOUND (TRUS) GUIDANCE Bilateral 2018    CHOLECYSTECTOMY      [facility]    LEFT HEART CATHETERIZATION Left 7/13/2021    Procedure: Left heart cath;  Surgeon: Philip Torres MD;  Location: [facility] CATH LAB;  Service: Cardiology;  Laterality: Left;       Social History    reports that he quit smoking about 33 years ago. He smoked 0.50 packs per day. He has quit using smokeless tobacco.  His smokeless tobacco use included chew. He reports current alcohol use. He reports that he does not use drugs.    Family History  's family history includes No Known Problems in his brother, daughter, father, mother, sister, and son.    Medications and Allergies     Medications  No outpatient medications have been marked as taking for the 3/31/22 encounter (Clinical Support) with WOUND CARE, [facility].       Allergies  Review of patient's allergies indicates:   Allergen Reactions    Lisinopril Swelling       Physical Examination     There were no vitals filed for this visit.   Physical Exam  Constitutional:       Appearance: Normal appearance.   Eyes:      Pupils: Pupils are equal, round, and reactive to light.   Cardiovascular:      Rate and Rhythm: Normal rate and regular rhythm.      Pulses:           Dorsalis pedis pulses are 1+ on the right side.      Heart sounds: Normal heart sounds.   Pulmonary:      Effort: Pulmonary effort is normal.      Breath sounds: Normal breath sounds.   Abdominal:      General: Bowel sounds are normal.   Musculoskeletal:      Right lower leg: No edema.      Left lower leg: No edema.   Skin:     Findings: Wound present.             Comments: See wound docs note for assessment and pictures   Neurological:      Mental Status: He is alert.          Assessment and Plan (including Health Maintenance)      Problem List  Smart Sets  Document Outside HM   :    Plan: See wound docs for treatment and plan. Clean daily with NS  and cover with silvercel and abd pads, change daily or more often if soiled.  SHADY's on the right 0.88, Left 0.8- pt to follow up with   Miguel Ángel next week.        Health Maintenance Due   Topic Date Due    TETANUS VACCINE  Never done    Shingles Vaccine (1 of 2) Never done    Pneumococcal Vaccines (Age 65+) (1 of 1 - PPSV23) Never done    Influenza Vaccine (1) Never done       Problem List Items Addressed This Visit    None     Visit Diagnoses     Non-healing surgical wound, subsequent encounter    -  Primary        Non-healing surgical wound, subsequent encounter       Health Maintenance Topics with due status: Not Due       Topic Last Completion Date    Lipid Panel 07/11/2021           Future Appointments   Date Time Provider Department Center   4/7/2022  9:00 AM Margareth Del Rosario MD [facility]   6/23/2022  3:00 PM AWV NURSE DEC[facility] MARY Godinez [place]   8/24/2022  8:30 AM Mahad Villaseñor DO [facility]   9/27/2022  8:30 AM Kb Brown Jr., MD [facility]        Follow up in about 2 weeks (around 4/14/2022).     Signature:  JONAH Alvarez    Date of encounter: 3/31/22

## 2022-04-07 ENCOUNTER — OFFICE VISIT (OUTPATIENT)
Dept: SURGERY | Facility: CLINIC | Age: 82
End: 2022-04-07
Payer: COMMERCIAL

## 2022-04-07 VITALS — BODY MASS INDEX: 27.9 KG/M2 | WEIGHT: 206 LBS | HEIGHT: 72 IN

## 2022-04-07 DIAGNOSIS — I70.203 ATHEROSCLEROSIS OF ARTERY OF BOTH LOWER EXTREMITIES: ICD-10-CM

## 2022-04-07 DIAGNOSIS — I73.9 PVD (PERIPHERAL VASCULAR DISEASE): Primary | ICD-10-CM

## 2022-04-07 PROCEDURE — 1159F MED LIST DOCD IN RCRD: CPT | Mod: CPTII,,, | Performed by: SURGERY

## 2022-04-07 PROCEDURE — 1160F PR REVIEW ALL MEDS BY PRESCRIBER/CLIN PHARMACIST DOCUMENTED: ICD-10-PCS | Mod: CPTII,,, | Performed by: SURGERY

## 2022-04-07 PROCEDURE — 1160F RVW MEDS BY RX/DR IN RCRD: CPT | Mod: CPTII,,, | Performed by: SURGERY

## 2022-04-07 PROCEDURE — 99215 OFFICE O/P EST HI 40 MIN: CPT | Mod: PBBFAC | Performed by: SURGERY

## 2022-04-07 PROCEDURE — 99203 OFFICE O/P NEW LOW 30 MIN: CPT | Mod: S$PBB,,, | Performed by: SURGERY

## 2022-04-07 PROCEDURE — 99203 PR OFFICE/OUTPT VISIT, NEW, LEVL III, 30-44 MIN: ICD-10-PCS | Mod: S$PBB,,, | Performed by: SURGERY

## 2022-04-07 PROCEDURE — 1159F PR MEDICATION LIST DOCUMENTED IN MEDICAL RECORD: ICD-10-PCS | Mod: CPTII,,, | Performed by: SURGERY

## 2022-04-07 NOTE — PROGRESS NOTES
Subjective:       Patient ID: Trung Barboza is a 82 y.o. male.    Chief Complaint: Pre-op Exam  New patient.  Said previous CABG with vein harvest right leg.  She had a persistent wound right leg since least January.  She has some noninvasive studies at outlying facility with no digital pressures joint ABIs of 0.8 bilaterally.  On exam he has got a biphasic week posterior tibial on the right do not hear dorsalis pedis.  Got no cellulitis he has got a 1 cm wound along the vein harvest site that extends into separate areas for proximally 5 cm each  family history includes No Known Problems in his brother, daughter, father, mother, sister, and son.  Past Medical History:   Diagnosis Date    Benign localized prostatic hyperplasia with lower urinary tract symptoms (LUTS)     Coronary artery disease     Elevated PSA     Hypercholesterolemia     Hyperlipidemia     Hypertension     Obesity (BMI 30-39.9)       Past Surgical History:   Procedure Laterality Date    ANGIOGRAM, CORONARY, WITH LEFT HEART CATHETERIZATION N/A 12/31/2021    Procedure: ANGIOGRAM,CORONARY,WITH LEFT HEART CATHETERIZATION;  Surgeon: Mahad Villaseñor DO;  Location: [facility] CATH LAB;  Service: Cardiology;  Laterality: N/A;    BIOPSY WITH TRANSRECTAL ULTRASOUND (TRUS) GUIDANCE Bilateral 03/07/2018    CHOLECYSTECTOMY      CORONARY ARTERY BYPASS GRAFT  01/07/2022    [facility]-Dr. Qureshi    LEFT HEART CATHETERIZATION Left 7/13/2021    Procedure: Left heart cath;  Surgeon: Philip Torres MD;  Location: [facility] CATH LAB;  Service: Cardiology;  Laterality: Left;       reports that he quit smoking about 33 years ago. He smoked 0.50 packs per day. He has quit using smokeless tobacco.  His smokeless tobacco use included chew. He reports current alcohol use. He reports that he does not use drugs.   HPI  Review of Systems      Objective:      Ht 6' (1.829 m)   Wt 93.4 kg (206 lb)   BMI 27.94 kg/m²    Physical Exam  Constitutional:        Appearance: Normal appearance.   Cardiovascular:      Rate and Rhythm: Normal rate.      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      Breath sounds: No stridor.   Skin:     General: Skin is warm and dry.      Capillary Refill: Capillary refill takes less than 2 seconds.   Neurological:      General: No focal deficit present.      Mental Status: He is alert.   Psychiatric:         Mood and Affect: Mood normal.         Behavior: Behavior normal.         Thought Content: Thought content normal.         Judgment: Judgment normal.           Assessment:       1. PVD (peripheral vascular disease)    2. Atherosclerosis of artery of both lower extremities        Plan:       ABIs with toe brachial indices

## 2022-04-12 ENCOUNTER — HOSPITAL ENCOUNTER (OUTPATIENT)
Dept: RADIOLOGY | Facility: HOSPITAL | Age: 82
Discharge: HOME | End: 2022-04-12
Attending: SURGERY
Payer: COMMERCIAL

## 2022-04-12 DIAGNOSIS — I73.9 PVD (PERIPHERAL VASCULAR DISEASE): ICD-10-CM

## 2022-04-12 PROCEDURE — 93923 UPR/LXTR ART STDY 3+ LVLS: CPT | Mod: TC

## 2022-04-12 PROCEDURE — 93923 UPR/LXTR ART STDY 3+ LVLS: CPT | Mod: 26,,, | Performed by: SURGERY

## 2022-04-12 PROCEDURE — 93923 US ARTERIAL DOPPLER EXAM: ICD-10-PCS | Mod: 26,,, | Performed by: SURGERY

## 2022-04-14 ENCOUNTER — CLINICAL SUPPORT (OUTPATIENT)
Dept: WOUND CARE | Facility: HOSPITAL | Age: 82
End: 2022-04-14
Attending: NURSE PRACTITIONER
Payer: COMMERCIAL

## 2022-04-14 VITALS
RESPIRATION RATE: 18 BRPM | TEMPERATURE: 98 F | SYSTOLIC BLOOD PRESSURE: 119 MMHG | HEART RATE: 78 BPM | DIASTOLIC BLOOD PRESSURE: 78 MMHG

## 2022-04-14 DIAGNOSIS — T81.89XD NON-HEALING SURGICAL WOUND, SUBSEQUENT ENCOUNTER: Primary | ICD-10-CM

## 2022-04-14 PROCEDURE — 11042 DBRDMT SUBQ TIS 1ST 20SQCM/<: CPT

## 2022-04-14 PROCEDURE — 11042 DBRDMT SUBQ TIS 1ST 20SQCM/<: CPT | Performed by: NURSE PRACTITIONER

## 2022-04-14 NOTE — PROGRESS NOTES
WOUND CARE, [facility]  [address]  [place] [address]     PATIENT NAME: Trung Barboza  : 1940  DATE: 22  MRN: [id number]      Billing Provider: WOUND CARE, On license of [facility]  Level of Service:   Patient PCP Information     Provider PCP Type    Lawrence Huerta DO General          Reason for Visit / Chief Complaint: No chief complaint on file.       Update PCP  Update Chief Complaint         History of Present Illness / Problem Focused Workflow     Trung Barboza presents to the clinic for evaluation of post up wound RLE; s/p CABG with vein harvest RLE.  Pt has [employer].   Duration: 2022  Severity: 4/10  Context: S/P vein harvest RLE  Modifiers: CAD, HTN, hyperlipidemia.      Review of Systems     Review of Systems   Constitutional: Negative.  Negative for activity change, appetite change, chills and fever.   Respiratory: Negative for shortness of breath.    Cardiovascular: Negative for chest pain.   Gastrointestinal: Negative for abdominal pain, nausea and vomiting.   Integumentary:  Positive for wound (RLE).   Neurological: Negative for weakness and headaches.        Medical / Social / Family History     Past Medical History:   Diagnosis Date    Benign localized prostatic hyperplasia with lower urinary tract symptoms (LUTS)     Coronary artery disease     Elevated PSA     Hypercholesterolemia     Hyperlipidemia     Hypertension     Obesity (BMI 30-39.9)        Past Surgical History:   Procedure Laterality Date    ANGIOGRAM, CORONARY, WITH LEFT HEART CATHETERIZATION N/A 2021    Procedure: ANGIOGRAM,CORONARY,WITH LEFT HEART CATHETERIZATION;  Surgeon: Mahad Villaseñor DO;  Location: [facility] CATH LAB;  Service: Cardiology;  Laterality: N/A;    BIOPSY WITH TRANSRECTAL ULTRASOUND (TRUS) GUIDANCE Bilateral 2018    CHOLECYSTECTOMY      CORONARY ARTERY [facility]PASS GRAFT  20[facility]    [facility]    LEFT HEART CATHETERIZATION Left  7/13/2021    Procedure: Left heart cath;  Surgeon: Philip Torres MD;  Location: [facility] CATH LAB;  Service: Cardiology;  Laterality: Left;       Social History    reports that he quit smoking about 33 years ago. He smoked 0.50 packs per day. He has quit using smokeless tobacco.  His smokeless tobacco use included chew. He reports current alcohol use. He reports that he does not use drugs.    Family History  's family history includes No Known Problems in his brother, daughter, father, mother, sister, and son.    Medications and Allergies     Medications  No outpatient medications have been marked as taking for the 4/14/22 encounter (Clinical Support) with WOUND CARE, [facility].       Allergies  Review of patient's allergies indicates:   Allergen Reactions    Lisinopril Swelling       Physical Examination     There were no vitals filed for this visit.   Physical Exam  Constitutional:       Appearance: Normal appearance.   Eyes:      Pupils: Pupils are equal, round, and reactive to light.   Cardiovascular:      Rate and Rhythm: Normal rate and regular rhythm.      Pulses:           Dorsalis pedis pulses are 1+ on the right side.      Heart sounds: Normal heart sounds.   Pulmonary:      Effort: Pulmonary effort is normal.      Breath sounds: Normal breath sounds.   Abdominal:      General: Bowel sounds are normal.   Musculoskeletal:      Right lower leg: No edema.      Left lower leg: No edema.   Skin:     Findings: Wound present.             Comments: See wound docs note for assessment and pictures   Neurological:      Mental Status: He is alert.          Assessment and Plan (including Health Maintenance)      Problem List  Smart Sets  Document Outside HM   :    Plan: See wound docs for treatment and plan. Clean daily with NS  and cover with silvercel and abd pads, change daily or more often if soiled.  SHADY's on the right 0.88, Left 0.8; keep follow up appt with vascular.        Health Maintenance Due    Topic Date Due    TETANUS VACCINE  Never done    Shingles Vaccine (1 of 2) Never done    Pneumococcal Vaccines (Age 65+) (1 - PCV) Never done    Influenza Vaccine (1) Never done       Problem List Items Addressed This Visit    None     Visit Diagnoses     Non-healing surgical wound, subsequent encounter    -  Primary        Non-healing surgical wound, subsequent encounter       Health Maintenance Topics with due status: Not Due       Topic Last Completion Date    Lipid Panel 07/11/2021           Future Appointments   Date Time Provider Department Center   4/27/2022  2:00 PM Margareth Del Rosario MD [facility]   6/23/2022  3:00 PM AWV NURSE DEC[facility] [place]   8/24/2022  8:30 AM Mahad Villaseñor DO [facility] C[facility]   9/27/2022  8:30 AM Kb Brown Jr., MD [facility]        Follow up in about 1 week (around 4/21/2022).     Signature:  JONAH Alvarez    Date of encounter: 4/14/22

## 2022-04-20 DIAGNOSIS — E87.6 DIURETIC-INDUCED HYPOKALEMIA: ICD-10-CM

## 2022-04-20 DIAGNOSIS — T50.2X5A DIURETIC-INDUCED HYPOKALEMIA: ICD-10-CM

## 2022-04-20 DIAGNOSIS — I10 ESSENTIAL HYPERTENSION: Primary | ICD-10-CM

## 2022-04-20 DIAGNOSIS — K21.9 GASTROESOPHAGEAL REFLUX DISEASE, UNSPECIFIED WHETHER ESOPHAGITIS PRESENT: ICD-10-CM

## 2022-04-21 ENCOUNTER — CLINICAL SUPPORT (OUTPATIENT)
Dept: WOUND CARE | Facility: HOSPITAL | Age: 82
End: 2022-04-21
Attending: NURSE PRACTITIONER
Payer: COMMERCIAL

## 2022-04-21 VITALS
DIASTOLIC BLOOD PRESSURE: 91 MMHG | RESPIRATION RATE: 18 BRPM | SYSTOLIC BLOOD PRESSURE: 164 MMHG | TEMPERATURE: 97 F | HEART RATE: 80 BPM

## 2022-04-21 DIAGNOSIS — T81.89XD NON-HEALING SURGICAL WOUND, SUBSEQUENT ENCOUNTER: Primary | ICD-10-CM

## 2022-04-21 PROCEDURE — 11042 DBRDMT SUBQ TIS 1ST 20SQCM/<: CPT

## 2022-04-21 PROCEDURE — 11042 DBRDMT SUBQ TIS 1ST 20SQCM/<: CPT | Performed by: NURSE PRACTITIONER

## 2022-04-21 RX ORDER — AMLODIPINE BESYLATE 5 MG/1
5 TABLET ORAL DAILY
Qty: 90 TABLET | Refills: 1 | Status: SHIPPED | OUTPATIENT
Start: 2022-04-21 | End: 2022-09-15 | Stop reason: SDUPTHER

## 2022-04-21 RX ORDER — PANTOPRAZOLE SODIUM 40 MG/1
40 TABLET, DELAYED RELEASE ORAL DAILY
Qty: 90 TABLET | Refills: 1 | Status: SHIPPED | OUTPATIENT
Start: 2022-04-21 | End: 2022-05-03

## 2022-04-21 RX ORDER — POTASSIUM CHLORIDE 750 MG/1
10 TABLET, EXTENDED RELEASE ORAL DAILY
Qty: 90 TABLET | Refills: 1 | Status: SHIPPED | OUTPATIENT
Start: 2022-04-21 | End: 2022-09-15 | Stop reason: SDUPTHER

## 2022-04-25 NOTE — PROGRESS NOTES
WOUND CARE, [facility]  [address]  [place] [address]     PATIENT NAME: Trung Barboza  : 1940  DATE: 22  MRN: [id number]      Billing Provider: WOUND CARE, [facility]  Level of Service:   Patient PCP Information     Provider PCP Type    Lawrence Huerta DO General          Reason for Visit / Chief Complaint: Wound Care       Update PCP  Update Chief Complaint         History of Present Illness / Problem Focused Workflow     Trung Barboza presents to the clinic for evaluation of post up wound RLE; s/p CABG with vein harvest RLE.  Pt has [employer].   Duration: 2022  Severity: 4/10  Context: S/P vein harvest RLE  Modifiers: CAD, HTN, hyperlipidemia.      Review of Systems     Review of Systems   Constitutional: Negative.  Negative for activity change, appetite change, chills and fever.   Respiratory: Negative for shortness of breath.    Cardiovascular: Negative for chest pain.   Gastrointestinal: Negative for abdominal pain, nausea and vomiting.   Integumentary:  Positive for wound (RLE).   Neurological: Negative for weakness and headaches.        Medical / Social / Family History     Past Medical History:   Diagnosis Date    Benign localized prostatic hyperplasia with lower urinary tract symptoms (LUTS)     Coronary artery disease     Elevated PSA     Hypercholesterolemia     Hyperlipidemia     Hypertension     Obesity (BMI 30-39.9)        Past Surgical History:   Procedure Laterality Date    ANGIOGRAM, CORONARY, WITH LEFT HEART CATHETERIZATION N/A 2021    Procedure: ANGIOGRAM,CORONARY,WITH LEFT HEART CATHETERIZATION;  Surgeon: Mahad Villaseñor DO;  Location: [facility] CATH LAB;  Service: Cardiology;  Laterality: N/A;    BIOPSY WITH TRANSRECTAL ULTRASOUND (TRUS) GUIDANCE Bilateral 2018    CHOLECYSTECTOMY      CORONARY ARTERY BYPASS GRAFT  2022    [facility]Dr. Qureshi    LEFT HEART CATHETERIZATION Left 2021     Procedure: Left heart cath;  Surgeon: Philip Torres MD;  Location: [facility] CATH LAB;  Service: Cardiology;  Laterality: Left;       Social History    reports that he quit smoking about 33 years ago. He smoked 0.50 packs per day. He has quit using smokeless tobacco.  His smokeless tobacco use included chew. He reports current alcohol use. He reports that he does not use drugs.    Family History  's family history includes No Known Problems in his brother, daughter, father, mother, sister, and son.    Medications and Allergies     Medications  No outpatient medications have been marked as taking for the 4/21/22 encounter (Clinical Support) with WOUND CARE, [facility].       Allergies  Review of patient's allergies indicates:   Allergen Reactions    Lisinopril Swelling       Physical Examination     Vitals:    04/21/22 1304   BP: (!) 164/91   Pulse: 80   Resp: 18   Temp: 97 °F (36.1 °C)      Physical Exam  Constitutional:       Appearance: Normal appearance.   Eyes:      Pupils: Pupils are equal, round, and reactive to light.   Cardiovascular:      Rate and Rhythm: Normal rate and regular rhythm.      Pulses:           Dorsalis pedis pulses are 1+ on the right side.      Heart sounds: Normal heart sounds.   Pulmonary:      Effort: Pulmonary effort is normal.      Breath sounds: Normal breath sounds.   Abdominal:      General: Bowel sounds are normal.   Musculoskeletal:      Right lower leg: No edema.      Left lower leg: No edema.   Skin:     Findings: Wound present.             Comments: See wound docs note for assessment and pictures   Neurological:      Mental Status: He is alert.          Assessment and Plan (including Health Maintenance)      Problem List  Smart Sets  Document Outside HM   :    Plan: See wound docs for treatment and plan. Clean daily with NS  and cover with silvercel and abd pads, change daily or more often if soiled.  SHADY's on the right 0.88, Left 0.8; keep follow up appt with  vascular.        Health Maintenance Due   Topic Date Due    TETANUS VACCINE  Never done    Shingles Vaccine (1 of 2) Never done    Pneumococcal Vaccines (Age 65+) (1 - PCV) Never done    Influenza Vaccine (1) Never done       Problem List Items Addressed This Visit    None     Visit Diagnoses     Non-healing surgical wound, subsequent encounter    -  Primary        Non-healing surgical wound, subsequent encounter       Health Maintenance Topics with due status: Not Due       Topic Last Completion Date    Lipid Panel 07/11/2021           Future Appointments   Date Time Provider Department Center   4/27/2022  2:00 PM Margareth Del Rosario MD [facility]   4/28/2022  9:30 AM [facility] [place] [facility]    6/23/2022  3:00 PM [facility] MARY [place]   8/24/2022  8:30 AM Mahad Villaseñor DO [facility]   9/27/2022  8:30 AM Kb Brown Jr., MD [facility]        Follow up in about 1 week (around 4/28/2022).     Signature:  JONAH Alvarez    Date of encounter: 4/21/22

## 2022-04-27 ENCOUNTER — OFFICE VISIT (OUTPATIENT)
Dept: SURGERY | Facility: CLINIC | Age: 82
End: 2022-04-27
Payer: COMMERCIAL

## 2022-04-27 VITALS — WEIGHT: 204 LBS | HEIGHT: 72 IN | BODY MASS INDEX: 27.63 KG/M2

## 2022-04-27 DIAGNOSIS — I73.9 PVD (PERIPHERAL VASCULAR DISEASE): Primary | ICD-10-CM

## 2022-04-27 PROCEDURE — 1160F RVW MEDS BY RX/DR IN RCRD: CPT | Mod: CPTII,,, | Performed by: SURGERY

## 2022-04-27 PROCEDURE — 99213 OFFICE O/P EST LOW 20 MIN: CPT | Mod: PBBFAC | Performed by: SURGERY

## 2022-04-27 PROCEDURE — 1159F PR MEDICATION LIST DOCUMENTED IN MEDICAL RECORD: ICD-10-PCS | Mod: CPTII,,, | Performed by: SURGERY

## 2022-04-27 PROCEDURE — 1160F PR REVIEW ALL MEDS BY PRESCRIBER/CLIN PHARMACIST DOCUMENTED: ICD-10-PCS | Mod: CPTII,,, | Performed by: SURGERY

## 2022-04-27 PROCEDURE — 99212 OFFICE O/P EST SF 10 MIN: CPT | Mod: S$PBB,,, | Performed by: SURGERY

## 2022-04-27 PROCEDURE — 1159F MED LIST DOCD IN RCRD: CPT | Mod: CPTII,,, | Performed by: SURGERY

## 2022-04-27 PROCEDURE — 99212 PR OFFICE/OUTPT VISIT, EST, LEVL II, 10-19 MIN: ICD-10-PCS | Mod: S$PBB,,, | Performed by: SURGERY

## 2022-04-27 NOTE — PROGRESS NOTES
Vascular    Repeat ABIs right 0.73 left 0.73    Vein harvest right medial lower leg upper portion is completely healed and dramatic improvement lower portion.    He is showing improvement we will continue wound care follow-up 3 weeks hold off for any further evaluation or vascular intervention at this time

## 2022-04-28 ENCOUNTER — CLINICAL SUPPORT (OUTPATIENT)
Dept: WOUND CARE | Facility: HOSPITAL | Age: 82
End: 2022-04-28
Attending: NURSE PRACTITIONER
Payer: COMMERCIAL

## 2022-04-28 VITALS
DIASTOLIC BLOOD PRESSURE: 86 MMHG | TEMPERATURE: 98 F | SYSTOLIC BLOOD PRESSURE: 120 MMHG | HEART RATE: 82 BPM | RESPIRATION RATE: 20 BRPM

## 2022-04-28 DIAGNOSIS — T81.89XD NON-HEALING SURGICAL WOUND, SUBSEQUENT ENCOUNTER: Primary | ICD-10-CM

## 2022-04-28 PROCEDURE — 11042 DBRDMT SUBQ TIS 1ST 20SQCM/<: CPT | Performed by: NURSE PRACTITIONER

## 2022-04-28 PROCEDURE — 87070 CULTURE OTHR SPECIMN AEROBIC: CPT | Performed by: NURSE PRACTITIONER

## 2022-04-28 PROCEDURE — 11042 DBRDMT SUBQ TIS 1ST 20SQCM/<: CPT

## 2022-04-28 NOTE — PROGRESS NOTES
WOUND CARE, [facility]  [address]  [place] [address]     PATIENT NAME: Trung Barboza  : 1940  DATE: 22  MRN: [id number]      Billing Provider: WOUND CARE, [facility]  Level of Service:   Patient PCP Information     Provider PCP Type    Lawrence Huerta DO General          Reason for Visit / Chief Complaint: Wound Care       Update PCP  Update Chief Complaint         History of Present Illness / Problem Focused Workflow     Trung Barboza presents to the clinic for evaluation of post up wound RLE; s/p CABG with vein harvest RLE.  Pt has [employer].   Duration: 2022  Severity: 4/10  Context: S/P vein harvest RLE  Modifiers: CAD, HTN, hyperlipidemia.      Review of Systems     Review of Systems   Constitutional: Negative.  Negative for activity change, appetite change, chills and fever.   Respiratory: Negative for shortness of breath.    Cardiovascular: Negative for chest pain.   Gastrointestinal: Negative for abdominal pain, nausea and vomiting.   Integumentary:  Positive for wound (RLE).   Neurological: Negative for weakness and headaches.        Medical / Social / Family History     Past Medical History:   Diagnosis Date    Benign localized prostatic hyperplasia with lower urinary tract symptoms (LUTS)     Coronary artery disease     Elevated PSA     Hypercholesterolemia     Hyperlipidemia     Hypertension     Obesity (BMI 30-39.9)        Past Surgical History:   Procedure Laterality Date    ANGIOGRAM, CORONARY, WITH LEFT HEART CATHETERIZATION N/A 2021    Procedure: ANGIOGRAM,CORONARY,WITH LEFT HEART CATHETERIZATION;  Surgeon: Mahad Villaseñor DO;  Location: [facility] CATH LAB;  Service: Cardiology;  Laterality: N/A;    BIOPSY WITH TRANSRECTAL ULTRASOUND (TRUS) GUIDANCE Bilateral 2018    CHOLECYSTECTOMY      CORONARY ARTERY BYPASS GRAFT  2022    [facility]Dr. Qureshi    LEFT HEART CATHETERIZATION Left 2021     Procedure: Left heart cath;  Surgeon: Philip Torres MD;  Location: [facility] CATH LAB;  Service: Cardiology;  Laterality: Left;       Social History    reports that he quit smoking about 33 years ago. He smoked 0.50 packs per day. He has quit using smokeless tobacco.  His smokeless tobacco use included chew. He reports current alcohol use. He reports that he does not use drugs.    Family History  's family history includes No Known Problems in his brother, daughter, father, mother, sister, and son.    Medications and Allergies     Medications  No outpatient medications have been marked as taking for the 4/28/22 encounter (Clinical Support) with WOUND CARE, [facility].       Allergies  Review of patient's allergies indicates:   Allergen Reactions    Lisinopril Swelling       Physical Examination     Vitals:    04/28/22 1054   BP: 120/86   Pulse: 82   Resp: 20   Temp: 98 °F (36.7 °C)      Physical Exam  Constitutional:       Appearance: Normal appearance.   Eyes:      Pupils: Pupils are equal, round, and reactive to light.   Cardiovascular:      Rate and Rhythm: Normal rate and regular rhythm.      Pulses:           Dorsalis pedis pulses are 1+ on the right side.      Heart sounds: Normal heart sounds.   Pulmonary:      Effort: Pulmonary effort is normal.      Breath sounds: Normal breath sounds.   Abdominal:      General: Bowel sounds are normal.   Musculoskeletal:      Right lower leg: No edema.      Left lower leg: No edema.   Skin:     Findings: Wound present.             Comments: See wound docs note for assessment and pictures   Neurological:      Mental Status: He is alert.          Assessment and Plan (including Health Maintenance)      Problem List  Smart Sets  Document Outside HM   :    Plan: See wound docs for treatment and plan. Clean daily with NS  and cover with silvercel and abd pads, change daily or more often if soiled.  SHADY's on the right 0.88, Left 0.8; keep follow up appt with  vascular.  Tissue culture obtained        Health Maintenance Due   Topic Date Due    TETANUS VACCINE  Never done    Shingles Vaccine (1 of 2) Never done    Pneumococcal Vaccines (Age 65+) (1 - PCV) Never done    Influenza Vaccine (1) Never done       Problem List Items Addressed This Visit    None     Visit Diagnoses     Non-healing surgical wound, subsequent encounter    -  Primary        Non-healing surgical wound, subsequent encounter       Health Maintenance Topics with due status: Not Due       Topic Last Completion Date    Lipid Panel 07/11/2021           Future Appointments   Date Time Provider Department Center   5/18/2022  2:00 PM Margareth Del Rosario MD [facility]   6/23/2022  3:00 PM AWV NURSE [facility] MARY Godinez [place]   8/24/2022  8:30 AM Mahad Villaseñor DO [facility]   9/27/2022  8:30 AM Kb Brown Jr., MD [facility]        Follow up in about 1 week (around 5/5/2022).     Signature:  JONAH Alvarez    Date of encounter: 4/28/22

## 2022-05-01 LAB — BACTERIA TISS AEROBE CULT: ABNORMAL

## 2022-05-02 RX ORDER — CIPROFLOXACIN 500 MG/1
500 TABLET ORAL 2 TIMES DAILY
Qty: 14 TABLET | Refills: 0 | Status: SHIPPED | OUTPATIENT
Start: 2022-05-02 | End: 2022-09-15 | Stop reason: ALTCHOICE

## 2022-05-03 DIAGNOSIS — K21.9 GASTROESOPHAGEAL REFLUX DISEASE, UNSPECIFIED WHETHER ESOPHAGITIS PRESENT: ICD-10-CM

## 2022-05-03 RX ORDER — PANTOPRAZOLE SODIUM 40 MG/1
TABLET, DELAYED RELEASE ORAL
Qty: 30 TABLET | Refills: 0 | Status: SHIPPED | OUTPATIENT
Start: 2022-05-03 | End: 2022-09-15 | Stop reason: SDUPTHER

## 2022-05-05 ENCOUNTER — CLINICAL SUPPORT (OUTPATIENT)
Dept: WOUND CARE | Facility: HOSPITAL | Age: 82
End: 2022-05-05
Attending: NURSE PRACTITIONER
Payer: COMMERCIAL

## 2022-05-05 VITALS
SYSTOLIC BLOOD PRESSURE: 120 MMHG | HEART RATE: 87 BPM | DIASTOLIC BLOOD PRESSURE: 77 MMHG | RESPIRATION RATE: 20 BRPM | TEMPERATURE: 98 F

## 2022-05-05 DIAGNOSIS — T81.89XD NON-HEALING SURGICAL WOUND, SUBSEQUENT ENCOUNTER: Primary | ICD-10-CM

## 2022-05-05 PROCEDURE — 11042 DBRDMT SUBQ TIS 1ST 20SQCM/<: CPT

## 2022-05-05 PROCEDURE — 11042 DBRDMT SUBQ TIS 1ST 20SQCM/<: CPT | Performed by: NURSE PRACTITIONER

## 2022-05-05 NOTE — PROGRESS NOTES
WOUND CARE, [facility]  [address]  [place] [address]     PATIENT NAME: Trung Barboza  : 1940  DATE: 22  MRN: [id number]      Billing Provider: WOUND CARE, [facility]  Level of Service:   Patient PCP Information     Provider PCP Type    Lawrence Huerta DO General          Reason for Visit / Chief Complaint: No chief complaint on file.       Update PCP  Update Chief Complaint         History of Present Illness / Problem Focused Workflow     Trung Barboza presents to the clinic for evaluation of post up wound RLE; s/p CABG with vein harvest RLE.  Pt has PSafe.   Duration: 2022  Severity: 4/10  Context: S/P vein harvest RLE  Modifiers: CAD, HTN, hyperlipidemia.      Review of Systems     Review of Systems   Constitutional: Negative.  Negative for activity change, appetite change, chills and fever.   Respiratory: Negative for shortness of breath.    Cardiovascular: Negative for chest pain.   Gastrointestinal: Negative for abdominal pain, nausea and vomiting.   Integumentary:  Positive for wound (RLE).   Neurological: Negative for weakness and headaches.        Medical / Social / Family History     Past Medical History:   Diagnosis Date    Benign localized prostatic hyperplasia with lower urinary tract symptoms (LUTS)     Coronary artery disease     Elevated PSA     Hypercholesterolemia     Hyperlipidemia     Hypertension     Obesity (BMI 30-39.9)        Past Surgical History:   Procedure Laterality Date    ANGIOGRAM, CORONARY, WITH LEFT HEART CATHETERIZATION N/A 2021    Procedure: ANGIOGRAM,CORONARY,WITH LEFT HEART CATHETERIZATION;  Surgeon: Mahad Villaseñor DO;  Location: [facility] CATH LAB;  Service: Cardiology;  Laterality: N/A;    BIOPSY WITH TRANSRECTAL ULTRASOUND (TRUS) GUIDANCE Bilateral 2018    CHOLECYSTECTOMY      CORONARY ARTERY BYPASS GRAFT  2022    [facility]-Dr. Qureshi    LEFT HEART CATHETERIZATION Left  7/13/2021    Procedure: Left heart cath;  Surgeon: Philip Torres MD;  Location: [facility] CATH LAB;  Service: Cardiology;  Laterality: Left;       Social History    reports that he quit smoking about 33 years ago. He smoked 0.50 packs per day. He has quit using smokeless tobacco.  His smokeless tobacco use included chew. He reports current alcohol use. He reports that he does not use drugs.    Family History  's family history includes No Known Problems in his brother, daughter, father, mother, sister, and son.    Medications and Allergies     Medications  No outpatient medications have been marked as taking for the 5/5/22 encounter (Clinical Support) with WOUND CARE, [facility].       Allergies  Review of patient's allergies indicates:   Allergen Reactions    Lisinopril Swelling       Physical Examination     There were no vitals filed for this visit.   Physical Exam  Constitutional:       Appearance: Normal appearance.   Eyes:      Pupils: Pupils are equal, round, and reactive to light.   Cardiovascular:      Rate and Rhythm: Normal rate and regular rhythm.      Pulses:           Dorsalis pedis pulses are 1+ on the right side.      Heart sounds: Normal heart sounds.   Pulmonary:      Effort: Pulmonary effort is normal.      Breath sounds: Normal breath sounds.   Abdominal:      General: Bowel sounds are normal.   Musculoskeletal:      Right lower leg: No edema.      Left lower leg: No edema.   Skin:     Findings: Wound present.             Comments: See wound docs note for assessment and pictures  #1 wound healed   Neurological:      Mental Status: He is alert.          Assessment and Plan (including Health Maintenance)      Problem List  Smart Sets  Document Outside HM   :    Plan: See wound docs for treatment and plan. Clean daily with NS  and cover with silvercel and abd pads, change daily or more often if soiled.  SHADY's on the right 0.88, Left 0.8; keep follow up appt with vascular.  Continue  antibiotics        Health Maintenance Due   Topic Date Due    TETANUS VACCINE  Never done    Shingles Vaccine (1 of 2) Never done    Pneumococcal Vaccines (Age 65+) (1 - PCV) Never done    COVID-19 Vaccine (4 - Booster for Moderna series) 03/09/2022       Problem List Items Addressed This Visit    None     Visit Diagnoses     Non-healing surgical wound, subsequent encounter    -  Primary        Non-healing surgical wound, subsequent encounter       Health Maintenance Topics with due status: Not Due       Topic Last Completion Date    Lipid Panel 07/11/2021    Influenza Vaccine Not Due           Future Appointments   Date Time Provider Department Center   5/5/2022  9:30 AM [facility], [facility]    5/18/2022  2:00 PM [facility] Del [facility]   6/23/2022  3:00 PM [facility]SE [facility] [place]   8/24/2022  8:30 AM Mahad Villaseñor DO [facility] C[facility]   9/27/2022  8:30 AM Kb Brown Jr., MD [facility]        Follow up in about 2 weeks (around 5/19/2022).     Signature:  JONAH Alvarez    Date of encounter: 5/5/22

## 2022-05-10 DIAGNOSIS — I10 ESSENTIAL HYPERTENSION: Primary | ICD-10-CM

## 2022-05-10 DIAGNOSIS — N18.31 STAGE 3A CHRONIC KIDNEY DISEASE: ICD-10-CM

## 2022-05-11 RX ORDER — FUROSEMIDE 20 MG/1
20 TABLET ORAL DAILY
Qty: 30 TABLET | Refills: 2 | Status: SHIPPED | OUTPATIENT
Start: 2022-05-11 | End: 2022-05-18

## 2022-05-17 DIAGNOSIS — N18.31 STAGE 3A CHRONIC KIDNEY DISEASE: ICD-10-CM

## 2022-05-17 DIAGNOSIS — I10 ESSENTIAL HYPERTENSION: ICD-10-CM

## 2022-05-18 RX ORDER — FUROSEMIDE 20 MG/1
TABLET ORAL
Qty: 30 TABLET | Refills: 0 | Status: SHIPPED | OUTPATIENT
Start: 2022-05-18 | End: 2022-09-27 | Stop reason: SDUPTHER

## 2022-05-19 ENCOUNTER — CLINICAL SUPPORT (OUTPATIENT)
Dept: WOUND CARE | Facility: HOSPITAL | Age: 82
End: 2022-05-19
Attending: NURSE PRACTITIONER
Payer: COMMERCIAL

## 2022-05-19 VITALS
RESPIRATION RATE: 18 BRPM | SYSTOLIC BLOOD PRESSURE: 122 MMHG | HEART RATE: 67 BPM | DIASTOLIC BLOOD PRESSURE: 80 MMHG | TEMPERATURE: 97 F

## 2022-05-19 DIAGNOSIS — T81.89XD NON-HEALING SURGICAL WOUND, SUBSEQUENT ENCOUNTER: Primary | ICD-10-CM

## 2022-05-19 PROCEDURE — 99213 OFFICE O/P EST LOW 20 MIN: CPT | Performed by: NURSE PRACTITIONER

## 2022-05-19 PROCEDURE — 99499 UNLISTED E&M SERVICE: CPT | Performed by: NURSE PRACTITIONER

## 2022-05-19 PROCEDURE — 97597 DBRDMT OPN WND 1ST 20 CM/<: CPT | Performed by: NURSE PRACTITIONER

## 2022-05-19 PROCEDURE — 97597 DBRDMT OPN WND 1ST 20 CM/<: CPT

## 2022-05-19 NOTE — PROGRESS NOTES
WOUND CARE, [facility]  [address]  [place] [address]     PATIENT NAME: Trung Barboza  : 1940  DATE: 22  MRN: [id number]      Billing Provider: WOUND CARE, [facility]  Level of Service:   Patient PCP Information     Provider PCP Type    Lawrence Huerta DO General          Reason for Visit / Chief Complaint: No chief complaint on file.       Update PCP  Update Chief Complaint         History of Present Illness / Problem Focused Workflow     Trung Barboza presents to the clinic for evaluation of post up wound RLE; s/p CABG with vein harvest RLE.  Pt has Semadic.   Duration: 2022  Severity: 4/10  Context: S/P vein harvest RLE  Modifiers: CAD, HTN, hyperlipidemia.      Review of Systems     Review of Systems   Constitutional: Negative.  Negative for activity change, appetite change, chills and fever.   Respiratory: Negative for shortness of breath.    Cardiovascular: Negative for chest pain.   Gastrointestinal: Negative for abdominal pain, nausea and vomiting.   Integumentary:  Positive for wound (RLE).   Neurological: Negative for weakness and headaches.        Medical / Social / Family History     Past Medical History:   Diagnosis Date    Benign localized prostatic hyperplasia with lower urinary tract symptoms (LUTS)     Coronary artery disease     Elevated PSA     Hypercholesterolemia     Hyperlipidemia     Hypertension     Obesity (BMI 30-39.9)        Past Surgical History:   Procedure Laterality Date    ANGIOGRAM, CORONARY, WITH LEFT HEART CATHETERIZATION N/A 2021    Procedure: ANGIOGRAM,CORONARY,WITH LEFT HEART CATHETERIZATION;  Surgeon: Mahad Villaseñor DO;  Location: [facility] C[facility] LAB;  Service: Cardiology;  Laterality: N/A;    BIOPSY WITH TRANSRECTAL ULTRASOUND (TRUS) GUIDANCE Bilateral 2018    CHOLECYSTECTOMY      CORONARY ARTERY BYPASS GRAFT  2022    [facility]-Dr. Qureshi    LEFT HEART CATHETERIZATION Left  7/13/2021    Procedure: Left heart cath;  Surgeon: Philip Torres MD;  Location: [facility] CATH LAB;  Service: Cardiology;  Laterality: Left;       Social History    reports that he quit smoking about 33 years ago. He smoked 0.50 packs per day. He has quit using smokeless tobacco.  His smokeless tobacco use included chew. He reports current alcohol use. He reports that he does not use drugs.    Family History  's family history includes No Known Problems in his brother, daughter, father, mother, sister, and son.    Medications and Allergies     Medications  No outpatient medications have been marked as taking for the 5/19/22 encounter (Clinical Support) with WOUND CARE, [facility].       Allergies  Review of patient's allergies indicates:   Allergen Reactions    Lisinopril Swelling       Physical Examination     There were no vitals filed for this visit.   Physical Exam  Constitutional:       Appearance: Normal appearance.   Eyes:      Pupils: Pupils are equal, round, and reactive to light.   Cardiovascular:      Rate and Rhythm: Normal rate and regular rhythm.      Pulses:           Dorsalis pedis pulses are 1+ on the right side.      Heart sounds: Normal heart sounds.   Pulmonary:      Effort: Pulmonary effort is normal.      Breath sounds: Normal breath sounds.   Abdominal:      General: Bowel sounds are normal.   Musculoskeletal:      Right lower leg: No edema.      Left lower leg: No edema.   Skin:     Findings: Wound present. No erythema.             Comments: See wound docs note for assessment and pictures     Neurological:      Mental Status: He is alert.          Assessment and Plan (including Health Maintenance)      Problem List  Smart Sets  Document Outside HM   :    Plan: See wound docs for treatment and plan. Clean daily with NS  and cover with silvercel and abd pads, change daily or more often if soiled.  SHADY's on the right 0.88, Left 0.8; keep follow up appt with vascular.        Health  Maintenance Due   Topic Date Due    TETANUS VACCINE  Never done    Shingles Vaccine (1 of 2) Never done    Pneumococcal Vaccines (Age 65+) (1 - PCV) Never done    COVID-19 Vaccine (4 - Booster for Moderna series) 03/09/2022       Problem List Items Addressed This Visit    None     Visit Diagnoses     Non-healing surgical wound, subsequent encounter    -  Primary        Non-healing surgical wound, subsequent encounter       Health Maintenance Topics with due status: Not Due       Topic Last Completion Date    Lipid Panel 07/11/2021    Influenza Vaccine Not Due           Future Appointments   Date Time Provider Department Center   6/23/2022  3:00 PM AWV NURSE [facility] [place]   8/24/2022  8:30 AM Mahad Villaseñor DO [facility] CARD [place] [facility]   9/27/2022  8:30 AM Kb Brown Jr., MD [facility]OL [facility]        Follow up in about 2 weeks (around 6/2/2022).     Signature:  JONAH Alvarez    Date of encounter: 5/19/22

## 2022-06-02 ENCOUNTER — CLINICAL SUPPORT (OUTPATIENT)
Dept: WOUND CARE | Facility: HOSPITAL | Age: 82
End: 2022-06-02
Attending: NURSE PRACTITIONER
Payer: COMMERCIAL

## 2022-06-02 VITALS
RESPIRATION RATE: 20 BRPM | HEART RATE: 75 BPM | SYSTOLIC BLOOD PRESSURE: 124 MMHG | TEMPERATURE: 98 F | DIASTOLIC BLOOD PRESSURE: 88 MMHG

## 2022-06-02 DIAGNOSIS — T81.89XD NON-HEALING SURGICAL WOUND, SUBSEQUENT ENCOUNTER: Primary | ICD-10-CM

## 2022-06-02 PROCEDURE — 99212 OFFICE O/P EST SF 10 MIN: CPT

## 2022-06-02 PROCEDURE — 99213 OFFICE O/P EST LOW 20 MIN: CPT | Performed by: NURSE PRACTITIONER

## 2022-06-02 NOTE — PROGRESS NOTES
WOUND CARE, [facility]  [address]  [place] [address]     PATIENT NAME: Trung Barboza  : 1940  DATE: 22  MRN: [id number]      Billing Provider: WOUND CARE, [facility]  Level of Service:   Patient PCP Information     Provider PCP Type    Lawrence Huerta DO General          Reason for Visit / Chief Complaint: Wound Care       Update PCP  Update Chief Complaint         History of Present Illness / Problem Focused Workflow     Trung Barboza presents to the clinic for evaluation of post up wound RLE; s/p CABG with vein harvest RLE.  Pt has GPNX.   Duration: 2022  Severity: 4/10  Context: S/P vein harvest RLE  Modifiers: CAD, HTN, hyperlipidemia.      Review of Systems     Review of Systems   Constitutional: Negative.  Negative for activity change, appetite change, chills and fever.   Respiratory: Negative for shortness of breath.    Cardiovascular: Negative for chest pain.   Gastrointestinal: Negative for abdominal pain, nausea and vomiting.   Integumentary:  Positive for wound (RLE).   Neurological: Negative for weakness and headaches.        Medical / Social / Family History     Past Medical History:   Diagnosis Date    Benign localized prostatic hyperplasia with lower urinary tract symptoms (LUTS)     Coronary artery disease     Elevated PSA     Hypercholesterolemia     Hyperlipidemia     Hypertension     Obesity (BMI 30-39.9)        Past Surgical History:   Procedure Laterality Date    ANGIOGRAM, CORONARY, WITH LEFT HEART CATHETERIZATION N/A 2021    Procedure: ANGIOGRAM,CORONARY,WITH LEFT HEART CATHETERIZATION;  Surgeon: Mahad Villaseñor DO;  Location: [facility] CATH LAB;  Service: Cardiology;  Laterality: N/A;    BIOPSY WITH TRANSRECTAL ULTRASOUND (TRUS) GUIDANCE Bilateral 2018    CHOLECYSTECTOMY      CORONARY ARTERY BYPASS GRAFT  2022    [facility]-Dr. Qureshi    LEFT HEART CATHETERIZATION Left 2021     Procedure: Left heart cath;  Surgeon: Philip Torres MD;  Location[facility] C[facility] LAB;  Service: Cardiology;  Laterality: Left;       Social History    reports that he quit smoking about 33 years ago. He smoked 0.50 packs per day. He has quit using smokeless tobacco.  His smokeless tobacco use included chew. He reports current alcohol use. He reports that he does not use drugs.    Family History  's family history includes No Known Problems in his brother, daughter, father, mother, sister, and son.    Medications and Allergies     Medications  No outpatient medications have been marked as taking for the 6/2/22 encounter (Clinical Support) with WOUND CARE, [facility].       Allergies  Review of patient's allergies indicates:   Allergen Reactions    Lisinopril Swelling       Physical Examination     Vitals:    06/02/22 0950   BP: 124/88   Pulse: 75   Resp: 20   Temp: 98.1 °F (36.7 °C)      Physical Exam  Constitutional:       Appearance: Normal appearance.   Eyes:      Pupils: Pupils are equal, round, and reactive to light.   Cardiovascular:      Rate and Rhythm: Normal rate and regular rhythm.      Pulses:           Dorsalis pedis pulses are 1+ on the right side.      Heart sounds: Normal heart sounds.   Pulmonary:      Effort: Pulmonary effort is normal.      Breath sounds: Normal breath sounds.   Abdominal:      General: Bowel sounds are normal.   Musculoskeletal:      Right lower leg: No edema.      Left lower leg: No edema.   Skin:     Findings: Wound present. No erythema.             Comments: See wound docs note for assessment and pictures     Neurological:      Mental Status: He is alert.          Assessment and Plan (including Health Maintenance)      Problem List  Smart Sets  Document Outside HM   :    Plan: See wound docs for treatment and plan. Clean daily with NS  and cover with  abd pads to protect area, change daily or more often if soiled.  SHADY's on the right 0.88, Left 0.8;         Health  Maintenance Due   Topic Date Due    TETANUS VACCINE  Never done    Shingles Vaccine (1 of 2) Never done    Pneumococcal Vaccines (Age 65+) (1 - PCV) Never done    COVID-19 Vaccine (4 - Booster for Moderna series) 03/09/2022       Problem List Items Addressed This Visit    None     Visit Diagnoses     Non-healing surgical wound, subsequent encounter    -  Primary        Non-healing surgical wound, subsequent encounter       Health Maintenance Topics with due status: Not Due       Topic Last Completion Date    Lipid Panel 07/11/2021    Influenza Vaccine Not Due           Future Appointments   Date Time Provider Department Center   6/23/2022  3:00 PM AWV NURSE [facility] FAMMED [place]   8/24/2022  8:30 AM Mahad Villaseñor DO [facility] CARD [facility]   9/27/2022  8:30 AM Kb Brown Jr., MD [facility]        Follow up in about 2 weeks (around 6/16/2022).     Signature:  JONAH Alvarez    Date of encounter: 6/2/22

## 2022-06-13 RX ORDER — HYDROCHLOROTHIAZIDE 12.5 MG/1
12.5 TABLET ORAL DAILY
Qty: 30 TABLET | Refills: 2 | Status: SHIPPED | OUTPATIENT
Start: 2022-06-13 | End: 2022-09-27 | Stop reason: SDUPTHER

## 2022-06-16 ENCOUNTER — CLINICAL SUPPORT (OUTPATIENT)
Dept: WOUND CARE | Facility: HOSPITAL | Age: 82
End: 2022-06-16
Attending: NURSE PRACTITIONER
Payer: COMMERCIAL

## 2022-06-16 VITALS
HEART RATE: 69 BPM | TEMPERATURE: 97 F | DIASTOLIC BLOOD PRESSURE: 83 MMHG | SYSTOLIC BLOOD PRESSURE: 140 MMHG | RESPIRATION RATE: 16 BRPM

## 2022-06-16 DIAGNOSIS — L97.919 VENOUS ULCER OF RIGHT LEG: Primary | ICD-10-CM

## 2022-06-16 DIAGNOSIS — I83.019 VENOUS ULCER OF RIGHT LEG: Primary | ICD-10-CM

## 2022-06-16 PROCEDURE — 99213 OFFICE O/P EST LOW 20 MIN: CPT | Performed by: NURSE PRACTITIONER

## 2022-06-16 PROCEDURE — 99214 OFFICE O/P EST MOD 30 MIN: CPT

## 2022-06-16 PROCEDURE — 99213 OFFICE O/P EST LOW 20 MIN: CPT

## 2022-06-16 NOTE — PROGRESS NOTES
WOUND CARE, [facility]  [address]  [place] [address]     PATIENT NAME: Trung Barboza  : 1940  DATE: 22  MRN: [id number]      B[facility] Provider[facility]  Level of Service: NC OFFICE/OUTPT VISIT, EST, LEVL III, 20-29 MIN  Patient PCP Information     Provider PCP Type    Lawrence Huerta DO General          Reason for Visit / Chief Complaint: No chief complaint on file.       Update PCP  Update Chief Complaint         History of Present Illness / Problem Focused Workflow     Trung Barboza presents to the clinic c/o 4 small wounds RLE adjacent to his surgical wound  Duration: 2 weeks  Context: Edema  Severity: 1/10  Modifiers: Previous vein harvest RLE, HTN      Review of Systems     Review of Systems   Constitutional: Negative.  Negative for chills, fatigue and fever.   Eyes: Negative for visual disturbance.   Respiratory: Negative for chest tightness and shortness of breath.    Cardiovascular: Negative for chest pain and leg swelling.   Gastrointestinal: Negative for abdominal pain, nausea and vomiting.   Musculoskeletal: Positive for arthralgias.   Integumentary:  Positive for wound (RLE).   Neurological: Negative for weakness and headaches.        Medical / Social / Family History     Past Medical History:   Diagnosis Date    Benign localized prostatic hyperplasia with lower urinary tract symptoms (LUTS)     Coronary artery disease     Elevated PSA     Hypercholesterolemia     Hyperlipidemia     Hypertension     Obesity (BMI 30-39.9)        Past Surgical History:   Procedure Laterality Date    ANGIOGRAM, CORONARY, WITH LEFT HEART CATHETERIZATION N/A 2021    Procedure: ANGIOGRAM,CORONARY,WITH LEFT HEART CATHETERIZATION;  Surgeon: Mahad Villaseñor DO;  Location: [facility] CATH LAB;  Service: Cardiology;  Laterality: N/A;    BIOPSY WITH TRANSRECTAL ULTRASOUND (TRUS) GUIDANCE Bilateral 2018    CHOLECYSTECTOMY      CORONARY ARTERY  BYPASS GRAFT  [facility]/07/2022    [facility]-Dr. Qureshi    LEFT HEART CATHETERIZATION Left 7/13/2021    Procedure: Left heart cath;  Surgeon: Philip Torres MD;  Location: [facility] CATH LAB;  Service: Cardiology;  Laterality: Left;       Social History    reports that he quit smoking about 33 years ago. He smoked 0.50 packs per day. He has quit using smokeless tobacco.  His smokeless tobacco use included chew. He reports current alcohol use. He reports that he does not use drugs.    Family History  's family history includes No Known Problems in his brother, daughter, father, mother, sister, and son.    Medications and Allergies     Medications  No outpatient medications have been marked as taking for the 6/16/22 encounter (Clinical Support) with WOUND CARE, [facility].       Allergies  Review of patient's allergies indicates:   Allergen Reactions    Lisinopril Swelling       Physical Examination   There were no vitals filed for this visit.   Physical Exam  Constitutional:       General: He is not in acute distress.     Appearance: Normal appearance.   Cardiovascular:      Rate and Rhythm: Normal rate and regular rhythm.      Pulses: Normal pulses.           Dorsalis pedis pulses are 2+ on the right side.      Heart sounds: Normal heart sounds.   Pulmonary:      Effort: Pulmonary effort is normal. No accessory muscle usage or respiratory distress.      Breath sounds: Normal breath sounds.   Abdominal:      Palpations: Abdomen is soft.   Musculoskeletal:         General: Normal range of motion.      Cervical back: Neck supple.   Skin:     General: Skin is warm and dry.      Capillary Refill: Capillary refill takes 2 to 3 seconds.      Coloration: Skin is not jaundiced or pale.      Findings: Wound present. No ecchymosis or erythema.             Comments: Small superficial venous ulcers   Neurological:      Mental Status: He is alert and oriented to person, place, and time.      Gait: Gait is intact.           Assessment and Plan (including Health Maintenance)      Problem List  Smart Sets  Document Outside HM   :    Plan: Clean wounds with normal saline and pat dry, apply calcium alginate and abd pad. Will use spandagrip for compression. Elevate leg as much as possible.        Health Maintenance Due   Topic Date Due    Shingles Vaccine (1 of 2) Never done    COVID-19 Vaccine (4 - Booster for Moderna series) 03/09/2022       Problem List Items Addressed This Visit    None     Visit Diagnoses     Venous ulcer of right leg    -  Primary        Venous ulcer of right leg       Health Maintenance Topics with due status: Not Due       Topic Last Completion Date    TETANUS VACCINE 12/11/2018    Lipid Panel 07/11/2021    Influenza Vaccine Not Due       Procedures     Future Appointments   Date Time Provider Department Center   6/23/2022  3:00 PM AWV NURSE [facility] MARY [place]   8/24/2022  8:30 AM Mahad Villaseñor DO [facility] CARD [facility]   9/27/2022  8:30 AM Kb Brown Jr., MD [facility]        Follow up in about 1 week (around 6/23/2022).     Signature:  JONAH Alvarez    Date of encounter: 6/16/22

## 2022-06-23 ENCOUNTER — CLINICAL SUPPORT (OUTPATIENT)
Dept: WOUND CARE | Facility: HOSPITAL | Age: 82
End: 2022-06-23
Attending: NURSE PRACTITIONER
Payer: COMMERCIAL

## 2022-06-23 ENCOUNTER — OFFICE VISIT (OUTPATIENT)
Dept: FAMILY MEDICINE | Facility: CLINIC | Age: 82
End: 2022-06-23
Payer: COMMERCIAL

## 2022-06-23 VITALS
RESPIRATION RATE: 20 BRPM | HEIGHT: 72 IN | BODY MASS INDEX: 28.17 KG/M2 | TEMPERATURE: 97 F | SYSTOLIC BLOOD PRESSURE: 128 MMHG | WEIGHT: 208 LBS | HEART RATE: 80 BPM | DIASTOLIC BLOOD PRESSURE: 84 MMHG | OXYGEN SATURATION: 99 %

## 2022-06-23 VITALS
DIASTOLIC BLOOD PRESSURE: 72 MMHG | TEMPERATURE: 98 F | SYSTOLIC BLOOD PRESSURE: 141 MMHG | RESPIRATION RATE: 18 BRPM | HEART RATE: 64 BPM

## 2022-06-23 DIAGNOSIS — Z72.0 TOBACCO USE: ICD-10-CM

## 2022-06-23 DIAGNOSIS — I83.019 VENOUS ULCER OF RIGHT LEG: Primary | ICD-10-CM

## 2022-06-23 DIAGNOSIS — G45.9 TIA (TRANSIENT ISCHEMIC ATTACK): ICD-10-CM

## 2022-06-23 DIAGNOSIS — N40.0 BENIGN PROSTATIC HYPERPLASIA, UNSPECIFIED WHETHER LOWER URINARY TRACT SYMPTOMS PRESENT: ICD-10-CM

## 2022-06-23 DIAGNOSIS — L97.919 VENOUS ULCER OF RIGHT LEG: Primary | ICD-10-CM

## 2022-06-23 DIAGNOSIS — Z95.1 HX OF CABG: ICD-10-CM

## 2022-06-23 DIAGNOSIS — I25.10 CORONARY ARTERY DISEASE INVOLVING NATIVE CORONARY ARTERY OF NATIVE HEART WITHOUT ANGINA PECTORIS: ICD-10-CM

## 2022-06-23 DIAGNOSIS — I10 ESSENTIAL HYPERTENSION: ICD-10-CM

## 2022-06-23 DIAGNOSIS — Z00.00 ENCOUNTER FOR SUBSEQUENT ANNUAL WELLNESS VISIT (AWV) IN MEDICARE PATIENT: Primary | ICD-10-CM

## 2022-06-23 DIAGNOSIS — E78.2 MIXED HYPERLIPIDEMIA: ICD-10-CM

## 2022-06-23 PROCEDURE — 1101F PR PT FALLS ASSESS DOC 0-1 FALLS W/OUT INJ PAST YR: ICD-10-PCS | Mod: ,,, | Performed by: FAMILY MEDICINE

## 2022-06-23 PROCEDURE — 3074F SYST BP LT 130 MM HG: CPT | Mod: ,,, | Performed by: FAMILY MEDICINE

## 2022-06-23 PROCEDURE — 1159F MED LIST DOCD IN RCRD: CPT | Mod: ,,, | Performed by: FAMILY MEDICINE

## 2022-06-23 PROCEDURE — 3288F FALL RISK ASSESSMENT DOCD: CPT | Mod: ,,, | Performed by: FAMILY MEDICINE

## 2022-06-23 PROCEDURE — 97597 DBRDMT OPN WND 1ST 20 CM/<: CPT

## 2022-06-23 PROCEDURE — 1101F PT FALLS ASSESS-DOCD LE1/YR: CPT | Mod: ,,, | Performed by: FAMILY MEDICINE

## 2022-06-23 PROCEDURE — 3074F PR MOST RECENT SYSTOLIC BLOOD PRESSURE < 130 MM HG: ICD-10-PCS | Mod: ,,, | Performed by: FAMILY MEDICINE

## 2022-06-23 PROCEDURE — 1160F PR REVIEW ALL MEDS BY PRESCRIBER/CLIN PHARMACIST DOCUMENTED: ICD-10-PCS | Mod: ,,, | Performed by: FAMILY MEDICINE

## 2022-06-23 PROCEDURE — 3079F PR MOST RECENT DIASTOLIC BLOOD PRESSURE 80-89 MM HG: ICD-10-PCS | Mod: ,,, | Performed by: FAMILY MEDICINE

## 2022-06-23 PROCEDURE — 1126F AMNT PAIN NOTED NONE PRSNT: CPT | Mod: ,,, | Performed by: FAMILY MEDICINE

## 2022-06-23 PROCEDURE — G0439 PR MEDICARE ANNUAL WELLNESS SUBSEQUENT VISIT: ICD-10-PCS | Mod: ,,, | Performed by: FAMILY MEDICINE

## 2022-06-23 PROCEDURE — 1159F PR MEDICATION LIST DOCUMENTED IN MEDICAL RECORD: ICD-10-PCS | Mod: ,,, | Performed by: FAMILY MEDICINE

## 2022-06-23 PROCEDURE — 1126F PR PAIN SEVERITY QUANTIFIED, NO PAIN PRESENT: ICD-10-PCS | Mod: ,,, | Performed by: FAMILY MEDICINE

## 2022-06-23 PROCEDURE — 3079F DIAST BP 80-89 MM HG: CPT | Mod: ,,, | Performed by: FAMILY MEDICINE

## 2022-06-23 PROCEDURE — G0439 PPPS, SUBSEQ VISIT: HCPCS | Mod: ,,, | Performed by: FAMILY MEDICINE

## 2022-06-23 PROCEDURE — 1160F RVW MEDS BY RX/DR IN RCRD: CPT | Mod: ,,, | Performed by: FAMILY MEDICINE

## 2022-06-23 PROCEDURE — 3288F PR FALLS RISK ASSESSMENT DOCUMENTED: ICD-10-PCS | Mod: ,,, | Performed by: FAMILY MEDICINE

## 2022-06-23 PROCEDURE — 97597 DBRDMT OPN WND 1ST 20 CM/<: CPT | Performed by: NURSE PRACTITIONER

## 2022-06-23 NOTE — PATIENT INSTRUCTIONS
Counseling and Referral of Other Preventative  (Italic type indicates deductible and co-insurance are waived)    Patient Name: Trung Barboza  Today's Date: 6/23/2022    Health Maintenance         Date Due Completion Date    COVID-19 Vaccine (4 - Booster for Moderna series) 03/09/2022 11/9/2021    Shingles Vaccine (1 of 2) 06/23/2023 (Originally 1/26/1990) ---    Lipid Panel 07/11/2022 7/11/2021    Influenza Vaccine (Season Ended) 09/01/2022 ---    TETANUS VACCINE 12/11/2028 12/11/2018          No orders of the defined types were placed in this encounter.

## 2022-06-23 NOTE — PROGRESS NOTES
[facility]     PATIENT NAME: Trung Barboza   : 1940    AGE: 82 y.o. DATE: 2022   MRN: [id number]        Reason for Visit / Chief Complaint: Medicare AWV ([employer] SUBS AWV )        Trung Barboza presents for a Subsequent Medicare AWV today.     The following components were reviewed and updated:    Medical/Social/Family History:  Past Medical History:   Diagnosis Date    Benign localized prostatic hyperplasia with lower urinary tract symptoms (LUTS)     Coronary artery disease     Elevated PSA     Hypercholesterolemia     Hyperlipidemia     Hypertension     Myocardial infarction     Obesity (BMI 30-39.9)         Family History   Problem Relation Age of Onset    Heart disease Mother     Hypertension Mother     No Known Problems Father     No Known Problems Sister     No Known Problems Brother     No Known Problems Daughter     No Known Problems Son         Social History     Tobacco Use   Smoking Status Former Smoker    Packs/day: 0.50    Quit date: 1988    Years since quittin.0   Smokeless Tobacco Former User    Types: Chew      Social History     Substance and Sexual Activity   Alcohol Use Yes    Comment: can of beer one or twice a month       Family History   Problem Relation Age of Onset    Heart disease Mother     Hypertension Mother     No Known Problems Father     No Known Problems Sister     No Known Problems Brother     No Known Problems Daughter     No Known Problems Son        Past Surgical History:   Procedure Laterality Date    ANGIOGRAM, CORONARY, WITH LEFT HEART CATHETERIZATION N/A 2021    Procedure: ANGIOGRAM,CORONARY,WITH LEFT HEART CATHETERIZATION;  Surgeon: Mahad Villaseñor DO;  Location: [facility] CATH LAB;  Service: Cardiology;  Laterality: N/A;    BIOPSY WITH TRANSRECTAL ULTRASOUND (TRUS) GUIDANCE Bilateral 2018    CHOLECYSTECTOMY      [facility]ON[facility]    LEFT HEART CATHETERIZATION Left 7/13/2021    Procedure: Left heart cath;  Surgeon: Philip Torres MD;  Location: [facility] CATH LAB;  Service: Cardiology;  Laterality: Left;         · Allergies and Current Medications     Review of patient's allergies indicates:   Allergen Reactions    Lisinopril Swelling       Current Outpatient Medications:     amLODIPine (NORVASC) 5 MG tablet, Take 1 tablet (5 mg total) by mouth once daily., Disp: 90 tablet, Rfl: 1    ascorbic acid, vitamin C, (VITAMIN C) 500 MG tablet, Take 1,000 mg by mouth 2 (two) times daily., Disp: , Rfl:     aspirin (ECOTRIN) 81 MG EC tablet, Take 1 tablet (81 mg total) by mouth once daily., Disp: 30 tablet, Rfl: 0    atorvastatin (LIPITOR) 80 MG tablet, Take 1 tablet (80 mg total) by mouth every evening., Disp: 90 tablet, Rfl: 3    furosemide (LASIX) 20 MG tablet, Take 1 tablet by mouth once daily, Disp: 30 tablet, Rfl: 0    hydroCHLOROthiazide (HYDRODIURIL) 12.5 MG Tab, Take 1 tablet (12.5 mg total) by mouth once daily., Disp: 30 tablet, Rfl: 2    pantoprazole (PROTONIX) 40 MG tablet, Take 1 tablet by mouth once daily, Disp: 30 tablet, Rfl: 0    potassium chloride (KLOR-CON) 10 MEQ TbSR, Take 1 tablet (10 mEq total) by mouth once daily., Disp: 90 tablet, Rfl: 1    ticagrelor (BRILINTA) 90 mg tablet, Take 1 tablet (90 mg total) by mouth 2 (two) times daily., Disp: 60 tablet, Rfl: 2    ciprofloxacin HCl (CIPRO) 500 MG tablet, Take 1 tablet (500 mg total) by mouth 2 (two) times daily. (Patient not taking: Reported on 6/23/2022), Disp: 14 tablet, Rfl: 0    clindamycin (CLEOCIN) 300 MG capsule, Take 1 capsule (300 mg total) by mouth 3 (three) times daily. (Patient not taking: Reported on 6/23/2022), Disp: 21 capsule, Rfl: 0    colchicine (COLCRYS) 0.6 mg tablet, TAKE 2 TABLETS TODAY, AND THEN TAKE ONE DAILY UNTIL GOUT ATTACK RESOLVING., Disp: 30 tablet, Rfl: 11    collagenase (SANTYL) ointment, Apply topically once  daily. (Patient not taking: Reported on 6/23/2022), Disp: 30 g, Rfl: 1    isosorbide mononitrate (IMDUR) 60 MG 24 hr tablet, Take by mouth., Disp: , Rfl:     oxyCODONE-acetaminophen (PERCOCET) 5-325 mg per tablet, Take 1 tablet by mouth every 6 (six) hours as needed., Disp: , Rfl:     · Health Risk Assessment   Fall Risk: no   Obesity: BMI 28.21     Advance Directive: Yes. Pt encouraged to bring copy for documentation   Depression: PHQ 9 score: 0    HTN:   yes, DASH diet, exercise, weight management, med compliance, home BP monitoring, and follow-up discussed.   T2DM: NA   Tobacco use: Former Smoker Quit 1988  STI: NA    Alcohol misuse: Denies   Statin Use: Yes      · Health Risk Assessment  What is your age?: 80 or older  Are you male or female?: Male  During the past four weeks, how much have you been bothered by emotional problems such as feeling anxious, depressed, irritable, sad, or downhearted and blue?: Not at all  During the past five weeks, has your physical and/or emotional health limited your social activities with family, friends, neighbors, or groups?: Not at all  During the past four weeks, how much bodily pain have you generally had?: Mild pain  During the past four weeks, was someone available to help if you needed and wanted help?: Yes, as much as I wanted  During the past four weeks, what was the hardest physical activity you could do for at least two minutes?: Light  Can you get to places out of walking distance without help?  (For example, can you travel alone on buses or taxis, or drive your own car?): Yes  Can you go shopping for groceries or clothes without someone's help?: Yes  Can you prepare your own meals?: Yes  Can you do your own housework without help?: Yes  Because of any health problems, do you need the help of another person with your personal care needs such as eating, bathing, dressing, or getting around the house?: No  Can you handle your own money without help?: Yes  During the  past four weeks, how would you rate your health in general?: Very good  How have things been going for you during the past four weeks?: Very well  Are you having difficulties driving your car?: No  Do you always fasten your seat belt when you are in a car?: Yes, usually  How often in the past four weeks have you been bothered by falling or dizzy when standing up?: Never  How often in the past four weeks have you been bothered by sexual problems?: Never  How often in the past four weeks have you been bothered by trouble eating well?: Never  How often in the past four weeks have you been bothered by teeth or denture problems?: Never  How often in the past four weeks have you been bothered with problems using the telephone?: Never  How often in the past four weeks have you been bothered by tiredness or fatigue?: Seldom  Have you fallen two or more times in the past year?: No  Are you afraid of falling?: No  Are you a smoker?: No  During the past four weeks, how many drinks of wine, beer, or other alcoholic beverages did you have?: No alcohol at all  Do you exercise for about 20 minutes three or more days a week?: Yes, some of the time  Have you been given any information to help you with hazards in your house that might hurt you?: No  Have you been given any information to help you with keeping track of your medications?: No  How often do you have trouble taking medicines the way you've been told to take them?: I always take them as prescribed  How confident are you that you can control and manage most of your health problems?: Somewhat confident  What is your race? (Check all that apply.):     · Health Maintenance   Last eye exam: 2021   Last CV screen with lipids: 07/11/2021   Diabetes screening with fasting glucose or A1c: 02/28/2022   Colonoscopy: Advanced Age   Flu Vaccine: NA   Pneumonia vaccines: 11/29/2016-13 10/18/2012-23   COVID vaccine: Moderna 03/26/2021 04/23/2021 11/09/2021   Hep B  vaccine: NA   DEXA: NA   Last pap/pelvic: NA   Last Mammogram: NA   Last PSA screen: 09/28/2021   AAA screening: NA (once in lifetime for males 65-75 who have smoked > 100 cigarettes in lifetime)  HIV Screeing: NA  Hepatitis C Screen: NA  Low Dose CT Scan: NA    Health Maintenance Topics with due status: Not Due       Topic Last Completion Date    TETANUS VACCINE 12/11/2018    Lipid Panel 07/11/2021    Influenza Vaccine Not Due     Health Maintenance Due   Topic Date Due    COVID-19 Vaccine (4 - Booster for Moderna series) 03/09/2022        Incontinence  Bowel: no  Bladder: No    Lab results available in Epic or see dates from [facility] above:   Lab Results   Component Value Date    CHOL 153 07/11/2021    CHOL 238 (H) 05/10/2021    CHOL 245 10/07/2020     Lab Results   Component Value Date    HDL 48 07/11/2021    HDL 54 05/10/2021    HDL 53 10/07/2020     Lab Results   Component Value Date    LDLCALC 71 07/11/2021    LDLCALC 148 05/10/2021    LDLCALC 142 10/07/2020     Lab Results   Component Value Date    TRIG 169 (H) 07/11/2021    TRIG 178 (H) 05/10/2021    TRIG 250 10/07/2020     Lab Results   Component Value Date    CHOLHDL 3.2 07/11/2021    CHOLHDL 4.4 05/10/2021    CHOLHDL 4.6 10/07/2020       Lab Results   Component Value Date    HGBA1C 6.6 02/28/2022       Sodium   Date Value Ref Range Status   02/28/2022 132 (L) 136 - 145 mmol/L Final     Potassium   Date Value Ref Range Status   02/28/2022 4.4 3.5 - 5.1 mmol/L Final     Chloride   Date Value Ref Range Status   02/28/2022 99 98 - 107 mmol/L Final     CO2   Date Value Ref Range Status   02/28/2022 25 21 - 32 mmol/L Final     Glucose   Date Value Ref Range Status   02/28/2022 122 (H) 74 - 106 mg/dL Final     BUN   Date Value Ref Range Status   02/28/2022 35 (H) 7 - 18 mg/dL Final     Creatinine   Date Value Ref Range Status   02/28/2022 2.65 (H) 0.70 - 1.30 mg/dL Final     Calcium   Date Value Ref Range Status   02/28/2022 9.0 8.5 - 10.1 mg/dL Final     Total  Protein   Date Value Ref Range Status   02/24/2022 7.5 6.4 - 8.2 g/dL Final     Albumin   Date Value Ref Range Status   02/24/2022 3.4 (L) 3.5 - 5.0 g/dL Final     Bilirubin, Total   Date Value Ref Range Status   02/24/2022 0.7 0.0 - 1.2 mg/dL Final     Alk Phos   Date Value Ref Range Status   02/24/2022 178 (H) 45 - 115 U/L Final     AST   Date Value Ref Range Status   02/24/2022 24 15 - 37 U/L Final     ALT   Date Value Ref Range Status   02/24/2022 24 16 - 61 U/L Final     Anion Gap   Date Value Ref Range Status   02/28/2022 12 7 - 16 mmol/L Final     eGFR    Date Value Ref Range Status   07/25/2021 59 (L) >=60 mL/min/1.73m² Final     eGFR   Date Value Ref Range Status   02/28/2022 25 (L) >=60 mL/min/1.73m² Final         Lab Results   Component Value Date    PSA 6.560 (H) 09/28/2021    (Delete this line if female pt)             **See Completed Assessments for Annual Wellness visit within the encounter summary    The following assessments were completed & reviewed:  · Depression Screening  · Cognitive function Screening  · Timed Get Up Test  · Whisper Test  · Vision Screen  · Health Risk Assessment  · Checklist of ADLs and IADLs      Objective  Vitals:    06/23/22 1504   BP: 128/84   Pulse: 80   Resp: 20   Temp: 97.4 °F (36.3 °C)   SpO2: 99%   Weight: 94.3 kg (208 lb)   Height: 6' (1.829 m)   PainSc: 0-No pain      Body mass index is 28.21 kg/m².  Ideal body weight: 77.6 kg (171 lb 1.2 oz)       Physical Exam      Assessment:     1. Encounter for subsequent annual wellness visit (AWV) in Medicare patient    2. Hx of CABG    3. Coronary artery disease involving native coronary artery of native heart without angina pectoris    4. Mixed hyperlipidemia    5. Tobacco use    6. Essential hypertension    7. TIA (transient ischemic attack)    8. Benign prostatic hyperplasia, unspecified whether lower urinary tract symptoms present     Problem List Items Addressed This Visit        Neuro    TIA (transient  ischemic attack)    Current Assessment & Plan     No recurrence of his TIA.  Will modify as blood pressures well as his lipids.  Follow-up every 3 months.              Cardiac/Vascular    Essential hypertension    Current Assessment & Plan     Blood pressure well controlled no change in medicines follow-up 3 months.           Hyperlipidemia    Current Assessment & Plan     Patient currently be treated for hyperlipidemia.  Last lipid shows cholesterol 153 and LDL of 71.  Continues current treatment.  Follow-up in 6 months for repeat labs.           Coronary artery disease involving native coronary artery of native heart without angina pectoris    Current Assessment & Plan     History of coronary artery disease currently stable status post bypass.  Patient doing well.           Hx of CABG    Current Assessment & Plan     History of coronary artery disease with CABG.  Currently stable.  No symptoms.  No change in medicines and follow-up with cardiology every 6 months.              Renal/    BPH (benign prostatic hyperplasia)    Current Assessment & Plan     Currently stable no change in medications follow-up every 6 months.              Other    Tobacco use    Current Assessment & Plan     Stop smoking is recommended and gave him the stop smoking help line.  Follow-up p.r.n.           Encounter for subsequent annual wellness visit (AWV) in Medicare patient - Primary    Current Assessment & Plan     Patient with native UB today and in stable condition.  No left test today.  Maintain his current medication.  Follow-up in 3 months.                 Plan:    Referrals:       Advised to call office if does not hear from anyone with referral appt within 2-3 weeks to check on status of referral. Voiced understanding.        Discussed and provided with a screening schedule and personal prevention plan in accordance with USPSTF age appropriate recommendations and Medicare screening guidelines.   Education, counseling, and  referrals were provided as needed.  After Visit Summary printed and given to patient which includes written education and a list of any referrals if indicated.     Education including advanced directives, diet, exercise, falls, and preventive health discussed with patient and patient verbalized understanding.      F/u plan for yearly AWV.    Signature:

## 2022-06-23 NOTE — PROGRESS NOTES
WOUND CARE, [facility]  [address]  [place] [address]     PATIENT NAME: Trung Barboza  : 1940  DATE: 22  MRN: [id number]      Billing Provider: WOUND CARE, [facility]  Level of Service:   Patient PCP Information     Provider PCP Type    Lawrence Huerta DO General          Reason for Visit / Chief Complaint: No chief complaint on file.       Update PCP  Update Chief Complaint         History of Present Illness / Problem Focused Workflow     Trung Barboza presents to the clinic c/o 4 small wounds RLE adjacent to his surgical wound  Duration: 2 weeks  Context: Edema  Severity: 1/10  Modifiers: Previous vein harvest RLE, HTN      Review of Systems     Review of Systems   Constitutional: Negative.  Negative for chills, fatigue and fever.   Eyes: Negative for visual disturbance.   Respiratory: Negative for chest tightness and shortness of breath.    Cardiovascular: Negative for chest pain and leg swelling.   Gastrointestinal: Negative for abdominal pain, nausea and vomiting.   Musculoskeletal: Positive for arthralgias.   Integumentary:  Positive for wound (RLE).   Neurological: Negative for weakness and headaches.        Medical / Social / Family History     Past Medical History:   Diagnosis Date    Benign localized prostatic hyperplasia with lower urinary tract symptoms (LUTS)     Coronary artery disease     Elevated PSA     Hypercholesterolemia     Hyperlipidemia     Hypertension     Obesity (BMI 30-39.9)        Past Surgical History:   Procedure Laterality Date    ANGIOGRAM, CORONARY, WITH LEFT HEART CATHETERIZATION N/A 2021    Procedure: ANGIOGRAM,CORONARY,WITH LEFT HEART CATHETERIZATION;  Surgeon: Mahad Villaseñor DO;  [facility];  Service: Cardiology;  Laterality: N/A;    BIOPSY WITH TRANSRECTAL ULTRASOUND (TRUS) GUIDANCE Bilateral 2018    CHOLECYSTECTOMY      [facility]    LEFT HEART CATHETERIZATION Left 7/13/2021    Procedure: Left heart cath;  Surgeon: Philip Torres MD;  Location: [facility] CATH LAB;  Service: Cardiology;  Laterality: Left;       Social History    reports that he quit smoking about 33 years ago. He smoked 0.50 packs per day. He has quit using smokeless tobacco.  His smokeless tobacco use included chew. He reports current alcohol use. He reports that he does not use drugs.    Family History  's family history includes No Known Problems in his brother, daughter, father, mother, sister, and son.    Medications and Allergies     Medications  No outpatient medications have been marked as taking for the 6/23/22 encounter (Clinical Support) with WOUND CARE, [facility].       Allergies  Review of patient's allergies indicates:   Allergen Reactions    Lisinopril Swelling       Physical Examination   There were no vitals filed for this visit.   Physical Exam  Constitutional:       General: He is not in acute distress.     Appearance: Normal appearance.   Cardiovascular:      Rate and Rhythm: Normal rate and regular rhythm.      Pulses: Normal pulses.           Dorsalis pedis pulses are 2+ on the right side.      Heart sounds: Normal heart sounds.   Pulmonary:      Effort: Pulmonary effort is normal. No accessory muscle usage or respiratory distress.      Breath sounds: Normal breath sounds.   Abdominal:      Palpations: Abdomen is soft.   Musculoskeletal:         General: Normal range of motion.      Cervical back: Neck supple.   Skin:     General: Skin is warm and dry.      Capillary Refill: Capillary refill takes 2 to 3 seconds.      Coloration: Skin is not jaundiced or pale.      Findings: Wound present. No ecchymosis or erythema.             Comments: Small superficial venous ulcer, other 2 small ulcers are healed. See wound docs note pictures and measurements   Neurological:      Mental Status: He is alert and oriented to person, place, and time.       Gait: Gait is intact.          Assessment and Plan (including Health Maintenance)      Problem List  Smart Sets  Document Outside HM   :    Plan: Clean wounds with normal saline and pat dry, apply calcium alginate and abd pad. Will use spandagrip for compression. Elevate leg as much as possible.        Health Maintenance Due   Topic Date Due    Shingles Vaccine (1 of 2) Never done    COVID-19 Vaccine (4 - Booster for Moderna series) 03/09/2022       Problem List Items Addressed This Visit    None     Visit Diagnoses     Venous ulcer of right leg    -  Primary        Venous ulcer of right leg       Health Maintenance Topics with due status: Not Due       Topic Last Completion Date    TETANUS VACCINE 12/11/2018    Lipid Panel 07/11/2021    Influenza Vaccine Not Due       Procedures     Future Appointments   Date Time Provider Department Center   6/23/2022  3:00 PM AWV NURSE DECARS[facility]IO [facility] [place]   8/24/2022  8:30 AM Mahad Villaseñor DO [facility] CARD [facility]   9/27/2022  8:30 AM Kb Brown Jr., MD [facility] UROL [facility]   6/29/2023  3:00 PM AWV NURSE ASHLEY [facility] [place]        Follow up in about 1 week (around 6/30/2022).     Signature:  OJNAH Alvarez    Date of encounter: 6/23/22

## 2022-06-30 ENCOUNTER — CLINICAL SUPPORT (OUTPATIENT)
Dept: WOUND CARE | Facility: HOSPITAL | Age: 82
End: 2022-06-30
Attending: NURSE PRACTITIONER
Payer: COMMERCIAL

## 2022-06-30 VITALS
HEART RATE: 76 BPM | RESPIRATION RATE: 16 BRPM | DIASTOLIC BLOOD PRESSURE: 81 MMHG | TEMPERATURE: 97 F | SYSTOLIC BLOOD PRESSURE: 116 MMHG

## 2022-06-30 DIAGNOSIS — M10.9 GOUT, UNSPECIFIED CAUSE, UNSPECIFIED CHRONICITY, UNSPECIFIED SITE: Primary | ICD-10-CM

## 2022-06-30 DIAGNOSIS — L97.919 VENOUS ULCER OF RIGHT LEG: Primary | ICD-10-CM

## 2022-06-30 DIAGNOSIS — I83.019 VENOUS ULCER OF RIGHT LEG: Primary | ICD-10-CM

## 2022-06-30 PROCEDURE — 99212 OFFICE O/P EST SF 10 MIN: CPT

## 2022-06-30 PROCEDURE — 99213 OFFICE O/P EST LOW 20 MIN: CPT | Performed by: NURSE PRACTITIONER

## 2022-06-30 RX ORDER — COLCHICINE 0.6 MG/1
TABLET ORAL
Qty: 30 TABLET | Refills: 11 | Status: SHIPPED | OUTPATIENT
Start: 2022-06-30 | End: 2023-02-07 | Stop reason: SDUPTHER

## 2022-06-30 NOTE — PROGRESS NOTES
WOUND CARE, [facility]  [address]  [place] [address]     PATIENT NAME: Trung Barboza  : 1940  DATE: 22  MRN: [id number]      Billing Provider: WOUND CARE, [facility]  Level of Service:   Patient PCP Information     Provider PCP Type    Lawrence Huerta DO General          Reason for Visit / Chief Complaint: No chief complaint on file.       Update PCP  Update Chief Complaint         History of Present Illness / Problem Focused Workflow     Trung Barboza presents to the clinic c/o 4 small wounds RLE adjacent to his surgical wound  Duration: 2 weeks  Context: Edema  Severity: 1/10  Modifiers: Previous vein harvest RLE, HTN      Review of Systems     Review of Systems   Constitutional: Negative.  Negative for chills, fatigue and fever.   Eyes: Negative for visual disturbance.   Respiratory: Negative for chest tightness and shortness of breath.    Cardiovascular: Negative for chest pain and leg swelling.   Gastrointestinal: Negative for abdominal pain, nausea and vomiting.   Musculoskeletal: Positive for arthralgias.   Integumentary:  Positive for wound (RLE).   Neurological: Negative for weakness and headaches.        Medical / Social / Family History     Past Medical History:   Diagnosis Date    Benign localized prostatic hyperplasia with lower urinary tract symptoms (LUTS)     Coronary artery disease     Elevated PSA     Hypercholesterolemia     Hyperlipidemia     Hypertension     Myocardial infarction     Obesity (BMI 30-39.9)        Past Surgical History:   Procedure Laterality Date    ANGIOGRAM, CORONARY, WITH LEFT HEART CATHETERIZATION N/A 2021    Procedure: ANGIOGRAM,CORONARY,WITH LEFT HEART CATHETERIZATION;  Surgeon: Mahad Villaseñor DO;  Location: [facility] C[facility] LAB;  Service: Cardiology;  Laterality: N/A;    BIOPSY WITH TRANSRECTAL ULTRASOUND (TRUS) GUIDANCE Bilateral 2018    CHOLECYSTECTOMY      CORONARY ARTERY BYPASS GRAFT  2022     [facility]-Dr. Qureshi    LEFT HEART CATHETERIZATION Left 7/13/2021    Procedure: Left heart cath;  Surgeon: Philip Torres MD;  Location: [facility] CATH LAB;  Service: Cardiology;  Laterality: Left;       Social History    reports that he quit smoking about 34 years ago. He smoked 0.50 packs per day. He has quit using smokeless tobacco.  His smokeless tobacco use included chew. He reports current alcohol use. He reports that he does not use drugs.    Family History  's family history includes Heart disease in his mother; Hypertension in his mother; No Known Problems in his brother, daughter, father, sister, and son.    Medications and Allergies     Medications  No outpatient medications have been marked as taking for the 6/30/22 encounter (Clinical Support) with WOUND CARE, [facility].       Allergies  Review of patient's allergies indicates:   Allergen Reactions    Lisinopril Swelling       Physical Examination   There were no vitals filed for this visit.   Physical Exam  Constitutional:       General: He is not in acute distress.     Appearance: Normal appearance.   Cardiovascular:      Rate and Rhythm: Normal rate and regular rhythm.      Pulses: Normal pulses.           Dorsalis pedis pulses are 2+ on the right side.      Heart sounds: Normal heart sounds.   Pulmonary:      Effort: Pulmonary effort is normal. No accessory muscle usage or respiratory distress.      Breath sounds: Normal breath sounds.   Abdominal:      Palpations: Abdomen is soft.   Musculoskeletal:         General: Normal range of motion.      Cervical back: Neck supple.   Skin:     General: Skin is warm and dry.      Capillary Refill: Capillary refill takes 2 to 3 seconds.      Coloration: Skin is not jaundiced or pale.      Findings: Wound present. No ecchymosis or erythema.             Comments:  2 small ulcers are healed. See wound docs note pictures and measurements   Neurological:      Mental Status: He is alert and  oriented to person, place, and time.      Gait: Gait is intact.          Assessment and Plan (including Health Maintenance)      Problem List  Smart Sets  Document Outside HM   :    Plan:  Will use spandagrip for compression. Elevate leg as much as possible and protect healed area.        Health Maintenance Due   Topic Date Due    COVID-19 Vaccine (4 - Booster for Moderna series) 03/09/2022       Problem List Items Addressed This Visit    None     Visit Diagnoses     Venous ulcer of right leg    -  Primary        Venous ulcer of right leg       Health Maintenance Topics with due status: Not Due       Topic Last Completion Date    TETANUS VACCINE 12/11/2018    Lipid Panel 07/11/2021    Influenza Vaccine Not Due       Procedures     Future Appointments   Date Time Provider Department Center   8/24/2022  8:30 AM Mahad Villaseñor DO [facility] CARD [facility]   9/27/2022  8:30 AM Kb Brown Jr., MD [facility] UROL [facility]   6/29/2023  3:00 PM AWV NURSE [place] MARY Gonzalez        Follow up in about 2 years (around 6/30/2024) for follow up to check wound.     Signature:  JONAH Alvarez    Date of encounter: 6/30/22

## 2022-07-05 NOTE — ASSESSMENT & PLAN NOTE
History of coronary artery disease with CABG.  Currently stable.  No symptoms.  No change in medicines and follow-up with cardiology every 6 months.

## 2022-07-05 NOTE — ASSESSMENT & PLAN NOTE
Patient with native UB today and in stable condition.  No left test today.  Maintain his current medication.  Follow-up in 3 months.

## 2022-07-05 NOTE — ASSESSMENT & PLAN NOTE
Patient currently be treated for hyperlipidemia.  Last lipid shows cholesterol 153 and LDL of 71.  Continues current treatment.  Follow-up in 6 months for repeat labs.

## 2022-07-05 NOTE — ASSESSMENT & PLAN NOTE
No recurrence of his TIA.  Will modify as blood pressures well as his lipids.  Follow-up every 3 months.

## 2022-07-14 ENCOUNTER — CLINICAL SUPPORT (OUTPATIENT)
Dept: WOUND CARE | Facility: HOSPITAL | Age: 82
End: 2022-07-14
Attending: NURSE PRACTITIONER
Payer: COMMERCIAL

## 2022-07-14 VITALS
SYSTOLIC BLOOD PRESSURE: 171 MMHG | DIASTOLIC BLOOD PRESSURE: 82 MMHG | HEART RATE: 71 BPM | TEMPERATURE: 97 F | RESPIRATION RATE: 18 BRPM

## 2022-07-14 DIAGNOSIS — L97.919 VENOUS ULCER OF RIGHT LEG: Primary | ICD-10-CM

## 2022-07-14 DIAGNOSIS — I83.019 VENOUS ULCER OF RIGHT LEG: Primary | ICD-10-CM

## 2022-07-14 PROCEDURE — 99212 OFFICE O/P EST SF 10 MIN: CPT | Performed by: NURSE PRACTITIONER

## 2022-07-14 PROCEDURE — 99212 OFFICE O/P EST SF 10 MIN: CPT

## 2022-07-14 NOTE — PROGRESS NOTES
WOUND CARE, [facility]   [facility]  [address]  [place] [address]     PATIENT NAME: Trung Barboza  : 1940  DATE: 22  MRN: [id number]      Billing Provider: WOUND CARE, [facility]  Level of Service:   Patient PCP Information     Provider PCP Type    Lawrence Huerta DO General          Reason for Visit / Chief Complaint: No chief complaint on file.       Update PCP  Update Chief Complaint         History of Present Illness / Problem Focused Workflow     Trung Barboza presents to the clinic c/o 4 small wounds RLE adjacent to his surgical wound  Duration: 2 weeks  Context: Edema  Severity: 1/10  Modifiers: Previous vein harvest RLE, HTN      Review of Systems     Review of Systems   Constitutional: Negative.  Negative for chills, fatigue and fever.   Eyes: Negative for visual disturbance.   Respiratory: Negative for chest tightness and shortness of breath.    Cardiovascular: Negative for chest pain and leg swelling.   Gastrointestinal: Negative for abdominal pain, nausea and vomiting.   Musculoskeletal: Positive for arthralgias.   Integumentary:  Positive for wound (RLE).   Neurological: Negative for weakness and headaches.        Medical / Social / Family History     Past Medical History:   Diagnosis Date    Benign localized prostatic hyperplasia with lower urinary tract symptoms (LUTS)     Coronary artery disease     Elevated PSA     Hypercholesterolemia     Hyperlipidemia     Hypertension     Myocardial infarction     Obesity (BMI 30-39.9)        Past Surgical History:   Procedure Laterality Date    ANGIOGRAM, CORONARY, WITH LEFT HEART CATHETERIZATION N/A 2021    Procedure: ANGIOGRAM,CORONARY,WITH LEFT HEART CATHETERIZATION;  Surgeon: Mahad Villaseñor DO;  [facility] CATH LAB;  Service: Cardiology;  Laterality: N/A;    BIOPSY WITH TRANSRECTAL ULTRASOUND (TRUS) GUIDANCE Bilateral 2018    CHOLECYSTECTOMY      CORONARY ARTERY BYPASS GRAFT  2022     [facility]-Dr. Qureshi    LEFT HEART CATHETERIZATION Left 7/13/2021    Procedure: Left heart cath;  Surgeon: Philip Torres MD;  Location: [facility] CATH LAB;  Service: Cardiology;  Laterality: Left;       Social History    reports that he quit smoking about 34 years ago. He smoked 0.50 packs per day. He has quit using smokeless tobacco.  His smokeless tobacco use included chew. He reports current alcohol use. He reports that he does not use drugs.    Family History  's family history includes Heart disease in his mother; Hypertension in his mother; No Known Problems in his brother, daughter, father, sister, and son.    Medications and Allergies     Medications  No outpatient medications have been marked as taking for the 7/14/22 encounter (Clinical Support) with WOUND CARE, [facility].       Allergies  Review of patient's allergies indicates:   Allergen Reactions    Lisinopril Swelling       Physical Examination   There were no vitals filed for this visit.   Physical Exam  Constitutional:       General: He is not in acute distress.     Appearance: Normal appearance.   Cardiovascular:      Rate and Rhythm: Normal rate and regular rhythm.      Pulses: Normal pulses.           Dorsalis pedis pulses are 2+ on the right side.      Heart sounds: Normal heart sounds.   Pulmonary:      Effort: Pulmonary effort is normal. No accessory muscle usage or respiratory distress.      Breath sounds: Normal breath sounds.   Abdominal:      Palpations: Abdomen is soft.   Musculoskeletal:         General: Normal range of motion.      Cervical back: Neck supple.   Skin:     General: Skin is warm and dry.      Capillary Refill: Capillary refill takes 2 to 3 seconds.      Coloration: Skin is not jaundiced or pale.      Findings: Wound present. No ecchymosis or erythema.             Comments:  Wounds are healed. See wound docs note pictures    Neurological:      Mental Status: He is alert and oriented to person,  place, and time.      Gait: Gait is intact.          Assessment and Plan (including Health Maintenance)      Problem List  Smart Sets  Document Outside HM   :    Plan:  Will use spandagrip for compression. Elevate leg as much as possible and protect healed area.        Health Maintenance Due   Topic Date Due    COVID-19 Vaccine (4 - Booster for Moderna series) 03/09/2022    Lipid Panel  07/11/2022       Problem List Items Addressed This Visit    None     Visit Diagnoses     Venous ulcer of right leg    -  Primary        Venous ulcer of right leg       Health Maintenance Topics with due status: Not Due       Topic Last Completion Date    TETANUS VACCINE 12/11/2018    Influenza Vaccine Not Due       Procedures     Future Appointments   Date Time Provider Department Center   8/24/2022  8:30 AM Mahad Villaseñor DO [facility] CARD [facility]   9/27/2022  8:30 AM Kb Brown Jr., MD [facility] UROL [facility]   6/29/2023  3:00 PM AWV NURSE [facility] [facility] MARY Gonzalez        Follow up if symptoms worsen or fail to improve.     Signature:  JONAH Alvarez    Date of encounter: 7/14/22

## 2022-09-13 ENCOUNTER — TELEPHONE (OUTPATIENT)
Dept: FAMILY MEDICINE | Facility: CLINIC | Age: 82
End: 2022-09-13

## 2022-09-13 RX ORDER — BISACODYL 5 MG/1
1 TABLET, COATED ORAL DAILY
COMMUNITY
Start: 2022-09-11

## 2022-09-13 RX ORDER — CEFUROXIME AXETIL 500 MG/1
500 TABLET ORAL 2 TIMES DAILY
COMMUNITY
Start: 2022-09-12 | End: 2023-08-07 | Stop reason: ALTCHOICE

## 2022-09-13 NOTE — TELEPHONE ENCOUNTER
Spoke with pt for TCC call. Pt reports he is doing much better. He reports his urine is almost 100% clear. Pt denies any pain. He states his appetite is good and states he slept well last night. Pt reports he lives with his wife. He states he takes care of himself and takes care of his wife who is blind. He states he has a son that assist as needed and takes him to appts, etc. Pt vu of all f/u appts. He is not current with HH, states he was discharged a week before hospital visit. He denies any DME needs. Meds reviewed. Instructed to take meds to dr trimble. Thanh angel. Pt denies any needs at this time. Instructed to notify his dr for new or worsening s/s or problems. Thanh angel.

## 2022-09-15 ENCOUNTER — OFFICE VISIT (OUTPATIENT)
Dept: FAMILY MEDICINE | Facility: CLINIC | Age: 82
End: 2022-09-15
Payer: COMMERCIAL

## 2022-09-15 VITALS
BODY MASS INDEX: 27.63 KG/M2 | OXYGEN SATURATION: 97 % | SYSTOLIC BLOOD PRESSURE: 144 MMHG | HEIGHT: 72 IN | TEMPERATURE: 98 F | HEART RATE: 64 BPM | RESPIRATION RATE: 20 BRPM | DIASTOLIC BLOOD PRESSURE: 64 MMHG | WEIGHT: 204 LBS

## 2022-09-15 DIAGNOSIS — K21.9 GASTROESOPHAGEAL REFLUX DISEASE, UNSPECIFIED WHETHER ESOPHAGITIS PRESENT: ICD-10-CM

## 2022-09-15 DIAGNOSIS — R31.9 HEMATURIA, UNSPECIFIED TYPE: Primary | ICD-10-CM

## 2022-09-15 DIAGNOSIS — T50.2X5A DIURETIC-INDUCED HYPOKALEMIA: ICD-10-CM

## 2022-09-15 DIAGNOSIS — Z95.1 HX OF CABG: ICD-10-CM

## 2022-09-15 DIAGNOSIS — I10 ESSENTIAL HYPERTENSION: ICD-10-CM

## 2022-09-15 DIAGNOSIS — E87.6 DIURETIC-INDUCED HYPOKALEMIA: ICD-10-CM

## 2022-09-15 LAB
BILIRUB SERPL-MCNC: NEGATIVE MG/DL
BLOOD URINE, POC: ABNORMAL
COLOR, POC UA: YELLOW
GLUCOSE UR QL STRIP: NEGATIVE
KETONES UR QL STRIP: NEGATIVE
LEUKOCYTE ESTERASE URINE, POC: ABNORMAL
NITRITE, POC UA: NEGATIVE
PH, POC UA: 5.5
PROTEIN, POC: ABNORMAL
SPECIFIC GRAVITY, POC UA: 1.01
UROBILINOGEN, POC UA: 0.2

## 2022-09-15 PROCEDURE — 3078F DIAST BP <80 MM HG: CPT | Mod: ,,, | Performed by: NURSE PRACTITIONER

## 2022-09-15 PROCEDURE — 1126F AMNT PAIN NOTED NONE PRSNT: CPT | Mod: ,,, | Performed by: NURSE PRACTITIONER

## 2022-09-15 PROCEDURE — 3288F PR FALLS RISK ASSESSMENT DOCUMENTED: ICD-10-PCS | Mod: ,,, | Performed by: NURSE PRACTITIONER

## 2022-09-15 PROCEDURE — 1126F PR PAIN SEVERITY QUANTIFIED, NO PAIN PRESENT: ICD-10-PCS | Mod: ,,, | Performed by: NURSE PRACTITIONER

## 2022-09-15 PROCEDURE — 1159F PR MEDICATION LIST DOCUMENTED IN MEDICAL RECORD: ICD-10-PCS | Mod: ,,, | Performed by: NURSE PRACTITIONER

## 2022-09-15 PROCEDURE — 1159F MED LIST DOCD IN RCRD: CPT | Mod: ,,, | Performed by: NURSE PRACTITIONER

## 2022-09-15 PROCEDURE — 3077F PR MOST RECENT SYSTOLIC BLOOD PRESSURE >= 140 MM HG: ICD-10-PCS | Mod: ,,, | Performed by: NURSE PRACTITIONER

## 2022-09-15 PROCEDURE — 1160F PR REVIEW ALL MEDS BY PRESCRIBER/CLIN PHARMACIST DOCUMENTED: ICD-10-PCS | Mod: ,,, | Performed by: NURSE PRACTITIONER

## 2022-09-15 PROCEDURE — 1160F RVW MEDS BY RX/DR IN RCRD: CPT | Mod: ,,, | Performed by: NURSE PRACTITIONER

## 2022-09-15 PROCEDURE — 1101F PT FALLS ASSESS-DOCD LE1/YR: CPT | Mod: ,,, | Performed by: NURSE PRACTITIONER

## 2022-09-15 PROCEDURE — 1101F PR PT FALLS ASSESS DOC 0-1 FALLS W/OUT INJ PAST YR: ICD-10-PCS | Mod: ,,, | Performed by: NURSE PRACTITIONER

## 2022-09-15 PROCEDURE — 81003 POCT URINALYSIS W/O SCOPE: ICD-10-PCS | Mod: QW,,, | Performed by: NURSE PRACTITIONER

## 2022-09-15 PROCEDURE — 99495 TRANSJ CARE MGMT MOD F2F 14D: CPT | Mod: ,,, | Performed by: NURSE PRACTITIONER

## 2022-09-15 PROCEDURE — 3288F FALL RISK ASSESSMENT DOCD: CPT | Mod: ,,, | Performed by: NURSE PRACTITIONER

## 2022-09-15 PROCEDURE — 99495 TCM SERVICES (MODERATE COMPLEXITY): ICD-10-PCS | Mod: ,,, | Performed by: NURSE PRACTITIONER

## 2022-09-15 PROCEDURE — 3077F SYST BP >= 140 MM HG: CPT | Mod: ,,, | Performed by: NURSE PRACTITIONER

## 2022-09-15 PROCEDURE — 3078F PR MOST RECENT DIASTOLIC BLOOD PRESSURE < 80 MM HG: ICD-10-PCS | Mod: ,,, | Performed by: NURSE PRACTITIONER

## 2022-09-15 PROCEDURE — 81003 URINALYSIS AUTO W/O SCOPE: CPT | Mod: QW,,, | Performed by: NURSE PRACTITIONER

## 2022-09-15 RX ORDER — AMLODIPINE BESYLATE 5 MG/1
5 TABLET ORAL DAILY
Qty: 90 TABLET | Refills: 1 | Status: SHIPPED | OUTPATIENT
Start: 2022-09-15 | End: 2022-09-27 | Stop reason: SDUPTHER

## 2022-09-15 RX ORDER — POTASSIUM CHLORIDE 750 MG/1
10 TABLET, EXTENDED RELEASE ORAL DAILY
Qty: 90 TABLET | Refills: 1 | Status: SHIPPED | OUTPATIENT
Start: 2022-09-15 | End: 2023-02-07 | Stop reason: SDUPTHER

## 2022-09-15 RX ORDER — PANTOPRAZOLE SODIUM 40 MG/1
40 TABLET, DELAYED RELEASE ORAL DAILY
Qty: 90 TABLET | Refills: 1 | Status: SHIPPED | OUTPATIENT
Start: 2022-09-15 | End: 2023-02-07 | Stop reason: SDUPTHER

## 2022-09-15 NOTE — PROGRESS NOTES
Swati Vicente NP   [facility]  [address]  [place]  [address]      PATIENT NAME: Trung Barboza  : 1940  DATE: 9/15/22  MRN: [id number]      Billing Provider: Swati Vicente NP  Level of Service: AL OFFICE/OUTPT VISIT, EST, LEVL III, 20-29 MIN  Patient PCP Information       Provider PCP Type    Lawrence Huerta DO General            Reason for Visit / Chief Complaint: Transitional Care (Patient is follow up from hospital. He was admitted to [facility] for hematuria. He was found to have a low blood count. His Brillinta was held from 2022. He will see cardiology,,on 22. He is not currently taking any blood thinners. He had CABG with Dr. Shen in 2022.) and Urinary Tract Infection (Patient was also treated for UTI while in the hospital. He has one more tablet left to take on his antibiotics.)       Update PCP  Update Chief Complaint         History of Present Illness / Problem Focused Workflow     Trung Barboza presents to the clinic with Transitional Care (Patient is follow up from hospital. He was admitted to [facility] for hematuria. He was found to have a low blood count. His Brillinta was held from 2022. He will see cardiology,,on 22. He is not currently taking any blood thinners. He had CABG with Dr. Shen in 2022.) and Urinary Tract Infection (Patient was also treated for UTI while in the hospital. He has one more tablet left to take on his antibiotics.)     82 year old male presents to clinic for hospital follow up. He was admitted to [facility] last week for gross hematuria. He was found to have UTI and his blood counts were low. He was treated with Ceftin for UTI and has one tablet left. His Brillinta was held from 2022. He will see cardiology, ,on 22. He is not currently taking any blood thinners. He is taking an 81mg ASA daily. He had CABG with Dr. Shen in 2022.   Patient  states he is feeling better. States urine now appears clear and he has not noticed any blood in stool. He denies any chest pain, shortness of breath, or palpitations. He denies any urgency or burning with urination. He does report he has some frequency but has had that for some time now and thinks it may be related to his prostate. He denies other problems at today's visit.       Review of Systems     @Review of Systems   Constitutional:  Negative for activity change, appetite change, fatigue and fever.   HENT:  Negative for nasal congestion, ear pain, rhinorrhea, sinus pressure/congestion and sore throat.    Eyes:  Negative for pain, redness, visual disturbance and eye dryness.   Respiratory:  Negative for cough and shortness of breath.    Cardiovascular:  Negative for chest pain and leg swelling.   Gastrointestinal:  Negative for abdominal distention, abdominal pain, constipation and diarrhea.   Endocrine: Negative for cold intolerance, heat intolerance and polyuria.   Genitourinary:  Negative for bladder incontinence, dysuria, frequency and urgency.   Musculoskeletal:  Negative for arthralgias, gait problem and myalgias.   Integumentary:  Negative for color change, rash and wound.   Allergic/Immunologic: Negative for environmental allergies and food allergies.   Neurological:  Negative for dizziness, weakness, light-headedness and headaches.   Psychiatric/Behavioral:  Negative for behavioral problems and sleep disturbance.      Medical / Social / Family History     Past Medical History:   Diagnosis Date    Benign localized prostatic hyperplasia with lower urinary tract symptoms (LUTS)     Coronary artery disease     Elevated PSA     Hypercholesterolemia     Hyperlipidemia     Hypertension     Myocardial infarction     Obesity (BMI 30-39.9)        Past Surgical History:   Procedure Laterality Date    ANGIOGRAM, CORONARY, WITH LEFT HEART CATHETERIZATION N/A 12/31/2021    Procedure: ANGIOGRAM,CORONARY,WITH LEFT  HEART CATHETERIZATION;  Surgeon: Mahad Villaseñor DO;  Location: [facility] CATH LAB;  Service: Cardiology;  Laterality: N/A;    BIOPSY WITH TRANSRECTAL ULTRASOUND (TRUS) GUIDANCE Bilateral 03/07/2018    CHOLECYSTECTOMY      CORONARY ARTERY BYPASS GRAFT  01/07/2022    [facility]-Dr. Qureshi    LEFT HEART CATHETERIZATION Left 7/13/2021    Procedure: Left heart cath;  Surgeon: Philip Torres MD;  Location: [facility] CATH LAB;  Service: Cardiology;  Laterality: Left;       Social History    reports that he quit smoking about 34 years ago. He smoked an average of .5 packs per day. He has quit using smokeless tobacco.  His smokeless tobacco use included chew. He reports current alcohol use. He reports that he does not use drugs.    Family History  's family history includes Heart disease in his mother; Hypertension in his mother; No Known Problems in his brother, daughter, father, sister, and son.    Medications and Allergies     Medications  Outpatient Medications Marked as Taking for the 9/15/22 encounter (Office Visit) with Swati Vicente NP   Medication Sig Dispense Refill    aspirin (ECOTRIN) 81 MG EC tablet Take 1 tablet (81 mg total) by mouth once daily. 30 tablet 0    atorvastatin (LIPITOR) 80 MG tablet Take 1 tablet (80 mg total) by mouth every evening. 90 tablet 3    cefUROXime (CEFTIN) 500 MG tablet Take 500 mg by mouth 2 (two) times daily.      colchicine (COLCRYS) 0.6 mg tablet TAKE 2 TABLETS TODAY, AND THEN TAKE ONE DAILY UNTIL GOUT ATTACK RESOLVING. 30 tablet 11    furosemide (LASIX) 20 MG tablet Take 1 tablet by mouth once daily 30 tablet 0    hydroCHLOROthiazide (HYDRODIURIL) 12.5 MG Tab Take 1 tablet (12.5 mg total) by mouth once daily. 30 tablet 2    multivitamin-minerals-lutein (MULTIVITAMIN 50 PLUS) Tab Take 1 tablet by mouth once daily.      [DISCONTINUED] amLODIPine (NORVASC) 5 MG tablet Take 1 tablet (5 mg total) by mouth once daily. 90 tablet 1    [DISCONTINUED] pantoprazole  (PROTONIX) 40 MG tablet Take 1 tablet by mouth once daily 30 tablet 0    [DISCONTINUED] potassium chloride (KLOR-CON) 10 MEQ TbSR Take 1 tablet (10 mEq total) by mouth once daily. 90 tablet 1       Allergies  Review of patient's allergies indicates:   Allergen Reactions    Lisinopril Swelling       Physical Examination     Vitals:    09/15/22 0821   BP: (!) 144/64   Pulse: 64   Resp: 20   Temp: 98.3 °F (36.8 °C)     Physical Exam  Vitals and nursing note reviewed.   HENT:      Head: Normocephalic.      Right Ear: Tympanic membrane normal.      Left Ear: Tympanic membrane normal.      Nose: Nose normal.      Mouth/Throat:      Mouth: Mucous membranes are moist.      Pharynx: Oropharynx is clear. No posterior oropharyngeal erythema.   Eyes:      Conjunctiva/sclera: Conjunctivae normal.   Cardiovascular:      Rate and Rhythm: Normal rate and regular rhythm.      Heart sounds: Normal heart sounds.   Pulmonary:      Effort: Pulmonary effort is normal.      Breath sounds: Normal breath sounds.   Abdominal:      General: Abdomen is flat. Bowel sounds are normal. There is no distension.      Palpations: Abdomen is soft.   Musculoskeletal:         General: No swelling or tenderness. Normal range of motion.      Cervical back: Normal range of motion.      Right lower leg: No edema.      Left lower leg: No edema.   Skin:     General: Skin is warm and dry.   Neurological:      Mental Status: He is alert. Mental status is at baseline.   Psychiatric:         Mood and Affect: Mood normal.         Behavior: Behavior normal.             Lab Results   Component Value Date    WBC 8.10 02/24/2022    HGB 12.0 (L) 02/24/2022    HCT 34.9 (L) 02/24/2022    MCV 91.1 02/24/2022     02/24/2022          Sodium   Date Value Ref Range Status   02/28/2022 132 (L) 136 - 145 mmol/L Final     Potassium   Date Value Ref Range Status   02/28/2022 4.4 3.5 - 5.1 mmol/L Final     Chloride   Date Value Ref Range Status   02/28/2022 99 98 - 107  mmol/L Final     CO2   Date Value Ref Range Status   02/28/2022 25 21 - 32 mmol/L Final     Glucose   Date Value Ref Range Status   02/28/2022 122 (H) 74 - 106 mg/dL Final     BUN   Date Value Ref Range Status   02/28/2022 35 (H) 7 - 18 mg/dL Final     Creatinine   Date Value Ref Range Status   02/28/2022 2.65 (H) 0.70 - 1.30 mg/dL Final     Calcium   Date Value Ref Range Status   02/28/2022 9.0 8.5 - 10.1 mg/dL Final     Total Protein   Date Value Ref Range Status   02/24/2022 7.5 6.4 - 8.2 g/dL Final     Albumin   Date Value Ref Range Status   02/24/2022 3.4 (L) 3.5 - 5.0 g/dL Final     Bilirubin, Total   Date Value Ref Range Status   02/24/2022 0.7 0.0 - 1.2 mg/dL Final     Alk Phos   Date Value Ref Range Status   02/24/2022 178 (H) 45 - 115 U/L Final     AST   Date Value Ref Range Status   02/24/2022 24 15 - 37 U/L Final     ALT   Date Value Ref Range Status   02/24/2022 24 16 - 61 U/L Final     Anion Gap   Date Value Ref Range Status   02/28/2022 12 7 - 16 mmol/L Final     eGFR    Date Value Ref Range Status   07/25/2021 59 (L) >=60 mL/min/1.73m² Final     eGFR   Date Value Ref Range Status   02/28/2022 25 (L) >=60 mL/min/1.73m² Final      US ARTERIAL DOPPLER EXAM  Narrative: EXAMINATION:  ABIs    CLINICAL HISTORY:  Nonhealing wound right vein harvest site for CABG    TECHNIQUE:  Continuous wave Doppler common pneumatic cuffs    COMPARISON:  Femoral areas 03/10/2022    FINDINGS:  Right brachial pressure 135 mmHg, right SHADY is 0.73    Left brachial pressure is 133 mmHg, left SHADY is 0.73  Impression: Moderate peripheral vascular disease bilateral lower extremities is slightly lower SHADY compared to previous study    Electronically signed by: Margareth Del Rosario  Date:    04/12/2022  Time:    16:27     Procedures   Assessment and Plan (including Health Maintenance)      Problem List  Smart Sets  Document Outside HM   :    Plan:           Problem List Items Addressed This Visit          Cardiac/Vascular     Essential hypertension    Current Assessment & Plan     Blood pressure slightly elevated at today's visit. We will continue current meds and follow up in 3 months.          Relevant Medications    amLODIPine (NORVASC) 5 MG tablet    Hx of CABG    Current Assessment & Plan     Talked with patient and daughter about the fact that due to his recent history of CABG (January 2022) he was at risk for forming clot. However at this time the risk of his blood being too thin outweighs the benefits. He should continue to hold Brilinta until follow up with Dr Villaseñor on 9/20/22, continue his ASA 81mg daily. Instructed if patient experiences any shortness of breath, palpitations, or chest pain he should go to nearest ED or follow up in clinic. Follow in one month.             Renal/    Hematuria - Primary    Current Assessment & Plan     Urine today was yellow in color but UA revealed large amount of blood and trace WBCs. Instructed patient to complete last dose of Ceftin and continue to hold Brillinta until follow up with Dr Villaseñor on 9/20.         Relevant Orders    POCT URINALYSIS W/O SCOPE (Completed)    Diuretic-induced hypokalemia    Current Assessment & Plan     Refill his potassium and continue to take as ordered. Follow up for labs in 3 months.          Relevant Medications    potassium chloride (KLOR-CON) 10 MEQ TbSR       GI    Gastroesophageal reflux disease    Current Assessment & Plan     Well controlled with current mediations. Will refill protonix. Follow up in 6 months or as needed.          Relevant Medications    pantoprazole (PROTONIX) 40 MG tablet       Health Maintenance Topics with due status: Not Due       Topic Last Completion Date    TETANUS VACCINE 12/11/2018       Future Appointments   Date Time Provider Department Center   9/27/2022  8:30 AM Kb Brown Jr., MD [facility]OL [facility]   10/31/2022 11:15 AM Mahad Villaseñor DO [facility]   6/29/2023  3:00 PM AWV NURSE ASH[facility] MARY Godinez  [facility]        Health Maintenance Due   Topic Date Due    COVID-19 Vaccine (4 - Booster for Moderna series) 03/09/2022    Lipid Panel  07/11/2022    Influenza Vaccine (1) Never done        Follow up in about 4 weeks (around 10/13/2022), or if symptoms worsen or fail to improve.     Signature:  Swati Vicente NP  [facility]  [address] [address] [place]  [address]    Date of encounter: 9/15/22

## 2022-09-15 NOTE — ASSESSMENT & PLAN NOTE
Talked with patient and daughter about the fact that due to his recent history of CABG (January 2022) he was at risk for forming clot. However at this time the risk of his blood being too thin outweighs the benefits. He should continue to hold Brilinta until follow up with Dr Villaseñor on 9/20/22, continue his ASA 81mg daily. Instructed if patient experiences any shortness of breath, palpitations, or chest pain he should go to nearest ED or follow up in clinic. Follow in one month.

## 2022-09-15 NOTE — ASSESSMENT & PLAN NOTE
Well controlled with current mediations. Will refill protonix. Follow up in 6 months or as needed.

## 2022-09-15 NOTE — ASSESSMENT & PLAN NOTE
Urine today was yellow in color but UA revealed large amount of blood and trace WBCs. Instructed patient to complete last dose of Ceftin and continue to hold Brillinta until follow up with Dr Villaseñor on 9/20.

## 2022-09-15 NOTE — ASSESSMENT & PLAN NOTE
Blood pressure slightly elevated at today's visit. We will continue current meds and follow up in 3 months.

## 2022-09-26 PROBLEM — Z00.00 ENCOUNTER FOR SUBSEQUENT ANNUAL WELLNESS VISIT (AWV) IN MEDICARE PATIENT: Status: RESOLVED | Noted: 2022-06-23 | Resolved: 2022-09-26

## 2022-09-27 ENCOUNTER — OFFICE VISIT (OUTPATIENT)
Dept: CARDIOLOGY | Facility: CLINIC | Age: 82
End: 2022-09-27
Payer: COMMERCIAL

## 2022-09-27 ENCOUNTER — OFFICE VISIT (OUTPATIENT)
Dept: UROLOGY | Facility: CLINIC | Age: 82
End: 2022-09-27
Payer: COMMERCIAL

## 2022-09-27 VITALS
HEART RATE: 86 BPM | WEIGHT: 203 LBS | DIASTOLIC BLOOD PRESSURE: 84 MMHG | SYSTOLIC BLOOD PRESSURE: 141 MMHG | HEIGHT: 72 IN | BODY MASS INDEX: 27.5 KG/M2

## 2022-09-27 VITALS
HEART RATE: 71 BPM | HEIGHT: 72 IN | OXYGEN SATURATION: 99 % | BODY MASS INDEX: 27.5 KG/M2 | DIASTOLIC BLOOD PRESSURE: 78 MMHG | WEIGHT: 203 LBS | SYSTOLIC BLOOD PRESSURE: 102 MMHG

## 2022-09-27 DIAGNOSIS — N32.0 BLADDER OUTLET OBSTRUCTION: ICD-10-CM

## 2022-09-27 DIAGNOSIS — N40.1 BENIGN PROSTATIC HYPERPLASIA WITH URINARY OBSTRUCTION: ICD-10-CM

## 2022-09-27 DIAGNOSIS — R97.20 ELEVATED PSA: Primary | ICD-10-CM

## 2022-09-27 DIAGNOSIS — Z95.1 HX OF CABG: ICD-10-CM

## 2022-09-27 DIAGNOSIS — N18.31 STAGE 3A CHRONIC KIDNEY DISEASE: ICD-10-CM

## 2022-09-27 DIAGNOSIS — E78.5 HYPERLIPIDEMIA, UNSPECIFIED HYPERLIPIDEMIA TYPE: ICD-10-CM

## 2022-09-27 DIAGNOSIS — R31.29 MICROSCOPIC HEMATURIA: ICD-10-CM

## 2022-09-27 DIAGNOSIS — R31.9 HEMATURIA, UNSPECIFIED TYPE: Primary | ICD-10-CM

## 2022-09-27 DIAGNOSIS — I25.10 CORONARY ARTERY DISEASE, UNSPECIFIED VESSEL OR LESION TYPE, UNSPECIFIED WHETHER ANGINA PRESENT, UNSPECIFIED WHETHER NATIVE OR TRANSPLANTED HEART: ICD-10-CM

## 2022-09-27 DIAGNOSIS — N13.8 BENIGN PROSTATIC HYPERPLASIA WITH URINARY OBSTRUCTION: ICD-10-CM

## 2022-09-27 DIAGNOSIS — N41.1 CHRONIC PROSTATITIS: ICD-10-CM

## 2022-09-27 DIAGNOSIS — I10 ESSENTIAL HYPERTENSION: ICD-10-CM

## 2022-09-27 PROCEDURE — 99214 OFFICE O/P EST MOD 30 MIN: CPT | Mod: PBBFAC | Performed by: UROLOGY

## 2022-09-27 PROCEDURE — 1126F AMNT PAIN NOTED NONE PRSNT: CPT | Mod: CPTII,,, | Performed by: UROLOGY

## 2022-09-27 PROCEDURE — 3079F PR MOST RECENT DIASTOLIC BLOOD PRESSURE 80-89 MM HG: ICD-10-PCS | Mod: CPTII,,, | Performed by: UROLOGY

## 2022-09-27 PROCEDURE — 93010 ELECTROCARDIOGRAM REPORT: CPT | Mod: S$PBB,,, | Performed by: INTERNAL MEDICINE

## 2022-09-27 PROCEDURE — 3288F FALL RISK ASSESSMENT DOCD: CPT | Mod: CPTII,,, | Performed by: UROLOGY

## 2022-09-27 PROCEDURE — 93005 ELECTROCARDIOGRAM TRACING: CPT | Mod: PBBFAC | Performed by: INTERNAL MEDICINE

## 2022-09-27 PROCEDURE — 3079F DIAST BP 80-89 MM HG: CPT | Mod: CPTII,,, | Performed by: UROLOGY

## 2022-09-27 PROCEDURE — 3288F PR FALLS RISK ASSESSMENT DOCUMENTED: ICD-10-PCS | Mod: CPTII,,, | Performed by: UROLOGY

## 2022-09-27 PROCEDURE — 3078F PR MOST RECENT DIASTOLIC BLOOD PRESSURE < 80 MM HG: ICD-10-PCS | Mod: CPTII,,, | Performed by: NURSE PRACTITIONER

## 2022-09-27 PROCEDURE — 3074F SYST BP LT 130 MM HG: CPT | Mod: CPTII,,, | Performed by: NURSE PRACTITIONER

## 2022-09-27 PROCEDURE — 1126F PR PAIN SEVERITY QUANTIFIED, NO PAIN PRESENT: ICD-10-PCS | Mod: CPTII,,, | Performed by: UROLOGY

## 2022-09-27 PROCEDURE — 3074F PR MOST RECENT SYSTOLIC BLOOD PRESSURE < 130 MM HG: ICD-10-PCS | Mod: CPTII,,, | Performed by: NURSE PRACTITIONER

## 2022-09-27 PROCEDURE — 93010 EKG 12-LEAD: ICD-10-PCS | Mod: S$PBB,,, | Performed by: INTERNAL MEDICINE

## 2022-09-27 PROCEDURE — 1159F PR MEDICATION LIST DOCUMENTED IN MEDICAL RECORD: ICD-10-PCS | Mod: CPTII,,, | Performed by: NURSE PRACTITIONER

## 2022-09-27 PROCEDURE — 99214 OFFICE O/P EST MOD 30 MIN: CPT | Mod: PBBFAC | Performed by: NURSE PRACTITIONER

## 2022-09-27 PROCEDURE — 1159F MED LIST DOCD IN RCRD: CPT | Mod: CPTII,,, | Performed by: NURSE PRACTITIONER

## 2022-09-27 PROCEDURE — 99213 OFFICE O/P EST LOW 20 MIN: CPT | Mod: S$PBB,,, | Performed by: NURSE PRACTITIONER

## 2022-09-27 PROCEDURE — 3077F SYST BP >= 140 MM HG: CPT | Mod: CPTII,,, | Performed by: UROLOGY

## 2022-09-27 PROCEDURE — 1160F RVW MEDS BY RX/DR IN RCRD: CPT | Mod: CPTII,,, | Performed by: NURSE PRACTITIONER

## 2022-09-27 PROCEDURE — 1101F PT FALLS ASSESS-DOCD LE1/YR: CPT | Mod: CPTII,,, | Performed by: UROLOGY

## 2022-09-27 PROCEDURE — 99213 OFFICE O/P EST LOW 20 MIN: CPT | Mod: S$PBB,,, | Performed by: UROLOGY

## 2022-09-27 PROCEDURE — 1160F PR REVIEW ALL MEDS BY PRESCRIBER/CLIN PHARMACIST DOCUMENTED: ICD-10-PCS | Mod: CPTII,,, | Performed by: NURSE PRACTITIONER

## 2022-09-27 PROCEDURE — 99213 PR OFFICE/OUTPT VISIT, EST, LEVL III, 20-29 MIN: ICD-10-PCS | Mod: S$PBB,,, | Performed by: UROLOGY

## 2022-09-27 PROCEDURE — 1101F PR PT FALLS ASSESS DOC 0-1 FALLS W/OUT INJ PAST YR: ICD-10-PCS | Mod: CPTII,,, | Performed by: UROLOGY

## 2022-09-27 PROCEDURE — 99213 PR OFFICE/OUTPT VISIT, EST, LEVL III, 20-29 MIN: ICD-10-PCS | Mod: S$PBB,,, | Performed by: NURSE PRACTITIONER

## 2022-09-27 PROCEDURE — 3078F DIAST BP <80 MM HG: CPT | Mod: CPTII,,, | Performed by: NURSE PRACTITIONER

## 2022-09-27 PROCEDURE — 3077F PR MOST RECENT SYSTOLIC BLOOD PRESSURE >= 140 MM HG: ICD-10-PCS | Mod: CPTII,,, | Performed by: UROLOGY

## 2022-09-27 NOTE — PROGRESS NOTES
PCP: Lawrence Huerta DO    Referring Provider:     Subjective:   Trung Barboza is a 82 y.o. male with hx of CAD s/p CABG 1/2022, HTN, and HLD who presents for hospital discharge from [facility] following hematuria on Brilinta and asa. Reports hematuria has resolved. Brilinta was discontinued on 9/11/2022. Occasional chest discomfort with exertion which has been present since CABG, no new or worsening symptoms.        Fhx: Mother HTN, heart diseas  Shx: CABG    EKG 9/27/2022: SR with 1st degree AV block, T wave inversion in inferior leads   ECHO 12/31/2021: EF 55%, LV diastolic dysfunction, moderate LA enlargement, mild-mod AR, mild MR, TR, OR   C 7/13/2021: PCI of RCA with 2 KEHINDE        12/31/2021: Severe 3 vessel CAD. CV surgery consult  CABG 1/22/2022: Bypass x 3, Dr. Shen    Lab Results   Component Value Date     (L) 02/28/2022    K 4.4 02/28/2022    CL 99 02/28/2022    CO2 25 02/28/2022    BUN 35 (H) 02/28/2022    CREATININE 2.65 (H) 02/28/2022    CALCIUM 9.0 02/28/2022    ANIONGAP 12 02/28/2022    ESTGFRAFRICA 59 (L) 07/25/2021    EGFRNONAA 25 (L) 02/28/2022       Lab Results   Component Value Date    CHOL 153 07/11/2021    CHOL 238 (H) 05/10/2021    CHOL 245 10/07/2020     Lab Results   Component Value Date    HDL 48 07/11/2021    HDL 54 05/10/2021    HDL 53 10/07/2020     Lab Results   Component Value Date    LDLCALC 71 07/11/2021    LDLCALC 148 05/10/2021    LDLCALC 142 10/07/2020     Lab Results   Component Value Date    TRIG 169 (H) 07/11/2021    TRIG 178 (H) 05/10/2021    TRIG 250 10/07/2020     Lab Results   Component Value Date    CHOLHDL 3.2 07/11/2021    CHOLHDL 4.4 05/10/2021    CHOLHDL 4.6 10/07/2020       Lab Results   Component Value Date    WBC 8.10 02/24/2022    HGB 12.0 (L) 02/24/2022    HCT 34.9 (L) 02/24/2022    MCV 91.1 02/24/2022     02/24/2022           Current Outpatient Medications:     aspirin (ECOTRIN) 81 MG EC tablet, Take 1 tablet (81 mg total) by mouth once daily., Disp:  30 tablet, Rfl: 0    atorvastatin (LIPITOR) 80 MG tablet, Take 1 tablet (80 mg total) by mouth every evening., Disp: 90 tablet, Rfl: 3    colchicine (COLCRYS) 0.6 mg tablet, TAKE 2 TABLETS TODAY, AND THEN TAKE ONE DAILY UNTIL GOUT ATTACK RESOLVING., Disp: 30 tablet, Rfl: 11    multivitamin-minerals-lutein (MULTIVITAMIN 50 PLUS) Tab, Take 1 tablet by mouth once daily., Disp: , Rfl:     pantoprazole (PROTONIX) 40 MG tablet, Take 1 tablet (40 mg total) by mouth once daily., Disp: 90 tablet, Rfl: 1    potassium chloride (KLOR-CON) 10 MEQ TbSR, Take 1 tablet (10 mEq total) by mouth once daily., Disp: 90 tablet, Rfl: 1    amLODIPine (NORVASC) 5 MG tablet, Take 1 tablet (5 mg total) by mouth once daily., Disp: 90 tablet, Rfl: 1    cefUROXime (CEFTIN) 500 MG tablet, Take 500 mg by mouth 2 (two) times daily., Disp: , Rfl:     furosemide (LASIX) 20 MG tablet, Take 1 tablet (20 mg total) by mouth once daily., Disp: 90 tablet, Rfl: 1    hydroCHLOROthiazide (HYDRODIURIL) 12.5 MG Tab, Take 1 tablet (12.5 mg total) by mouth once daily., Disp: 90 tablet, Rfl: 1    ROS      Objective:   /78 (BP Location: Left arm, Patient Position: Sitting)   Pulse 71   Ht 6' (1.829 m)   Wt 92.1 kg (203 lb)   SpO2 99%   BMI 27.53 kg/m²     Physical Exam      Assessment:     1. Hematuria, unspecified type        2. Coronary artery disease, unspecified vessel or lesion type, unspecified whether angina present, unspecified whether native or transplanted heart  EKG 12-lead    EKG 12-lead      3. Essential hypertension  furosemide (LASIX) 20 MG tablet    amLODIPine (NORVASC) 5 MG tablet      4. Stage 3a chronic kidney disease  furosemide (LASIX) 20 MG tablet      5. Hx of CABG        6. Hyperlipidemia, unspecified hyperlipidemia type              Plan:   Hx of CABG  1/22/2022 bypass x 3, Dr. Hermelinda Cook discontinued on 9/11/2022 after hospitalization for hematuria.  Previous PCI x 2 RCA in 7/2021  Per latisha Silver to discontinue  brilinta; continue asa and statin  Keep scheduled follow up with Dr. Villaseñor on Oct 31, 2022  Will need lipid profile at follow up    Hematuria  Recently discharged from [facility] 9/12/2022  Brilinta held since 9/11/2022  Hematuria resolved  Hx of PCI 7/2021, CABG 1/2022; Per Dr. Villaseñor, okay to discontinue Brilinta. Continue asa 81 and statin      Essential hypertension  Well controlled, continue current medical management    Hyperlipidemia  Lipid profile at next follow up, 10/31/2022

## 2022-09-27 NOTE — PATIENT INSTRUCTIONS
PSA pending when patient left the office.  Patient will have 6 month appointment with another PSA or sooner if needed    PSA of 13.00 called to patient's daughter Radha, new appointment given for Tues. 3/28/23 at 9:30 per Dr. Brown.  Radha verbalized understanding and agreed with new OV date.

## 2022-09-27 NOTE — LETTER
September 27, 2022      [facility]  [address] [place] [address]  Phone: [phone]  Fax: [phone]       Patient: Trung Barboza   YOB: 1940  Date of Visit: 09/27/2022    To Whom It May Concern:    Milena Barboza  ( Danielle Page)  was at [facility] on 09/27/2022. The patient may return to work/school on 09/28/2022 with no restrictions. If you have any questions or concerns, or if I can be of further assistance, please do not hesitate to contact me.    Sincerely,    Natalya Pace RN/ JUAN LUIS Islas NP

## 2022-09-27 NOTE — PROGRESS NOTES
"Subjective:       Patient ID: Trung Barboza is a 82 y.o. male.    Chief Complaint: Follow-up (One year visit, PSA elevated)       Chief Complaint/Reason For Encounter  "One year visit, PSA for elevation";   Benign prostatic hyperplasia with lower urinary tract symptoms; Bladder-neck obstruction; Chronic prostatitis; Elevated prostate specific antigen (PSA)     History of Present Illness  Elevated PSA  2018 20:55  Test comment: right and left prostate bx's      SURGICAL PATHOLOGY REPORT      PATIENT: TRUNG BARBOZA CASE NUMBER: [id number]    1940 AGE: 78 yrs SEX: M ACCOUNT NUMBER: [id number]   RACE: Black UNIT NUMBER: [id number]   ATTENDING DATE COLLECTED: 3/07/2018   Kb Brown M.D.   PHYSICIAN:   DATE RECEIVED: 3/07/2018   DATE REPORTED: 3/08/2018      DIAGNOSIS:   A. Left lateral prostate, biopsies: Benign prostate tissue   B. Left midline prostate, biopsies: Benign prostate tissue   C. Right lateral prostate, biopsies: Benign prostate tissue   D. Right midline prostate, biopsies: Benign prostate tissue      COMMENTS: The biopsies include somewhat closely spaced small glands without atypia which appear atrophic. Immunostains for high molecular weight cytokeratin   demonstrate a basal layer in these foci which supports benign glandular change.      SPECIMEN SUBMITTED:   A. Biopsies left lateral prostate   B. Biopsies left midline prostate   C. Biopsies right lateral prostate   D. Biopsies right midline prostate      GROSS DESCRIPTION:   A. The specimen is received in formalin and consists of three tan stringy biopsy fragments ranging in length from 1.0 to 1.2 cm.   Additionally there is a 0.2 cm fragment. All are submitted in block A1.   B. The specimen is received in formalin and consists of three tan stringy biopsy fragments ranging in length from 1.5 to 2.0 cm entirely submitted in block B1.   C. The specimen is received in formalin and consists of three tan stringy biopsy fragments ranging " in length from 0.8 to1.2 cm entirely submitted in block C1.   D. The specimen is received in formalin and consists of three tan stringy biopsy fragments ranging in length from 1.0 to1.8 c entirely submitted in block D1. CAC/afl      MICROSCOPIC DESCRIPTION:   A microscopic examination has been performed.      Clinical History: UROLOGY   Elevated PSA, PSA 6.86      Electronically Signed By:      RUSS Medel III, MD   03/08/2018 16:30   ---------------------------     Urology History  Past Medical History  Hypertension  Gout  Previous cholecystectomy  Never a smoker  Mr. Barboza is 77 years old. Sent to me by Ms. Courtney Jay because of PSA elevation. Patient had a high normal PSA of 3.07 in 2012. He had an elevation in 2015 of 5.08 and PSA this year is up to 5.52. He has no major trouble with voiding. He does have nocturia 1-2 times nightly. Stream is normally satisfactory and does not feel he needs any extra help with the way he is urinating. No history of urinary tract infections, hematuria, stones, or other  problems.  He does have hypertension and does have history of  gout.  Patient in with his sister today.  (October 4, 2017)     Patient was here to be seen on January 31 but could not stay to see me because I was tied up and he had to return to work. He did have his PSA and it was up further to 6.86. He agreed to come back on another day for biopsies and today's the day. Additional history reveals that patient is not taking blood pressure medicine except for amlodipine. According the chart he should be on that plus a combination drug. He'll check with primary care about prescription. The patient is agreeable to going ahead with prostate biopsies today.  (March 7, 2018)     Mr. Barboza had biopsies on the above visit 6 months ago. This showed only benign prostatic tissue. His prostate gland is slightly over 80 mL in size so he does have significant hyperplasia of the prostate. His PSA is actually down this  time to 6.04 which is slightly lower than before. He feels like he is urinating okay and has no new complaints.  (September 17, 2018)     Mr. Barboza is in for first visit in a year. He feels he is stable with voiding. He did not have blood drawn before he was seen. He has stopped driving a school bus. Spending time managing things at home as his wife is blind now. Satisfactory year as far as health is concerned. (September 24, 2019)     Mr. Barboza is in for yearly checkup with PSA. PSA is pending at this time. He admits he does not always take his blood pressure medication the way he is supposed to, but he has been taking it regularly for the last several days, and blood pressure is still elevated today. He is feeling okay and doing okay and has had no new problems otherwise. When he skips his blood pressure medicine lacks energy, and does not feel as good as he expects to. No other health issues this year. (September 29, 2020)     PSA Results:  Past PSA Results   PSA was 7.610 on September 29, 2020  PSA was 6.710 on September 24, 2019  PSA was 6.040 on 9/17/2018  PSA was 6.860 on 1/31/2018  PSA was 5.520 on 6/19/2017  PSA was 5.080 on 5/27/2015  PSA was 3.07 on January 16, 2012  -------------------------------------------------------------------------------------------------------------------------------------------------------------------------------------------------------------------------------------------------------------------------------------------------------------------------------------------  The above notes are from the old electronic medical record.       PSA was 6.560 on September 28, 2021  PSA was 13.000 on September 27, 2022      Patient in for yearly checkup with PSA and follow-up.  No worsening bladder outlet obstructive symptoms.  He did have a heart attack a few months ago and ended up with a stent in his heart for that.  Clinically no problems with that now.  Blood pressure elevated today.  (September 28, 2021)    Mr. Barboza was in today for yearly checkup with PSA.  He has had chronic PSA elevation.  We have  continued to follow despite age.  He has nocturia 2-3 times nightly but is on 2 diuretics with furosemide and hydrochlorothiazide.  He denies dysuria.  Patient had a coronary artery bypass procedure done by Dr. Shen on January 6 has done well from that surgery.  Has appointment see Dr. Villaseñor today also.  PSA pending when he left the office but subsequently returned as 13 which is the highest he has ever had. [September 27, 2022]       Review of Systems      Objective:      Physical Exam  Constitutional:       Appearance: Normal appearance. He is normal weight.   Genitourinary:     Prostate: Normal.      Rectum: Normal.      Comments: Prostate gland is 30-40 g firm symmetrical and smooth  Neurological:      Mental Status: He is alert.   Psychiatric:         Mood and Affect: Mood normal.         Behavior: Behavior normal.     Urinalysis revealed 1-2 red cells with occasional pus.  Dipstick had a trace of blood with pH of 5.0 and specific gravity 1.030  Assessment:       Problem List Items Addressed This Visit          Renal/    Elevated PSA - Primary    Relevant Orders    PSA, Total (Diagnostic)    BPH (benign prostatic hyperplasia)    Bladder outlet obstruction     Other Visit Diagnoses       Chronic prostatitis        Microscopic hematuria                Plan:       PSA pending when patient left the office.  Patient will have 6 month appointment with another PSA or sooner if needed.

## 2022-09-29 RX ORDER — HYDROCHLOROTHIAZIDE 12.5 MG/1
12.5 TABLET ORAL DAILY
Qty: 90 TABLET | Refills: 1 | Status: SHIPPED | OUTPATIENT
Start: 2022-09-29 | End: 2023-05-09 | Stop reason: SDUPTHER

## 2022-09-29 RX ORDER — AMLODIPINE BESYLATE 5 MG/1
5 TABLET ORAL DAILY
Qty: 90 TABLET | Refills: 1 | Status: SHIPPED | OUTPATIENT
Start: 2022-09-29 | End: 2023-05-09 | Stop reason: SDUPTHER

## 2022-09-29 RX ORDER — FUROSEMIDE 20 MG/1
20 TABLET ORAL DAILY
Qty: 90 TABLET | Refills: 1 | Status: SHIPPED | OUTPATIENT
Start: 2022-09-29 | End: 2023-05-09 | Stop reason: SDUPTHER

## 2022-09-29 NOTE — ASSESSMENT & PLAN NOTE
Recently discharged from [facility] 9/12/2022  Brilinta held since 9/11/2022  Hematuria resolved  Hx of PCI 7/2021, CABG 1/2022; Per Dr. Villaseñor, okay to discontinue Brilinta. Continue asa 81 and statin

## 2022-09-29 NOTE — ASSESSMENT & PLAN NOTE
1/22/2022 bypass x 3, Dr. Shen  Brilinta discontinued on 9/11/2022 after hospitalization for hematuria.  Previous PCI x 2 RCA in 7/2021  Per latisha Silver to discontinue brilinta; continue asa and statin  Keep scheduled follow up with Dr. Villaseñor on Oct 31, 2022  Will need lipid profile at follow up

## 2022-10-04 ENCOUNTER — CLINICAL SUPPORT (OUTPATIENT)
Dept: FAMILY MEDICINE | Facility: CLINIC | Age: 82
End: 2022-10-04
Payer: COMMERCIAL

## 2022-10-04 ENCOUNTER — IMMUNIZATION (OUTPATIENT)
Dept: FAMILY MEDICINE | Facility: CLINIC | Age: 82
End: 2022-10-04
Payer: COMMERCIAL

## 2022-10-04 DIAGNOSIS — Z23 NEED FOR VACCINATION: Primary | ICD-10-CM

## 2022-10-04 DIAGNOSIS — Z23 ENCOUNTER FOR IMMUNIZATION: Primary | ICD-10-CM

## 2022-10-04 PROCEDURE — 90694 FLU VACCINE - QUADRIVALENT - ADJUVANTED: ICD-10-PCS | Mod: ,,, | Performed by: NURSE PRACTITIONER

## 2022-10-04 PROCEDURE — 0134A COVID-19, MRNA, LNP-S, BIVALENT BOOSTER, PF, 50 MCG/0.5 ML: CPT | Mod: ,,, | Performed by: NURSE PRACTITIONER

## 2022-10-04 PROCEDURE — 90694 VACC AIIV4 NO PRSRV 0.5ML IM: CPT | Mod: ,,, | Performed by: NURSE PRACTITIONER

## 2022-10-04 PROCEDURE — G0008 ADMIN INFLUENZA VIRUS VAC: HCPCS | Mod: ,,, | Performed by: NURSE PRACTITIONER

## 2022-10-04 PROCEDURE — 91313 COVID-19, MRNA, LNP-S, BIVALENT BOOSTER, PF, 50 MCG/0.5 ML: ICD-10-PCS | Mod: ,,, | Performed by: NURSE PRACTITIONER

## 2022-10-04 PROCEDURE — G0008 FLU VACCINE - QUADRIVALENT - ADJUVANTED: ICD-10-PCS | Mod: ,,, | Performed by: NURSE PRACTITIONER

## 2022-10-04 PROCEDURE — 0134A COVID-19, MRNA, LNP-S, BIVALENT BOOSTER, PF, 50 MCG/0.5 ML: ICD-10-PCS | Mod: ,,, | Performed by: NURSE PRACTITIONER

## 2022-10-04 PROCEDURE — 91313 COVID-19, MRNA, LNP-S, BIVALENT BOOSTER, PF, 50 MCG/0.5 ML: CPT | Mod: ,,, | Performed by: NURSE PRACTITIONER

## 2022-10-06 NOTE — PROGRESS NOTES
Pt presented to clinic for flu vaccine per standing order.     High dose flu vaccine given per protocol.

## 2022-10-31 ENCOUNTER — OFFICE VISIT (OUTPATIENT)
Dept: CARDIOLOGY | Facility: CLINIC | Age: 82
End: 2022-10-31
Payer: COMMERCIAL

## 2022-10-31 VITALS
HEIGHT: 72 IN | HEART RATE: 68 BPM | DIASTOLIC BLOOD PRESSURE: 80 MMHG | BODY MASS INDEX: 27.63 KG/M2 | OXYGEN SATURATION: 98 % | SYSTOLIC BLOOD PRESSURE: 132 MMHG | WEIGHT: 204 LBS

## 2022-10-31 DIAGNOSIS — I10 ESSENTIAL HYPERTENSION: Primary | ICD-10-CM

## 2022-10-31 DIAGNOSIS — I25.10 CORONARY ARTERY DISEASE INVOLVING NATIVE CORONARY ARTERY OF NATIVE HEART WITHOUT ANGINA PECTORIS: ICD-10-CM

## 2022-10-31 DIAGNOSIS — T81.49XA INFECTED SURGICAL WOUND: ICD-10-CM

## 2022-10-31 DIAGNOSIS — E78.2 MIXED HYPERLIPIDEMIA: ICD-10-CM

## 2022-10-31 PROCEDURE — 3079F PR MOST RECENT DIASTOLIC BLOOD PRESSURE 80-89 MM HG: ICD-10-PCS | Mod: CPTII,,, | Performed by: INTERNAL MEDICINE

## 2022-10-31 PROCEDURE — 1159F MED LIST DOCD IN RCRD: CPT | Mod: CPTII,,, | Performed by: INTERNAL MEDICINE

## 2022-10-31 PROCEDURE — 3075F PR MOST RECENT SYSTOLIC BLOOD PRESS GE 130-139MM HG: ICD-10-PCS | Mod: CPTII,,, | Performed by: INTERNAL MEDICINE

## 2022-10-31 PROCEDURE — 3079F DIAST BP 80-89 MM HG: CPT | Mod: CPTII,,, | Performed by: INTERNAL MEDICINE

## 2022-10-31 PROCEDURE — 99214 OFFICE O/P EST MOD 30 MIN: CPT | Mod: PBBFAC | Performed by: INTERNAL MEDICINE

## 2022-10-31 PROCEDURE — 1159F PR MEDICATION LIST DOCUMENTED IN MEDICAL RECORD: ICD-10-PCS | Mod: CPTII,,, | Performed by: INTERNAL MEDICINE

## 2022-10-31 PROCEDURE — 99214 OFFICE O/P EST MOD 30 MIN: CPT | Mod: S$PBB,,, | Performed by: INTERNAL MEDICINE

## 2022-10-31 PROCEDURE — 3075F SYST BP GE 130 - 139MM HG: CPT | Mod: CPTII,,, | Performed by: INTERNAL MEDICINE

## 2022-10-31 PROCEDURE — 99214 PR OFFICE/OUTPT VISIT, EST, LEVL IV, 30-39 MIN: ICD-10-PCS | Mod: S$PBB,,, | Performed by: INTERNAL MEDICINE

## 2022-10-31 RX ORDER — ASCORBIC ACID 500 MG
500 TABLET ORAL DAILY
COMMUNITY
End: 2024-02-12

## 2022-11-01 NOTE — PROGRESS NOTES
Cardiology Clinic Note:    PCP: Lawrence Huerta DO    REFERRING PHYSICIAN: Lawrence Huerta DO    CHIEF COMPLAINT:  Chest pain    HISTORY OF PRESENT ILLNESS:  Trung Barboza is a 82 y.o. male who reports chest pain has resolved.  .  Pt underwent PCI with KEHINDE RCA 12/2021 and  CABG 1/2022.  He has resumed all activities of daily living without symptoms of chest pain, tightness, pressure or squeezing,.symptoms   Patient states he walks daily and does exercise with therapy.    Pt reports wound to right lower extremity has stopped draining, is healing slowly..  .     Review of Systems   Constitutional: Negative for diaphoresis, malaise/fatigue, night sweats and weight gain.   HENT:  Negative for congestion, ear pain, hearing loss, nosebleeds and sore throat.    Eyes:  Negative for blurred vision, double vision, pain, photophobia and visual disturbance.   Cardiovascular:  Positive for chest pain. Negative for claudication, dyspnea on exertion, irregular heartbeat, leg swelling, near-syncope, orthopnea, palpitations and syncope.   Respiratory:  Negative for cough, shortness of breath, sleep disturbances due to breathing, snoring and wheezing.    Endocrine: Negative for cold intolerance, heat intolerance, polydipsia, polyphagia and polyuria.   Hematologic/Lymphatic: Negative for bleeding problem. Does not bruise/bleed easily.   Skin:  Negative for dry skin, flushing, itching, rash and skin cancer.   Musculoskeletal:  Negative for arthritis, back pain, falls, joint pain, muscle cramps, muscle weakness and myalgias.   Gastrointestinal:  Negative for abdominal pain, change in bowel habit, constipation, diarrhea, dysphagia, heartburn, nausea and vomiting.   Genitourinary:  Negative for bladder incontinence, dysuria, flank pain, frequency and nocturia.   Neurological:  Negative for dizziness, focal weakness, headaches, light-headedness, loss of balance, numbness, paresthesias and seizures.   Psychiatric/Behavioral:  Negative  for depression, memory loss and substance abuse. The patient is not nervous/anxious.    Allergic/Immunologic: Negative for environmental allergies.        PAST MEDICAL HISTORY:  Past Medical History:   Diagnosis Date    Benign localized prostatic hyperplasia with lower urinary tract symptoms (LUTS)     Coronary artery disease     Elevated PSA     Hypercholesterolemia     Hyperlipidemia     Hypertension     Myocardial infarction     Obesity (BMI 30-39.9)        PAST SURGICAL HISTORY:  Past Surgical History:   Procedure Laterality Date    ANGIOGRAM, CORONARY, WITH LEFT HEART CATHETERIZATION N/A 2021    Procedure: ANGIOGRAM,CORONARY,WITH LEFT HEART CATHETERIZATION;  Surgeon: Mahad Villaseñor DO;  Location: [facility] CATH LAB;  Service: Cardiology;  Laterality: N/A;    BIOPSY WITH TRANSRECTAL ULTRASOUND (TRUS) GUIDANCE Bilateral 2018    CHOLECYSTECTOMY      CORONARY ARTERY BYPASS GRAFT  2022    [facility]-Dr. Qureshi    LEFT HEART CATHETERIZATION Left 2021    Procedure: Left heart cath;  Surgeon: Philip Torres MD;  Location: [facility] CATH LAB;  Service: Cardiology;  Laterality: Left;       SOCIAL HISTORY:  Social History     Socioeconomic History    Marital status:     Number of children: 7   Occupational History    Occupation: retired   Tobacco Use    Smoking status: Former     Packs/day: 0.50     Types: Cigarettes     Quit date: 1988     Years since quittin.3    Smokeless tobacco: Former     Types: Chew   Substance and Sexual Activity    Alcohol use: Not Currently     Comment: can of beer one or twice a month    Drug use: Never    Sexual activity: Not Currently       FAMILY HISTORY:  Family History   Problem Relation Age of Onset    Heart disease Mother     Hypertension Mother     No Known Problems Father     No Known Problems Sister     No Known Problems Brother     No Known Problems Daughter     No Known Problems Son        ALLERGIES:  Allergies as of 10/31/2022 -  Reviewed 10/31/2022   Allergen Reaction Noted    Lisinopril Swelling 07/13/2021         MEDICATIONS:  Current Outpatient Medications on File Prior to Visit   Medication Sig Dispense Refill    amLODIPine (NORVASC) 5 MG tablet Take 1 tablet (5 mg total) by mouth once daily. 90 tablet 1    ascorbic acid, vitamin C, (VITAMIN C) 500 MG tablet Take 500 mg by mouth once daily.      atorvastatin (LIPITOR) 80 MG tablet Take 1 tablet (80 mg total) by mouth every evening. 90 tablet 3    furosemide (LASIX) 20 MG tablet Take 1 tablet (20 mg total) by mouth once daily. 90 tablet 1    hydroCHLOROthiazide (HYDRODIURIL) 12.5 MG Tab Take 1 tablet (12.5 mg total) by mouth once daily. 90 tablet 1    multivitamin-minerals-lutein (MULTIVITAMIN 50 PLUS) Tab Take 1 tablet by mouth once daily.      pantoprazole (PROTONIX) 40 MG tablet Take 1 tablet (40 mg total) by mouth once daily. 90 tablet 1    potassium chloride (KLOR-CON) 10 MEQ TbSR Take 1 tablet (10 mEq total) by mouth once daily. 90 tablet 1    aspirin (ECOTRIN) 81 MG EC tablet Take 1 tablet (81 mg total) by mouth once daily. 30 tablet 0    cefUROXime (CEFTIN) 500 MG tablet Take 500 mg by mouth 2 (two) times daily.      colchicine (COLCRYS) 0.6 mg tablet TAKE 2 TABLETS TODAY, AND THEN TAKE ONE DAILY UNTIL GOUT ATTACK RESOLVING. 30 tablet 11     No current facility-administered medications on file prior to visit.          PHYSICAL EXAM:  Blood pressure 132/80, pulse 68, height 6' (1.829 m), weight 92.5 kg (204 lb), SpO2 98 %.  Wt Readings from Last 3 Encounters:   10/31/22 92.5 kg (204 lb)   09/27/22 92.1 kg (203 lb)   09/27/22 92.1 kg (203 lb)      Body mass index is 27.67 kg/m².    Physical Exam  Vitals and nursing note reviewed.   Constitutional:       Appearance: Normal appearance. He is normal weight.   HENT:      Head: Normocephalic and atraumatic.      Right Ear: External ear normal.      Left Ear: External ear normal.   Eyes:      General: No scleral icterus.        Right  eye: No discharge.         Left eye: No discharge.      Extraocular Movements: Extraocular movements intact.      Conjunctiva/sclera: Conjunctivae normal.      Pupils: Pupils are equal, round, and reactive to light.   Cardiovascular:      Rate and Rhythm: Normal rate and regular rhythm.      Pulses: Normal pulses.      Heart sounds: Normal heart sounds. No murmur heard.    No friction rub. No gallop.   Pulmonary:      Effort: Pulmonary effort is normal.      Breath sounds: Normal breath sounds. No wheezing, rhonchi or rales.   Chest:      Chest wall: No tenderness.   Abdominal:      General: Abdomen is flat. Bowel sounds are normal. There is no distension.      Palpations: Abdomen is soft.      Tenderness: There is no abdominal tenderness. There is no guarding or rebound.   Musculoskeletal:         General: No swelling or tenderness. Normal range of motion.      Cervical back: Normal range of motion and neck supple.   Skin:     General: Skin is warm and dry.      Findings: No erythema or rash.      Comments: Draining wound to right lower extremity, vein harvest site.    Neurological:      General: No focal deficit present.      Mental Status: He is alert and oriented to person, place, and time.      Cranial Nerves: No cranial nerve deficit.      Motor: No weakness.      Gait: Gait normal.   Psychiatric:         Mood and Affect: Mood normal.         Behavior: Behavior normal.         Thought Content: Thought content normal.         Judgment: Judgment normal.        LABS REVIEWED:  Lab Results   Component Value Date    WBC 8.10 02/24/2022    RBC 3.83 (L) 02/24/2022    HGB 12.0 (L) 02/24/2022    HCT 34.9 (L) 02/24/2022    MCV 91.1 02/24/2022    MCH 31.3 (H) 02/24/2022    MCHC 34.4 02/24/2022    RDW 13.8 02/24/2022     02/24/2022    MPV 9.4 02/24/2022    NRBC 0.0 01/01/2022    INR 1.11 (H) 12/31/2021     Lab Results   Component Value Date     (L) 02/28/2022    K 4.4 02/28/2022    CL 99 02/28/2022    CO2 25  02/28/2022    BUN 35 (H) 02/28/2022    MG 2.1 07/12/2021    PHOS 3.0 07/12/2021     Lab Results   Component Value Date    AST 24 02/24/2022    ALT 24 02/24/2022     Lab Results   Component Value Date     (H) 02/28/2022    HGBA1C 6.6 02/28/2022     Lab Results   Component Value Date    CHOL 153 07/11/2021    HDL 48 07/11/2021    TRIG 169 (H) 07/11/2021    CHOLHDL 3.2 07/11/2021       CARDIAC STUDIES REVIEWED:  [facility] 12/31/21 -  Three vessel CAD ( 70% LAD, 70% OM1, 99% pRCA)  S/P successful IVUS guided PCI of RCA with two KEHINDE.   1/22/22 - CABG, Dr. Shen    OTHER IMAGING STUDIES REVIEWED:    ASSESSMENT:   There are no diagnoses linked to this encounter.      PLAN:  Chest pain: has resolved following revascularization   2.  CAD - severe 3 vessel disease, post CABG x 3, 1/22/22,    3. Right lower extremityy, slowly healing wound, vein harvest site, following with Dr. Shen  4.  Hypertension controlled   5. Hyperlipidemia, on statin, following with primary care.

## 2022-11-09 DIAGNOSIS — Z71.89 COMPLEX CARE COORDINATION: ICD-10-CM

## 2023-02-07 ENCOUNTER — OFFICE VISIT (OUTPATIENT)
Dept: FAMILY MEDICINE | Facility: CLINIC | Age: 83
End: 2023-02-07
Payer: MEDICARE

## 2023-02-07 VITALS
SYSTOLIC BLOOD PRESSURE: 160 MMHG | OXYGEN SATURATION: 98 % | BODY MASS INDEX: 29.34 KG/M2 | HEART RATE: 61 BPM | WEIGHT: 216.63 LBS | DIASTOLIC BLOOD PRESSURE: 76 MMHG | TEMPERATURE: 98 F | HEIGHT: 72 IN | RESPIRATION RATE: 18 BRPM

## 2023-02-07 DIAGNOSIS — E78.2 MIXED HYPERLIPIDEMIA: ICD-10-CM

## 2023-02-07 DIAGNOSIS — K21.9 GASTROESOPHAGEAL REFLUX DISEASE, UNSPECIFIED WHETHER ESOPHAGITIS PRESENT: ICD-10-CM

## 2023-02-07 DIAGNOSIS — T50.2X5A DIURETIC-INDUCED HYPOKALEMIA: ICD-10-CM

## 2023-02-07 DIAGNOSIS — E87.6 DIURETIC-INDUCED HYPOKALEMIA: ICD-10-CM

## 2023-02-07 DIAGNOSIS — I10 ESSENTIAL HYPERTENSION: Primary | ICD-10-CM

## 2023-02-07 DIAGNOSIS — M10.9 GOUT, UNSPECIFIED CAUSE, UNSPECIFIED CHRONICITY, UNSPECIFIED SITE: ICD-10-CM

## 2023-02-07 PROCEDURE — 96372 THER/PROPH/DIAG INJ SC/IM: CPT | Mod: ,,, | Performed by: NURSE PRACTITIONER

## 2023-02-07 PROCEDURE — 99214 PR OFFICE/OUTPT VISIT, EST, LEVL IV, 30-39 MIN: ICD-10-PCS | Mod: 25,,, | Performed by: NURSE PRACTITIONER

## 2023-02-07 PROCEDURE — 1160F PR REVIEW ALL MEDS BY PRESCRIBER/CLIN PHARMACIST DOCUMENTED: ICD-10-PCS | Mod: ,,, | Performed by: NURSE PRACTITIONER

## 2023-02-07 PROCEDURE — 1160F RVW MEDS BY RX/DR IN RCRD: CPT | Mod: ,,, | Performed by: NURSE PRACTITIONER

## 2023-02-07 PROCEDURE — 3078F PR MOST RECENT DIASTOLIC BLOOD PRESSURE < 80 MM HG: ICD-10-PCS | Mod: ,,, | Performed by: NURSE PRACTITIONER

## 2023-02-07 PROCEDURE — 1159F MED LIST DOCD IN RCRD: CPT | Mod: ,,, | Performed by: NURSE PRACTITIONER

## 2023-02-07 PROCEDURE — 96372 PR INJECTION,THERAP/PROPH/DIAG2ST, IM OR SUBCUT: ICD-10-PCS | Mod: ,,, | Performed by: NURSE PRACTITIONER

## 2023-02-07 PROCEDURE — 3078F DIAST BP <80 MM HG: CPT | Mod: ,,, | Performed by: NURSE PRACTITIONER

## 2023-02-07 PROCEDURE — 3288F FALL RISK ASSESSMENT DOCD: CPT | Mod: ,,, | Performed by: NURSE PRACTITIONER

## 2023-02-07 PROCEDURE — 99214 OFFICE O/P EST MOD 30 MIN: CPT | Mod: 25,,, | Performed by: NURSE PRACTITIONER

## 2023-02-07 PROCEDURE — 1101F PR PT FALLS ASSESS DOC 0-1 FALLS W/OUT INJ PAST YR: ICD-10-PCS | Mod: ,,, | Performed by: NURSE PRACTITIONER

## 2023-02-07 PROCEDURE — 3077F PR MOST RECENT SYSTOLIC BLOOD PRESSURE >= 140 MM HG: ICD-10-PCS | Mod: ,,, | Performed by: NURSE PRACTITIONER

## 2023-02-07 PROCEDURE — 1159F PR MEDICATION LIST DOCUMENTED IN MEDICAL RECORD: ICD-10-PCS | Mod: ,,, | Performed by: NURSE PRACTITIONER

## 2023-02-07 PROCEDURE — 1101F PT FALLS ASSESS-DOCD LE1/YR: CPT | Mod: ,,, | Performed by: NURSE PRACTITIONER

## 2023-02-07 PROCEDURE — 3077F SYST BP >= 140 MM HG: CPT | Mod: ,,, | Performed by: NURSE PRACTITIONER

## 2023-02-07 PROCEDURE — 3288F PR FALLS RISK ASSESSMENT DOCUMENTED: ICD-10-PCS | Mod: ,,, | Performed by: NURSE PRACTITIONER

## 2023-02-07 RX ORDER — METHYLPREDNISOLONE ACETATE 40 MG/ML
40 INJECTION, SUSPENSION INTRA-ARTICULAR; INTRALESIONAL; INTRAMUSCULAR; SOFT TISSUE
Status: COMPLETED | OUTPATIENT
Start: 2023-02-07 | End: 2023-02-07

## 2023-02-07 RX ORDER — COLCHICINE 0.6 MG/1
TABLET ORAL
Qty: 30 TABLET | Refills: 11 | Status: SHIPPED | OUTPATIENT
Start: 2023-02-07 | End: 2023-05-09 | Stop reason: SDUPTHER

## 2023-02-07 RX ORDER — POTASSIUM CHLORIDE 750 MG/1
10 TABLET, EXTENDED RELEASE ORAL DAILY
Qty: 90 TABLET | Refills: 1 | Status: SHIPPED | OUTPATIENT
Start: 2023-02-07 | End: 2023-05-09 | Stop reason: SDUPTHER

## 2023-02-07 RX ORDER — PANTOPRAZOLE SODIUM 40 MG/1
40 TABLET, DELAYED RELEASE ORAL DAILY
Qty: 90 TABLET | Refills: 1 | Status: SHIPPED | OUTPATIENT
Start: 2023-02-07 | End: 2023-05-09 | Stop reason: SDUPTHER

## 2023-02-07 RX ADMIN — METHYLPREDNISOLONE ACETATE 40 MG: 40 INJECTION, SUSPENSION INTRA-ARTICULAR; INTRALESIONAL; INTRAMUSCULAR; SOFT TISSUE at 11:02

## 2023-02-07 NOTE — PROGRESS NOTES
Swati Vicente NP   [id number] [facility] [address]  [place]  [address]      PATIENT NAME: Trung Barboza  : 1940  DATE: 23  MRN: [id number]      Billing Provider: Swati Vicente NP  Level of Service: NV OFFICE/OUTPT VISIT, EST, LEVL IV, 30-39 MIN  Patient PCP Information       Provider PCP Type    Lawrence Huerta DO General            Reason for Visit / Chief Complaint: Hand Pain (Pain and numbness that comes and goes in both hands.), Hyperlipidemia (Here for check up and med refills.), and Hypertension (Here for check up and med refills.)       Update PCP  Update Chief Complaint         History of Present Illness / Problem Focused Workflow     Trung Barboza presents to the clinic with Hand Pain (Pain and numbness that comes and goes in both hands.), Hyperlipidemia (Here for check up and med refills.), and Hypertension (Here for check up and med refills.)     83 year old male presents to clinic for check up and refill of Hyperlipidemia and Hypertension meds. He has additional complaints of bilat arthritic pain to hands. He is requesting a steroid injection as this typically helps his arthritis pain. His blood pressure is elevated today in clinic however he states he has been without his meds for a couple of days. States it has been running around 130s/70s at home.         Review of Systems     @Review of Systems   Constitutional:  Negative for activity change, appetite change, fatigue and fever.   HENT:  Negative for nasal congestion, ear pain, rhinorrhea, sinus pressure/congestion and sore throat.    Eyes:  Negative for pain, redness, visual disturbance and eye dryness.   Respiratory:  Negative for cough and shortness of breath.    Cardiovascular:  Negative for chest pain and leg swelling.   Gastrointestinal:  Negative for abdominal distention, abdominal pain, constipation and diarrhea.   Endocrine: Negative for cold intolerance, heat intolerance and polyuria.   Genitourinary:   Negative for bladder incontinence, dysuria, frequency and urgency.   Musculoskeletal:  Positive for arthralgias. Negative for gait problem and myalgias.   Integumentary:  Negative for color change, rash and wound.   Allergic/Immunologic: Negative for environmental allergies and food allergies.   Neurological:  Negative for dizziness, weakness, light-headedness and headaches.   Psychiatric/Behavioral:  Negative for behavioral problems and sleep disturbance.      Medical / Social / Family History     Past Medical History:   Diagnosis Date    Benign localized prostatic hyperplasia with lower urinary tract symptoms (LUTS)     Coronary artery disease     Elevated PSA     Hypercholesterolemia     Hyperlipidemia     Hypertension     Myocardial infarction     Obesity (BMI 30-39.9)        Past Surgical History:   Procedure Laterality Date    ANGIOGRAM, CORONARY, WITH LEFT HEART CATHETERIZATION N/A 12/31/2021    Procedure: ANGIOGRAM,CORONARY,WITH LEFT HEART CATHETERIZATION;  Surgeon: Mahad Villaseñor DO;  Location: [facility] CATH LAB;  Service: Cardiology;  Laterality: N/A;    BIOPSY WITH TRANSRECTAL ULTRASOUND (TRUS) GUIDANCE Bilateral 03/07/2018    CHOLECYSTECTOMY      [facility]    LEFT HEART CATHETERIZATION Left 7/13/2021    Procedure: Left heart cath;  Surgeon: Philip Torres MD;  Location: [facility] CATH LAB;  Service: Cardiology;  Laterality: Left;       Social History    reports that he quit smoking about 34 years ago. His smoking use included cigarettes. He has a 7.50 pack-year smoking history. He has never been exposed to tobacco smoke. He has quit using smokeless tobacco.  His smokeless tobacco use included chew. He reports that he does not currently use alcohol. He reports that he does not use drugs.    Family History  's family history includes Heart disease in his mother; Hypertension in his mother; No Known Problems in his brother,  daughter, father, sister, and son.    Medications and Allergies     Medications  Outpatient Medications Marked as Taking for the 2/7/23 encounter (Office Visit) with Swati Vicente NP   Medication Sig Dispense Refill    amLODIPine (NORVASC) 5 MG tablet Take 1 tablet (5 mg total) by mouth once daily. 90 tablet 1    ascorbic acid, vitamin C, (VITAMIN C) 500 MG tablet Take 500 mg by mouth once daily.      furosemide (LASIX) 20 MG tablet Take 1 tablet (20 mg total) by mouth once daily. 90 tablet 1    hydroCHLOROthiazide (HYDRODIURIL) 12.5 MG Tab Take 1 tablet (12.5 mg total) by mouth once daily. 90 tablet 1    multivitamin-minerals-lutein (MULTIVITAMIN 50 PLUS) Tab Take 1 tablet by mouth once daily.      [DISCONTINUED] colchicine (COLCRYS) 0.6 mg tablet TAKE 2 TABLETS TODAY, AND THEN TAKE ONE DAILY UNTIL GOUT ATTACK RESOLVING. 30 tablet 11    [DISCONTINUED] pantoprazole (PROTONIX) 40 MG tablet Take 1 tablet (40 mg total) by mouth once daily. 90 tablet 1    [DISCONTINUED] potassium chloride (KLOR-CON) 10 MEQ TbSR Take 1 tablet (10 mEq total) by mouth once daily. 90 tablet 1       Allergies  Review of patient's allergies indicates:   Allergen Reactions    Lisinopril Swelling       Physical Examination     Vitals:    02/07/23 1118   BP: (!) 160/76   Pulse:    Resp:    Temp:      Physical Exam  Vitals and nursing note reviewed.   HENT:      Head: Normocephalic.      Right Ear: Tympanic membrane normal.      Left Ear: Tympanic membrane normal.      Nose: Nose normal.      Mouth/Throat:      Mouth: Mucous membranes are moist.      Pharynx: Oropharynx is clear. No posterior oropharyngeal erythema.   Eyes:      Conjunctiva/sclera: Conjunctivae normal.   Cardiovascular:      Rate and Rhythm: Normal rate and regular rhythm.      Pulses: Normal pulses.      Heart sounds: Normal heart sounds.   Pulmonary:      Effort: Pulmonary effort is normal.      Breath sounds: Normal breath sounds.   Abdominal:      General: Abdomen is  flat. Bowel sounds are normal. There is no distension.      Palpations: Abdomen is soft.   Musculoskeletal:         General: No swelling or tenderness. Normal range of motion.      Right hand: Bony tenderness present.      Left hand: Bony tenderness present.      Cervical back: Normal range of motion.      Right lower leg: No edema.      Left lower leg: No edema.      Comments: Bony tenderness and stiffness to bilat hands.    Skin:     General: Skin is warm and dry.      Capillary Refill: Capillary refill takes less than 2 seconds.   Neurological:      Mental Status: He is alert. Mental status is at baseline.   Psychiatric:         Mood and Affect: Mood normal.         Behavior: Behavior normal.             Lab Results   Component Value Date    WBC 7.26 02/09/2023    HGB 14.5 02/09/2023    HCT 44.1 02/09/2023    MCV 93.6 02/09/2023     02/09/2023          Sodium   Date Value Ref Range Status   02/09/2023 141 136 - 145 mmol/L Final     Potassium   Date Value Ref Range Status   02/09/2023 4.2 3.5 - 5.1 mmol/L Final     Chloride   Date Value Ref Range Status   02/09/2023 107 98 - 107 mmol/L Final     CO2   Date Value Ref Range Status   02/09/2023 29 21 - 32 mmol/L Final     Glucose   Date Value Ref Range Status   02/09/2023 98 74 - 106 mg/dL Final     BUN   Date Value Ref Range Status   02/09/2023 26 (H) 7 - 18 mg/dL Final     Creatinine   Date Value Ref Range Status   02/09/2023 1.94 (H) 0.70 - 1.30 mg/dL Final     Calcium   Date Value Ref Range Status   02/09/2023 9.3 8.5 - 10.1 mg/dL Final     Total Protein   Date Value Ref Range Status   02/09/2023 7.5 6.4 - 8.2 g/dL Final     Albumin   Date Value Ref Range Status   02/09/2023 3.7 3.5 - 5.0 g/dL Final     Bilirubin, Total   Date Value Ref Range Status   02/09/2023 0.4 >0.0 - 1.2 mg/dL Final     Alk Phos   Date Value Ref Range Status   02/09/2023 92 45 - 115 U/L Final     AST   Date Value Ref Range Status   02/09/2023 23 15 - 37 U/L Final     ALT   Date Value  Ref Range Status   02/09/2023 27 16 - 61 U/L Final     Anion Gap   Date Value Ref Range Status   02/09/2023 9 7 - 16 mmol/L Final     eGFR    Date Value Ref Range Status   07/25/2021 59 (L) >=60 mL/min/1.73m² Final     eGFR   Date Value Ref Range Status   02/28/2022 25 (L) >=60 mL/min/1.73m² Final      US ARTERIAL DOPPLER EXAM  Narrative: EXAMINATION:  ABIs    CLINICAL HISTORY:  Nonhealing wound right vein harvest site for CABG    TECHNIQUE:  Continuous wave Doppler common pneumatic cuffs    COMPARISON:  Femoral areas 03/10/2022    FINDINGS:  Right brachial pressure 135 mmHg, right SHADY is 0.73    Left brachial pressure is 133 mmHg, left SHADY is 0.73  Impression: Moderate peripheral vascular disease bilateral lower extremities is slightly lower SHADY compared to previous study    Electronically signed by: Margareth Del Rosario  Date:    04/12/2022  Time:    16:27     Procedures   Assessment and Plan (including Health Maintenance)      Problem List  Smart Sets  Document Outside HM   :    Plan:           Problem List Items Addressed This Visit          Cardiac/Vascular    Essential hypertension - Primary    Current Assessment & Plan     Blood pressure elevated today in clinic. However, he states he has been without meds. Continue to monitor at home and follow up in 1 month for recheck. CBC and CMP obtained today.          Relevant Orders    CBC Auto Differential (Completed)    Comprehensive Metabolic Panel (Completed)    Hyperlipidemia    Current Assessment & Plan     Lipid panel obtained at today's visit. Goal LDL is less than 70. Continue current meds and low fat/low cholesterol diet with increased exercise as tolerated. Will follow up with labs. Patient to follow up in 3 months or as needed.            Relevant Orders    Lipid Panel (Completed)       Renal/    Diuretic-induced hypokalemia    Current Assessment & Plan     CMP obtained at today's visit. Continue meds as ordered.          Relevant Medications     potassium chloride (KLOR-CON) 10 MEQ TbSR       GI    Gastroesophageal reflux disease    Current Assessment & Plan     Well controlled with current mediations. Will refill protonix. Follow up in 6 months or as needed.          Relevant Medications    pantoprazole (PROTONIX) 40 MG tablet       Orthopedic    Gout    Relevant Medications    colchicine (COLCRYS) 0.6 mg tablet       Health Maintenance Topics with due status: Not Due       Topic Last Completion Date    TETANUS VACCINE 12/11/2018    Lipid Panel 02/09/2023       Future Appointments   Date Time Provider Department Center   3/28/2023  9:30 AM Kb Brown Jr., MD [facility] UROL [facility]   5/9/2023  8:30 AM [facility]   6/6/2023 11:15 AM Mahad Villaseñor DO [facility] CARD [facility]   6/29/2023  3:00 PM [facility]SE [facility]        Health Maintenance Due   Topic Date Due    Aspirin/Antiplatelet Therapy  Never done        No follow-ups on file.     Signature:  Swati Vicente NP  [facility]  [id number] [place] [address], [place]  [address]    Date of encounter: 2/7/23

## 2023-02-10 RX ORDER — ATORVASTATIN CALCIUM 80 MG/1
80 TABLET, FILM COATED ORAL NIGHTLY
Qty: 90 TABLET | Refills: 1 | Status: SHIPPED | OUTPATIENT
Start: 2023-02-10 | End: 2023-05-09

## 2023-03-16 NOTE — ASSESSMENT & PLAN NOTE
Lipid panel obtained at today's visit. Goal LDL is less than 70. Continue current meds and low fat/low cholesterol diet with increased exercise as tolerated. Will follow up with labs. Patient to follow up in 3 months or as needed.

## 2023-03-16 NOTE — ASSESSMENT & PLAN NOTE
Blood pressure elevated today in clinic. However, he states he has been without meds. Continue to monitor at home and follow up in 1 month for recheck. CBC and CMP obtained today.

## 2023-03-28 ENCOUNTER — OFFICE VISIT (OUTPATIENT)
Dept: UROLOGY | Facility: CLINIC | Age: 83
End: 2023-03-28
Payer: MEDICARE

## 2023-03-28 VITALS
WEIGHT: 208 LBS | SYSTOLIC BLOOD PRESSURE: 162 MMHG | HEIGHT: 72 IN | BODY MASS INDEX: 28.17 KG/M2 | HEART RATE: 75 BPM | DIASTOLIC BLOOD PRESSURE: 88 MMHG

## 2023-03-28 DIAGNOSIS — N41.1 CHRONIC PROSTATITIS: ICD-10-CM

## 2023-03-28 DIAGNOSIS — I10 ESSENTIAL HYPERTENSION: ICD-10-CM

## 2023-03-28 DIAGNOSIS — N32.0 BLADDER OUTLET OBSTRUCTION: ICD-10-CM

## 2023-03-28 DIAGNOSIS — R31.9 URINARY TRACT INFECTION WITH HEMATURIA, SITE UNSPECIFIED: ICD-10-CM

## 2023-03-28 DIAGNOSIS — N13.8 BENIGN PROSTATIC HYPERPLASIA WITH URINARY OBSTRUCTION: ICD-10-CM

## 2023-03-28 DIAGNOSIS — N40.1 BENIGN PROSTATIC HYPERPLASIA WITH URINARY OBSTRUCTION: ICD-10-CM

## 2023-03-28 DIAGNOSIS — R97.20 ELEVATED PSA: Primary | ICD-10-CM

## 2023-03-28 DIAGNOSIS — N39.0 URINARY TRACT INFECTION WITH HEMATURIA, SITE UNSPECIFIED: ICD-10-CM

## 2023-03-28 PROCEDURE — 1160F PR REVIEW ALL MEDS BY PRESCRIBER/CLIN PHARMACIST DOCUMENTED: ICD-10-PCS | Mod: CPTII,,, | Performed by: UROLOGY

## 2023-03-28 PROCEDURE — 99214 OFFICE O/P EST MOD 30 MIN: CPT | Mod: PBBFAC | Performed by: UROLOGY

## 2023-03-28 PROCEDURE — 3079F DIAST BP 80-89 MM HG: CPT | Mod: CPTII,,, | Performed by: UROLOGY

## 2023-03-28 PROCEDURE — 99213 OFFICE O/P EST LOW 20 MIN: CPT | Mod: S$PBB,,, | Performed by: UROLOGY

## 2023-03-28 PROCEDURE — 87086 CULTURE, URINE: ICD-10-PCS | Mod: ,,, | Performed by: CLINICAL MEDICAL LABORATORY

## 2023-03-28 PROCEDURE — 1159F MED LIST DOCD IN RCRD: CPT | Mod: CPTII,,, | Performed by: UROLOGY

## 2023-03-28 PROCEDURE — 1126F PR PAIN SEVERITY QUANTIFIED, NO PAIN PRESENT: ICD-10-PCS | Mod: CPTII,,, | Performed by: UROLOGY

## 2023-03-28 PROCEDURE — 1160F RVW MEDS BY RX/DR IN RCRD: CPT | Mod: CPTII,,, | Performed by: UROLOGY

## 2023-03-28 PROCEDURE — 99213 PR OFFICE/OUTPT VISIT, EST, LEVL III, 20-29 MIN: ICD-10-PCS | Mod: S$PBB,,, | Performed by: UROLOGY

## 2023-03-28 PROCEDURE — 87086 URINE CULTURE/COLONY COUNT: CPT | Mod: ,,, | Performed by: CLINICAL MEDICAL LABORATORY

## 2023-03-28 PROCEDURE — 1101F PR PT FALLS ASSESS DOC 0-1 FALLS W/OUT INJ PAST YR: ICD-10-PCS | Mod: CPTII,,, | Performed by: UROLOGY

## 2023-03-28 PROCEDURE — 1159F PR MEDICATION LIST DOCUMENTED IN MEDICAL RECORD: ICD-10-PCS | Mod: CPTII,,, | Performed by: UROLOGY

## 2023-03-28 PROCEDURE — 1126F AMNT PAIN NOTED NONE PRSNT: CPT | Mod: CPTII,,, | Performed by: UROLOGY

## 2023-03-28 PROCEDURE — 3079F PR MOST RECENT DIASTOLIC BLOOD PRESSURE 80-89 MM HG: ICD-10-PCS | Mod: CPTII,,, | Performed by: UROLOGY

## 2023-03-28 PROCEDURE — 3288F FALL RISK ASSESSMENT DOCD: CPT | Mod: CPTII,,, | Performed by: UROLOGY

## 2023-03-28 PROCEDURE — 3288F PR FALLS RISK ASSESSMENT DOCUMENTED: ICD-10-PCS | Mod: CPTII,,, | Performed by: UROLOGY

## 2023-03-28 PROCEDURE — 3077F SYST BP >= 140 MM HG: CPT | Mod: CPTII,,, | Performed by: UROLOGY

## 2023-03-28 PROCEDURE — 3077F PR MOST RECENT SYSTOLIC BLOOD PRESSURE >= 140 MM HG: ICD-10-PCS | Mod: CPTII,,, | Performed by: UROLOGY

## 2023-03-28 PROCEDURE — 1101F PT FALLS ASSESS-DOCD LE1/YR: CPT | Mod: CPTII,,, | Performed by: UROLOGY

## 2023-03-28 NOTE — PROGRESS NOTES
"Subjective     Patient ID: Trung Barboza is a 83 y.o. male.    Chief Complaint: Elevated PSA (1 year PSA R97.20 and OV)       Chief Complaint/Reason For Encounter  "One year visit, PSA for elevation";   Benign prostatic hyperplasia with lower urinary tract symptoms; Bladder-neck obstruction; Chronic prostatitis; Elevated prostate specific antigen (PSA)     History of Present Illness  Elevated PSA  2018 20:55  Test comment: right and left prostate bx's      SURGICAL PATHOLOGY REPORT      PATIENT: TRUNG BARBZOA CASE NUMBER: [id number]    1940 AGE: 78 yrs SEX: M ACCOUNT NUMBER: [id number]   RACE: Black UNIT NUMBER: [id number]   ATTENDING DATE COLLECTED: 3/07/2018   Kb Brown M.D.   PHYSICIAN:   DATE RECEIVED: 3/07/2018   DATE REPORTED: 3/08/2018      DIAGNOSIS:   A. Left lateral prostate, biopsies: Benign prostate tissue   B. Left midline prostate, biopsies: Benign prostate tissue   C. Right lateral prostate, biopsies: Benign prostate tissue   D. Right midline prostate, biopsies: Benign prostate tissue      COMMENTS: The biopsies include somewhat closely spaced small glands without atypia which appear atrophic. Immunostains for high molecular weight cytokeratin   demonstrate a basal layer in these foci which supports benign glandular change.      SPECIMEN SUBMITTED:   A. Biopsies left lateral prostate   B. Biopsies left midline prostate   C. Biopsies right lateral prostate   D. Biopsies right midline prostate      GROSS DESCRIPTION:   A. The specimen is received in formalin and consists of three tan stringy biopsy fragments ranging in length from 1.0 to 1.2 cm.   Additionally there is a 0.2 cm fragment. All are submitted in block A1.   B. The specimen is received in formalin and consists of three tan stringy biopsy fragments ranging in length from 1.5 to 2.0 cm entirely submitted in block B1.   C. The specimen is received in formalin and consists of three tan stringy biopsy fragments ranging in " length from 0.8 to1.2 cm entirely submitted in block C1.   D. The specimen is received in formalin and consists of three tan stringy biopsy fragments ranging in length from 1.0 to1.8 c entirely submitted in block D1. CAC/afl      MICROSCOPIC DESCRIPTION:   A microscopic examination has been performed.      Clinical History: UROLOGY   Elevated PSA, PSA 6.86      Electronically Signed By:      RUSS Medel III, MD   03/08/2018 16:30   ---------------------------     Urology History  Past Medical History  Hypertension  Gout  Previous cholecystectomy  Never a smoker  Mr. Barboza is 77 years old. Sent to me by Ms. Courtney Jay because of PSA elevation. Patient had a high normal PSA of 3.07 in 2012. He had an elevation in 2015 of 5.08 and PSA this year is up to 5.52. He has no major trouble with voiding. He does have nocturia 1-2 times nightly. Stream is normally satisfactory and does not feel he needs any extra help with the way he is urinating. No history of urinary tract infections, hematuria, stones, or other  problems.  He does have hypertension and does have history of  gout.  Patient in with his sister today.  (October 4, 2017)     Patient was here to be seen on January 31 but could not stay to see me because I was tied up and he had to return to work. He did have his PSA and it was up further to 6.86. He agreed to come back on another day for biopsies and today's the day. Additional history reveals that patient is not taking blood pressure medicine except for amlodipine. According the chart he should be on that plus a combination drug. He'll check with primary care about prescription. The patient is agreeable to going ahead with prostate biopsies today.  (March 7, 2018)     Mr. Barboza had biopsies on the above visit 6 months ago. This showed only benign prostatic tissue. His prostate gland is slightly over 80 mL in size so he does have significant hyperplasia of the prostate. His PSA is actually down this  time to 6.04 which is slightly lower than before. He feels like he is urinating okay and has no new complaints.  (September 17, 2018)     Mr. Barboza is in for first visit in a year. He feels he is stable with voiding. He did not have blood drawn before he was seen. He has stopped driving a school bus. Spending time managing things at home as his wife is blind now. Satisfactory year as far as health is concerned. (September 24, 2019)     Mr. Barboza is in for yearly checkup with PSA. PSA is pending at this time. He admits he does not always take his blood pressure medication the way he is supposed to, but he has been taking it regularly for the last several days, and blood pressure is still elevated today. He is feeling okay and doing okay and has had no new problems otherwise. When he skips his blood pressure medicine lacks energy, and does not feel as good as he expects to. No other health issues this year. (September 29, 2020)     PSA Results:  Past PSA Results   PSA was 7.610 on September 29, 2020  PSA was 6.710 on September 24, 2019  PSA was 6.040 on 9/17/2018  PSA was 6.860 on 1/31/2018  PSA was 5.520 on 6/19/2017  PSA was 5.080 on 5/27/2015  PSA was 3.07 on January 16, 2012  -------------------------------------------------------------------------------------------------------------------------------------------------------------------------------------------------------------------------------------------------------------------------------------------------------------------------------------------  The above notes are from the old electronic medical record.       PSA was 6.560 on September 28, 2021  PSA was 13.000 on September 27, 2022  PSA was 9.260 on March 28, 2023      Patient in for yearly checkup with PSA and follow-up.  No worsening bladder outlet obstructive symptoms.  He did have a heart attack a few months ago and ended up with a stent in his heart for that.  Clinically no problems with that now.   Blood pressure elevated today. (September 28, 2021)     Mr. Barboza was in today for yearly checkup with PSA.  He has had chronic PSA elevation.  We have  continued to follow despite age.  He has nocturia 2-3 times nightly but is on 2 diuretics with furosemide and hydrochlorothiazide.  He denies dysuria.  Patient had a coronary artery bypass procedure done by Dr. Shen on January 6 has done well from that surgery.  Has appointment see Dr. Villaseñor today also.  PSA pending when he left the office but subsequently returned as 13 which is the highest he has ever had. [September 27, 2022]     Mr. Barboza was brought in for six-month check rather than waiting for full year because his PSA jumped up to 13 after last visit.  He had another PSA drawn today and results are pending.  He has had no new health issues since he was here last time.  Continues with nocturia 2-3 times nightly.  Mild burning on urination when he takes 1 of his medications but other than that no problems and no sensation of infection.  No visible hematuria.  [March 28, 2023]  Review of Systems       Objective     Physical Exam  Constitutional:       Appearance: He is normal weight.   Genitourinary:     Prostate: Normal.      Rectum: Normal.      Comments: Prostate gland 25 grams smooth firm and symmetrical  Neurological:      Mental Status: He is alert.   Psychiatric:         Mood and Affect: Mood normal.         Behavior: Behavior normal.      Urinalysis showed 10-15 pus cells with occasional red cells.  The dipstick had 1+ blood, trace protein, trace of leukocytes, pH of 6.0, and specific gravity 1.020  Assessment and Plan     Problem List Items Addressed This Visit          Cardiac/Vascular    Essential hypertension       Renal/    Elevated PSA - Primary    BPH (benign prostatic hyperplasia)    Bladder outlet obstruction     Other Visit Diagnoses       Urinary tract infection with hematuria, site unspecified        Relevant Orders    Urine culture     Chronic prostatitis                Urine sent for culture to make sure there is no infection.  PSA returned after patient left the office and was 9.260.  That is down slightly.  He was notified of results by telephone.  1 year appointment with PSA or sooner for problems.

## 2023-03-28 NOTE — PATIENT INSTRUCTIONS
Urine sent for culture to make sure there is no infection.  PSA returned after patient left the office and was 9.260.  That is down slightly.  He was notified of results by telephone.  1 year appointment with PSA or sooner for problems.

## 2023-03-30 LAB — UA COMPLETE W REFLEX CULTURE PNL UR: NO GROWTH

## 2023-05-09 ENCOUNTER — OFFICE VISIT (OUTPATIENT)
Dept: FAMILY MEDICINE | Facility: CLINIC | Age: 83
End: 2023-05-09
Payer: MEDICARE

## 2023-05-09 VITALS
TEMPERATURE: 97 F | OXYGEN SATURATION: 98 % | RESPIRATION RATE: 20 BRPM | SYSTOLIC BLOOD PRESSURE: 136 MMHG | BODY MASS INDEX: 28.63 KG/M2 | DIASTOLIC BLOOD PRESSURE: 64 MMHG | HEIGHT: 72 IN | WEIGHT: 211.38 LBS | HEART RATE: 62 BPM

## 2023-05-09 DIAGNOSIS — M10.9 GOUT, UNSPECIFIED CAUSE, UNSPECIFIED CHRONICITY, UNSPECIFIED SITE: ICD-10-CM

## 2023-05-09 DIAGNOSIS — T50.2X5A DIURETIC-INDUCED HYPOKALEMIA: ICD-10-CM

## 2023-05-09 DIAGNOSIS — E78.2 MIXED HYPERLIPIDEMIA: ICD-10-CM

## 2023-05-09 DIAGNOSIS — K21.9 GASTROESOPHAGEAL REFLUX DISEASE, UNSPECIFIED WHETHER ESOPHAGITIS PRESENT: ICD-10-CM

## 2023-05-09 DIAGNOSIS — E87.6 DIURETIC-INDUCED HYPOKALEMIA: ICD-10-CM

## 2023-05-09 DIAGNOSIS — I10 ESSENTIAL HYPERTENSION: ICD-10-CM

## 2023-05-09 DIAGNOSIS — N18.31 STAGE 3A CHRONIC KIDNEY DISEASE: ICD-10-CM

## 2023-05-09 PROCEDURE — 3078F DIAST BP <80 MM HG: CPT | Mod: ,,, | Performed by: NURSE PRACTITIONER

## 2023-05-09 PROCEDURE — 1101F PT FALLS ASSESS-DOCD LE1/YR: CPT | Mod: ,,, | Performed by: NURSE PRACTITIONER

## 2023-05-09 PROCEDURE — 99213 PR OFFICE/OUTPT VISIT, EST, LEVL III, 20-29 MIN: ICD-10-PCS | Mod: ,,, | Performed by: NURSE PRACTITIONER

## 2023-05-09 PROCEDURE — 1159F PR MEDICATION LIST DOCUMENTED IN MEDICAL RECORD: ICD-10-PCS | Mod: ,,, | Performed by: NURSE PRACTITIONER

## 2023-05-09 PROCEDURE — 1159F MED LIST DOCD IN RCRD: CPT | Mod: ,,, | Performed by: NURSE PRACTITIONER

## 2023-05-09 PROCEDURE — 3288F FALL RISK ASSESSMENT DOCD: CPT | Mod: ,,, | Performed by: NURSE PRACTITIONER

## 2023-05-09 PROCEDURE — 3288F PR FALLS RISK ASSESSMENT DOCUMENTED: ICD-10-PCS | Mod: ,,, | Performed by: NURSE PRACTITIONER

## 2023-05-09 PROCEDURE — 3075F PR MOST RECENT SYSTOLIC BLOOD PRESS GE 130-139MM HG: ICD-10-PCS | Mod: ,,, | Performed by: NURSE PRACTITIONER

## 2023-05-09 PROCEDURE — 3078F PR MOST RECENT DIASTOLIC BLOOD PRESSURE < 80 MM HG: ICD-10-PCS | Mod: ,,, | Performed by: NURSE PRACTITIONER

## 2023-05-09 PROCEDURE — 3075F SYST BP GE 130 - 139MM HG: CPT | Mod: ,,, | Performed by: NURSE PRACTITIONER

## 2023-05-09 PROCEDURE — 99213 OFFICE O/P EST LOW 20 MIN: CPT | Mod: ,,, | Performed by: NURSE PRACTITIONER

## 2023-05-09 PROCEDURE — 1101F PR PT FALLS ASSESS DOC 0-1 FALLS W/OUT INJ PAST YR: ICD-10-PCS | Mod: ,,, | Performed by: NURSE PRACTITIONER

## 2023-05-09 RX ORDER — PANTOPRAZOLE SODIUM 40 MG/1
40 TABLET, DELAYED RELEASE ORAL DAILY
Qty: 90 TABLET | Refills: 1 | Status: SHIPPED | OUTPATIENT
Start: 2023-05-09 | End: 2023-05-09

## 2023-05-09 RX ORDER — HYDROCHLOROTHIAZIDE 12.5 MG/1
12.5 TABLET ORAL DAILY
Qty: 90 TABLET | Refills: 1 | Status: SHIPPED | OUTPATIENT
Start: 2023-05-09 | End: 2023-05-09

## 2023-05-09 RX ORDER — POTASSIUM CHLORIDE 750 MG/1
10 TABLET, EXTENDED RELEASE ORAL DAILY
Qty: 90 TABLET | Refills: 1 | Status: SHIPPED | OUTPATIENT
Start: 2023-05-09 | End: 2024-03-08 | Stop reason: SDUPTHER

## 2023-05-09 RX ORDER — COLCHICINE 0.6 MG/1
TABLET ORAL
Qty: 30 TABLET | Refills: 11 | Status: SHIPPED | OUTPATIENT
Start: 2023-05-09 | End: 2023-12-08 | Stop reason: SDUPTHER

## 2023-05-09 RX ORDER — PANTOPRAZOLE SODIUM 40 MG/1
40 TABLET, DELAYED RELEASE ORAL DAILY
Qty: 90 TABLET | Refills: 1 | Status: SHIPPED | OUTPATIENT
Start: 2023-05-09 | End: 2024-03-08 | Stop reason: SDUPTHER

## 2023-05-09 RX ORDER — HYDROCHLOROTHIAZIDE 12.5 MG/1
12.5 TABLET ORAL DAILY
Qty: 90 TABLET | Refills: 1 | Status: SHIPPED | OUTPATIENT
Start: 2023-05-09 | End: 2023-12-08

## 2023-05-09 RX ORDER — FUROSEMIDE 20 MG/1
20 TABLET ORAL DAILY
Qty: 90 TABLET | Refills: 1 | Status: SHIPPED | OUTPATIENT
Start: 2023-05-09 | End: 2024-03-08 | Stop reason: SDUPTHER

## 2023-05-09 RX ORDER — AMLODIPINE BESYLATE 5 MG/1
5 TABLET ORAL DAILY
Qty: 90 TABLET | Refills: 1 | Status: SHIPPED | OUTPATIENT
Start: 2023-05-09 | End: 2023-05-09

## 2023-05-09 RX ORDER — ATORVASTATIN CALCIUM 80 MG/1
40 TABLET, FILM COATED ORAL NIGHTLY
Qty: 90 TABLET | Refills: 1
Start: 2023-05-09 | End: 2023-09-14

## 2023-05-09 RX ORDER — FUROSEMIDE 20 MG/1
20 TABLET ORAL DAILY
Qty: 90 TABLET | Refills: 1 | Status: SHIPPED | OUTPATIENT
Start: 2023-05-09 | End: 2023-05-09

## 2023-05-09 RX ORDER — POTASSIUM CHLORIDE 750 MG/1
10 TABLET, EXTENDED RELEASE ORAL DAILY
Qty: 90 TABLET | Refills: 1 | Status: SHIPPED | OUTPATIENT
Start: 2023-05-09 | End: 2023-05-09

## 2023-05-09 RX ORDER — AMLODIPINE BESYLATE 5 MG/1
5 TABLET ORAL DAILY
Qty: 90 TABLET | Refills: 1 | Status: SHIPPED | OUTPATIENT
Start: 2023-05-09 | End: 2024-03-08 | Stop reason: SDUPTHER

## 2023-05-09 RX ORDER — COLCHICINE 0.6 MG/1
TABLET ORAL
Qty: 30 TABLET | Refills: 11 | Status: SHIPPED | OUTPATIENT
Start: 2023-05-09 | End: 2023-05-09

## 2023-05-09 NOTE — PROGRESS NOTES
Swati Vicente NP   [place] [address] [facility] [place] [address]  [place]  [address]      PATIENT NAME: Trung Barboza  : 1940  DATE: 23  MRN: [id number]      Billing Provider: Swati Vicente NP  Level of Service:   Patient PCP Information       Provider PCP Type    Swati Vicente NP General            Reason for Visit / Chief Complaint: 3 month follow up and Leg Swelling (Has been having some leg swelling states it started when he started taking the Atorovastatin)         History of Present Illness / Problem Focused Workflow     Trung Barboza presents to the clinic with 3 month follow up and Leg Swelling (Has been having some leg swelling states it started when he started taking the Atorovastatin)     83 year old male presents to clinic for 3 month follow up. He states he has been having some swelling in his legs that he attributes to increased dosage of Atorvastatin. He is wanted to decrease his Atorvastatin back to 40mg nightly.       Review of Systems     @Review of Systems   Constitutional:  Negative for activity change, appetite change, fatigue and fever.   HENT:  Negative for nasal congestion, ear pain, rhinorrhea, sinus pressure/congestion and sore throat.    Eyes:  Negative for pain, redness, visual disturbance and eye dryness.   Respiratory:  Negative for cough and shortness of breath.    Cardiovascular:  Positive for leg swelling. Negative for chest pain.   Gastrointestinal:  Negative for abdominal distention, abdominal pain, constipation and diarrhea.   Endocrine: Negative for cold intolerance, heat intolerance and polyuria.   Genitourinary:  Negative for bladder incontinence, dysuria, frequency and urgency.   Musculoskeletal:  Negative for arthralgias, gait problem and myalgias.   Integumentary:  Negative for color change, rash and wound.   Allergic/Immunologic: Negative for environmental allergies and food allergies.   Neurological:  Negative for dizziness, weakness,  light-headedness and headaches.   Psychiatric/Behavioral:  Negative for behavioral problems and sleep disturbance.      Medical / Social / Family History     Past Medical History:   Diagnosis Date    Benign localized prostatic hyperplasia with lower urinary tract symptoms (LUTS)     Coronary artery disease     Elevated PSA     Hypercholesterolemia     Hyperlipidemia     Hypertension     Myocardial infarction     Obesity (BMI 30-39.9)        Past Surgical History:   Procedure Laterality Date    ANGIOGRAM, CORONARY, WITH LEFT HEART CATHETERIZATION N/A 12/31/2021    Procedure: ANGIOGRAM,CORONARY,WITH LEFT HEART CATHETERIZATION;  Surgeon: Mahad Villaseñor DO;  Location: [facility] CATH LAB;  Service: Cardiology;  Laterality: N/A;    BIOPSY WITH TRANSRECTAL ULTRASOUND (TRUS) GUIDANCE Bilateral 03/07/2018    CHOLECYSTECTOMY      CORONARY ARTERY BYPASS GRAFT  01/07/2022    [facility]-Dr. Qureshi    LEFT HEART CATHETERIZATION Left 7/13/2021    Procedure: Left heart cath;  Surgeon: Philip Torres MD;  Location: [facility] CATH LAB;  Service: Cardiology;  Laterality: Left;       Social History    reports that he quit smoking about 34 years ago. His smoking use included cigarettes. He has a 7.50 pack-year smoking history. He has never been exposed to tobacco smoke. He has quit using smokeless tobacco.  His smokeless tobacco use included chew. He reports that he does not currently use alcohol. He reports that he does not use drugs.    Family History  's family history includes Heart disease in his mother; Hypertension in his mother; No Known Problems in his brother, daughter, father, sister, and son.    Medications and Allergies     Medications  Outpatient Medications Marked as Taking for the 5/9/23 encounter (Office Visit) with Swati Vicente NP   Medication Sig Dispense Refill    ascorbic acid, vitamin C, (VITAMIN C) 500 MG tablet Take 500 mg by mouth once daily.      multivitamin-minerals-lutein  (MULTIVITAMIN 50 PLUS) Tab Take 1 tablet by mouth once daily.      [DISCONTINUED] amLODIPine (NORVASC) 5 MG tablet Take 1 tablet (5 mg total) by mouth once daily. 90 tablet 1    [DISCONTINUED] atorvastatin (LIPITOR) 80 MG tablet Take 1 tablet (80 mg total) by mouth every evening. 90 tablet 1    [DISCONTINUED] colchicine (COLCRYS) 0.6 mg tablet TAKE 2 TABLETS TODAY, AND THEN TAKE ONE DAILY UNTIL GOUT ATTACK RESOLVING. 30 tablet 11    [DISCONTINUED] furosemide (LASIX) 20 MG tablet Take 1 tablet (20 mg total) by mouth once daily. 90 tablet 1    [DISCONTINUED] hydroCHLOROthiazide (HYDRODIURIL) 12.5 MG Tab Take 1 tablet (12.5 mg total) by mouth once daily. 90 tablet 1    [DISCONTINUED] pantoprazole (PROTONIX) 40 MG tablet Take 1 tablet (40 mg total) by mouth once daily. 90 tablet 1    [DISCONTINUED] potassium chloride (KLOR-CON) 10 MEQ TbSR Take 1 tablet (10 mEq total) by mouth once daily. 90 tablet 1       Allergies  Review of patient's allergies indicates:   Allergen Reactions    Lisinopril Swelling and Anaphylaxis       Physical Examination     Vitals:    05/09/23 0835   BP: 136/64   Pulse: 62   Resp: 20   Temp: 97.2 °F (36.2 °C)     Physical Exam  Vitals and nursing note reviewed.   HENT:      Head: Normocephalic.      Right Ear: Tympanic membrane normal.      Left Ear: Tympanic membrane normal.      Nose: Nose normal.      Mouth/Throat:      Mouth: Mucous membranes are moist.      Pharynx: Oropharynx is clear. No posterior oropharyngeal erythema.   Eyes:      Conjunctiva/sclera: Conjunctivae normal.   Cardiovascular:      Rate and Rhythm: Normal rate and regular rhythm.      Pulses: Normal pulses.      Heart sounds: Normal heart sounds.   Pulmonary:      Effort: Pulmonary effort is normal.      Breath sounds: Normal breath sounds.   Abdominal:      General: Abdomen is flat. Bowel sounds are normal. There is no distension.      Palpations: Abdomen is soft.   Musculoskeletal:         General: No swelling or  tenderness. Normal range of motion.      Cervical back: Normal range of motion.      Right lower leg: No edema.      Left lower leg: No edema.   Skin:     General: Skin is warm and dry.      Capillary Refill: Capillary refill takes less than 2 seconds.   Neurological:      Mental Status: He is alert. Mental status is at baseline.   Psychiatric:         Mood and Affect: Mood normal.         Behavior: Behavior normal.             Lab Results   Component Value Date    WBC 7.26 02/09/2023    HGB 14.5 02/09/2023    HCT 44.1 02/09/2023    MCV 93.6 02/09/2023     02/09/2023        CMP  Sodium   Date Value Ref Range Status   02/09/2023 141 136 - 145 mmol/L Final     Potassium   Date Value Ref Range Status   02/09/2023 4.2 3.5 - 5.1 mmol/L Final     Chloride   Date Value Ref Range Status   02/09/2023 107 98 - 107 mmol/L Final     CO2   Date Value Ref Range Status   02/09/2023 29 21 - 32 mmol/L Final     Glucose   Date Value Ref Range Status   02/09/2023 98 74 - 106 mg/dL Final     BUN   Date Value Ref Range Status   02/09/2023 26 (H) 7 - 18 mg/dL Final     Creatinine   Date Value Ref Range Status   02/09/2023 1.94 (H) 0.70 - 1.30 mg/dL Final     Calcium   Date Value Ref Range Status   02/09/2023 9.3 8.5 - 10.1 mg/dL Final     Total Protein   Date Value Ref Range Status   02/09/2023 7.5 6.4 - 8.2 g/dL Final     Albumin   Date Value Ref Range Status   02/09/2023 3.7 3.5 - 5.0 g/dL Final     Bilirubin, Total   Date Value Ref Range Status   02/09/2023 0.4 >0.0 - 1.2 mg/dL Final     Alk Phos   Date Value Ref Range Status   02/09/2023 92 45 - 115 U/L Final     AST   Date Value Ref Range Status   02/09/2023 23 15 - 37 U/L Final     ALT   Date Value Ref Range Status   02/09/2023 27 16 - 61 U/L Final     Anion Gap   Date Value Ref Range Status   02/09/2023 9 7 - 16 mmol/L Final     eGFR   Date Value Ref Range Status   02/09/2023 34 (L) >=60 mL/min/1.73m² Final     Procedures   Assessment and Plan (including Health Maintenance)    :    Plan:           Problem List Items Addressed This Visit          Cardiac/Vascular    Essential hypertension    Current Assessment & Plan     Blood pressure well controlled. Continue current medications. Follow up in 3 months or as needed.              Relevant Medications    amLODIPine (NORVASC) 5 MG tablet    furosemide (LASIX) 20 MG tablet    Hyperlipidemia    Current Assessment & Plan     Will decreased Atorvastatin to 40mg nightly. Follow up in 3 months for recheck of Lipid panel. Follow low fat/low cholesterol diet.               Renal/    Diuretic-induced hypokalemia    Relevant Medications    potassium chloride (KLOR-CON) 10 MEQ TbSR       GI    Gastroesophageal reflux disease    Current Assessment & Plan     Well controlled with current mediations. Will refill protonix. Follow up in 6 months or as needed.            Relevant Medications    pantoprazole (PROTONIX) 40 MG tablet       Orthopedic    Gout    Current Assessment & Plan     Continue colchicine as needed. NO recent flare up.            Relevant Medications    colchicine (COLCRYS) 0.6 mg tablet     Other Visit Diagnoses       Stage 3a chronic kidney disease        Relevant Medications    furosemide (LASIX) 20 MG tablet            Health Maintenance Topics with due status: Not Due       Topic Last Completion Date    TETANUS VACCINE 12/11/2018    Lipid Panel 02/09/2023       Future Appointments   Date Time Provider Department Center   6/6/2023 11:15 AM Mahad Villaseñor DO [facility] CARD [facility]   6/29/2023  3:00 PM AWV NURSE [facility]   8/7/2023  9:30 AM Swati Vicente NP [facility]   4/1/2024  9:30 AM Kb Brown Jr., MD [facility] UROL [facility]        Health Maintenance Due   Topic Date Due    Aspirin/Antiplatelet Therapy  Never done        No follow-ups on file.     Signature:  [facility]  [address] [facility] [address]  [place]  [address]    Date of encounter: 5/9/23

## 2023-05-09 NOTE — ASSESSMENT & PLAN NOTE
Patient understands increased risk of persistent corneal edema after cataract surgery which may require corneal transplantation/endothelial keratoplasty. Will decreased Atorvastatin to 40mg nightly. Follow up in 3 months for recheck of Lipid panel. Follow low fat/low cholesterol diet.

## 2023-06-06 ENCOUNTER — OFFICE VISIT (OUTPATIENT)
Dept: CARDIOLOGY | Facility: CLINIC | Age: 83
End: 2023-06-06
Payer: MEDICARE

## 2023-06-06 VITALS
SYSTOLIC BLOOD PRESSURE: 160 MMHG | DIASTOLIC BLOOD PRESSURE: 78 MMHG | WEIGHT: 211 LBS | BODY MASS INDEX: 28.58 KG/M2 | OXYGEN SATURATION: 99 % | HEART RATE: 70 BPM | HEIGHT: 72 IN

## 2023-06-06 DIAGNOSIS — I10 ESSENTIAL HYPERTENSION: ICD-10-CM

## 2023-06-06 DIAGNOSIS — E78.2 MIXED HYPERLIPIDEMIA: ICD-10-CM

## 2023-06-06 DIAGNOSIS — I25.10 CORONARY ARTERY DISEASE INVOLVING NATIVE CORONARY ARTERY OF NATIVE HEART WITHOUT ANGINA PECTORIS: ICD-10-CM

## 2023-06-06 DIAGNOSIS — Z95.1 HX OF CABG: Primary | ICD-10-CM

## 2023-06-06 DIAGNOSIS — I10 ESSENTIAL HYPERTENSION: Primary | ICD-10-CM

## 2023-06-06 PROCEDURE — 99214 PR OFFICE/OUTPT VISIT, EST, LEVL IV, 30-39 MIN: ICD-10-PCS | Mod: S$PBB,,, | Performed by: INTERNAL MEDICINE

## 2023-06-06 PROCEDURE — 3077F PR MOST RECENT SYSTOLIC BLOOD PRESSURE >= 140 MM HG: ICD-10-PCS | Mod: CPTII,,, | Performed by: INTERNAL MEDICINE

## 2023-06-06 PROCEDURE — 3078F DIAST BP <80 MM HG: CPT | Mod: CPTII,,, | Performed by: INTERNAL MEDICINE

## 2023-06-06 PROCEDURE — 3077F SYST BP >= 140 MM HG: CPT | Mod: CPTII,,, | Performed by: INTERNAL MEDICINE

## 2023-06-06 PROCEDURE — 1159F PR MEDICATION LIST DOCUMENTED IN MEDICAL RECORD: ICD-10-PCS | Mod: CPTII,,, | Performed by: INTERNAL MEDICINE

## 2023-06-06 PROCEDURE — 1159F MED LIST DOCD IN RCRD: CPT | Mod: CPTII,,, | Performed by: INTERNAL MEDICINE

## 2023-06-06 PROCEDURE — 99214 OFFICE O/P EST MOD 30 MIN: CPT | Mod: S$PBB,,, | Performed by: INTERNAL MEDICINE

## 2023-06-06 PROCEDURE — 93010 ELECTROCARDIOGRAM REPORT: CPT | Mod: S$PBB,,, | Performed by: INTERNAL MEDICINE

## 2023-06-06 PROCEDURE — 93005 ELECTROCARDIOGRAM TRACING: CPT | Mod: PBBFAC | Performed by: INTERNAL MEDICINE

## 2023-06-06 PROCEDURE — 99214 OFFICE O/P EST MOD 30 MIN: CPT | Mod: PBBFAC | Performed by: INTERNAL MEDICINE

## 2023-06-06 PROCEDURE — 3078F PR MOST RECENT DIASTOLIC BLOOD PRESSURE < 80 MM HG: ICD-10-PCS | Mod: CPTII,,, | Performed by: INTERNAL MEDICINE

## 2023-06-06 PROCEDURE — 93010 EKG 12-LEAD: ICD-10-PCS | Mod: S$PBB,,, | Performed by: INTERNAL MEDICINE

## 2023-06-06 NOTE — PROGRESS NOTES
Cardiology Clinic Note:    PCP: Swati Vicente NP    REFERRING PHYSICIAN: Swati Vicente NP    CHIEF COMPLAINT:  Chest pain    HISTORY OF PRESENT ILLNESS:                            PT reports chest pain has resolved post revascularization PCi plus CABG.  He is able to perform normal activities of daily living without chest pain, pressure, palpitations or shortness of breath. He notes drainage from vein harvest site has resolved.  He reports is walking consistently at least five days a week.                   Review of Systems   Constitutional: Negative for diaphoresis, malaise/fatigue, night sweats and weight gain.   HENT:  Negative for congestion, ear pain, hearing loss, nosebleeds and sore throat.    Eyes:  Negative for blurred vision, double vision, pain, photophobia and visual disturbance.   Cardiovascular:  Positive for chest pain. Negative for claudication, dyspnea on exertion, irregular heartbeat, leg swelling, near-syncope, orthopnea, palpitations and syncope.   Respiratory:  Negative for cough, shortness of breath, sleep disturbances due to breathing, snoring and wheezing.    Endocrine: Negative for cold intolerance, heat intolerance, polydipsia, polyphagia and polyuria.   Hematologic/Lymphatic: Negative for bleeding problem. Does not bruise/bleed easily.   Skin:  Negative for dry skin, flushing, itching, rash and skin cancer.   Musculoskeletal:  Negative for arthritis, back pain, falls, joint pain, muscle cramps, muscle weakness and myalgias.   Gastrointestinal:  Negative for abdominal pain, change in bowel habit, constipation, diarrhea, dysphagia, heartburn, nausea and vomiting.   Genitourinary:  Negative for bladder incontinence, dysuria, flank pain, frequency and nocturia.   Neurological:  Negative for dizziness, focal weakness, headaches, light-headedness, loss of balance, numbness, paresthesias and seizures.   Psychiatric/Behavioral:  Negative for depression, memory loss and substance abuse.  The patient is not nervous/anxious.    Allergic/Immunologic: Negative for environmental allergies.        PAST MEDICAL HISTORY:  Past Medical History:   Diagnosis Date    Benign localized prostatic hyperplasia with lower urinary tract symptoms (LUTS)     Coronary artery disease     Elevated PSA     Hypercholesterolemia     Hyperlipidemia     Hypertension     Myocardial infarction     Obesity (BMI 30-39.9)        PAST SURGICAL HISTORY:  Past Surgical History:   Procedure Laterality Date    ANGIOGRAM, CORONARY, WITH LEFT HEART CATHETERIZATION N/A 2021    Procedure: ANGIOGRAM,CORONARY,WITH LEFT HEART CATHETERIZATION;  Surgeon: Mahad Villaseñor DO;  Location: [facility] CATH LAB;  Service: Cardiology;  Laterality: N/A;    BIOPSY WITH TRANSRECTAL ULTRASOUND (TRUS) GUIDANCE Bilateral 2018    CHOLECYSTECTOMY      CORONARY ARTERY BYPASS GRAFT  2022    [facility]-Dr. Qureshi    LEFT HEART CATHETERIZATION Left 2021    Procedure: Left heart cath;  Surgeon: Philip Torres MD;  Location: [facility] CATH LAB;  Service: Cardiology;  Laterality: Left;       SOCIAL HISTORY:  Social History     Socioeconomic History    Marital status:     Number of children: 7   Occupational History    Occupation: retired   Tobacco Use    Smoking status: Former     Packs/day: 0.50     Years: 15.00     Pack years: 7.50     Types: Cigarettes     Quit date: 1988     Years since quittin.9     Passive exposure: Never    Smokeless tobacco: Former     Types: Chew   Substance and Sexual Activity    Alcohol use: Not Currently     Comment: can of beer one or twice a month    Drug use: Never    Sexual activity: Not Currently       FAMILY HISTORY:  Family History   Problem Relation Age of Onset    Heart disease Mother     Hypertension Mother     No Known Problems Father     No Known Problems Sister     No Known Problems Brother     No Known Problems Daughter     No Known Problems Son        ALLERGIES:  Allergies as  of 06/06/2023 - Reviewed 05/09/2023   Allergen Reaction Noted    Lisinopril Swelling and Anaphylaxis 07/13/2021         MEDICATIONS:  Current Outpatient Medications on File Prior to Visit   Medication Sig Dispense Refill    amLODIPine (NORVASC) 5 MG tablet Take 1 tablet (5 mg total) by mouth once daily. 90 tablet 1    ascorbic acid, vitamin C, (VITAMIN C) 500 MG tablet Take 500 mg by mouth once daily.      aspirin (ECOTRIN) 81 MG EC tablet Take 1 tablet (81 mg total) by mouth once daily. 30 tablet 0    atorvastatin (LIPITOR) 80 MG tablet Take 0.5 tablets (40 mg total) by mouth every evening. 90 tablet 1    cefUROXime (CEFTIN) 500 MG tablet Take 500 mg by mouth 2 (two) times daily.      colchicine (COLCRYS) 0.6 mg tablet TAKE 2 TABLETS TODAY, AND THEN TAKE ONE DAILY UNTIL GOUT ATTACK RESOLVING. 30 tablet 11    furosemide (LASIX) 20 MG tablet Take 1 tablet (20 mg total) by mouth once daily. 90 tablet 1    hydroCHLOROthiazide (HYDRODIURIL) 12.5 MG Tab Take 1 tablet (12.5 mg total) by mouth once daily. 90 tablet 1    multivitamin-minerals-lutein (MULTIVITAMIN 50 PLUS) Tab Take 1 tablet by mouth once daily.      pantoprazole (PROTONIX) 40 MG tablet Take 1 tablet (40 mg total) by mouth once daily. 90 tablet 1    potassium chloride (KLOR-CON) 10 MEQ TbSR Take 1 tablet (10 mEq total) by mouth once daily. 90 tablet 1     No current facility-administered medications on file prior to visit.          PHYSICAL EXAM:  There were no vitals taken for this visit.  Wt Readings from Last 3 Encounters:   05/09/23 95.9 kg (211 lb 6.4 oz)   03/28/23 94.3 kg (208 lb)   02/07/23 98.2 kg (216 lb 9.6 oz)      There is no height or weight on file to calculate BMI.    Physical Exam  Vitals and nursing note reviewed.   Constitutional:       Appearance: Normal appearance. He is normal weight.   HENT:      Head: Normocephalic and atraumatic.      Right Ear: External ear normal.      Left Ear: External ear normal.   Eyes:      General: No scleral  icterus.        Right eye: No discharge.         Left eye: No discharge.      Extraocular Movements: Extraocular movements intact.      Conjunctiva/sclera: Conjunctivae normal.      Pupils: Pupils are equal, round, and reactive to light.   Cardiovascular:      Rate and Rhythm: Normal rate and regular rhythm.      Pulses: Normal pulses.      Heart sounds: Normal heart sounds. No murmur heard.    No friction rub. No gallop.   Pulmonary:      Effort: Pulmonary effort is normal.      Breath sounds: Normal breath sounds. No wheezing, rhonchi or rales.   Chest:      Chest wall: No tenderness.   Abdominal:      General: Abdomen is flat. Bowel sounds are normal. There is no distension.      Palpations: Abdomen is soft.      Tenderness: There is no abdominal tenderness. There is no guarding or rebound.   Musculoskeletal:         General: No swelling or tenderness. Normal range of motion.      Cervical back: Normal range of motion and neck supple.   Skin:     General: Skin is warm and dry.      Findings: No erythema or rash.      Comments: Draining wound to right lower extremity, vein harvest site.    Neurological:      General: No focal deficit present.      Mental Status: He is alert and oriented to person, place, and time.      Cranial Nerves: No cranial nerve deficit.      Motor: No weakness.      Gait: Gait normal.   Psychiatric:         Mood and Affect: Mood normal.         Behavior: Behavior normal.         Thought Content: Thought content normal.         Judgment: Judgment normal.        LABS REVIEWED:  Lab Results   Component Value Date    WBC 7.26 02/09/2023    RBC 4.71 02/09/2023    HGB 14.5 02/09/2023    HCT 44.1 02/09/2023    MCV 93.6 02/09/2023    MCH 30.8 02/09/2023    MCHC 32.9 02/09/2023    RDW 13.1 02/09/2023     02/09/2023    MPV 9.8 02/09/2023    NRBC 0.0 02/09/2023    INR 1.11 (H) 12/31/2021     Lab Results   Component Value Date     02/09/2023    K 4.2 02/09/2023     02/09/2023    CO2  29 02/09/2023    BUN 26 (H) 02/09/2023    MG 2.1 07/12/2021    PHOS 3.0 07/12/2021     Lab Results   Component Value Date    AST 23 02/09/2023    ALT 27 02/09/2023     Lab Results   Component Value Date    GLU 98 02/09/2023    HGBA1C 6.6 02/28/2022     Lab Results   Component Value Date    CHOL 225 (H) 02/09/2023    HDL 61 (H) 02/09/2023    TRIG 220 (H) 02/09/2023    CHOLHDL 3.7 02/09/2023       CARDIAC STUDIES REVIEWED:  [facility] 12/31/21 -  Three vessel CAD ( 70% LAD, 70% OM1, 99% pRCA)  S/P successful IVUS guided PCI of RCA with two KEHINDE.   1/22/22 - CABG, Dr. Shen    OTHER IMAGING STUDIES REVIEWED:    ASSESSMENT:   There are no diagnoses linked to this encounter.      PLAN:  Stable class 1 angina, chest pain: has resolved following revascularization   2.  CAD - severe 3 vessel disease, post CABG x 3, 1/22/22,    3. Right lower extremityy, slowly healing wound, has resolved        4.  Hypertension controlled   5. Hyperlipidemia, on statin, following with primary care.        Follow up in 6 months, sooner if symptoms change

## 2023-08-07 ENCOUNTER — OFFICE VISIT (OUTPATIENT)
Dept: FAMILY MEDICINE | Facility: CLINIC | Age: 83
End: 2023-08-07
Payer: MEDICARE

## 2023-08-07 VITALS
WEIGHT: 208 LBS | HEIGHT: 74 IN | RESPIRATION RATE: 20 BRPM | BODY MASS INDEX: 26.69 KG/M2 | OXYGEN SATURATION: 98 % | DIASTOLIC BLOOD PRESSURE: 72 MMHG | SYSTOLIC BLOOD PRESSURE: 128 MMHG | HEART RATE: 64 BPM | TEMPERATURE: 98 F

## 2023-08-07 DIAGNOSIS — J01.40 ACUTE NON-RECURRENT PANSINUSITIS: Primary | ICD-10-CM

## 2023-08-07 PROCEDURE — 1101F PR PT FALLS ASSESS DOC 0-1 FALLS W/OUT INJ PAST YR: ICD-10-PCS | Mod: ,,, | Performed by: NURSE PRACTITIONER

## 2023-08-07 PROCEDURE — 99213 PR OFFICE/OUTPT VISIT, EST, LEVL III, 20-29 MIN: ICD-10-PCS | Mod: 25,,, | Performed by: NURSE PRACTITIONER

## 2023-08-07 PROCEDURE — 96372 PR INJECTION,THERAP/PROPH/DIAG2ST, IM OR SUBCUT: ICD-10-PCS | Mod: ,,, | Performed by: NURSE PRACTITIONER

## 2023-08-07 PROCEDURE — 3288F PR FALLS RISK ASSESSMENT DOCUMENTED: ICD-10-PCS | Mod: ,,, | Performed by: NURSE PRACTITIONER

## 2023-08-07 PROCEDURE — 3074F PR MOST RECENT SYSTOLIC BLOOD PRESSURE < 130 MM HG: ICD-10-PCS | Mod: ,,, | Performed by: NURSE PRACTITIONER

## 2023-08-07 PROCEDURE — 1159F PR MEDICATION LIST DOCUMENTED IN MEDICAL RECORD: ICD-10-PCS | Mod: ,,, | Performed by: NURSE PRACTITIONER

## 2023-08-07 PROCEDURE — 1159F MED LIST DOCD IN RCRD: CPT | Mod: ,,, | Performed by: NURSE PRACTITIONER

## 2023-08-07 PROCEDURE — 96372 THER/PROPH/DIAG INJ SC/IM: CPT | Mod: ,,, | Performed by: NURSE PRACTITIONER

## 2023-08-07 PROCEDURE — 3074F SYST BP LT 130 MM HG: CPT | Mod: ,,, | Performed by: NURSE PRACTITIONER

## 2023-08-07 PROCEDURE — 99213 OFFICE O/P EST LOW 20 MIN: CPT | Mod: 25,,, | Performed by: NURSE PRACTITIONER

## 2023-08-07 PROCEDURE — 1160F PR REVIEW ALL MEDS BY PRESCRIBER/CLIN PHARMACIST DOCUMENTED: ICD-10-PCS | Mod: ,,, | Performed by: NURSE PRACTITIONER

## 2023-08-07 PROCEDURE — 1101F PT FALLS ASSESS-DOCD LE1/YR: CPT | Mod: ,,, | Performed by: NURSE PRACTITIONER

## 2023-08-07 PROCEDURE — 3078F PR MOST RECENT DIASTOLIC BLOOD PRESSURE < 80 MM HG: ICD-10-PCS | Mod: ,,, | Performed by: NURSE PRACTITIONER

## 2023-08-07 PROCEDURE — 3288F FALL RISK ASSESSMENT DOCD: CPT | Mod: ,,, | Performed by: NURSE PRACTITIONER

## 2023-08-07 PROCEDURE — 3078F DIAST BP <80 MM HG: CPT | Mod: ,,, | Performed by: NURSE PRACTITIONER

## 2023-08-07 PROCEDURE — 1160F RVW MEDS BY RX/DR IN RCRD: CPT | Mod: ,,, | Performed by: NURSE PRACTITIONER

## 2023-08-07 RX ORDER — CEFTRIAXONE 1 G/1
1 INJECTION, POWDER, FOR SOLUTION INTRAMUSCULAR; INTRAVENOUS
Status: COMPLETED | OUTPATIENT
Start: 2023-08-07 | End: 2023-08-07

## 2023-08-07 RX ORDER — AMOXICILLIN 500 MG/1
500 TABLET, FILM COATED ORAL EVERY 12 HOURS
Qty: 14 TABLET | Refills: 0 | Status: SHIPPED | OUTPATIENT
Start: 2023-08-07 | End: 2023-08-14

## 2023-08-07 RX ORDER — METHYLPREDNISOLONE ACETATE 40 MG/ML
40 INJECTION, SUSPENSION INTRA-ARTICULAR; INTRALESIONAL; INTRAMUSCULAR; SOFT TISSUE
Status: COMPLETED | OUTPATIENT
Start: 2023-08-07 | End: 2023-08-07

## 2023-08-07 RX ADMIN — METHYLPREDNISOLONE ACETATE 40 MG: 40 INJECTION, SUSPENSION INTRA-ARTICULAR; INTRALESIONAL; INTRAMUSCULAR; SOFT TISSUE at 10:08

## 2023-08-07 RX ADMIN — CEFTRIAXONE 1 G: 1 INJECTION, POWDER, FOR SOLUTION INTRAMUSCULAR; INTRAVENOUS at 10:08

## 2023-08-07 NOTE — PROGRESS NOTES
Swati Vicente NP   [facility]  [address]  [place]  [address]      PATIENT NAME: Trung Barboza  : 1940  DATE: 23  MRN: [id number]      Billing Provider: Swati Vicente NP  Level of Service: [place] OFFICE/OUTPT VISIT, EST, LEVL III, 20-29 MIN  Patient PCP Information       Provider PCP Type    Swati Vicente NP General            Reason for Visit / Chief Complaint: Hypertension (Patient is here for 3 month follow up ), Hyperlipidemia (3 month follow up. Taking medications as directed. ), Nasal Congestion (Sinus congestion started about 3 weeks ago), Cough (Cough and congestion x 3 weeks), Gout (Pain to bilateral wrist. ), and Hand Problem (Patient states he has numbness to bilateral hands with left greater than right. )         History of Present Illness / Problem Focused Workflow     Trung Barboza presents to the clinic with Hypertension (Patient is here for 3 month follow up ), Hyperlipidemia (3 month follow up. Taking medications as directed. ), Nasal Congestion (Sinus congestion started about 3 weeks ago), Cough (Cough and congestion x 3 weeks), Gout (Pain to bilateral wrist. ), and Hand Problem (Patient states he has numbness to bilateral hands with left greater than right. )     83 year old male presents to clinic with complaints of nasal congestion and productive cough with yellow sputum which he reports has been ongoing for about 3 weeks now. He denies fever or shortness of breath. He reports he has had some pain in bilat wrists and hands. However, he does have arthritis to these areas.         Review of Systems     @Review of Systems   Constitutional:  Negative for activity change, appetite change, fatigue and fever.   HENT:  Positive for nasal congestion, rhinorrhea and sinus pressure/congestion. Negative for ear pain and sore throat.    Eyes:  Negative for pain, redness, visual disturbance and eye dryness.   Respiratory:  Positive for cough. Negative for shortness  of breath.    Cardiovascular:  Negative for chest pain and leg swelling.   Gastrointestinal:  Negative for abdominal distention, abdominal pain, constipation and diarrhea.   Endocrine: Negative for cold intolerance, heat intolerance and polyuria.   Genitourinary:  Negative for bladder incontinence, dysuria, frequency and urgency.   Musculoskeletal:  Positive for arthralgias. Negative for gait problem and myalgias.   Integumentary:  Negative for color change, rash and wound.   Allergic/Immunologic: Negative for environmental allergies and food allergies.   Neurological:  Negative for dizziness, weakness, light-headedness and headaches.   Psychiatric/Behavioral:  Negative for behavioral problems and sleep disturbance.        Medical / Social / Family History     Past Medical History:   Diagnosis Date    Benign localized prostatic hyperplasia with lower urinary tract symptoms (LUTS)     Coronary artery disease     Elevated PSA     Hypercholesterolemia     Hyperlipidemia     Hypertension     Myocardial infarction     Obesity (BMI 30-39.9)        Past Surgical History:   Procedure Laterality Date    ANGIOGRAM, CORONARY, WITH LEFT HEART CATHETERIZATION N/A 12/31/2021    Procedure: ANGIOGRAM,CORONARY,WITH LEFT HEART CATHETERIZATION;  Surgeon: Mahad Villaseñor DO;  Location: [facility] CATH LAB;  Service: Cardiology;  Laterality: N/A;    BIOPSY WITH TRANSRECTAL ULTRASOUND (TRUS) GUIDANCE Bilateral 03/07/2018    CHOLECYSTECTOMY      CORONARY ARTERY BYPASS GRAFT  01/07/2022    [facility]-Dr. Qureshi    LEFT HEART CATHETERIZATION Left 7/13/2021    Procedure: Left heart cath;  Surgeon: Philip Torres MD;  Location: [facility] CATH LAB;  Service: Cardiology;  Laterality: Left;       Social History    reports that he quit smoking about 35 years ago. His smoking use included cigarettes. He started smoking about 50 years ago. He has a 7.5 pack-year smoking history. He has never been exposed to tobacco smoke. He has quit  using smokeless tobacco.  His smokeless tobacco use included chew. He reports that he does not drink alcohol and does not use drugs.    Family History  's family history includes Heart disease in his mother; Hypertension in his mother; No Known Problems in his brother, daughter, father, sister, and son.    Medications and Allergies     Medications  Outpatient Medications Marked as Taking for the 8/7/23 encounter (Office Visit) with Swati Vicente NP   Medication Sig Dispense Refill    amLODIPine (NORVASC) 5 MG tablet Take 1 tablet (5 mg total) by mouth once daily. 90 tablet 1    ascorbic acid, vitamin C, (VITAMIN C) 500 MG tablet Take 500 mg by mouth once daily.      aspirin (ECOTRIN) 81 MG EC tablet Take 1 tablet (81 mg total) by mouth once daily. 30 tablet 0    atorvastatin (LIPITOR) 80 MG tablet Take 0.5 tablets (40 mg total) by mouth every evening. 90 tablet 1    colchicine (COLCRYS) 0.6 mg tablet TAKE 2 TABLETS TODAY, AND THEN TAKE ONE DAILY UNTIL GOUT ATTACK RESOLVING. 30 tablet 11    furosemide (LASIX) 20 MG tablet Take 1 tablet (20 mg total) by mouth once daily. 90 tablet 1    hydroCHLOROthiazide (HYDRODIURIL) 12.5 MG Tab Take 1 tablet (12.5 mg total) by mouth once daily. 90 tablet 1    multivitamin-minerals-lutein (MULTIVITAMIN 50 PLUS) Tab Take 1 tablet by mouth once daily.      pantoprazole (PROTONIX) 40 MG tablet Take 1 tablet (40 mg total) by mouth once daily. 90 tablet 1    potassium chloride (KLOR-CON) 10 MEQ TbSR Take 1 tablet (10 mEq total) by mouth once daily. 90 tablet 1     Current Facility-Administered Medications for the 8/7/23 encounter (Office Visit) with Swati Vicente NP   Medication Dose Route Frequency Provider Last Rate Last Admin    [COMPLETED] cefTRIAXone injection 1 g  1 g Intramuscular 1 time in Clinic/HOD Swati Vicente NP   1 g at 08/07/23 1044    [COMPLETED] methylPREDNISolone acetate injection 40 mg  40 mg Intramuscular 1 time in Clinic/HOD Swati Vicente NP    40 mg at 08/07/23 1045       Allergies  Review of patient's allergies indicates:   Allergen Reactions    Lisinopril Swelling and Anaphylaxis       Physical Examination     Vitals:    08/07/23 0922   BP: 128/72   Pulse: 64   Resp: 20   Temp: 98 °F (36.7 °C)     Physical Exam  Vitals and nursing note reviewed.   Constitutional:       Appearance: Normal appearance.   HENT:      Head: Normocephalic.      Right Ear: Tympanic membrane normal. There is impacted cerumen.      Left Ear: Tympanic membrane normal. There is impacted cerumen.      Nose: Congestion and rhinorrhea present. Rhinorrhea is purulent.      Right Turbinates: Pale.      Left Turbinates: Pale.      Right Sinus: Maxillary sinus tenderness and frontal sinus tenderness present.      Left Sinus: Maxillary sinus tenderness and frontal sinus tenderness present.      Mouth/Throat:      Lips: Pink.      Mouth: Mucous membranes are moist.      Pharynx: Oropharyngeal exudate (clear post nasal drainage.) and posterior oropharyngeal erythema present.   Eyes:      Conjunctiva/sclera: Conjunctivae normal.   Cardiovascular:      Rate and Rhythm: Normal rate and regular rhythm.      Pulses: Normal pulses.      Heart sounds: Normal heart sounds.   Pulmonary:      Effort: Pulmonary effort is normal.      Breath sounds: Normal breath sounds. No wheezing or rhonchi.   Abdominal:      General: Abdomen is flat. Bowel sounds are normal. There is no distension.      Palpations: Abdomen is soft.      Tenderness: There is no abdominal tenderness.   Musculoskeletal:      Right wrist: Tenderness and bony tenderness present. Decreased range of motion.      Left wrist: Tenderness and bony tenderness present. Decreased range of motion.      Right hand: Tenderness and bony tenderness present. Decreased range of motion.      Left hand: Tenderness and bony tenderness present. Decreased range of motion.      Cervical back: Normal range of motion.   Skin:     General: Skin is warm and dry.       Capillary Refill: Capillary refill takes less than 2 seconds.   Neurological:      General: No focal deficit present.      Mental Status: He is alert and oriented to person, place, and time. Mental status is at baseline.   Psychiatric:         Mood and Affect: Mood normal.         Behavior: Behavior normal.               Lab Results   Component Value Date    WBC 7.26 02/09/2023    HGB 14.5 02/09/2023    HCT 44.1 02/09/2023    MCV 93.6 02/09/2023     02/09/2023        CMP  Sodium   Date Value Ref Range Status   02/09/2023 141 136 - 145 mmol/L Final     Potassium   Date Value Ref Range Status   02/09/2023 4.2 3.5 - 5.1 mmol/L Final     Chloride   Date Value Ref Range Status   02/09/2023 107 98 - 107 mmol/L Final     CO2   Date Value Ref Range Status   02/09/2023 29 21 - 32 mmol/L Final     Glucose   Date Value Ref Range Status   02/09/2023 98 74 - 106 mg/dL Final     BUN   Date Value Ref Range Status   02/09/2023 26 (H) 7 - 18 mg/dL Final     Creatinine   Date Value Ref Range Status   02/09/2023 1.94 (H) 0.70 - 1.30 mg/dL Final     Calcium   Date Value Ref Range Status   02/09/2023 9.3 8.5 - 10.1 mg/dL Final     Total Protein   Date Value Ref Range Status   02/09/2023 7.5 6.4 - 8.2 g/dL Final     Albumin   Date Value Ref Range Status   02/09/2023 3.7 3.5 - 5.0 g/dL Final     Bilirubin, Total   Date Value Ref Range Status   02/09/2023 0.4 >0.0 - 1.2 mg/dL Final     Alk Phos   Date Value Ref Range Status   02/09/2023 92 45 - 115 U/L Final     AST   Date Value Ref Range Status   02/09/2023 23 15 - 37 U/L Final     ALT   Date Value Ref Range Status   02/09/2023 27 16 - 61 U/L Final     Anion Gap   Date Value Ref Range Status   02/09/2023 9 7 - 16 mmol/L Final     eGFR   Date Value Ref Range Status   02/09/2023 34 (L) >=60 mL/min/1.73m² Final     Procedures   Assessment and Plan (including Health Maintenance)   :    Plan:           Problem List Items Addressed This Visit          ENT    Acute non-recurrent  pansinusitis - Primary    Current Assessment & Plan     Rocephin and Depomedrol given IM in clinic.   Amoxicillin as ordered.    Reviewed pathology of current symptoms, medication side effects/risk/benefits/directions on taking medications, and worsening or persistent symptoms that require follow up in next 2-3 days. Saline/steroid nasal sprays, antihistamine use, increase fluid intake, and multivitamin/elderberry/Zinc use were recommended. May take Tylenol or Motrin as needed for pain and/or fever. Patient was instructed to take antibiotic as directed, complete entire course to avoid antibiotic resistance, and take OTC probiotic with antibiotic to prevent GI upset. Patient verbalized understanding of treatment plan and denies any questions.              Health Maintenance Topics with due status: Not Due       Topic Last Completion Date    TETANUS VACCINE 12/11/2018    Influenza Vaccine 10/04/2022    Lipid Panel 02/09/2023    Aspirin/Antiplatelet Therapy 08/07/2023       Future Appointments   Date Time Provider Department Center   11/9/2023  8:30 AM [facility] NP [facility]   12/7/2023  3:00 PM AWV NURSE [facility]   4/1/2024  9:30 AM Kb Brown Jr., MD [facility]        Health Maintenance Due   Topic Date Due    Shingles Vaccine (1 of 2) Never done    COVID-19 Vaccine (5 - Moderna series) 02/04/2023        No follow-ups on file.     Signature:  Swati Vicente NP  [facility]  [address] [place] [address], [place]  [address]    Date of encounter: 8/7/23

## 2023-08-07 NOTE — ASSESSMENT & PLAN NOTE
Rocephin and Depomedrol given IM in clinic.   Amoxicillin as ordered.    Reviewed pathology of current symptoms, medication side effects/risk/benefits/directions on taking medications, and worsening or persistent symptoms that require follow up in next 2-3 days. Saline/steroid nasal sprays, antihistamine use, increase fluid intake, and multivitamin/elderberry/Zinc use were recommended. May take Tylenol or Motrin as needed for pain and/or fever. Patient was instructed to take antibiotic as directed, complete entire course to avoid antibiotic resistance, and take OTC probiotic with antibiotic to prevent GI upset. Patient verbalized understanding of treatment plan and denies any questions.

## 2023-08-31 ENCOUNTER — EXTERNAL CHRONIC CARE MANAGEMENT (OUTPATIENT)
Dept: FAMILY MEDICINE | Facility: CLINIC | Age: 83
End: 2023-08-31
Payer: MEDICARE

## 2023-08-31 PROCEDURE — G0511 CCM/BHI BY RHC/FQHC 20MIN MO: HCPCS | Mod: ,,, | Performed by: FAMILY MEDICINE

## 2023-08-31 PROCEDURE — G0511 PR CHRONIC CARE MGMT, RHC OR FQHC ONLY, 20 MINS OR MORE: ICD-10-PCS | Mod: ,,, | Performed by: FAMILY MEDICINE

## 2023-09-14 RX ORDER — ATORVASTATIN CALCIUM 80 MG/1
80 TABLET, FILM COATED ORAL NIGHTLY
Qty: 90 TABLET | Refills: 0 | Status: SHIPPED | OUTPATIENT
Start: 2023-09-14 | End: 2023-12-26

## 2023-09-30 ENCOUNTER — EXTERNAL CHRONIC CARE MANAGEMENT (OUTPATIENT)
Dept: FAMILY MEDICINE | Facility: CLINIC | Age: 83
End: 2023-09-30
Payer: MEDICARE

## 2023-09-30 PROCEDURE — G0511 PR CHRONIC CARE MGMT, RHC OR FQHC ONLY, 20 MINS OR MORE: ICD-10-PCS | Mod: ,,, | Performed by: FAMILY MEDICINE

## 2023-09-30 PROCEDURE — G0511 CCM/BHI BY RHC/FQHC 20MIN MO: HCPCS | Mod: ,,, | Performed by: FAMILY MEDICINE

## 2023-10-31 ENCOUNTER — EXTERNAL CHRONIC CARE MANAGEMENT (OUTPATIENT)
Dept: FAMILY MEDICINE | Facility: CLINIC | Age: 83
End: 2023-10-31
Payer: MEDICARE

## 2023-10-31 PROCEDURE — G0511 CCM/BHI BY RHC/FQHC 20MIN MO: HCPCS | Mod: ,,, | Performed by: FAMILY MEDICINE

## 2023-10-31 PROCEDURE — G0511 PR CHRONIC CARE MGMT, RHC OR FQHC ONLY, 20 MINS OR MORE: ICD-10-PCS | Mod: ,,, | Performed by: FAMILY MEDICINE

## 2023-11-06 PROBLEM — J01.40 ACUTE NON-RECURRENT PANSINUSITIS: Status: RESOLVED | Noted: 2023-08-07 | Resolved: 2023-11-06

## 2023-11-30 ENCOUNTER — EXTERNAL CHRONIC CARE MANAGEMENT (OUTPATIENT)
Dept: FAMILY MEDICINE | Facility: CLINIC | Age: 83
End: 2023-11-30
Payer: MEDICARE

## 2023-11-30 PROCEDURE — G0511 CCM/BHI BY RHC/FQHC 20MIN MO: HCPCS | Mod: ,,, | Performed by: FAMILY MEDICINE

## 2023-11-30 PROCEDURE — G0511 PR CHRONIC CARE MGMT, RHC OR FQHC ONLY, 20 MINS OR MORE: ICD-10-PCS | Mod: ,,, | Performed by: FAMILY MEDICINE

## 2023-12-07 NOTE — PROGRESS NOTES
"82 y/o male presents to clinic for Medicare AWV. He denies any concerns or questions at present.       Trung Barboza presented for a  Medicare AWV and comprehensive Health Risk Assessment today. The following components were reviewed and updated:    Medical history  Family History  Social history  Allergies and Current Medications  Health Risk Assessment  Health Maintenance  Care Team         ** See Completed Assessments for Annual Wellness Visit within the encounter summary.**         The following assessments were completed:  Living Situation  CAGE  Depression Screening  Timed Get Up and Go  Whisper Test  Cognitive Function Screening  Nutrition Screening  ADL Screening  PAQ Screening        Vitals:    12/08/23 0843   BP: 138/78   BP Location: Left arm   Patient Position: Sitting   Pulse: 65   Resp: 20   Temp: 97.2 °F (36.2 °C)   SpO2: 98%   Weight: 101.6 kg (224 lb)   Height: 6' 2" (1.88 m)     Body mass index is 28.76 kg/m².  Physical Exam  Vitals and nursing note reviewed.   Constitutional:       General: He is awake.      Appearance: Normal appearance.   HENT:      Head: Normocephalic.      Right Ear: Tympanic membrane, ear canal and external ear normal.      Left Ear: Tympanic membrane, ear canal and external ear normal.      Nose: Nose normal.      Mouth/Throat:      Lips: Pink.      Mouth: Mucous membranes are moist.      Pharynx: Oropharynx is clear. Uvula midline.   Cardiovascular:      Rate and Rhythm: Normal rate and regular rhythm.      Heart sounds: Normal heart sounds, S1 normal and S2 normal.   Pulmonary:      Effort: Pulmonary effort is normal. No respiratory distress.      Breath sounds: Normal breath sounds. No decreased breath sounds, wheezing, rhonchi or rales.   Abdominal:      General: Bowel sounds are normal.      Palpations: Abdomen is soft.      Tenderness: There is no abdominal tenderness.   Musculoskeletal:      Cervical back: Normal range of motion.   Skin:     General: Skin is warm.     " " Capillary Refill: Capillary refill takes less than 2 seconds.   Neurological:      Mental Status: He is alert and oriented to person, place, and time.   Psychiatric:         Thought Content: Thought content does not include homicidal or suicidal ideation. Thought content does not include homicidal or suicidal plan.           Diagnoses and health risks identified today and associated recommendations/orders:    Problem List Items Addressed This Visit          Cardiac/Vascular    Essential hypertension     BP controlled well. Continue current medication regimen. Monitor BP daily and keep BP daily log to bring to next visit. Exercise at least 5 times a week. Keep scheduled appts. RTC sooner if BP is staying <130/80. Dash diet.    DASH- includes foods rich in K+, calcium and Magnesium.The diet limits foods high in sodium, saturated fats and added sugars. This is a flexible balanced eating plan that helps create heart healthy eating style for life. Diet is rich in vegetable, whole grains and fruits. It includes fat free or low fat dairy products, fish, poultry, nuts. Chose foods high in K+, calcium, magnesium, fiber, protein, and low in saturated fats and sodium.          Hyperlipidemia     Lab Results   Component Value Date    LDLCALC 120 02/09/2023   Lipid panel obtained at today's visit. Goal LDL is less than 70. Continue current meds and low fat/low cholesterol diet with increased exercise as tolerated. Will follow up with labs. Patient to follow up in 3 months or as needed    A few changes in your diet can reduce cholesterol and improve your heart health. Saturated fats, found primarily in red meat and full-fat dairy products, raise your total cholesterol. Decreasing your consumption of saturated fats can reduce your low-density lipoprotein (LDL) cholesterol. rans fats, sometimes listed on food labels as "partially hydrogenated vegetable oil," are often used in margarines and store-bought cookies, crackers and " cakes.     Trans fats raise overall cholesterol levels. Omega-3 fatty acids don't affect LDL cholesterol. But they have other heart-healthy benefits, including reducing blood pressure. Foods with omega-3 fatty acids include salmon, mackerel, herring, walnuts and flaxseeds.Soluble fiber can reduce the absorption of cholesterol into your bloodstream. Soluble fiber is found in such foods as oatmeal, kidney beans, [place] sprouts, apples and pears.  at least 30 minutes of exercise five times a week or vigorous aerobic activity for 20 minutes three times a week if tolerated.           Coronary artery disease involving native coronary artery of native heart without angina pectoris     History of coronary artery disease currently stable status post bypass.  Patient doing well.             Endocrine    BMI 28.0-28.9,adult     Healthy diet discussed with pt with understanding voiced.            Orthopedic    Gout     Continue colchicine as needed. NO recent flare up.           Relevant Medications    colchicine (COLCRYS) 0.6 mg tablet       Other    Encounter for subsequent annual wellness visit (AWV) in Medicare patient - Primary     Physical assessment WNL          Other Visit Diagnoses       Need for vaccination        Relevant Orders    Influenza (FLUAD) - Quadrivalent (Adjuvanted) *Preferred* (65+) (PF) (Completed)    Encounter for preventive health examination                Provided Trung with a 5-10 year written screening schedule and personal prevention plan. Recommendations were developed using the USPSTF age appropriate recommendations. Education, counseling, and referrals were provided as needed. After Visit Summary printed and given to patient which includes a list of additional screenings\tests needed.    Follow up for yearly annual wellness visit    Declined Covid booster and RSV Vaccines today. Pt reports has had shingles vaccine. Will request records.    I offered to discuss advanced care planning,  including how to pick a person who would make decisions for you if you were unable to make them for yourself, called a health care power of , and what kind of decisions you might make such as use of life sustaining treatments such as ventilators and tube feeding when faced with a life limiting illness recorded on a living will that they will need to know. (How you want to be cared for as you near the end of your natural life)     X  Patient has advanced directives written and agrees to provide copies to the institution.

## 2023-12-08 ENCOUNTER — OFFICE VISIT (OUTPATIENT)
Dept: FAMILY MEDICINE | Facility: CLINIC | Age: 83
End: 2023-12-08
Payer: MEDICARE

## 2023-12-08 VITALS
WEIGHT: 224 LBS | OXYGEN SATURATION: 98 % | RESPIRATION RATE: 20 BRPM | SYSTOLIC BLOOD PRESSURE: 138 MMHG | HEART RATE: 65 BPM | DIASTOLIC BLOOD PRESSURE: 78 MMHG | HEIGHT: 74 IN | TEMPERATURE: 97 F | BODY MASS INDEX: 28.75 KG/M2

## 2023-12-08 DIAGNOSIS — Z00.00 ENCOUNTER FOR SUBSEQUENT ANNUAL WELLNESS VISIT (AWV) IN MEDICARE PATIENT: Primary | ICD-10-CM

## 2023-12-08 DIAGNOSIS — E78.2 MIXED HYPERLIPIDEMIA: ICD-10-CM

## 2023-12-08 DIAGNOSIS — M10.9 GOUT, UNSPECIFIED CAUSE, UNSPECIFIED CHRONICITY, UNSPECIFIED SITE: ICD-10-CM

## 2023-12-08 DIAGNOSIS — I10 ESSENTIAL HYPERTENSION: ICD-10-CM

## 2023-12-08 DIAGNOSIS — Z00.00 ENCOUNTER FOR PREVENTIVE HEALTH EXAMINATION: ICD-10-CM

## 2023-12-08 DIAGNOSIS — Z23 NEED FOR VACCINATION: ICD-10-CM

## 2023-12-08 DIAGNOSIS — I25.10 CORONARY ARTERY DISEASE INVOLVING NATIVE CORONARY ARTERY OF NATIVE HEART WITHOUT ANGINA PECTORIS: ICD-10-CM

## 2023-12-08 PROCEDURE — 3078F DIAST BP <80 MM HG: CPT | Mod: ,,,

## 2023-12-08 PROCEDURE — 3288F PR FALLS RISK ASSESSMENT DOCUMENTED: ICD-10-PCS | Mod: ,,,

## 2023-12-08 PROCEDURE — 1101F PT FALLS ASSESS-DOCD LE1/YR: CPT | Mod: ,,,

## 2023-12-08 PROCEDURE — 1101F PR PT FALLS ASSESS DOC 0-1 FALLS W/OUT INJ PAST YR: ICD-10-PCS | Mod: ,,,

## 2023-12-08 PROCEDURE — 90694 FLU VACCINE - QUADRIVALENT - ADJUVANTED: ICD-10-PCS | Mod: ,,,

## 2023-12-08 PROCEDURE — 90694 VACC AIIV4 NO PRSRV 0.5ML IM: CPT | Mod: ,,,

## 2023-12-08 PROCEDURE — 4037F INFLUENZA IMM ORDER/ADMIN: CPT | Mod: ,,,

## 2023-12-08 PROCEDURE — 3288F FALL RISK ASSESSMENT DOCD: CPT | Mod: ,,,

## 2023-12-08 PROCEDURE — G0439 PPPS, SUBSEQ VISIT: HCPCS | Mod: ,,,

## 2023-12-08 PROCEDURE — 1126F AMNT PAIN NOTED NONE PRSNT: CPT | Mod: ,,,

## 2023-12-08 PROCEDURE — 1159F PR MEDICATION LIST DOCUMENTED IN MEDICAL RECORD: ICD-10-PCS | Mod: ,,,

## 2023-12-08 PROCEDURE — 1159F MED LIST DOCD IN RCRD: CPT | Mod: ,,,

## 2023-12-08 PROCEDURE — 1170F PR FUNCTIONAL STATUS ASSESSED: ICD-10-PCS | Mod: ,,,

## 2023-12-08 PROCEDURE — 1126F PR PAIN SEVERITY QUANTIFIED, NO PAIN PRESENT: ICD-10-PCS | Mod: ,,,

## 2023-12-08 PROCEDURE — 1170F FXNL STATUS ASSESSED: CPT | Mod: ,,,

## 2023-12-08 PROCEDURE — 3075F PR MOST RECENT SYSTOLIC BLOOD PRESS GE 130-139MM HG: ICD-10-PCS | Mod: ,,,

## 2023-12-08 PROCEDURE — 3078F PR MOST RECENT DIASTOLIC BLOOD PRESSURE < 80 MM HG: ICD-10-PCS | Mod: ,,,

## 2023-12-08 PROCEDURE — G0439 PR MEDICARE ANNUAL WELLNESS SUBSEQUENT VISIT: ICD-10-PCS | Mod: ,,,

## 2023-12-08 PROCEDURE — 3075F SYST BP GE 130 - 139MM HG: CPT | Mod: ,,,

## 2023-12-08 PROCEDURE — G0008 FLU VACCINE - QUADRIVALENT - ADJUVANTED: ICD-10-PCS | Mod: ,,,

## 2023-12-08 PROCEDURE — G0008 ADMIN INFLUENZA VIRUS VAC: HCPCS | Mod: ,,,

## 2023-12-08 PROCEDURE — 4037F PR INFLUENZA IMMUNIZATION ORDERED OR ADMINISTERED: ICD-10-PCS | Mod: ,,,

## 2023-12-08 RX ORDER — COLCHICINE 0.6 MG/1
TABLET ORAL
Qty: 30 TABLET | Refills: 11 | Status: SHIPPED | OUTPATIENT
Start: 2023-12-08 | End: 2024-02-12

## 2023-12-08 NOTE — PATIENT INSTRUCTIONS
Counseling and Referral of Other Preventative  (Italic type indicates deductible and co-insurance are waived)    Patient Name: Trung Barboza  Today's Date: 12/8/2023    Health Maintenance       Date Due Completion Date    Shingles Vaccine (1 of 2) Never done ---    RSV Vaccine (Age 60+ and Pregnant patients) (1 - 1-dose 60+ series) Never done ---    COVID-19 Vaccine (5 - 2023-24 season) 09/01/2023 10/4/2022    Lipid Panel 02/09/2024 2/9/2023    Aspirin/Antiplatelet Therapy 08/07/2024 8/7/2023    TETANUS VACCINE 12/11/2028 12/11/2018        Orders Placed This Encounter   Procedures    Influenza (FLUAD) - Quadrivalent (Adjuvanted) *Preferred* (65+) (PF)         Counseling and Referral of Other Preventative  (Italic type indicates deductible and co-insurance are waived)    Patient Name: Trung Barboza  Today's Date: 12/8/2023    Health Maintenance       Date Due Completion Date    Shingles Vaccine (1 of 2) Never done ---    RSV Vaccine (Age 60+ and Pregnant patients) (1 - 1-dose 60+ series) Never done ---    COVID-19 Vaccine (5 - 2023-24 season) 09/01/2023 10/4/2022    Lipid Panel 02/09/2024 2/9/2023    Aspirin/Antiplatelet Therapy 08/07/2024 8/7/2023    TETANUS VACCINE 12/11/2028 12/11/2018        Orders Placed This Encounter   Procedures    Influenza (FLUAD) - Quadrivalent (Adjuvanted) *Preferred* (65+) (PF)         The following information is provided to all patients.  This information is to help you find resources for any of the problems found today that may be affecting your health:                Living healthy guide: www.[facility].[place].gov      Understanding Diabetes: www.diabetes.org      Eating healthy: www.cdc.gov/healthyweight      CDC home safety checklist: www.cdc.gov/steadi/patient.html      Agency on Aging: www.goea.[place].gov      Alcoholics anonymous (AA): www.aa.org      Physical Activity: www.marjorie.nih.gov/dh7fzbr      Tobacco use: www.quitwithusla.org     Counseling and Referral of Other  Preventative  (Italic type indicates deductible and co-insurance are waived)    Patient Name: Trung Barboza  Today's Date: 12/8/2023    Health Maintenance       Date Due Completion Date    Shingles Vaccine (1 of 2) Never done ---    RSV Vaccine (Age 60+ and Pregnant patients) (1 - 1-dose 60+ series) Never done ---    COVID-19 Vaccine (5 - 2023-24 season) 09/01/2023 10/4/2022    Lipid Panel 02/09/2024 2/9/2023    Aspirin/Antiplatelet Therapy 08/07/2024 8/7/2023    TETANUS VACCINE 12/11/2028 12/11/2018        Orders Placed This Encounter   Procedures    Influenza (FLUAD) - Quadrivalent (Adjuvanted) *Preferred* (65+) (PF)       The following information is provided to all patients.  This information is to help you find resources for any of the problems found today that may be affecting your health:                Living healthy guide: www.[facility].[place].[facility]      Understanding Diabetes: www.diabetes.org      Eating healthy: www.cdc.gov/healthyweight      CDC home safety checklist: www.cdc.gov/steadi/patient.html      Agency on Aging: www.goea.[place].[facility]      Alcoholics anonymous (AA): www.aa.org      Physical Activity: www.marjorie.nih.gov/ht9qdsv      Tobacco use: www.quitwithusla.org

## 2023-12-08 NOTE — ASSESSMENT & PLAN NOTE
BP controlled well. Continue current medication regimen. Monitor BP daily and keep BP daily log to bring to next visit. Exercise at least 5 times a week. Keep scheduled appts. RTC sooner if BP is staying <130/80. Dash diet.    DASH- includes foods rich in K+, calcium and Magnesium.The diet limits foods high in sodium, saturated fats and added sugars. This is a flexible balanced eating plan that helps create heart healthy eating style for life. Diet is rich in vegetable, whole grains and fruits. It includes fat free or low fat dairy products, fish, poultry, nuts. Chose foods high in K+, calcium, magnesium, fiber, protein, and low in saturated fats and sodium.

## 2023-12-08 NOTE — ASSESSMENT & PLAN NOTE
"Lab Results   Component Value Date    LDLCALC 120 02/09/2023     Lipid panel obtained at today's visit. Goal LDL is less than 70. Continue current meds and low fat/low cholesterol diet with increased exercise as tolerated. Will follow up with labs. Patient to follow up in 3 months or as needed    A few changes in your diet can reduce cholesterol and improve your heart health. Saturated fats, found primarily in red meat and full-fat dairy products, raise your total cholesterol. Decreasing your consumption of saturated fats can reduce your low-density lipoprotein (LDL) cholesterol. rans fats, sometimes listed on food labels as "partially hydrogenated vegetable oil," are often used in margarines and store-bought cookies, crackers and cakes.     Trans fats raise overall cholesterol levels. Omega-3 fatty acids don't affect LDL cholesterol. But they have other heart-healthy benefits, including reducing blood pressure. Foods with omega-3 fatty acids include salmon, mackerel, herring, walnuts and flaxseeds.Soluble fiber can reduce the absorption of cholesterol into your bloodstream. Soluble fiber is found in such foods as oatmeal, kidney beans, [place] sprouts, apples and pears.  at least 30 minutes of exercise five times a week or vigorous aerobic activity for 20 minutes three times a week if tolerated.    "

## 2023-12-26 RX ORDER — ATORVASTATIN CALCIUM 80 MG/1
80 TABLET, FILM COATED ORAL NIGHTLY
Qty: 90 TABLET | Refills: 0 | Status: SHIPPED | OUTPATIENT
Start: 2023-12-26 | End: 2024-03-25

## 2023-12-31 ENCOUNTER — EXTERNAL CHRONIC CARE MANAGEMENT (OUTPATIENT)
Dept: FAMILY MEDICINE | Facility: CLINIC | Age: 83
End: 2023-12-31
Payer: MEDICARE

## 2023-12-31 PROCEDURE — G0511 CCM/BHI BY RHC/FQHC 20MIN MO: HCPCS | Mod: ,,, | Performed by: FAMILY MEDICINE

## 2024-01-18 ENCOUNTER — OFFICE VISIT (OUTPATIENT)
Dept: FAMILY MEDICINE | Facility: CLINIC | Age: 84
End: 2024-01-18
Payer: MEDICARE

## 2024-01-18 VITALS
RESPIRATION RATE: 16 BRPM | SYSTOLIC BLOOD PRESSURE: 126 MMHG | WEIGHT: 223 LBS | DIASTOLIC BLOOD PRESSURE: 74 MMHG | HEIGHT: 74 IN | HEART RATE: 76 BPM | TEMPERATURE: 99 F | OXYGEN SATURATION: 99 % | BODY MASS INDEX: 28.62 KG/M2

## 2024-01-18 DIAGNOSIS — R05.9 COUGH, UNSPECIFIED TYPE: ICD-10-CM

## 2024-01-18 DIAGNOSIS — J01.00 ACUTE MAXILLARY SINUSITIS, RECURRENCE NOT SPECIFIED: Primary | ICD-10-CM

## 2024-01-18 DIAGNOSIS — R09.81 NASAL CONGESTION: ICD-10-CM

## 2024-01-18 LAB
CTP QC/QA: YES
CTP QC/QA: YES
FLUAV AG NPH QL: NEGATIVE
FLUBV AG NPH QL: NEGATIVE
SARS-COV-2 RDRP RESP QL NAA+PROBE: NEGATIVE

## 2024-01-18 PROCEDURE — 99213 OFFICE O/P EST LOW 20 MIN: CPT | Mod: 25,,,

## 2024-01-18 PROCEDURE — 3288F FALL RISK ASSESSMENT DOCD: CPT | Mod: ,,,

## 2024-01-18 PROCEDURE — 1159F MED LIST DOCD IN RCRD: CPT | Mod: ,,,

## 2024-01-18 PROCEDURE — 87635 SARS-COV-2 COVID-19 AMP PRB: CPT | Mod: RHCUB

## 2024-01-18 PROCEDURE — 1160F RVW MEDS BY RX/DR IN RCRD: CPT | Mod: ,,,

## 2024-01-18 PROCEDURE — 96372 THER/PROPH/DIAG INJ SC/IM: CPT | Mod: ,,,

## 2024-01-18 PROCEDURE — 1126F AMNT PAIN NOTED NONE PRSNT: CPT | Mod: ,,,

## 2024-01-18 PROCEDURE — 3078F DIAST BP <80 MM HG: CPT | Mod: ,,,

## 2024-01-18 PROCEDURE — 3074F SYST BP LT 130 MM HG: CPT | Mod: ,,,

## 2024-01-18 PROCEDURE — 87804 INFLUENZA ASSAY W/OPTIC: CPT | Mod: QW,RHCUB

## 2024-01-18 PROCEDURE — 1101F PT FALLS ASSESS-DOCD LE1/YR: CPT | Mod: ,,,

## 2024-01-18 RX ORDER — CHLORPHENIRAMINE MALEATE AND PHENYLEPHRINE HYDROCHLORIDE 4; 10 MG/1; MG/1
1 TABLET, COATED ORAL EVERY 4 HOURS PRN
COMMUNITY
End: 2024-02-12

## 2024-01-18 RX ORDER — AZITHROMYCIN 250 MG/1
TABLET, FILM COATED ORAL
Qty: 6 TABLET | Refills: 0 | Status: SHIPPED | OUTPATIENT
Start: 2024-01-18 | End: 2024-01-23

## 2024-01-18 RX ORDER — DEXCHLORPHENIRAMINE MALEATE, DEXTROMETHORPHAN HBR, PHENYLEPHRINE HCL 1; 10; 5 MG/5ML; MG/5ML; MG/5ML
10 SYRUP ORAL
Qty: 200 ML | Refills: 0 | Status: SHIPPED | OUTPATIENT
Start: 2024-01-18 | End: 2024-02-12

## 2024-01-18 RX ORDER — CEFTRIAXONE 1 G/1
1 INJECTION, POWDER, FOR SOLUTION INTRAMUSCULAR; INTRAVENOUS
Status: COMPLETED | OUTPATIENT
Start: 2024-01-18 | End: 2024-01-18

## 2024-01-18 RX ORDER — DEXAMETHASONE SODIUM PHOSPHATE 4 MG/ML
4 INJECTION, SOLUTION INTRA-ARTICULAR; INTRALESIONAL; INTRAMUSCULAR; INTRAVENOUS; SOFT TISSUE
Status: COMPLETED | OUTPATIENT
Start: 2024-01-18 | End: 2024-01-18

## 2024-01-18 RX ADMIN — CEFTRIAXONE 1 G: 1 INJECTION, POWDER, FOR SOLUTION INTRAMUSCULAR; INTRAVENOUS at 11:01

## 2024-01-18 RX ADMIN — DEXAMETHASONE SODIUM PHOSPHATE 4 MG: 4 INJECTION, SOLUTION INTRA-ARTICULAR; INTRALESIONAL; INTRAMUSCULAR; INTRAVENOUS; SOFT TISSUE at 11:01

## 2024-01-18 NOTE — ASSESSMENT & PLAN NOTE
Rocephin IM and Decadron IM today. Azithromycin RX. Medication instructions and education given with understanding voiced. Rest, increase fluids. OTC antihistamines, decongestants, Ibuprofen, Tylenol as needed. RTC with worsening, new or persistent symptoms.

## 2024-01-18 NOTE — PROGRESS NOTES
JONAH KNOX   [facility]  [address]  [place]  [address]      PATIENT NAME: Trung Barboza  : 1940  DATE: 24  MRN: [id number]        Reason for Visit / Chief Complaint: Cough (X 3 days. Productive cough. Yellow sputum. He has been taking Mucinex. ) and Nasal Congestion (X3 days )         History of Present Illness / Problem Focused Workflow     82 y/o male presents to clinic with complaints of cough and congestion for the last 3-4 days. He denies any sore throat, fever, chills, body aches, GI symptoms. OTC medication is not improving symptoms      Review of Systems     @Review of Systems   Constitutional:  Negative for chills, fatigue and fever.   HENT:  Positive for nasal congestion and sinus pressure/congestion. Negative for ear discharge, ear pain, rhinorrhea and sore throat.    Respiratory:  Positive for cough. Negative for chest tightness, shortness of breath, wheezing and stridor.    Cardiovascular:  Negative for palpitations and claudication.   Gastrointestinal:  Negative for abdominal pain, constipation, diarrhea, nausea, vomiting and reflux.   Genitourinary:  Negative for dysuria, flank pain, frequency, hematuria and urgency.   Musculoskeletal:  Negative for myalgias.   Neurological:  Negative for dizziness, weakness, light-headedness and headaches.   Psychiatric/Behavioral:  Negative for suicidal ideas.        Medical / Social / Family History     Past Medical History:   Diagnosis Date    Benign localized prostatic hyperplasia with lower urinary tract symptoms (LUTS)     CKD (chronic kidney disease)     Coronary artery disease     Elevated PSA     GERD (gastroesophageal reflux disease)     Gout, unspecified     Hypercholesterolemia     Hyperlipidemia     Hypertension     Myocardial infarction     Obesity (BMI 30-39.9)     TIA (transient ischemic attack)        Past Surgical History:   Procedure Laterality Date    ANGIOGRAM, CORONARY, WITH LEFT HEART  CATHETERIZATION N/A 12/31/2021    Procedure: ANGIOGRAM,CORONARY,WITH LEFT HEART CATHETERIZATION;  Surgeon: Mahad Villaseñor DO;  Location: [facility] CATH LAB;  Service: Cardiology;  Laterality: N/A;    BIOPSY WITH TRANSRECTAL ULTRASOUND (TRUS) GUIDANCE Bilateral 03/07/2018    CHOLECYSTECTOMY      CORONARY ARTERY BYPASS GRAFT  01/07/2022    [facility]-Dr. Qureshi    LEFT HEART CATHETERIZATION Left 7/13/2021    Procedure: Left heart cath;  Surgeon: Philip Torres MD;  Location: [facility] CATH LAB;  Service: Cardiology;  Laterality: Left;           Medications and Allergies     Medications  Outpatient Medications Marked as Taking for the 1/18/24 encounter (Office Visit) with Patrick Arana FNP   Medication Sig Dispense Refill    amLODIPine (NORVASC) 5 MG tablet Take 1 tablet (5 mg total) by mouth once daily. 90 tablet 1    ascorbic acid, vitamin C, (VITAMIN C) 500 MG tablet Take 500 mg by mouth once daily.      aspirin (ECOTRIN) 81 MG EC tablet Take 1 tablet (81 mg total) by mouth once daily. 30 tablet 0    atorvastatin (LIPITOR) 80 MG tablet Take 1 tablet by mouth in the evening 90 tablet 0    chlorpheniramine-phenylephrine (ED A-HIST) 4-10 mg per tablet Take 1 tablet by mouth every 4 (four) hours as needed for Congestion.      colchicine (COLCRYS) 0.6 mg tablet TAKE 2 TABLETS TODAY, AND THEN TAKE ONE DAILY UNTIL GOUT ATTACK RESOLVING. 30 tablet 11    furosemide (LASIX) 20 MG tablet Take 1 tablet (20 mg total) by mouth once daily. 90 tablet 1    multivitamin-minerals-lutein (MULTIVITAMIN 50 PLUS) Tab Take 1 tablet by mouth once daily.      pantoprazole (PROTONIX) 40 MG tablet Take 1 tablet (40 mg total) by mouth once daily. 90 tablet 1    potassium chloride (KLOR-CON) 10 MEQ TbSR Take 1 tablet (10 mEq total) by mouth once daily. 90 tablet 1     Current Facility-Administered Medications for the 1/18/24 encounter (Office Visit) with Patrick Arana FNP   Medication Dose Route Frequency Provider Last Rate  Last Admin    [COMPLETED] cefTRIAXone injection 1 g  1 g Intramuscular 1 time in Clinic/HOD AranaPatrick calix JONAH   1 g at 01/18/24 1104    [COMPLETED] dexAMETHasone injection 4 mg  4 mg Intramuscular 1 time in Clinic/HOD AranaPatrick calix JONAH   4 mg at 01/18/24 1104       Allergies  Review of patient's allergies indicates:   Allergen Reactions    Lisinopril Swelling and Anaphylaxis       Physical Examination     Vitals:    01/18/24 0952   BP: 126/74   Pulse: 76   Resp: 16   Temp: 98.6 °F (37 °C)     Physical Exam  Vitals and nursing note reviewed.   Constitutional:       General: He is awake.      Appearance: Normal appearance.   HENT:      Head: Normocephalic.      Right Ear: Tympanic membrane, ear canal and external ear normal.      Left Ear: Tympanic membrane, ear canal and external ear normal.      Nose:      Right Sinus: Maxillary sinus tenderness present.      Left Sinus: Maxillary sinus tenderness present.      Mouth/Throat:      Lips: Pink.      Mouth: Mucous membranes are moist.      Pharynx: Oropharynx is clear. Uvula midline. Posterior oropharyngeal erythema present.   Cardiovascular:      Rate and Rhythm: Normal rate and regular rhythm.      Heart sounds: Normal heart sounds, S1 normal and S2 normal.   Pulmonary:      Effort: Pulmonary effort is normal. No respiratory distress.      Breath sounds: Normal breath sounds. No decreased breath sounds, wheezing, rhonchi or rales.   Abdominal:      General: Bowel sounds are normal.      Palpations: Abdomen is soft.      Tenderness: There is no abdominal tenderness.   Musculoskeletal:      Cervical back: Normal range of motion.   Lymphadenopathy:      Cervical: No cervical adenopathy.   Skin:     General: Skin is warm.      Capillary Refill: Capillary refill takes less than 2 seconds.   Neurological:      Mental Status: He is alert and oriented to person, place, and time.   Psychiatric:         Thought Content: Thought content does not include homicidal or  suicidal ideation. Thought content does not include homicidal or suicidal plan.               Procedures   Assessment and Plan (including Health Maintenance)   :    Plan:     Problem List Items Addressed This Visit          ENT    Acute maxillary sinusitis - Primary    Current Assessment & Plan     Rocephin IM and Decadron IM today. Azithromycin RX. Medication instructions and education given with understanding voiced. Rest, increase fluids. OTC antihistamines, decongestants, Ibuprofen, Tylenol as needed. RTC with worsening, new or persistent symptoms.           Relevant Medications    cefTRIAXone injection 1 g (Completed)    dexAMETHasone injection 4 mg (Completed)    azithromycin (Z-GONZALO) 250 MG tablet    dexchlorphen-phenylephrine-DM (POLYTUSSIN DM,DEXCHLORPHENRMN,) 1-5-10 mg/5 mL Syrp    Nasal congestion    Current Assessment & Plan     Rapid covid and flu negative         Relevant Orders    POCT COVID-19 Rapid Screening (Completed)    POCT Influenza A/B (Completed)       Pulmonary    Cough    Current Assessment & Plan     Rapid covid and flu negative         Relevant Orders    POCT COVID-19 Rapid Screening (Completed)    POCT Influenza A/B (Completed)       Health Maintenance Topics with due status: Not Due       Topic Last Completion Date    TETANUS VACCINE 12/11/2018    Aspirin/Antiplatelet Therapy 01/18/2024       Future Appointments   Date Time Provider Department Center   1/18/2024 11:40 AM Patrick Arana FNP [facility] [place]   2/12/2024 10:15 AM Mahad Villaseñor DO [facility] CARD [facility]   3/8/2024  9:00 AM Swati Vicente NP [facility] [place]   4/1/2024  9:30 AM Kb Brown Jr., MD [facility]   1/16/2025  3:00 PM AWV NURSE DECATARSENIO [facility] [place]        Health Maintenance Due   Topic Date Due    Shingles Vaccine (1 of 2) Never done    RSV Vaccine (Age 60+ and Pregnant patients) (1 - 1-dose 60+ series) Never done    COVID-19 Vaccine (5 - 2023-24 season) 09/01/2023     Lipid Panel  02/09/2024        Follow up if symptoms worsen or fail to improve.     Signature:  BERT LOPEZ [facility]  [address] [address] [place]  [address]    Date of encounter: 1/18/24

## 2024-01-25 RX ORDER — DEXTROMETHORPHAN HBR, PHENYLEPHRINE HCL, PYRILAMINE MALEATE 7.5; 5; 12.5 MG/5ML; MG/5ML; MG/5ML
10 SYRUP ORAL
COMMUNITY
Start: 2024-01-18 | End: 2024-02-12

## 2024-01-25 NOTE — TELEPHONE ENCOUNTER
. Trung Barboza called requesting a refill on cough syrup prescribed 1/18/2024. His cold has come back and he is coughing excessively. [facility] in [place] requested please.

## 2024-01-29 RX ORDER — DEXTROMETHORPHAN HBR, PHENYLEPHRINE HCL, PYRILAMINE MALEATE 7.5; 5; 12.5 MG/5ML; MG/5ML; MG/5ML
10 SYRUP ORAL
OUTPATIENT
Start: 2024-01-29

## 2024-01-30 ENCOUNTER — OFFICE VISIT (OUTPATIENT)
Dept: FAMILY MEDICINE | Facility: CLINIC | Age: 84
End: 2024-01-30
Payer: MEDICARE

## 2024-01-30 VITALS
SYSTOLIC BLOOD PRESSURE: 144 MMHG | HEIGHT: 74 IN | OXYGEN SATURATION: 99 % | DIASTOLIC BLOOD PRESSURE: 84 MMHG | RESPIRATION RATE: 19 BRPM | TEMPERATURE: 99 F | WEIGHT: 209 LBS | HEART RATE: 81 BPM | BODY MASS INDEX: 26.82 KG/M2

## 2024-01-30 DIAGNOSIS — J01.00 ACUTE NON-RECURRENT MAXILLARY SINUSITIS: ICD-10-CM

## 2024-01-30 DIAGNOSIS — I73.9 PVD (PERIPHERAL VASCULAR DISEASE): Primary | ICD-10-CM

## 2024-01-30 PROCEDURE — 1126F AMNT PAIN NOTED NONE PRSNT: CPT | Mod: ,,, | Performed by: NURSE PRACTITIONER

## 2024-01-30 PROCEDURE — 3288F FALL RISK ASSESSMENT DOCD: CPT | Mod: ,,, | Performed by: NURSE PRACTITIONER

## 2024-01-30 PROCEDURE — 1160F RVW MEDS BY RX/DR IN RCRD: CPT | Mod: ,,, | Performed by: NURSE PRACTITIONER

## 2024-01-30 PROCEDURE — 1101F PT FALLS ASSESS-DOCD LE1/YR: CPT | Mod: ,,, | Performed by: NURSE PRACTITIONER

## 2024-01-30 PROCEDURE — 3077F SYST BP >= 140 MM HG: CPT | Mod: ,,, | Performed by: NURSE PRACTITIONER

## 2024-01-30 PROCEDURE — 99213 OFFICE O/P EST LOW 20 MIN: CPT | Mod: ,,, | Performed by: NURSE PRACTITIONER

## 2024-01-30 PROCEDURE — 1159F MED LIST DOCD IN RCRD: CPT | Mod: ,,, | Performed by: NURSE PRACTITIONER

## 2024-01-30 PROCEDURE — 3079F DIAST BP 80-89 MM HG: CPT | Mod: ,,, | Performed by: NURSE PRACTITIONER

## 2024-01-30 RX ORDER — PREDNISONE 10 MG/1
TABLET ORAL
Qty: 13 TABLET | Refills: 0 | Status: SHIPPED | OUTPATIENT
Start: 2024-01-30 | End: 2024-02-12

## 2024-01-30 RX ORDER — AMOXICILLIN 500 MG/1
500 TABLET, FILM COATED ORAL EVERY 12 HOURS
Qty: 20 TABLET | Refills: 0 | Status: SHIPPED | OUTPATIENT
Start: 2024-01-30 | End: 2024-02-09

## 2024-01-30 RX ORDER — CHLOPHEDIANOL HCL AND PYRILAMINE MALEATE 12.5; 12.5 MG/5ML; MG/5ML
10 SOLUTION ORAL EVERY 8 HOURS PRN
Qty: 240 ML | Refills: 0 | Status: SHIPPED | OUTPATIENT
Start: 2024-01-30 | End: 2024-02-12

## 2024-01-30 NOTE — PROGRESS NOTES
Swati Vicente NP   [facility]  [address]  [place]  [address]      PATIENT NAME: Trung Barboza  : 1940  DATE: 24  MRN: [id number]      Billing Provider: Swati Vicente NP  Level of Service: PA OFFICE/OUTPT VISIT, EST, LEVL III, 20-29 MIN  Patient PCP Information       Provider PCP Type    Swati Vicente NP General            Reason for Visit / Chief Complaint: Cough (Patient reports he is still having some sinus congestion from last visit. He has taken all antibiotics as prescribed but still having a productive cough )         History of Present Illness / Problem Focused Workflow     84 year old male presents to clinic with complaints of  productive cough, sinus congestion with white sputum production. Denies fever, denies shortness of breath. He was seen in this clinic on  and diagnosed with sinusitis. Was treated with Rocephin and Decadron and given Zithromax and cough syrup but symptoms did not improve.           Review of Systems     @Review of Systems   Constitutional:  Negative for activity change, appetite change, fatigue and fever.   HENT:  Positive for nasal congestion, rhinorrhea and sinus pressure/congestion. Negative for ear pain and sore throat.    Eyes:  Negative for pain, redness, visual disturbance and eye dryness.   Respiratory:  Positive for cough. Negative for shortness of breath.    Cardiovascular:  Negative for chest pain and leg swelling.   Gastrointestinal:  Negative for abdominal distention, abdominal pain, constipation and diarrhea.   Endocrine: Negative for cold intolerance, heat intolerance and polyuria.   Genitourinary:  Negative for bladder incontinence, dysuria, frequency and urgency.   Musculoskeletal:  Negative for arthralgias, gait problem and myalgias.   Integumentary:  Negative for color change, rash and wound.   Allergic/Immunologic: Negative for environmental allergies and food allergies.   Neurological:  Negative for dizziness,  weakness, light-headedness and headaches.   Psychiatric/Behavioral:  Negative for behavioral problems and sleep disturbance.        Medical / Social / Family History     Past Medical History:   Diagnosis Date    Benign localized prostatic hyperplasia with lower urinary tract symptoms (LUTS)     CKD (chronic kidney disease)     Coronary artery disease     Elevated PSA     GERD (gastroesophageal reflux disease)     Gout, unspecified     Hypercholesterolemia     Hyperlipidemia     Hypertension     Myocardial infarction     Obesity (BMI 30-39.9)     TIA (transient ischemic attack)        Past Surgical History:   Procedure Laterality Date    ANGIOGRAM, CORONARY, WITH LEFT HEART CATHETERIZATION N/A 12/31/2021    Procedure: ANGIOGRAM,CORONARY,WITH LEFT HEART CATHETERIZATION;  Surgeon: Mahad Villaseñor DO;  Location: [facility] CATH LAB;  Service: Cardiology;  Laterality: N/A;    BIOPSY WITH TRANSRECTAL ULTRASOUND (TRUS) GUIDANCE Bilateral 03/07/2018    CHOLECYSTECTOMY      CORONARY ARTERY BYPASS GRAFT  01/07/2022    [facility]-Dr. Qureshi    LEFT HEART CATHETERIZATION Left 7/13/2021    Procedure: Left heart cath;  Surgeon: Philip Torres MD;  Location: [facility] CATH LAB;  Service: Cardiology;  Laterality: Left;       Medications and Allergies     Medications  Outpatient Medications Marked as Taking for the 1/30/24 encounter (Office Visit) with Swati Vicente NP   Medication Sig Dispense Refill    amLODIPine (NORVASC) 5 MG tablet Take 1 tablet (5 mg total) by mouth once daily. 90 tablet 1    ascorbic acid, vitamin C, (VITAMIN C) 500 MG tablet Take 500 mg by mouth once daily.      aspirin (ECOTRIN) 81 MG EC tablet Take 1 tablet (81 mg total) by mouth once daily. 30 tablet 0    atorvastatin (LIPITOR) 80 MG tablet Take 1 tablet by mouth in the evening 90 tablet 0    chlorpheniramine-phenylephrine (ED A-HIST) 4-10 mg per tablet Take 1 tablet by mouth every 4 (four) hours as needed for Congestion.      colchicine  (COLCRYS) 0.6 mg tablet TAKE 2 TABLETS TODAY, AND THEN TAKE ONE DAILY UNTIL GOUT ATTACK RESOLVING. 30 tablet 11    furosemide (LASIX) 20 MG tablet Take 1 tablet (20 mg total) by mouth once daily. 90 tablet 1    multivitamin-minerals-lutein (MULTIVITAMIN 50 PLUS) Tab Take 1 tablet by mouth once daily.      pantoprazole (PROTONIX) 40 MG tablet Take 1 tablet (40 mg total) by mouth once daily. 90 tablet 1    potassium chloride (KLOR-CON) 10 MEQ TbSR Take 1 tablet (10 mEq total) by mouth once daily. 90 tablet 1       Allergies  Review of patient's allergies indicates:   Allergen Reactions    Lisinopril Swelling and Anaphylaxis       Physical Examination     Vitals:    01/30/24 1532   BP: (!) 144/84   Pulse: 81   Resp: 19   Temp: 98.8 °F (37.1 °C)     Physical Exam  Vitals and nursing note reviewed.   Constitutional:       Appearance: Normal appearance.   HENT:      Head: Normocephalic.      Right Ear: Tympanic membrane normal.      Left Ear: Tympanic membrane normal.      Nose: Congestion and rhinorrhea present. Rhinorrhea is purulent.      Right Turbinates: Pale.      Left Turbinates: Pale.      Right Sinus: Maxillary sinus tenderness and frontal sinus tenderness present.      Left Sinus: Maxillary sinus tenderness and frontal sinus tenderness present.      Mouth/Throat:      Lips: Pink.      Mouth: Mucous membranes are moist.      Pharynx: Oropharyngeal exudate (clear post nasal drainage.) and posterior oropharyngeal erythema present.   Eyes:      Conjunctiva/sclera: Conjunctivae normal.   Cardiovascular:      Rate and Rhythm: Normal rate and regular rhythm.      Pulses: Normal pulses.      Heart sounds: Normal heart sounds.   Pulmonary:      Effort: Pulmonary effort is normal.      Breath sounds: Normal breath sounds. No wheezing or rhonchi.   Abdominal:      General: Abdomen is flat. Bowel sounds are normal. There is no distension.      Palpations: Abdomen is soft.      Tenderness: There is no abdominal tenderness.    Musculoskeletal:         General: Normal range of motion.      Cervical back: Normal range of motion.   Skin:     General: Skin is warm and dry.      Capillary Refill: Capillary refill takes less than 2 seconds.   Neurological:      General: No focal deficit present.      Mental Status: He is alert and oriented to person, place, and time. Mental status is at baseline.   Psychiatric:         Mood and Affect: Mood normal.         Behavior: Behavior normal.               Lab Results   Component Value Date    WBC 7.26 02/09/2023    HGB 14.5 02/09/2023    HCT 44.1 02/09/2023    MCV 93.6 02/09/2023     02/09/2023        CMP  Sodium   Date Value Ref Range Status   02/09/2023 141 136 - 145 mmol/L Final     Potassium   Date Value Ref Range Status   02/09/2023 4.2 3.5 - 5.1 mmol/L Final     Chloride   Date Value Ref Range Status   02/09/2023 107 98 - 107 mmol/L Final     CO2   Date Value Ref Range Status   02/09/2023 29 21 - 32 mmol/L Final     Glucose   Date Value Ref Range Status   02/09/2023 98 74 - 106 mg/dL Final     BUN   Date Value Ref Range Status   02/09/2023 26 (H) 7 - 18 mg/dL Final     Creatinine   Date Value Ref Range Status   02/09/2023 1.94 (H) 0.70 - 1.30 mg/dL Final     Calcium   Date Value Ref Range Status   02/09/2023 9.3 8.5 - 10.1 mg/dL Final     Total Protein   Date Value Ref Range Status   02/09/2023 7.5 6.4 - 8.2 g/dL Final     Albumin   Date Value Ref Range Status   02/09/2023 3.7 3.5 - 5.0 g/dL Final     Bilirubin, Total   Date Value Ref Range Status   02/09/2023 0.4 >0.0 - 1.2 mg/dL Final     Alk Phos   Date Value Ref Range Status   02/09/2023 92 45 - 115 U/L Final     AST   Date Value Ref Range Status   02/09/2023 23 15 - 37 U/L Final     ALT   Date Value Ref Range Status   02/09/2023 27 16 - 61 U/L Final     Anion Gap   Date Value Ref Range Status   02/09/2023 9 7 - 16 mmol/L Final     eGFR   Date Value Ref Range Status   02/09/2023 34 (L) >=60 mL/min/1.73m² Final     Procedures    Assessment and Plan (including Health Maintenance)   :    Plan:     Problem List Items Addressed This Visit          ENT    Acute maxillary sinusitis    Current Assessment & Plan     No improvement with Zithromax. Will start on Amoxicillin and tapering Prednisone. Ninjacof as needed.     Reviewed pathology of current symptoms, medication side effects/risk/benefits/directions on taking medications, and worsening or persistent symptoms that require follow up in next 2-3 days. Saline/steroid nasal sprays, antihistamine use, increase fluid intake, and multivitamin/elderberry/Zinc use were recommended. May take Tylenol or Motrin as needed for pain and/or fever. Patient was instructed to take antibiotic as directed, complete entire course to avoid antibiotic resistance, and take OTC probiotic with antibiotic to prevent GI upset. Patient verbalized understanding of treatment plan and denies any questions.              Cardiac/Vascular    PVD (peripheral vascular disease) - Primary    Current Assessment & Plan     HE does have PVD, was evaluated by Dr Del Rosario about a year ago after he had a non healing wound. Decision was made at that time to postpone any further vascular intervention. Patient reports his legs do not really bother him anymore. Will refer back to Dr Del Rosario if symptoms develop.             Health Maintenance Topics with due status: Not Due       Topic Last Completion Date    TETANUS VACCINE 12/11/2018    Aspirin/Antiplatelet Therapy 01/30/2024       Future Appointments   Date Time Provider Department Center   2/12/2024 10:15 AM Mahad Villaseñor DO [facility] CARD [facility]   3/8/2024  9:00 AM Swati Vicente, NP [facility] [place]   4/1/2024  9:30 AM Kb Brown Jr., MD [facility]   1/16/2025  3:00 PM AWV NURSE DECARSENIO [facility] FAMMED [place]        Health Maintenance Due   Topic Date Due    Shingles Vaccine (1 of 2) Never done    RSV Vaccine (Age 60+ and Pregnant patients) (1 - 1-dose 60+  series) Never done    COVID-19 Vaccine (5 - 2023-24 season) 09/01/2023    Lipid Panel  02/09/2024          Signature:  Swati Vicente NP  [address] [place]  [address]    Date of encounter: 1/30/24

## 2024-01-31 ENCOUNTER — EXTERNAL CHRONIC CARE MANAGEMENT (OUTPATIENT)
Dept: FAMILY MEDICINE | Facility: CLINIC | Age: 84
End: 2024-01-31
Payer: MEDICARE

## 2024-01-31 PROCEDURE — G0511 CCM/BHI BY RHC/FQHC 20MIN MO: HCPCS | Mod: ,,, | Performed by: FAMILY MEDICINE

## 2024-02-01 PROBLEM — I73.9 PVD (PERIPHERAL VASCULAR DISEASE): Status: ACTIVE | Noted: 2024-02-01

## 2024-02-01 PROBLEM — N18.4 CKD (CHRONIC KIDNEY DISEASE) STAGE 4, GFR 15-29 ML/MIN: Status: RESOLVED | Noted: 2021-07-10 | Resolved: 2024-02-01

## 2024-02-01 NOTE — ASSESSMENT & PLAN NOTE
HE does have PVD, was evaluated by Dr Del Rosario about a year ago after he had a non healing wound. Decision was made at that time to postpone any further vascular intervention. Patient reports his legs do not really bother him anymore. Will refer back to Dr Del Rosario if symptoms develop.

## 2024-02-12 ENCOUNTER — OFFICE VISIT (OUTPATIENT)
Dept: CARDIOLOGY | Facility: CLINIC | Age: 84
End: 2024-02-12
Payer: MEDICARE

## 2024-02-12 VITALS
OXYGEN SATURATION: 99 % | SYSTOLIC BLOOD PRESSURE: 150 MMHG | HEART RATE: 89 BPM | WEIGHT: 208.5 LBS | HEIGHT: 72 IN | DIASTOLIC BLOOD PRESSURE: 90 MMHG | BODY MASS INDEX: 28.24 KG/M2

## 2024-02-12 DIAGNOSIS — E78.2 MIXED HYPERLIPIDEMIA: ICD-10-CM

## 2024-02-12 DIAGNOSIS — I25.10 CORONARY ARTERY DISEASE INVOLVING NATIVE CORONARY ARTERY OF NATIVE HEART WITHOUT ANGINA PECTORIS: ICD-10-CM

## 2024-02-12 DIAGNOSIS — Z95.1 HX OF CABG: ICD-10-CM

## 2024-02-12 DIAGNOSIS — I10 ESSENTIAL HYPERTENSION: Primary | ICD-10-CM

## 2024-02-12 DIAGNOSIS — I73.9 PVD (PERIPHERAL VASCULAR DISEASE): ICD-10-CM

## 2024-02-12 DIAGNOSIS — I21.4 NSTEMI (NON-ST ELEVATED MYOCARDIAL INFARCTION): ICD-10-CM

## 2024-02-12 DIAGNOSIS — G45.9 TIA (TRANSIENT ISCHEMIC ATTACK): ICD-10-CM

## 2024-02-12 LAB
OHS QRS DURATION: 122 MS
OHS QTC CALCULATION: 481 MS

## 2024-02-12 PROCEDURE — 93010 ELECTROCARDIOGRAM REPORT: CPT | Mod: S$PBB,,, | Performed by: INTERNAL MEDICINE

## 2024-02-12 PROCEDURE — 1159F MED LIST DOCD IN RCRD: CPT | Mod: CPTII,,, | Performed by: INTERNAL MEDICINE

## 2024-02-12 PROCEDURE — 3288F FALL RISK ASSESSMENT DOCD: CPT | Mod: CPTII,,, | Performed by: INTERNAL MEDICINE

## 2024-02-12 PROCEDURE — 93005 ELECTROCARDIOGRAM TRACING: CPT | Mod: PBBFAC | Performed by: INTERNAL MEDICINE

## 2024-02-12 PROCEDURE — 1101F PT FALLS ASSESS-DOCD LE1/YR: CPT | Mod: CPTII,,, | Performed by: INTERNAL MEDICINE

## 2024-02-12 PROCEDURE — 3077F SYST BP >= 140 MM HG: CPT | Mod: CPTII,,, | Performed by: INTERNAL MEDICINE

## 2024-02-12 PROCEDURE — 99213 OFFICE O/P EST LOW 20 MIN: CPT | Mod: PBBFAC,25 | Performed by: INTERNAL MEDICINE

## 2024-02-12 PROCEDURE — 99214 OFFICE O/P EST MOD 30 MIN: CPT | Mod: S$PBB,,, | Performed by: INTERNAL MEDICINE

## 2024-02-12 PROCEDURE — 3080F DIAST BP >= 90 MM HG: CPT | Mod: CPTII,,, | Performed by: INTERNAL MEDICINE

## 2024-02-12 RX ORDER — HYDROCHLOROTHIAZIDE 12.5 MG/1
12.5 TABLET ORAL DAILY
COMMUNITY
End: 2024-02-13 | Stop reason: ALTCHOICE

## 2024-02-12 NOTE — PROGRESS NOTES
Cardiology Clinic Note:    PCP: Swati Vicente NP    REFERRING PHYSICIAN: Swati Vicente NP    CHIEF COMPLAINT:  Chest pain    HISTORY OF PRESENT ILLNESS:                                PT reports chest pain has resolved post revascularization PCi plus CABG. He is experiencing mild depression of after the loss of his wife of 66 ears a month ago.   He is able to perform normal activities of daily living without chest pain, pressure, palpitations or shortness of breath. He notes drainage from vein harvest site has resolved, surgical sites have resolved.   He reports is walking consistently at least five days a week.                   Review of Systems   Constitutional: Negative for diaphoresis, malaise/fatigue, night sweats and weight gain.   HENT:  Negative for congestion, ear pain, hearing loss, nosebleeds and sore throat.    Eyes:  Negative for blurred vision, double vision, pain, photophobia and visual disturbance.   Cardiovascular:  Positive for chest pain. Negative for claudication, dyspnea on exertion, irregular heartbeat, leg swelling, near-syncope, orthopnea, palpitations and syncope.   Respiratory:  Negative for cough, shortness of breath, sleep disturbances due to breathing, snoring and wheezing.    Endocrine: Negative for cold intolerance, heat intolerance, polydipsia, polyphagia and polyuria.   Hematologic/Lymphatic: Negative for bleeding problem. Does not bruise/bleed easily.   Skin:  Negative for dry skin, flushing, itching, rash and skin cancer.   Musculoskeletal:  Negative for arthritis, back pain, falls, joint pain, muscle cramps, muscle weakness and myalgias.   Gastrointestinal:  Negative for abdominal pain, change in bowel habit, constipation, diarrhea, dysphagia, heartburn, nausea and vomiting.   Genitourinary:  Negative for bladder incontinence, dysuria, flank pain, frequency and nocturia.   Neurological:  Negative for dizziness, focal weakness, headaches, light-headedness, loss of  balance, numbness, paresthesias and seizures.   Psychiatric/Behavioral:  Negative for depression, memory loss and substance abuse. The patient is not nervous/anxious.    Allergic/Immunologic: Negative for environmental allergies.          PAST MEDICAL HISTORY:  Past Medical History:   Diagnosis Date    Benign localized prostatic hyperplasia with lower urinary tract symptoms (LUTS)     CKD (chronic kidney disease)     Coronary artery disease     Elevated PSA     GERD (gastroesophageal reflux disease)     Gout, unspecified     Hypercholesterolemia     Hyperlipidemia     Hypertension     Myocardial infarction     Obesity (BMI 30-39.9)     TIA (transient ischemic attack)        PAST SURGICAL HISTORY:  Past Surgical History:   Procedure Laterality Date    ANGIOGRAM, CORONARY, WITH LEFT HEART CATHETERIZATION N/A 2021    Procedure: ANGIOGRAM,CORONARY,WITH LEFT HEART CATHETERIZATION;  Surgeon: Mahad Villaseñor DO;  Location: [facility] CATH LAB;  Service: Cardiology;  Laterality: N/A;    BIOPSY WITH TRANSRECTAL ULTRASOUND (TRUS) GUIDANCE Bilateral 2018    CHOLECYSTECTOMY      CORONARY ARTERY BYPASS GRAFT  2022    [facility]-Dr. Qureshi    LEFT HEART CATHETERIZATION Left 2021    Procedure: Left heart cath;  Surgeon: Philip Torres MD;  Location: [facility] CATH LAB;  Service: Cardiology;  Laterality: Left;       SOCIAL HISTORY:  Social History     Socioeconomic History    Marital status:     Number of children: 7   Occupational History    Occupation: retired   Tobacco Use    Smoking status: Former     Current packs/day: 0.00     Average packs/day: 0.5 packs/day for 15.0 years (7.5 ttl pk-yrs)     Types: Cigarettes     Start date: 1973     Quit date: 1988     Years since quittin.6     Passive exposure: Never    Smokeless tobacco: Former     Types: Chew   Substance and Sexual Activity    Alcohol use: Not Currently     Comment: occasional wine    Drug use: Never    Sexual  activity: Not Currently     Partners: Female     Social Determinants of Health     Financial Resource Strain: Low Risk  (12/8/2023)    Overall Financial Resource Strain (CARDIA)     Difficulty of Paying Living Expenses: Not hard at all   Food Insecurity: No Food Insecurity (12/8/2023)    Hunger Vital Sign     Worried About Running Out of Food in the Last Year: Never true     Ran Out of Food in the Last Year: Never true   Transportation Needs: No Transportation Needs (12/8/2023)    PRAPARE - Transportation     Lack of Transportation (Medical): No     Lack of Transportation (Non-Medical): No   Physical Activity: Insufficiently Active (12/8/2023)    Exercise Vital Sign     Days of Exercise per Week: 3 days     Minutes of Exercise per Session: 30 min   Stress: No Stress Concern Present (12/8/2023)    [employer] [place] [employer] - Occupational Stress Questionnaire     Feeling of Stress : Not at all   Social Connections: Moderately Integrated (12/8/2023)    Social Connection and Isolation Panel [NHANES]     Frequency of Communication with Friends and Family: More than three times a week     Frequency of Social Gatherings with Friends and Family: Once a week     Attends Oriental orthodox Services: More than 4 times per year     Active Member of Clubs or Organizations: No     Attends Club or Organization Meetings: Never     Marital Status:    Housing Stability: Low Risk  (12/8/2023)    Housing Stability Vital Sign     Unable to Pay for Housing in the Last Year: No     Number of Places Lived in the Last Year: 1     Unstable Housing in the Last Year: No       FAMILY HISTORY:  Family History   Problem Relation Age of Onset    Heart disease Mother     Hypertension Mother     No Known Problems Father     No Known Problems Sister     No Known Problems Sister     No Known Problems Sister     No Known Problems Sister     No Known Problems Brother     No Known Problems Brother     No Known Problems Brother     No Known  Problems Brother     No Known Problems Daughter     Hypertension Son     No Known Problems Son     No Known Problems Son     No Known Problems Son     No Known Problems Son        ALLERGIES:  Allergies as of 02/12/2024 - Reviewed 02/12/2024   Allergen Reaction Noted    Lisinopril Swelling and Anaphylaxis 07/13/2021         MEDICATIONS:  Current Outpatient Medications on File Prior to Visit   Medication Sig Dispense Refill    amLODIPine (NORVASC) 5 MG tablet Take 1 tablet (5 mg total) by mouth once daily. 90 tablet 1    aspirin (ECOTRIN) 81 MG EC tablet Take 1 tablet (81 mg total) by mouth once daily. 30 tablet 0    atorvastatin (LIPITOR) 80 MG tablet Take 1 tablet by mouth in the evening 90 tablet 0    furosemide (LASIX) 20 MG tablet Take 1 tablet (20 mg total) by mouth once daily. 90 tablet 1    hydroCHLOROthiazide (HYDRODIURIL) 12.5 MG Tab Take 12.5 mg by mouth once daily.      multivitamin-minerals-lutein (MULTIVITAMIN 50 PLUS) Tab Take 1 tablet by mouth once daily.      pantoprazole (PROTONIX) 40 MG tablet Take 1 tablet (40 mg total) by mouth once daily. 90 tablet 1    potassium chloride (KLOR-CON) 10 MEQ TbSR Take 1 tablet (10 mEq total) by mouth once daily. 90 tablet 1    [DISCONTINUED] ascorbic acid, vitamin C, (VITAMIN C) 500 MG tablet Take 500 mg by mouth once daily.      [DISCONTINUED] chlorpheniramine-phenylephrine (ED A-HIST) 4-10 mg per tablet Take 1 tablet by mouth every 4 (four) hours as needed for Congestion.      [DISCONTINUED] colchicine (COLCRYS) 0.6 mg tablet TAKE 2 TABLETS TODAY, AND THEN TAKE ONE DAILY UNTIL GOUT ATTACK RESOLVING. (Patient not taking: Reported on 2/12/2024) 30 tablet 11    [DISCONTINUED] dexchlorphen-phenylephrine-DM (POLYTUSSIN DM,DEXCHLORPHENRMN,) 1-5-10 mg/5 mL Syrp Take 10 mLs by mouth every 4 to 6 hours as needed (cough). (Patient not taking: Reported on 2/12/2024) 200 mL 0    [DISCONTINUED] POLYTUSSIN DM,PYRILAMINE, 12.5-5-7.5 mg/5 mL Liqd Take 10 mLs by mouth as needed  (every 4 to 6 hours as needed).      [DISCONTINUED] predniSONE (DELTASONE) 10 MG tablet Take 3 tabs daily x 2 days, then 2 tabs daily x 2 days, then 1 tab daily x 2 days, then half tab daily x 2 days. (Patient not taking: Reported on 2/12/2024) 13 tablet 0    [DISCONTINUED] pyrilamine-chlophedianoL (NINJACOF) 12.5-12.5 mg/5 mL Liqd Take 10 mLs by mouth every 8 (eight) hours as needed (cough). (Patient not taking: Reported on 2/12/2024) 240 mL 0     No current facility-administered medications on file prior to visit.          PHYSICAL EXAM:  Blood pressure (!) 150/90, pulse 89, height 6' (1.829 m), weight 94.6 kg (208 lb 8 oz), SpO2 99 %.  Wt Readings from Last 3 Encounters:   02/12/24 94.6 kg (208 lb 8 oz)   01/30/24 94.8 kg (209 lb)   01/18/24 101.2 kg (223 lb)      Body mass index is 28.28 kg/m².    Physical Exam  Vitals and nursing note reviewed.   Constitutional:       Appearance: Normal appearance. He is normal weight.   HENT:      Head: Normocephalic and atraumatic.      Right Ear: External ear normal.      Left Ear: External ear normal.   Eyes:      General: No scleral icterus.        Right eye: No discharge.         Left eye: No discharge.      Extraocular Movements: Extraocular movements intact.      Conjunctiva/sclera: Conjunctivae normal.      Pupils: Pupils are equal, round, and reactive to light.   Cardiovascular:      Rate and Rhythm: Normal rate and regular rhythm.      Pulses: Normal pulses.      Heart sounds: Normal heart sounds. No murmur heard.     No friction rub. No gallop.   Pulmonary:      Effort: Pulmonary effort is normal.      Breath sounds: Normal breath sounds. No wheezing, rhonchi or rales.   Chest:      Chest wall: No tenderness.   Abdominal:      General: Abdomen is flat. Bowel sounds are normal. There is no distension.      Palpations: Abdomen is soft.      Tenderness: There is no abdominal tenderness. There is no guarding or rebound.   Musculoskeletal:         General: No swelling or  tenderness. Normal range of motion.      Cervical back: Normal range of motion and neck supple.   Skin:     General: Skin is warm and dry.      Findings: No erythema or rash.      Comments: Draining wound to right lower extremity, vein harvest site.    Neurological:      General: No focal deficit present.      Mental Status: He is alert and oriented to person, place, and time.      Cranial Nerves: No cranial nerve deficit.      Motor: No weakness.      Gait: Gait normal.   Psychiatric:         Mood and Affect: Mood normal.         Behavior: Behavior normal.         Thought Content: Thought content normal.         Judgment: Judgment normal.          LABS REVIEWED:  Lab Results   Component Value Date    WBC 7.26 02/09/2023    RBC 4.71 02/09/2023    HGB 14.5 02/09/2023    HCT 44.1 02/09/2023    MCV 93.6 02/09/2023    MCH 30.8 02/09/2023    MCHC 32.9 02/09/2023    RDW 13.1 02/09/2023     02/09/2023    MPV 9.8 02/09/2023    NRBC 0.0 02/09/2023    INR 1.11 (H) 12/31/2021     Lab Results   Component Value Date     02/09/2023    K 4.2 02/09/2023     02/09/2023    CO2 29 02/09/2023    BUN 26 (H) 02/09/2023    MG 2.1 07/12/2021    PHOS 3.0 07/12/2021     Lab Results   Component Value Date    AST 23 02/09/2023    ALT 27 02/09/2023     Lab Results   Component Value Date    GLU 98 02/09/2023    HGBA1C 6.6 02/28/2022     Lab Results   Component Value Date    CHOL 225 (H) 02/09/2023    HDL 61 (H) 02/09/2023    TRIG 220 (H) 02/09/2023    CHOLHDL 3.7 02/09/2023       CARDIAC STUDIES REVIEWED:  [facility] 12/31/21 -  Three vessel CAD ( 70% LAD, 70% OM1, 99% pRCA)  S/P successful IVUS guided PCI of RCA with two KEHINDE.   1/22/22 - CABG, Dr. Shen    OTHER IMAGING STUDIES REVIEWED:    ASSESSMENT:   Essential hypertension  -     EKG 12-lead; Future    TIA (transient ischemic attack)    Mixed hyperlipidemia    NSTEMI (non-ST elevated myocardial infarction)    Coronary artery disease involving native coronary artery of native  heart without angina pectoris    Hx of CABG    PVD (peripheral vascular disease)          PLAN:  Stable class 1 angina, chest pain: has resolved following revascularization  2.  CAD - severe 3 vessel disease, post CABG x 3, 1/22/22,    3. Right lower extremityy  wound, has resolved          4.  Hypertension controlled at home, has not taken meds today.  5. Hyperlipidemia, on statin, following with primary care.        Follow up in 6 months, sooner if symptoms change

## 2024-02-29 ENCOUNTER — EXTERNAL CHRONIC CARE MANAGEMENT (OUTPATIENT)
Dept: FAMILY MEDICINE | Facility: CLINIC | Age: 84
End: 2024-02-29
Payer: MEDICARE

## 2024-02-29 PROCEDURE — G0511 CCM/BHI BY RHC/FQHC 20MIN MO: HCPCS | Mod: ,,, | Performed by: FAMILY MEDICINE

## 2024-03-08 ENCOUNTER — OFFICE VISIT (OUTPATIENT)
Dept: FAMILY MEDICINE | Facility: CLINIC | Age: 84
End: 2024-03-08
Payer: MEDICARE

## 2024-03-08 VITALS
TEMPERATURE: 99 F | OXYGEN SATURATION: 99 % | BODY MASS INDEX: 28.71 KG/M2 | SYSTOLIC BLOOD PRESSURE: 138 MMHG | HEIGHT: 72 IN | WEIGHT: 212 LBS | RESPIRATION RATE: 18 BRPM | HEART RATE: 70 BPM | DIASTOLIC BLOOD PRESSURE: 72 MMHG

## 2024-03-08 DIAGNOSIS — T50.2X5A DIURETIC-INDUCED HYPOKALEMIA: ICD-10-CM

## 2024-03-08 DIAGNOSIS — K21.9 GASTROESOPHAGEAL REFLUX DISEASE, UNSPECIFIED WHETHER ESOPHAGITIS PRESENT: ICD-10-CM

## 2024-03-08 DIAGNOSIS — I25.10 CORONARY ARTERY DISEASE INVOLVING NATIVE CORONARY ARTERY OF NATIVE HEART WITHOUT ANGINA PECTORIS: ICD-10-CM

## 2024-03-08 DIAGNOSIS — E78.2 MIXED HYPERLIPIDEMIA: Primary | ICD-10-CM

## 2024-03-08 DIAGNOSIS — N18.31 STAGE 3A CHRONIC KIDNEY DISEASE: ICD-10-CM

## 2024-03-08 DIAGNOSIS — I10 ESSENTIAL HYPERTENSION: ICD-10-CM

## 2024-03-08 DIAGNOSIS — E87.6 DIURETIC-INDUCED HYPOKALEMIA: ICD-10-CM

## 2024-03-08 PROBLEM — J01.00 ACUTE MAXILLARY SINUSITIS: Status: RESOLVED | Noted: 2024-01-18 | Resolved: 2024-03-08

## 2024-03-08 PROBLEM — R05.9 COUGH: Status: RESOLVED | Noted: 2024-01-18 | Resolved: 2024-03-08

## 2024-03-08 PROBLEM — R09.81 NASAL CONGESTION: Status: RESOLVED | Noted: 2024-01-18 | Resolved: 2024-03-08

## 2024-03-08 LAB
ALBUMIN SERPL BCP-MCNC: 3.7 G/DL (ref 3.5–5)
ALBUMIN/GLOB SERPL: 1 {RATIO}
ALP SERPL-CCNC: 156 U/L (ref 45–115)
ALT SERPL W P-5'-P-CCNC: 31 U/L (ref 16–61)
ANION GAP SERPL CALCULATED.3IONS-SCNC: 9 MMOL/L (ref 7–16)
AST SERPL W P-5'-P-CCNC: 27 U/L (ref 15–37)
BASOPHILS # BLD AUTO: 0.02 K/UL (ref 0–0.2)
BASOPHILS NFR BLD AUTO: 0.5 % (ref 0–1)
BILIRUB SERPL-MCNC: 0.9 MG/DL (ref ?–1.2)
BUN SERPL-MCNC: 21 MG/DL (ref 7–18)
BUN/CREAT SERPL: 12 (ref 6–20)
CALCIUM SERPL-MCNC: 9.5 MG/DL (ref 8.5–10.1)
CHLORIDE SERPL-SCNC: 108 MMOL/L (ref 98–107)
CHOLEST SERPL-MCNC: 197 MG/DL (ref 0–200)
CHOLEST/HDLC SERPL: 2.7 {RATIO}
CO2 SERPL-SCNC: 27 MMOL/L (ref 21–32)
CREAT SERPL-MCNC: 1.76 MG/DL (ref 0.7–1.3)
DIFFERENTIAL METHOD BLD: ABNORMAL
EGFR (NO RACE VARIABLE) (RUSH/TITUS): 38 ML/MIN/1.73M2
EOSINOPHIL # BLD AUTO: 0.02 K/UL (ref 0–0.5)
EOSINOPHIL NFR BLD AUTO: 0.5 % (ref 1–4)
ERYTHROCYTE [DISTWIDTH] IN BLOOD BY AUTOMATED COUNT: 15.2 % (ref 11.5–14.5)
GLOBULIN SER-MCNC: 3.7 G/DL (ref 2–4)
GLUCOSE SERPL-MCNC: 98 MG/DL (ref 74–106)
HCT VFR BLD AUTO: 40.2 % (ref 40–54)
HDLC SERPL-MCNC: 73 MG/DL (ref 40–60)
HGB BLD-MCNC: 13.6 G/DL (ref 13.5–18)
IMM GRANULOCYTES # BLD AUTO: 0.01 K/UL (ref 0–0.04)
IMM GRANULOCYTES NFR BLD: 0.2 % (ref 0–0.4)
LDLC SERPL CALC-MCNC: 108 MG/DL
LDLC/HDLC SERPL: 1.5 {RATIO}
LYMPHOCYTES # BLD AUTO: 1.44 K/UL (ref 1–4.8)
LYMPHOCYTES NFR BLD AUTO: 35 % (ref 27–41)
MCH RBC QN AUTO: 30.9 PG (ref 27–31)
MCHC RBC AUTO-ENTMCNC: 33.8 G/DL (ref 32–36)
MCV RBC AUTO: 91.4 FL (ref 80–96)
MONOCYTES # BLD AUTO: 0.42 K/UL (ref 0–0.8)
MONOCYTES NFR BLD AUTO: 10.2 % (ref 2–6)
MPC BLD CALC-MCNC: 9.8 FL (ref 9.4–12.4)
NEUTROPHILS # BLD AUTO: 2.2 K/UL (ref 1.8–7.7)
NEUTROPHILS NFR BLD AUTO: 53.6 % (ref 53–65)
NONHDLC SERPL-MCNC: 124 MG/DL
NRBC # BLD AUTO: 0 X10E3/UL
NRBC, AUTO (.00): 0 %
PLATELET # BLD AUTO: 239 K/UL (ref 150–400)
POTASSIUM SERPL-SCNC: 4 MMOL/L (ref 3.5–5.1)
PROT SERPL-MCNC: 7.4 G/DL (ref 6.4–8.2)
RBC # BLD AUTO: 4.4 M/UL (ref 4.6–6.2)
SODIUM SERPL-SCNC: 140 MMOL/L (ref 136–145)
TRIGL SERPL-MCNC: 80 MG/DL (ref 35–150)
VLDLC SERPL-MCNC: 16 MG/DL
WBC # BLD AUTO: 4.11 K/UL (ref 4.5–11)

## 2024-03-08 PROCEDURE — 1159F MED LIST DOCD IN RCRD: CPT | Mod: ,,, | Performed by: NURSE PRACTITIONER

## 2024-03-08 PROCEDURE — 99214 OFFICE O/P EST MOD 30 MIN: CPT | Mod: ,,, | Performed by: NURSE PRACTITIONER

## 2024-03-08 PROCEDURE — 3078F DIAST BP <80 MM HG: CPT | Mod: ,,, | Performed by: NURSE PRACTITIONER

## 2024-03-08 PROCEDURE — 1126F AMNT PAIN NOTED NONE PRSNT: CPT | Mod: ,,, | Performed by: NURSE PRACTITIONER

## 2024-03-08 PROCEDURE — 1101F PT FALLS ASSESS-DOCD LE1/YR: CPT | Mod: ,,, | Performed by: NURSE PRACTITIONER

## 2024-03-08 PROCEDURE — 80061 LIPID PANEL: CPT | Mod: ,,, | Performed by: CLINICAL MEDICAL LABORATORY

## 2024-03-08 PROCEDURE — 80053 COMPREHEN METABOLIC PANEL: CPT | Mod: ,,, | Performed by: CLINICAL MEDICAL LABORATORY

## 2024-03-08 PROCEDURE — 3075F SYST BP GE 130 - 139MM HG: CPT | Mod: ,,, | Performed by: NURSE PRACTITIONER

## 2024-03-08 PROCEDURE — 85025 COMPLETE CBC W/AUTO DIFF WBC: CPT | Mod: ,,, | Performed by: CLINICAL MEDICAL LABORATORY

## 2024-03-08 PROCEDURE — 1160F RVW MEDS BY RX/DR IN RCRD: CPT | Mod: ,,, | Performed by: NURSE PRACTITIONER

## 2024-03-08 PROCEDURE — 3288F FALL RISK ASSESSMENT DOCD: CPT | Mod: ,,, | Performed by: NURSE PRACTITIONER

## 2024-03-08 RX ORDER — FUROSEMIDE 20 MG/1
20 TABLET ORAL DAILY
Qty: 90 TABLET | Refills: 1 | Status: SHIPPED | OUTPATIENT
Start: 2024-03-08 | End: 2024-06-12 | Stop reason: SDUPTHER

## 2024-03-08 RX ORDER — PANTOPRAZOLE SODIUM 40 MG/1
40 TABLET, DELAYED RELEASE ORAL DAILY
Qty: 90 TABLET | Refills: 1 | Status: ON HOLD | OUTPATIENT
Start: 2024-03-08 | End: 2024-06-05 | Stop reason: HOSPADM

## 2024-03-08 RX ORDER — AMLODIPINE BESYLATE 5 MG/1
5 TABLET ORAL DAILY
Qty: 90 TABLET | Refills: 1 | Status: ON HOLD | OUTPATIENT
Start: 2024-03-08 | End: 2024-06-05 | Stop reason: HOSPADM

## 2024-03-08 RX ORDER — POTASSIUM CHLORIDE 750 MG/1
10 TABLET, EXTENDED RELEASE ORAL DAILY
Qty: 90 TABLET | Refills: 1 | Status: SHIPPED | OUTPATIENT
Start: 2024-03-08 | End: 2024-06-12 | Stop reason: SDUPTHER

## 2024-03-08 NOTE — ASSESSMENT & PLAN NOTE
BP controlled well. Continue current medication regimen. Monitor BP daily and keep BP daily log to bring to next visit. Exercise at least 5 times a week. Keep scheduled appts. RTC sooner if BP is staying <130/80. Dash diet.

## 2024-03-08 NOTE — PROGRESS NOTES
Swati Vicente NP   [facility]  [address]  [place]  [address]      PATIENT NAME: Trung Barboza  : 1940  DATE: 3/8/24  MRN: [id number]      Billing Provider: Swati Vicente NP  Level of Service: NH OFFICE/OUTPT VISIT, EST, LEVL IV, 30-39 MIN  Patient PCP Information       Provider PCP Type    Swati Vicente NP General            Reason for Visit / Chief Complaint: Health Maintenance (We discussed these care gaps at this visit and patient is aware he may obtain these at any local pharmacy Shingles Vaccine(1 of 2) Never done/RSV Vaccine (Age 60+ and Pregnant patients)(1 - 1-dose 60+ series) Never done/COVID-19 Vaccine(2023- season) due on 2023/Lipid Panel due on 2024--Ordered today/) and Follow-up (Trung Barboza 84 year old male presents for a 3 month follow up lab work and medication refills.)         History of Present Illness / Problem Focused Workflow     Trung Barboza 84 year old male presents for a 3 month follow up lab work and medication refills on HTN, Hyperlipidemia, and GERD. He reports he has been doing well and denies problems.             Review of Systems     @Review of Systems   Constitutional:  Negative for activity change, appetite change, fatigue and fever.   HENT:  Negative for nasal congestion, ear pain, rhinorrhea, sinus pressure/congestion and sore throat.    Eyes:  Negative for pain, redness, visual disturbance and eye dryness.   Respiratory:  Negative for cough and shortness of breath.    Cardiovascular:  Negative for chest pain and leg swelling.   Gastrointestinal:  Negative for abdominal distention, abdominal pain, constipation and diarrhea.   Endocrine: Negative for cold intolerance, heat intolerance and polyuria.   Genitourinary:  Negative for bladder incontinence, dysuria, frequency and urgency.   Musculoskeletal:  Negative for arthralgias, gait problem and myalgias.   Integumentary:  Negative for color change, rash and wound.    Allergic/Immunologic: Negative for environmental allergies and food allergies.   Neurological:  Negative for dizziness, weakness, light-headedness and headaches.   Psychiatric/Behavioral:  Negative for behavioral problems and sleep disturbance.        Medical / Social / Family History     Past Medical History:   Diagnosis Date    Benign localized prostatic hyperplasia with lower urinary tract symptoms (LUTS)     CKD (chronic kidney disease)     Coronary artery disease     Elevated PSA     GERD (gastroesophageal reflux disease)     Gout, unspecified     Hypercholesterolemia     Hyperlipidemia     Hypertension     Myocardial infarction     Obesity (BMI 30-39.9)     TIA (transient ischemic attack)        Past Surgical History:   Procedure Laterality Date    ANGIOGRAM, CORONARY, WITH LEFT HEART CATHETERIZATION N/A 12/31/2021    Procedure: ANGIOGRAM,CORONARY,WITH LEFT HEART CATHETERIZATION;  Surgeon: Mahad Villaseñor DO;  Location: [facility] CATH LAB;  Service: Cardiology;  Laterality: N/A;    BIOPSY WITH TRANSRECTAL ULTRASOUND (TRUS) GUIDANCE Bilateral 03/07/2018    CHOLECYSTECTOMY      CORONARY ARTERY BYPASS GRAFT  01/07/2022    [facility]-Dr. Qureshi    LEFT HEART CATHETERIZATION Left 7/13/2021    Procedure: Left heart cath;  Surgeon: Philip Torres MD;  Location: [facility] CATH LAB;  Service: Cardiology;  Laterality: Left;       Medications and Allergies     Medications  Outpatient Medications Marked as Taking for the 3/8/24 encounter (Office Visit) with Swati Vicente NP   Medication Sig Dispense Refill    aspirin (ECOTRIN) 81 MG EC tablet Take 1 tablet (81 mg total) by mouth once daily. 30 tablet 0    multivitamin-minerals-lutein (MULTIVITAMIN 50 PLUS) Tab Take 1 tablet by mouth once daily.      [DISCONTINUED] amLODIPine (NORVASC) 5 MG tablet Take 1 tablet (5 mg total) by mouth once daily. 90 tablet 1    [DISCONTINUED] furosemide (LASIX) 20 MG tablet Take 1 tablet (20 mg total) by mouth once daily.  90 tablet 1    [DISCONTINUED] pantoprazole (PROTONIX) 40 MG tablet Take 1 tablet (40 mg total) by mouth once daily. 90 tablet 1    [DISCONTINUED] potassium chloride (KLOR-CON) 10 MEQ TbSR Take 1 tablet (10 mEq total) by mouth once daily. 90 tablet 1       Allergies  Review of patient's allergies indicates:   Allergen Reactions    Lisinopril Swelling and Anaphylaxis       Physical Examination     Vitals:    03/08/24 0930   BP: 138/72   Pulse:    Resp:    Temp:      Physical Exam  Vitals and nursing note reviewed.   HENT:      Head: Normocephalic.      Right Ear: Tympanic membrane normal.      Left Ear: Tympanic membrane normal.      Nose: Nose normal.      Mouth/Throat:      Mouth: Mucous membranes are moist.      Pharynx: Oropharynx is clear. No posterior oropharyngeal erythema.   Eyes:      Conjunctiva/sclera: Conjunctivae normal.   Cardiovascular:      Rate and Rhythm: Normal rate and regular rhythm.      Pulses: Normal pulses.      Heart sounds: Normal heart sounds.   Pulmonary:      Effort: Pulmonary effort is normal.      Breath sounds: Normal breath sounds.   Abdominal:      General: Abdomen is flat. Bowel sounds are normal. There is no distension.      Palpations: Abdomen is soft.   Musculoskeletal:         General: No swelling or tenderness. Normal range of motion.      Cervical back: Normal range of motion.      Right lower leg: No edema.      Left lower leg: No edema.   Skin:     General: Skin is warm and dry.      Capillary Refill: Capillary refill takes less than 2 seconds.      Comments: Incision site to right leg healed.    Neurological:      Mental Status: He is alert. Mental status is at baseline.   Psychiatric:         Mood and Affect: Mood normal.         Behavior: Behavior normal.               Lab Results   Component Value Date    WBC 7.26 02/09/2023    HGB 14.5 02/09/2023    HCT 44.1 02/09/2023    MCV 93.6 02/09/2023     02/09/2023        CMP  Sodium   Date Value Ref Range Status    02/09/2023 141 136 - 145 mmol/L Final     Potassium   Date Value Ref Range Status   02/09/2023 4.2 3.5 - 5.1 mmol/L Final     Chloride   Date Value Ref Range Status   02/09/2023 107 98 - 107 mmol/L Final     CO2   Date Value Ref Range Status   02/09/2023 29 21 - 32 mmol/L Final     Glucose   Date Value Ref Range Status   02/09/2023 98 74 - 106 mg/dL Final     BUN   Date Value Ref Range Status   02/09/2023 26 (H) 7 - 18 mg/dL Final     Creatinine   Date Value Ref Range Status   02/09/2023 1.94 (H) 0.70 - 1.30 mg/dL Final     Calcium   Date Value Ref Range Status   02/09/2023 9.3 8.5 - 10.1 mg/dL Final     Total Protein   Date Value Ref Range Status   02/09/2023 7.5 6.4 - 8.2 g/dL Final     Albumin   Date Value Ref Range Status   02/09/2023 3.7 3.5 - 5.0 g/dL Final     Bilirubin, Total   Date Value Ref Range Status   02/09/2023 0.4 >0.0 - 1.2 mg/dL Final     Alk Phos   Date Value Ref Range Status   02/09/2023 92 45 - 115 U/L Final     AST   Date Value Ref Range Status   02/09/2023 23 15 - 37 U/L Final     ALT   Date Value Ref Range Status   02/09/2023 27 16 - 61 U/L Final     Anion Gap   Date Value Ref Range Status   02/09/2023 9 7 - 16 mmol/L Final     eGFR   Date Value Ref Range Status   02/09/2023 34 (L) >=60 mL/min/1.73m² Final     Procedures   Assessment and Plan (including Health Maintenance)   :    Plan:     Problem List Items Addressed This Visit          Cardiac/Vascular    Essential hypertension    Current Assessment & Plan     BP controlled well. Continue current medication regimen. Monitor BP daily and keep BP daily log to bring to next visit. Exercise at least 5 times a week. Keep scheduled appts. RTC sooner if BP is staying <130/80. Dash diet.         Relevant Medications    amLODIPine (NORVASC) 5 MG tablet    furosemide (LASIX) 20 MG tablet    Hyperlipidemia - Primary    Current Assessment & Plan     Lipid panel obtained at today's visit. Goal LDL is less than 70. Continue current meds and low fat/low  cholesterol diet with increased exercise as tolerated. Will follow up with labs. Patient to follow up in 3 months or as needed.            Relevant Orders    Lipid Panel    CBC Auto Differential    Comprehensive Metabolic Panel    Coronary artery disease involving native coronary artery of native heart without angina pectoris    Current Assessment & Plan     History of coronary artery disease currently stable status post bypass.  Patient doing well.             Renal/    Diuretic-induced hypokalemia    Relevant Medications    potassium chloride (KLOR-CON) 10 MEQ TbSR       GI    Gastroesophageal reflux disease    Relevant Medications    pantoprazole (PROTONIX) 40 MG tablet     Other Visit Diagnoses       Stage 3a chronic kidney disease        Relevant Medications    furosemide (LASIX) 20 MG tablet            Health Maintenance Topics with due status: Not Due       Topic Last Completion Date    TETANUS VACCINE 12/11/2018    Aspirin/Antiplatelet Therapy 03/08/2024       Future Appointments   Date Time Provider Department Center   4/1/2024  9:30 AM Kb Brown Jr., MD [facility]   9/10/2024  8:40 AM Swati Vicente NP [facility] [place]   1/16/2025  3:00 PM AWV NURSE [facility] FAM[facility] [place]        Health Maintenance Due   Topic Date Due    Shingles Vaccine (1 of 2) Never done    RSV Vaccine (Age 60+ and Pregnant patients) (1 - 1-dose 60+ series) Never done    COVID-19 Vaccine (5 - 2023-24 season) 09/01/2023    Lipid Panel  02/09/2024          Signature:  [facility]  [id number]  [place]  [address]    Date of encounter: 3/8/24

## 2024-03-11 PROBLEM — Z00.00 ENCOUNTER FOR SUBSEQUENT ANNUAL WELLNESS VISIT (AWV) IN MEDICARE PATIENT: Status: RESOLVED | Noted: 2023-12-08 | Resolved: 2024-03-11

## 2024-03-11 NOTE — PROGRESS NOTES
Labs reviewed: Cholesterol looks good. Continue current meds and low fat/low cholesterol diet. BUN and Creatinine elevated. eGFR elevated. Increase water intake. Avoid NSAIDs. Other labs normal or clinically insignificant. Follow up in 3 months or as needed.

## 2024-03-25 RX ORDER — ATORVASTATIN CALCIUM 80 MG/1
80 TABLET, FILM COATED ORAL NIGHTLY
Qty: 90 TABLET | Refills: 0 | Status: SHIPPED | OUTPATIENT
Start: 2024-03-25 | End: 2024-06-12 | Stop reason: SDUPTHER

## 2024-03-31 ENCOUNTER — EXTERNAL CHRONIC CARE MANAGEMENT (OUTPATIENT)
Dept: FAMILY MEDICINE | Facility: CLINIC | Age: 84
End: 2024-03-31
Payer: MEDICARE

## 2024-03-31 PROCEDURE — G0511 CCM/BHI BY RHC/FQHC 20MIN MO: HCPCS | Mod: ,,, | Performed by: FAMILY MEDICINE

## 2024-04-30 ENCOUNTER — EXTERNAL CHRONIC CARE MANAGEMENT (OUTPATIENT)
Dept: FAMILY MEDICINE | Facility: CLINIC | Age: 84
End: 2024-04-30
Payer: MEDICARE

## 2024-04-30 PROCEDURE — G0511 CCM/BHI BY RHC/FQHC 20MIN MO: HCPCS | Mod: ,,, | Performed by: FAMILY MEDICINE

## 2024-05-06 ENCOUNTER — HOSPITAL ENCOUNTER (INPATIENT)
Facility: HOSPITAL | Age: 84
LOS: 1 days | Discharge: HOME | DRG: 321 | End: 2024-05-08
Attending: HOSPITALIST | Admitting: HOSPITALIST
Payer: MEDICARE

## 2024-05-06 DIAGNOSIS — R79.89 ELEVATED TROPONIN: Primary | ICD-10-CM

## 2024-05-06 DIAGNOSIS — N18.32 STAGE 3B CHRONIC KIDNEY DISEASE: ICD-10-CM

## 2024-05-06 DIAGNOSIS — R79.89 ELEVATED BRAIN NATRIURETIC PEPTIDE (BNP) LEVEL: ICD-10-CM

## 2024-05-06 DIAGNOSIS — R07.9 CHEST PAIN: ICD-10-CM

## 2024-05-06 DIAGNOSIS — I50.21 ACUTE SYSTOLIC HEART FAILURE: ICD-10-CM

## 2024-05-06 DIAGNOSIS — I10 ESSENTIAL HYPERTENSION: ICD-10-CM

## 2024-05-06 DIAGNOSIS — I21.4 NSTEMI (NON-ST ELEVATED MYOCARDIAL INFARCTION): ICD-10-CM

## 2024-05-06 DIAGNOSIS — I35.1 AORTIC REGURGITATION: ICD-10-CM

## 2024-05-06 DIAGNOSIS — I35.1 AORTIC VALVE INSUFFICIENCY, ETIOLOGY OF CARDIAC VALVE DISEASE UNSPECIFIED: ICD-10-CM

## 2024-05-06 DIAGNOSIS — R06.02 SHORTNESS OF BREATH: ICD-10-CM

## 2024-05-06 DIAGNOSIS — Z95.1 HX OF CABG: ICD-10-CM

## 2024-05-06 DIAGNOSIS — I50.21 ACUTE SYSTOLIC CONGESTIVE HEART FAILURE: ICD-10-CM

## 2024-05-06 DIAGNOSIS — I24.9 ACS (ACUTE CORONARY SYNDROME): ICD-10-CM

## 2024-05-06 LAB
ALBUMIN SERPL BCP-MCNC: 3.5 G/DL (ref 3.5–5)
ALBUMIN/GLOB SERPL: 1 {RATIO}
ALP SERPL-CCNC: 235 U/L (ref 45–115)
ALT SERPL W P-5'-P-CCNC: 28 U/L (ref 16–61)
ANION GAP SERPL CALCULATED.3IONS-SCNC: 17 MMOL/L (ref 7–16)
APTT PPP: 35.2 SECONDS (ref 25.2–37.3)
AST SERPL W P-5'-P-CCNC: 29 U/L (ref 15–37)
BASOPHILS # BLD AUTO: 0.02 K/UL (ref 0–0.2)
BASOPHILS NFR BLD AUTO: 0.5 % (ref 0–1)
BILIRUB SERPL-MCNC: 1.2 MG/DL (ref ?–1.2)
BUN SERPL-MCNC: 25 MG/DL (ref 7–18)
BUN/CREAT SERPL: 13 (ref 6–20)
CALCIUM SERPL-MCNC: 8.9 MG/DL (ref 8.5–10.1)
CHLORIDE SERPL-SCNC: 108 MMOL/L (ref 98–107)
CO2 SERPL-SCNC: 23 MMOL/L (ref 21–32)
CREAT SERPL-MCNC: 1.92 MG/DL (ref 0.7–1.3)
DIFFERENTIAL METHOD BLD: ABNORMAL
EGFR (NO RACE VARIABLE) (RUSH/TITUS): 34 ML/MIN/1.73M2
EOSINOPHIL # BLD AUTO: 0.06 K/UL (ref 0–0.5)
EOSINOPHIL NFR BLD AUTO: 1.4 % (ref 1–4)
ERYTHROCYTE [DISTWIDTH] IN BLOOD BY AUTOMATED COUNT: 14.9 % (ref 11.5–14.5)
EST. AVERAGE GLUCOSE BLD GHB EST-MCNC: 114 MG/DL
GLOBULIN SER-MCNC: 3.6 G/DL (ref 2–4)
GLUCOSE SERPL-MCNC: 100 MG/DL (ref 74–106)
HBA1C MFR BLD HPLC: 5.6 % (ref 4.5–6.6)
HCT VFR BLD AUTO: 41.2 % (ref 40–54)
HGB BLD-MCNC: 13.2 G/DL (ref 13.5–18)
IMM GRANULOCYTES # BLD AUTO: 0.01 K/UL (ref 0–0.04)
IMM GRANULOCYTES NFR BLD: 0.2 % (ref 0–0.4)
INR BLD: 1.21
LYMPHOCYTES # BLD AUTO: 1.58 K/UL (ref 1–4.8)
LYMPHOCYTES NFR BLD AUTO: 36.4 % (ref 27–41)
MAGNESIUM SERPL-MCNC: 2.2 MG/DL (ref 1.7–2.3)
MCH RBC QN AUTO: 31.7 PG (ref 27–31)
MCHC RBC AUTO-ENTMCNC: 32 G/DL (ref 32–36)
MCV RBC AUTO: 99 FL (ref 80–96)
MONOCYTES # BLD AUTO: 0.54 K/UL (ref 0–0.8)
MONOCYTES NFR BLD AUTO: 12.4 % (ref 2–6)
MPC BLD CALC-MCNC: 10 FL (ref 9.4–12.4)
NEUTROPHILS # BLD AUTO: 2.13 K/UL (ref 1.8–7.7)
NEUTROPHILS NFR BLD AUTO: 49.1 % (ref 53–65)
NRBC # BLD AUTO: 0 X10E3/UL
NRBC, AUTO (.00): 0 %
NT-PROBNP SERPL-MCNC: 8121 PG/ML (ref 1–450)
PLATELET # BLD AUTO: 169 K/UL (ref 150–400)
POTASSIUM SERPL-SCNC: 4.8 MMOL/L (ref 3.5–5.1)
PROT SERPL-MCNC: 7.1 G/DL (ref 6.4–8.2)
PROTHROMBIN TIME: 15.2 SECONDS (ref 11.7–14.7)
RBC # BLD AUTO: 4.16 M/UL (ref 4.6–6.2)
SARS-COV-2 RDRP RESP QL NAA+PROBE: NEGATIVE
SODIUM SERPL-SCNC: 143 MMOL/L (ref 136–145)
TROPONIN I SERPL DL<=0.01 NG/ML-MCNC: 314.7 PG/ML
TROPONIN I SERPL DL<=0.01 NG/ML-MCNC: 327.6 PG/ML
TROPONIN I SERPL DL<=0.01 NG/ML-MCNC: 360.6 PG/ML
TSH SERPL DL<=0.005 MIU/L-ACNC: 3.37 UIU/ML (ref 0.36–3.74)
WBC # BLD AUTO: 4.34 K/UL (ref 4.5–11)

## 2024-05-06 PROCEDURE — 99285 EMERGENCY DEPT VISIT HI MDM: CPT | Mod: 25

## 2024-05-06 PROCEDURE — G0378 HOSPITAL OBSERVATION PER HR: HCPCS

## 2024-05-06 PROCEDURE — 25000003 PHARM REV CODE 250: Performed by: HOSPITALIST

## 2024-05-06 PROCEDURE — 84484 ASSAY OF TROPONIN QUANT: CPT | Performed by: NURSE PRACTITIONER

## 2024-05-06 PROCEDURE — 93005 ELECTROCARDIOGRAM TRACING: CPT

## 2024-05-06 PROCEDURE — 84484 ASSAY OF TROPONIN QUANT: CPT | Mod: 91 | Performed by: NURSE PRACTITIONER

## 2024-05-06 PROCEDURE — 83735 ASSAY OF MAGNESIUM: CPT | Performed by: HOSPITALIST

## 2024-05-06 PROCEDURE — 96374 THER/PROPH/DIAG INJ IV PUSH: CPT

## 2024-05-06 PROCEDURE — 63600175 PHARM REV CODE 636 W HCPCS: Performed by: HOSPITALIST

## 2024-05-06 PROCEDURE — 99285 EMERGENCY DEPT VISIT HI MDM: CPT | Mod: ,,, | Performed by: NURSE PRACTITIONER

## 2024-05-06 PROCEDURE — 85610 PROTHROMBIN TIME: CPT | Performed by: HOSPITALIST

## 2024-05-06 PROCEDURE — 36415 COLL VENOUS BLD VENIPUNCTURE: CPT | Performed by: HOSPITALIST

## 2024-05-06 PROCEDURE — 80053 COMPREHEN METABOLIC PANEL: CPT | Performed by: NURSE PRACTITIONER

## 2024-05-06 PROCEDURE — 85025 COMPLETE CBC W/AUTO DIFF WBC: CPT | Performed by: NURSE PRACTITIONER

## 2024-05-06 PROCEDURE — 63600175 PHARM REV CODE 636 W HCPCS: Performed by: NURSE PRACTITIONER

## 2024-05-06 PROCEDURE — 83880 ASSAY OF NATRIURETIC PEPTIDE: CPT | Performed by: NURSE PRACTITIONER

## 2024-05-06 PROCEDURE — 25000003 PHARM REV CODE 250: Performed by: NURSE PRACTITIONER

## 2024-05-06 PROCEDURE — 93010 ELECTROCARDIOGRAM REPORT: CPT | Mod: ,,, | Performed by: HOSPITALIST

## 2024-05-06 PROCEDURE — 83036 HEMOGLOBIN GLYCOSYLATED A1C: CPT | Performed by: HOSPITALIST

## 2024-05-06 PROCEDURE — 84443 ASSAY THYROID STIM HORMONE: CPT | Performed by: HOSPITALIST

## 2024-05-06 PROCEDURE — 96372 THER/PROPH/DIAG INJ SC/IM: CPT | Performed by: HOSPITALIST

## 2024-05-06 PROCEDURE — 99223 1ST HOSP IP/OBS HIGH 75: CPT | Mod: ,,, | Performed by: HOSPITALIST

## 2024-05-06 PROCEDURE — 87635 SARS-COV-2 COVID-19 AMP PRB: CPT | Performed by: HOSPITALIST

## 2024-05-06 PROCEDURE — 36415 COLL VENOUS BLD VENIPUNCTURE: CPT | Performed by: NURSE PRACTITIONER

## 2024-05-06 PROCEDURE — 84484 ASSAY OF TROPONIN QUANT: CPT | Mod: 91

## 2024-05-06 PROCEDURE — 85730 THROMBOPLASTIN TIME PARTIAL: CPT | Performed by: HOSPITALIST

## 2024-05-06 PROCEDURE — 96375 TX/PRO/DX INJ NEW DRUG ADDON: CPT

## 2024-05-06 RX ORDER — PANTOPRAZOLE SODIUM 40 MG/1
40 TABLET, DELAYED RELEASE ORAL DAILY
Status: DISCONTINUED | OUTPATIENT
Start: 2024-05-07 | End: 2024-05-08 | Stop reason: HOSPADM

## 2024-05-06 RX ORDER — MORPHINE SULFATE 2 MG/ML
2 INJECTION, SOLUTION INTRAMUSCULAR; INTRAVENOUS EVERY 4 HOURS PRN
Status: DISCONTINUED | OUTPATIENT
Start: 2024-05-06 | End: 2024-05-08 | Stop reason: HOSPADM

## 2024-05-06 RX ORDER — ENOXAPARIN SODIUM 100 MG/ML
1 INJECTION SUBCUTANEOUS ONCE
Status: COMPLETED | OUTPATIENT
Start: 2024-05-06 | End: 2024-05-06

## 2024-05-06 RX ORDER — ONDANSETRON HYDROCHLORIDE 2 MG/ML
4 INJECTION, SOLUTION INTRAVENOUS
Status: COMPLETED | OUTPATIENT
Start: 2024-05-06 | End: 2024-05-06

## 2024-05-06 RX ORDER — FUROSEMIDE 20 MG/1
20 TABLET ORAL DAILY
Status: DISCONTINUED | OUTPATIENT
Start: 2024-05-07 | End: 2024-05-08

## 2024-05-06 RX ORDER — BISACODYL 5 MG
10 TABLET, DELAYED RELEASE (ENTERIC COATED) ORAL DAILY PRN
Status: DISCONTINUED | OUTPATIENT
Start: 2024-05-06 | End: 2024-05-08 | Stop reason: HOSPADM

## 2024-05-06 RX ORDER — SIMETHICONE 80 MG
1 TABLET,CHEWABLE ORAL 3 TIMES DAILY PRN
Status: DISCONTINUED | OUTPATIENT
Start: 2024-05-06 | End: 2024-05-08 | Stop reason: HOSPADM

## 2024-05-06 RX ORDER — ONDANSETRON HYDROCHLORIDE 2 MG/ML
8 INJECTION, SOLUTION INTRAVENOUS EVERY 6 HOURS PRN
Status: DISCONTINUED | OUTPATIENT
Start: 2024-05-06 | End: 2024-05-07

## 2024-05-06 RX ORDER — ASPIRIN 81 MG/1
81 TABLET ORAL DAILY
Status: DISCONTINUED | OUTPATIENT
Start: 2024-05-07 | End: 2024-05-08 | Stop reason: HOSPADM

## 2024-05-06 RX ORDER — TRAZODONE HYDROCHLORIDE 50 MG/1
50 TABLET ORAL NIGHTLY PRN
Status: DISCONTINUED | OUTPATIENT
Start: 2024-05-06 | End: 2024-05-08 | Stop reason: HOSPADM

## 2024-05-06 RX ORDER — GUAIFENESIN AND DEXTROMETHORPHAN HYDROBROMIDE 10; 100 MG/5ML; MG/5ML
10 SYRUP ORAL EVERY 6 HOURS PRN
Status: DISCONTINUED | OUTPATIENT
Start: 2024-05-06 | End: 2024-05-08 | Stop reason: HOSPADM

## 2024-05-06 RX ORDER — MORPHINE SULFATE 4 MG/ML
4 INJECTION, SOLUTION INTRAMUSCULAR; INTRAVENOUS
Status: COMPLETED | OUTPATIENT
Start: 2024-05-06 | End: 2024-05-06

## 2024-05-06 RX ORDER — AMLODIPINE BESYLATE 5 MG/1
5 TABLET ORAL DAILY
Status: DISCONTINUED | OUTPATIENT
Start: 2024-05-07 | End: 2024-05-08 | Stop reason: HOSPADM

## 2024-05-06 RX ORDER — ATORVASTATIN CALCIUM 80 MG/1
80 TABLET, FILM COATED ORAL NIGHTLY
Status: DISCONTINUED | OUTPATIENT
Start: 2024-05-06 | End: 2024-05-08 | Stop reason: HOSPADM

## 2024-05-06 RX ORDER — TALC
6 POWDER (GRAM) TOPICAL NIGHTLY PRN
Status: DISCONTINUED | OUTPATIENT
Start: 2024-05-06 | End: 2024-05-08 | Stop reason: HOSPADM

## 2024-05-06 RX ORDER — ACETAMINOPHEN 500 MG
1000 TABLET ORAL EVERY 6 HOURS PRN
Status: DISCONTINUED | OUTPATIENT
Start: 2024-05-06 | End: 2024-05-07

## 2024-05-06 RX ORDER — FUROSEMIDE 10 MG/ML
40 INJECTION INTRAMUSCULAR; INTRAVENOUS
Status: COMPLETED | OUTPATIENT
Start: 2024-05-06 | End: 2024-05-06

## 2024-05-06 RX ORDER — ATORVASTATIN CALCIUM 80 MG/1
80 TABLET, FILM COATED ORAL NIGHTLY
Status: DISCONTINUED | OUTPATIENT
Start: 2024-05-06 | End: 2024-05-06

## 2024-05-06 RX ADMIN — ENOXAPARIN SODIUM 100 MG: 100 INJECTION SUBCUTANEOUS at 09:05

## 2024-05-06 RX ADMIN — ATORVASTATIN CALCIUM 80 MG: 80 TABLET, FILM COATED ORAL at 09:05

## 2024-05-06 RX ADMIN — NITROGLYCERIN 1 INCH: 20 OINTMENT TOPICAL at 05:05

## 2024-05-06 RX ADMIN — MORPHINE SULFATE 4 MG: 4 INJECTION INTRAVENOUS at 07:05

## 2024-05-06 RX ADMIN — FUROSEMIDE 40 MG: 10 INJECTION, SOLUTION INTRAMUSCULAR; INTRAVENOUS at 07:05

## 2024-05-06 RX ADMIN — TICAGRELOR 180 MG: 90 TABLET ORAL at 07:05

## 2024-05-06 RX ADMIN — ONDANSETRON 4 MG: 2 INJECTION INTRAMUSCULAR; INTRAVENOUS at 07:05

## 2024-05-06 NOTE — FIRST PROVIDER EVALUATION
Emergency Department TeleTriage Encounter Note      CHIEF COMPLAINT    Chief Complaint   Patient presents with    Shortness of Breath    Chest Pain       VITAL SIGNS   Initial Vitals   BP Pulse Resp Temp SpO2   05/06/24 1502 05/06/24 1502 05/06/24 1502 05/06/24 1502 05/06/24 1502   (!) 178/97 72 17 97.4 °F (36.3 °C) 99 %      MAP       --                   ALLERGIES    Review of patient's allergies indicates:   Allergen Reactions    Lisinopril Swelling and Anaphylaxis       PROVIDER TRIAGE NOTE  This is a teletriage evaluation of a 84 y.o. male presenting to the ED with c/o shortness of breath and chest pain that began 4 days ago. Constant. Limited physical exam via telehealth: The patient is awake, alert, answering questions appropriately and is not in respiratory distress. Initial orders will be placed and care will be transferred to an alternate provider when patient is roomed for a full evaluation. Any additional orders and the final disposition will be determined by that provider.         ORDERS  Labs Reviewed   CBC W/ AUTO DIFFERENTIAL   COMPREHENSIVE METABOLIC PANEL   TROPONIN I   TROPONIN I   NT-PRO NATRIURETIC PEPTIDE       ED Orders (720h ago, onward)      Start Ordered     Status Ordering Provider    05/06/24 1905 05/06/24 1604  Troponin I #2  Once Timed         Ordered HALIMA WEEKS    05/06/24 1605 05/06/24 1604  Vital signs  Every 15 min         Ordered HALIMA WEEKS    05/06/24 1605 05/06/24 1604  Cardiac Monitoring - Adult  Continuous        Comments: Notify Physician If:    Ordered HALIMA WEEKS    05/06/24 1605 05/06/24 1604  Pulse Oximetry Continuous  Continuous         Ordered HALIMA WEEKS    05/06/24 1605 05/06/24 1604  Diet NPO  Diet effective now         Ordered HALIMA WEEKS    05/06/24 1605 05/06/24 1604  Saline lock IV  Once         Ordered HALIMA WEEKS    05/06/24 1605 05/06/24 1604  EKG 12-lead  Once        Comments: Do not perform if previously done during this  visit/ triage    Ordered HALIMA WEEKS P.    05/06/24 1605 05/06/24 1604  CBC auto differential  STAT         Ordered HALIMA WEEKS P.    05/06/24 1605 05/06/24 1604  Comprehensive metabolic panel  STAT         Ordered HALIMA WEEKS P.    05/06/24 1605 05/06/24 1604  Troponin I #1  STAT         Ordered HALIMA WEEKS P.    05/06/24 1605 05/06/24 1604  NT-Pro Natriuretic Peptide  STAT         Ordered HALIMA WEEKS P.    05/06/24 1605 05/06/24 1604  X-Ray Chest PA And Lateral  1 time imaging         Ordered HALIMA WEEKS              Virtual Visit Note: The provider triage portion of this emergency department evaluation and documentation was performed via [employer], a HIPAA-compliant telemedicine application, in concert with a tele-presenter in the room. A face to face patient evaluation with one of my colleagues will occur once the patient is placed in an emergency department room.      DISCLAIMER: This note was prepared with [employer] voice recognition transcription software. Garbled syntax, mangled pronouns, and other bizarre constructions may be attributed to that software system.

## 2024-05-06 NOTE — ED PROVIDER NOTES
Encounter Date: 5/6/2024       History     Chief Complaint   Patient presents with    Shortness of Breath    Chest Pain     HPI  Pt is a pleasant 85 y/o BM with PMH CAD, TIA, NSTEMI, CABG, PVD, who presents from home with family with c/o 3-4 day hx of constant CP and SOB. He reports the CP is constant x3-4 days, with periods of partial relief but never full relief. He notes exertion exacerbates the CP. He has a hx of revascularization PCi plus CABG 1/22/22 at [place]. His cardiologist is Dr. Villaseñor; last OV Feb 2024.    Last heart cath 12/31/21:  The left ventricular systolic function was normal.  The left ventricular end diastolic pressure was normal.  The pre-procedure left ventricular end diastolic pressure was 11.  The ejection fraction was calculated to be 55%.  The ejection fraction was greater than 55% by visual estimate.  The Acute Mrg lesion was 80% stenosed.  The Dist RCA lesion was 50% stenosed.  The 1st Diag lesion was 75% stenosed.  The Ost LAD to Prox LAD lesion was 80% stenosed.  The 2nd Diag lesion was 80% stenosed.  The Prox Cx to Mid Cx lesion was 80% stenosed.  The 2nd Mrg lesion was 60% stenosed.  The estimated blood loss was none.  There was three vessel coronary artery disease.      Last echo 12/30/21  The left ventricle is normal in size with moderate concentric hypertrophy and  The estimated ejection fraction is 55%.  Normal right ventricular size.  Mild right atrial enlargement.  Mild mitral regurgitation.  Mild tricuspid regurgitation.  Mild pulmonic regurgitation.  Left ventricular diastolic dysfunction.  Atrial fibrillation not observed.  Moderate left atrial enlargement.  Mild-to-moderate aortic regurgitation.       Review of patient's allergies indicates:   Allergen Reactions    Lisinopril Swelling and Anaphylaxis     Past Medical History:   Diagnosis Date    Benign localized prostatic hyperplasia with lower urinary tract symptoms (LUTS)     Coronary artery disease     Essential  (primary) hypertension     GERD (gastroesophageal reflux disease)     Gout, arthritis     TIA (transient ischemic attack)      Past Surgical History:   Procedure Laterality Date    ANGIOGRAM, CORONARY, WITH LEFT HEART CATHETERIZATION N/A 2021    Procedure: ANGIOGRAM,CORONARY,WITH LEFT HEART CATHETERIZATION;  Surgeon: Mahad Villaseñor DO;  Location: [facility] CATH LAB;  Service: Cardiology;  Laterality: N/A;    BIOPSY WITH TRANSRECTAL ULTRASOUND (TRUS) GUIDANCE Bilateral 2018    CHOLECYSTECTOMY      CORONARY ARTERY BYPASS GRAFT  2022    [facility]-Dr. Qureshi    LEFT HEART CATHETERIZATION Left 2021    Procedure: Left heart cath;  Surgeon: Philip Torres MD;  Location: [facility] CATH LAB;  Service: Cardiology;  Laterality: Left;     Family History   Problem Relation Name Age of Onset    Heart disease Mother      Hypertension Mother      No Known Problems Father      No Known Problems Sister      No Known Problems Sister      No Known Problems Sister      No Known Problems Sister      No Known Problems Brother      No Known Problems Brother      No Known Problems Brother      No Known Problems Brother      No Known Problems Daughter      Hypertension Son      No Known Problems Son      No Known Problems Son      No Known Problems Son      No Known Problems Son       Social History     Tobacco Use    Smoking status: Former     Current packs/day: 0.00     Average packs/day: 0.5 packs/day for 15.0 years (7.5 ttl pk-yrs)     Types: Cigarettes     Start date: 1973     Quit date: 1988     Years since quittin.8     Passive exposure: Never    Smokeless tobacco: Former     Types: Chew    Tobacco comments:     Dips 1 or 2 times a month   Substance Use Topics    Alcohol use: Not Currently     Comment: occasional wine    Drug use: Never     Review of Systems   Respiratory:  Positive for shortness of breath.    Cardiovascular:  Positive for chest pain and leg swelling. Negative for  palpitations.       Physical Exam     Initial Vitals   BP Pulse Resp Temp SpO2   05/06/24 1502 05/06/24 1502 05/06/24 1502 05/06/24 1502 05/06/24 1502   (!) 178/97 72 17 97.4 °F (36.3 °C) 99 %      MAP       --                Physical Exam    Constitutional: He appears well-developed and well-nourished.   HENT:   Head: Normocephalic.   Eyes: Pupils are equal, round, and reactive to light.   Cardiovascular:  Normal rate.           Pulmonary/Chest: Breath sounds normal. No respiratory distress.   Abdominal: Abdomen is soft.   Musculoskeletal:         General: Edema present.     Neurological: He is alert and oriented to person, place, and time. GCS score is 15. GCS eye subscore is 4. GCS verbal subscore is 5. GCS motor subscore is 6.   Skin: Skin is warm and dry.   Psychiatric: He has a normal mood and affect. His behavior is normal. Judgment and thought content normal.         Medical Screening Exam   See Full Note    ED Course   Procedures  Labs Reviewed   COMPREHENSIVE METABOLIC PANEL - Abnormal; Notable for the following components:       Result Value    Chloride 108 (*)     Anion Gap 17 (*)     BUN 25 (*)     Creatinine 1.92 (*)     Alk Phos 235 (*)     eGFR 34 (*)     All other components within normal limits   TROPONIN I - Abnormal; Notable for the following components:    Troponin I High Sensitivity 314.7 (*)     All other components within normal limits   NT-PRO NATRIURETIC PEPTIDE - Abnormal; Notable for the following components:    ProBNP 8,121 (*)     All other components within normal limits   CBC WITH DIFFERENTIAL - Abnormal; Notable for the following components:    WBC 4.34 (*)     RBC 4.16 (*)     Hemoglobin 13.2 (*)     MCV 99.0 (*)     MCH 31.7 (*)     RDW 14.9 (*)     Neutrophils % 49.1 (*)     Monocytes % 12.4 (*)     All other components within normal limits   CBC W/ AUTO DIFFERENTIAL    Narrative:     The following orders were created for panel order CBC auto differential.  Procedure                                Abnormality         Status                     ---------                               -----------         ------                     CBC with Differential[[id number]]       Abnormal            Final result                 Please view results for these tests on the individual orders.   TROPONIN I   SARS-COV-2 RNA AMPLIFICATION, QUAL          Imaging Results              X-Ray Chest PA And Lateral (Final result)  Result time 05/06/24 16:17:51      Final result by Gus Tristan DO (05/06/24 16:17:51)                   Impression:      As above.    [place] [facility] [place]      Electronically signed by: Gus Tristan  Date:    05/06/2024  Time:    16:17               Narrative:    EXAMINATION:  XR CHEST PA AND LATERAL    CLINICAL HISTORY:  Chest Pain;    COMPARISON:  Chest x-ray December 30, 2021    TECHNIQUE:  Frontal and lateral views of the chest.    FINDINGS:  Interval sternotomy.  Mild cardiomegaly.  Question subtle interstitial and ground-glass infiltrate/edema within the lungs.  Suspect trace bilateral pleural fluid.  No significant pneumothorax.  Osseous structures appear unchanged.                                       Medications   morphine injection 4 mg (has no administration in time range)   ondansetron injection 4 mg (has no administration in time range)   furosemide injection 40 mg (has no administration in time range)   acetaminophen tablet 1,000 mg (has no administration in time range)   bisacodyL EC tablet 10 mg (has no administration in time range)   dextromethorphan-guaiFENesin  mg/5 ml liquid 10 mL (has no administration in time range)   melatonin tablet 6 mg (has no administration in time range)   ondansetron injection 8 mg (has no administration in time range)   simethicone chewable tablet 80 mg (has no administration in time range)   traZODone tablet 50 mg (has no administration in time range)   ticagrelor tablet 180 mg (has no administration in time  range)   atorvastatin tablet 80 mg (has no administration in time range)   morphine injection 2 mg (has no administration in time range)   nitroGLYCERIN 2% TD oint ointment 1 inch (1 inch Topical (Top) Given 5/6/24 1728)     Medical Decision Making  Amount and/or Complexity of Data Reviewed  Labs:  Decision-making details documented in ED Course.    Risk  Prescription drug management.    Pt presenting with CP and SOB x3-4 days. Strong cardiac hx. Will do cardiac workup, give nitro, and reassess.     18:20 - nitro paste has been on x1 hour - pt states pain is from a 10 to a 5. Giving morphine for pain control.    18:30 - d/w Dr. Sellers. Will give Lasix 40 mg IV for pressure and edema and CXR. Contacted Dr. Bernal, hospitalist on call, for admission for ACS. She states she will put in orders. Pt and family at bedside updated on all plans and are in agreement.          ED Course as of 05/06/24 1840   Mon May 06, 2024   1619 NT-Pro Natriuretic Peptide [AB]      ED Course User Index  [AB] Anne Camacho, JONAH                           Clinical Impression:   Final diagnoses:  [R07.9] Chest pain  [R79.89] Elevated troponin (Primary)  [R06.02] Shortness of breath  [I24.9] ACS (acute coronary syndrome)        ED Disposition Condition    Observation                 Anne Camacho, JONAH  05/06/24 1632       Anne Camacho, JONAH  05/06/24 1821       Anne Camacho, JONAH  05/06/24 1836       Anne Camacho, JONAH  05/06/24 1840       Anne Camacho, JONAH  05/06/24 1841

## 2024-05-07 PROBLEM — R79.89 ELEVATED BRAIN NATRIURETIC PEPTIDE (BNP) LEVEL: Status: ACTIVE | Noted: 2024-05-07

## 2024-05-07 PROBLEM — I50.21 ACUTE SYSTOLIC HEART FAILURE: Status: ACTIVE | Noted: 2024-05-07

## 2024-05-07 PROBLEM — I21.4 NSTEMI (NON-ST ELEVATED MYOCARDIAL INFARCTION): Status: ACTIVE | Noted: 2024-05-07

## 2024-05-07 PROBLEM — I24.9 ACS (ACUTE CORONARY SYNDROME): Status: ACTIVE | Noted: 2024-05-07

## 2024-05-07 PROBLEM — N18.9 CKD (CHRONIC KIDNEY DISEASE): Status: ACTIVE | Noted: 2024-05-07

## 2024-05-07 PROBLEM — E78.5 HLD (HYPERLIPIDEMIA): Status: ACTIVE | Noted: 2024-05-07

## 2024-05-07 PROBLEM — Z95.1 HX OF CABG: Status: ACTIVE | Noted: 2024-05-07

## 2024-05-07 PROBLEM — I35.1 AORTIC VALVE REGURGITATION: Status: ACTIVE | Noted: 2024-05-07

## 2024-05-07 LAB
AORTIC ROOT ANNULUS: 2.88 CM
AORTIC VALVE CUSP SEPERATION: 1.87 CM
AV INDEX (PROSTH): 0.69
AV MEAN GRADIENT: 4 MMHG
AV PEAK GRADIENT: 7 MMHG
AV REGURGITATION PRESSURE HALF TIME: 726.8 MS
AV VALVE AREA BY VELOCITY RATIO: 2 CM²
AV VALVE AREA: 2.24 CM²
AV VELOCITY RATIO: 0.61
BSA FOR ECHO PROCEDURE: 2.21 M2
CV ECHO LV RWT: 0.75 CM
DOP CALC AO PEAK VEL: 1.29 M/S
DOP CALC AO VTI: 23.9 CM
DOP CALC LVOT AREA: 3.3 CM2
DOP CALC LVOT DIAMETER: 2.04 CM
DOP CALC LVOT PEAK VEL: 0.79 M/S
DOP CALC LVOT STROKE VOLUME: 53.58 CM3
DOP CALC MV VTI: 27.3 CM
DOP CALCLVOT PEAK VEL VTI: 16.4 CM
E WAVE DECELERATION TIME: 210.56 MSEC
E/A RATIO: 2.1
E/E' RATIO: 15.64 M/S
ECHO LV POSTERIOR WALL: 1.5 CM (ref 0.6–1.1)
FRACTIONAL SHORTENING: 21 % (ref 28–44)
GLOBAL LONGITUIDAL STRAIN: 8.8 %
INTERVENTRICULAR SEPTUM: 1.3 CM (ref 0.6–1.1)
IVC DIAMETER: 2.47 CM
LA MAJOR: 4.46 CM
LEFT ATRIUM SIZE: 3.81 CM
LEFT INTERNAL DIMENSION IN SYSTOLE: 3.16 CM (ref 2.1–4)
LEFT VENTRICLE DIASTOLIC VOLUME INDEX: 31.92 ML/M2
LEFT VENTRICLE DIASTOLIC VOLUME: 69.91 ML
LEFT VENTRICLE MASS INDEX: 95 G/M2
LEFT VENTRICLE SYSTOLIC VOLUME INDEX: 18.1 ML/M2
LEFT VENTRICLE SYSTOLIC VOLUME: 39.66 ML
LEFT VENTRICULAR INTERNAL DIMENSION IN DIASTOLE: 4 CM (ref 3.5–6)
LEFT VENTRICULAR MASS: 208.96 G
LV LATERAL E/E' RATIO: 14.33 M/S
LV SEPTAL E/E' RATIO: 17.2 M/S
LVOT MG: 1.47 MMHG
LVOT MV: 0.58 CM/S
MV MEAN GRADIENT: 1 MMHG
MV PEAK A VEL: 0.41 M/S
MV PEAK E VEL: 0.86 M/S
MV PEAK GRADIENT: 4 MMHG
MV STENOSIS PRESSURE HALF TIME: 66.86 MS
MV VALVE AREA BY CONTINUITY EQUATION: 1.96 CM2
MV VALVE AREA P 1/2 METHOD: 3.29 CM2
OHS CV RV/LV RATIO: 1.2 CM
OHS LV EJECTION FRACTION SIMPSONS BIPLANE MOD: 37 %
PISA AR MAX VEL: 1.85 M/S
PISA MRMAX VEL: 4.28 M/S
PISA TR MAX VEL: 3.44 M/S
PV PEAK GRADIENT: 1 MMHG
PV PEAK VELOCITY: 0.58 M/S
RA MAJOR: 4.86 CM
RA PRESSURE ESTIMATED: 15 MMHG
RIGHT VENTRICULAR END-DIASTOLIC DIMENSION: 4.81 CM
RV TB RVSP: 18 MMHG
TDI LATERAL: 0.06 M/S
TDI SEPTAL: 0.05 M/S
TDI: 0.06 M/S
TR MAX PG: 47 MMHG
TRICUSPID ANNULAR PLANE SYSTOLIC EXCURSION: 1.4 CM
TV REST PULMONARY ARTERY PRESSURE: 62 MMHG
Z-SCORE OF LEFT VENTRICULAR DIMENSION IN END DIASTOLE: -6.15
Z-SCORE OF LEFT VENTRICULAR DIMENSION IN END SYSTOLE: -2.79

## 2024-05-07 PROCEDURE — C1894 INTRO/SHEATH, NON-LASER: HCPCS | Performed by: INTERNAL MEDICINE

## 2024-05-07 PROCEDURE — 94761 N-INVAS EAR/PLS OXIMETRY MLT: CPT

## 2024-05-07 PROCEDURE — C1887 CATHETER, GUIDING: HCPCS | Performed by: INTERNAL MEDICINE

## 2024-05-07 PROCEDURE — B2151ZZ FLUOROSCOPY OF LEFT HEART USING LOW OSMOLAR CONTRAST: ICD-10-PCS | Performed by: INTERNAL MEDICINE

## 2024-05-07 PROCEDURE — C9600 PERC DRUG-EL COR STENT SING: HCPCS | Mod: LD | Performed by: INTERNAL MEDICINE

## 2024-05-07 PROCEDURE — 25000003 PHARM REV CODE 250: Performed by: STUDENT IN AN ORGANIZED HEALTH CARE EDUCATION/TRAINING PROGRAM

## 2024-05-07 PROCEDURE — 11000001 HC ACUTE MED/SURG PRIVATE ROOM

## 2024-05-07 PROCEDURE — 99152 MOD SED SAME PHYS/QHP 5/>YRS: CPT | Mod: ,,, | Performed by: INTERNAL MEDICINE

## 2024-05-07 PROCEDURE — 027034Z DILATION OF CORONARY ARTERY, ONE ARTERY WITH DRUG-ELUTING INTRALUMINAL DEVICE, PERCUTANEOUS APPROACH: ICD-10-PCS | Performed by: INTERNAL MEDICINE

## 2024-05-07 PROCEDURE — G0378 HOSPITAL OBSERVATION PER HR: HCPCS

## 2024-05-07 PROCEDURE — 99153 MOD SED SAME PHYS/QHP EA: CPT | Performed by: INTERNAL MEDICINE

## 2024-05-07 PROCEDURE — 25000003 PHARM REV CODE 250: Performed by: INTERNAL MEDICINE

## 2024-05-07 PROCEDURE — 25000003 PHARM REV CODE 250: Performed by: HOSPITALIST

## 2024-05-07 PROCEDURE — 99233 SBSQ HOSP IP/OBS HIGH 50: CPT | Mod: ,,, | Performed by: HOSPITALIST

## 2024-05-07 PROCEDURE — C1769 GUIDE WIRE: HCPCS | Performed by: INTERNAL MEDICINE

## 2024-05-07 PROCEDURE — 99215 OFFICE O/P EST HI 40 MIN: CPT | Mod: 25,,, | Performed by: STUDENT IN AN ORGANIZED HEALTH CARE EDUCATION/TRAINING PROGRAM

## 2024-05-07 PROCEDURE — 63600175 PHARM REV CODE 636 W HCPCS: Performed by: INTERNAL MEDICINE

## 2024-05-07 PROCEDURE — C1874 STENT, COATED/COV W/DEL SYS: HCPCS | Performed by: INTERNAL MEDICINE

## 2024-05-07 PROCEDURE — 92928 PRQ TCAT PLMT NTRAC ST 1 LES: CPT | Mod: LD,,, | Performed by: INTERNAL MEDICINE

## 2024-05-07 PROCEDURE — 25500020 PHARM REV CODE 255: Performed by: INTERNAL MEDICINE

## 2024-05-07 PROCEDURE — 93010 ELECTROCARDIOGRAM REPORT: CPT | Mod: ,,, | Performed by: HOSPITALIST

## 2024-05-07 PROCEDURE — B2111ZZ FLUOROSCOPY OF MULTIPLE CORONARY ARTERIES USING LOW OSMOLAR CONTRAST: ICD-10-PCS | Performed by: INTERNAL MEDICINE

## 2024-05-07 PROCEDURE — 99900035 HC TECH TIME PER 15 MIN (STAT)

## 2024-05-07 PROCEDURE — 99152 MOD SED SAME PHYS/QHP 5/>YRS: CPT | Performed by: INTERNAL MEDICINE

## 2024-05-07 PROCEDURE — 27201423 OPTIME MED/SURG SUP & DEVICES STERILE SUPPLY: Performed by: INTERNAL MEDICINE

## 2024-05-07 PROCEDURE — 93459 L HRT ART/GRFT ANGIO: CPT | Mod: XU | Performed by: INTERNAL MEDICINE

## 2024-05-07 PROCEDURE — 4A023N7 MEASUREMENT OF CARDIAC SAMPLING AND PRESSURE, LEFT HEART, PERCUTANEOUS APPROACH: ICD-10-PCS | Performed by: INTERNAL MEDICINE

## 2024-05-07 PROCEDURE — 85347 COAGULATION TIME ACTIVATED: CPT

## 2024-05-07 PROCEDURE — C1760 CLOSURE DEV, VASC: HCPCS | Performed by: INTERNAL MEDICINE

## 2024-05-07 PROCEDURE — 93459 L HRT ART/GRFT ANGIO: CPT | Mod: 26,51,XU, | Performed by: INTERNAL MEDICINE

## 2024-05-07 PROCEDURE — 93005 ELECTROCARDIOGRAM TRACING: CPT

## 2024-05-07 DEVICE — EVEROLIMUS-ELUTING PLATINUM CHROMIUM CORONARY STENT SYSTEM
Type: IMPLANTABLE DEVICE | Site: CORONARY | Status: FUNCTIONAL
Brand: SYNERGY™ XD

## 2024-05-07 DEVICE — ANGIO-SEAL VIP VASCULAR CLOSURE DEVICE
Type: IMPLANTABLE DEVICE | Site: CORONARY | Status: FUNCTIONAL
Brand: ANGIO-SEAL

## 2024-05-07 RX ORDER — ACETAMINOPHEN 325 MG/1
650 TABLET ORAL EVERY 4 HOURS PRN
Status: DISCONTINUED | OUTPATIENT
Start: 2024-05-07 | End: 2024-05-08 | Stop reason: HOSPADM

## 2024-05-07 RX ORDER — SODIUM CHLORIDE 450 MG/100ML
INJECTION, SOLUTION INTRAVENOUS CONTINUOUS
Status: DISPENSED | OUTPATIENT
Start: 2024-05-07 | End: 2024-05-08

## 2024-05-07 RX ORDER — SODIUM CHLORIDE 9 MG/ML
INJECTION, SOLUTION INTRAVENOUS
Status: DISCONTINUED | OUTPATIENT
Start: 2024-05-07 | End: 2024-05-08 | Stop reason: HOSPADM

## 2024-05-07 RX ORDER — HEPARIN SODIUM 1000 [USP'U]/ML
INJECTION, SOLUTION INTRAVENOUS; SUBCUTANEOUS
Status: DISCONTINUED | OUTPATIENT
Start: 2024-05-07 | End: 2024-05-07 | Stop reason: HOSPADM

## 2024-05-07 RX ORDER — MIDAZOLAM HYDROCHLORIDE 1 MG/ML
INJECTION INTRAMUSCULAR; INTRAVENOUS
Status: DISCONTINUED | OUTPATIENT
Start: 2024-05-07 | End: 2024-05-07 | Stop reason: HOSPADM

## 2024-05-07 RX ORDER — LIDOCAINE HYDROCHLORIDE 10 MG/ML
INJECTION INFILTRATION; PERINEURAL
Status: DISCONTINUED | OUTPATIENT
Start: 2024-05-07 | End: 2024-05-07 | Stop reason: HOSPADM

## 2024-05-07 RX ORDER — FENTANYL CITRATE 50 UG/ML
INJECTION, SOLUTION INTRAMUSCULAR; INTRAVENOUS
Status: DISCONTINUED | OUTPATIENT
Start: 2024-05-07 | End: 2024-05-07 | Stop reason: HOSPADM

## 2024-05-07 RX ORDER — ONDANSETRON 4 MG/1
8 TABLET, ORALLY DISINTEGRATING ORAL EVERY 8 HOURS PRN
Status: DISCONTINUED | OUTPATIENT
Start: 2024-05-07 | End: 2024-05-08 | Stop reason: HOSPADM

## 2024-05-07 RX ADMIN — SODIUM CHLORIDE: 4.5 INJECTION, SOLUTION INTRAVENOUS at 05:05

## 2024-05-07 RX ADMIN — TICAGRELOR 90 MG: 90 TABLET ORAL at 10:05

## 2024-05-07 RX ADMIN — ATORVASTATIN CALCIUM 80 MG: 80 TABLET, FILM COATED ORAL at 09:05

## 2024-05-07 RX ADMIN — ASPIRIN 81 MG: 81 TABLET, COATED ORAL at 08:05

## 2024-05-07 RX ADMIN — TICAGRELOR 90 MG: 90 TABLET ORAL at 09:05

## 2024-05-07 RX ADMIN — AMLODIPINE BESYLATE 5 MG: 5 TABLET ORAL at 08:05

## 2024-05-07 RX ADMIN — PANTOPRAZOLE SODIUM 40 MG: 40 TABLET, DELAYED RELEASE ORAL at 08:05

## 2024-05-07 RX ADMIN — FUROSEMIDE 20 MG: 20 TABLET ORAL at 08:05

## 2024-05-07 NOTE — ASSESSMENT & PLAN NOTE
- Patient seen and evaluated by Dr. Wallace  - Final echo results pending  - Will need GDMT initiated prior to discharge  - Further recommendations following [facility]

## 2024-05-07 NOTE — HPI
This patient is a very pleasant 83yo male who presented to [facility] ED with c/o three days of increasingly severe mid-chest pain that he describes as a sharp, intermittent pain accompanied by SOB and occasional nausea. The patient is well known to the Cardiology service here and sees Dr. Mahad Villaseñor for regular outpatient follow up. He had a CABG performed at [facility] in 2022 by Dr. Qureshi and says that he was not having any heart-related symptoms until a few days ago. He says his last visit with Dr. Villaseñor this past February was uneventful and that he has been able to continue to live independently and perform ADL without assistance these past couple of years. The patient says in addition to chest pain he has also noticed new bilateral lower extremity swelling over the past couple of days. He denied PND, orthopnea, palpitations or syncopal episodes. He denied new constitutional complaints including fevers, chills and unintended weight loss.     In addition to a history of CABG, the patient also has a pmh of CAD s/p stents, AR, BPH, HLD, HTN, CKD, gout and GERD. He sees NP Swati Vicente as his PCP. He has a hx of tobacco use but says he currently does not use any nicotine containing products.    The ED bloodwork showed a troponin increase from 314-->360. BNP was elevated at 8121. Labs were otherwise at baseline for the patient. A chest xray showed some cardiomegaly and ground glass infiltrates/edema. The patient was given a dose of weight-based Lovenox and a loading dose of Brilinta.

## 2024-05-07 NOTE — SUBJECTIVE & OBJECTIVE
Past Medical History:   Diagnosis Date    Aortic regurgitation     BPH with urinary obstruction     Chronic kidney disease, stage 3b     GR 44     creat 1.8    Coronary artery disease     Essential hypertension 07/10/2021    Gastroesophageal reflux disease 09/15/2022    Gout, arthritis     PVD (peripheral vascular disease) 02/01/2024    TIA (transient ischemic attack)        Past Surgical History:   Procedure Laterality Date    ANGIOGRAM, CORONARY, WITH LEFT HEART CATHETERIZATION N/A 12/31/2021    Procedure: ANGIOGRAM,CORONARY,WITH LEFT HEART CATHETERIZATION;  Surgeon: Mahad Villaseñor DO;  Location: [facility] CATH LAB;  Service: Cardiology;  Laterality: N/A;    BIOPSY WITH TRANSRECTAL ULTRASOUND (TRUS) GUIDANCE Bilateral 03/07/2018    CHOLECYSTECTOMY      CORONARY ARTERY BYPASS GRAFT  01/07/2022    [facility]-Dr. Qureshi    LEFT HEART CATHETERIZATION Left 7/13/2021    Procedure: Left heart cath;  Surgeon: Philip Torres MD;  Location: [facility] CATH LAB;  Service: Cardiology;  Laterality: Left;       Review of patient's allergies indicates:   Allergen Reactions    Lisinopril Swelling and Anaphylaxis       No current facility-administered medications on file prior to encounter.     Current Outpatient Medications on File Prior to Encounter   Medication Sig    amLODIPine (NORVASC) 5 MG tablet Take 1 tablet (5 mg total) by mouth once daily.    aspirin (ECOTRIN) 81 MG EC tablet Take 1 tablet (81 mg total) by mouth once daily.    atorvastatin (LIPITOR) 80 MG tablet Take 1 tablet by mouth in the evening    furosemide (LASIX) 20 MG tablet Take 1 tablet (20 mg total) by mouth once daily.    multivitamin-minerals-lutein (MULTIVITAMIN 50 PLUS) Tab Take 1 tablet by mouth once daily.    pantoprazole (PROTONIX) 40 MG tablet Take 1 tablet (40 mg total) by mouth once daily.    potassium chloride (KLOR-CON) 10 MEQ TbSR Take 1 tablet (10 mEq total) by mouth once daily.     Family History       Problem Relation (Age of  Onset)    Heart disease Mother    Hypertension Mother, Son    No Known Problems Father, Sister, Sister, Sister, Sister, Brother, Brother, Brother, Brother, Daughter, Son, Son, Son, Son          Tobacco Use    Smoking status: Former     Current packs/day: 0.00     Average packs/day: 0.5 packs/day for 15.0 years (7.5 ttl pk-yrs)     Types: Cigarettes     Start date: 1973     Quit date: 1988     Years since quittin.8     Passive exposure: Never    Smokeless tobacco: Former     Types: Chew    Tobacco comments:     Dips 1 or 2 times a month   Substance and Sexual Activity    Alcohol use: Not Currently     Comment: occasional wine    Drug use: Never    Sexual activity: Not Currently     Partners: Female     Review of Systems   Constitutional: Negative for chills, diaphoresis and fever.   HENT: Negative.     Eyes: Negative.    Cardiovascular:  Positive for chest pain and dyspnea on exertion. Negative for leg swelling, orthopnea, palpitations and syncope.   Respiratory:  Positive for shortness of breath and sleep disturbances due to breathing.    Gastrointestinal: Negative.    All other systems reviewed and are negative.    Objective:     Vital Signs (Most Recent):  Temp: 97.5 °F (36.4 °C) (24)  Pulse: 72 (24)  Resp: 19 (24)  BP: (!) 177/94 (told the nurse.) (24)  SpO2: 98 % (24) Vital Signs (24h Range):  Temp:  [97.4 °F (36.3 °C)-97.5 °F (36.4 °C)] 97.5 °F (36.4 °C)  Pulse:  [55-72] 72  Resp:  [15-25] 19  SpO2:  [95 %-100 %] 98 %  BP: (133-207)/() 177/94     Weight: 96.5 kg (212 lb 11.9 oz)  Body mass index is 28.85 kg/m².    SpO2: 98 %         Intake/Output Summary (Last 24 hours) at 2024 1028  Last data filed at 2024 0530  Gross per 24 hour   Intake --   Output 2200 ml   Net -2200 ml       Lines/Drains/Airways       Peripheral Intravenous Line  Duration                  Peripheral IV - Single Lumen 24 1645 20 G Anterior;Right Forearm  "<1 day         Peripheral IV - Single Lumen 05/06/24 2030 20 G Right;Lateral Antecubital <1 day                     Physical Exam  Vitals reviewed.   Constitutional:       General: He is not in acute distress.  HENT:      Nose: Nose normal.      Mouth/Throat:      Mouth: Mucous membranes are moist.      Pharynx: Oropharynx is clear.   Eyes:      General: No scleral icterus.     Pupils: Pupils are equal, round, and reactive to light.   Neck:      Vascular: No JVD.   Cardiovascular:      Rate and Rhythm: Normal rate and regular rhythm.   Pulmonary:      Effort: Pulmonary effort is normal.      Breath sounds: Normal breath sounds.   Abdominal:      General: Bowel sounds are normal.      Palpations: Abdomen is soft.   Musculoskeletal:      Right lower leg: Edema (1+) present.      Left lower leg: Edema (1+) present.   Skin:     General: Skin is warm and dry.   Neurological:      Mental Status: He is alert and oriented to person, place, and time.          Significant Labs: ABG: No results for input(s): "PH", "PCO2", "HCO3", "POCSATURATED", "BE" in the last 48 hours., Blood Culture: No results for input(s): "LABBLOO" in the last 48 hours., BMP:   Recent Labs   Lab 05/06/24  1653 05/06/24  2038     --      --    K 4.8  --    *  --    CO2 23  --    BUN 25*  --    CREATININE 1.92*  --    CALCIUM 8.9  --    MG  --  2.2   , CMP   Recent Labs   Lab 05/06/24  1653      K 4.8   *   CO2 23      BUN 25*   CREATININE 1.92*   CALCIUM 8.9   PROT 7.1   ALBUMIN 3.5   BILITOT 1.2   ALKPHOS 235*   AST 29   ALT 28   ANIONGAP 17*   , CBC   Recent Labs   Lab 05/06/24  1653   WBC 4.34*   HGB 13.2*   HCT 41.2      , Lipid Panel No results for input(s): "CHOL", "HDL", "LDLCALC", "TRIG", "CHOLHDL" in the last 48 hours., and Troponin No results for input(s): "TROPONINI" in the last 48 hours.    Significant Imaging: Cardiac Cath: Results for orders placed during the hospital encounter of " 12/30/21    Cardiac catheterization    Conclusion  · The left ventricular systolic function was normal.  · The left ventricular end diastolic pressure was normal.  · The pre-procedure left ventricular end diastolic pressure was 11.  · The ejection fraction was calculated to be 55%.  · The ejection fraction was greater than 55% by visual estimate.  · The Acute Mrg lesion was 80% stenosed.  · The Dist RCA lesion was 50% stenosed.  · The 1st Diag lesion was 75% stenosed.  · The Ost LAD to Prox LAD lesion was 80% stenosed.  · The 2nd Diag lesion was 80% stenosed.  · The Prox Cx to Mid Cx lesion was 80% stenosed.  · The 2nd Mrg lesion was 60% stenosed.  · The estimated blood loss was none.  · There was three vessel coronary artery disease.    The procedure log was documented by No documenter listed and verified by Mahad Villaseñor DO.    Date: 12/31/2021  Time: 12:22 PM\    Severe three vessel CAD, progression of disease in distal RCA, consider CABG versus multiple vessel PCI, normal LV , Echocardiogram: Transthoracic echo (TTE) complete (Cupid Only):   Results for orders placed or performed during the hospital encounter of 05/06/24   Echo   Result Value Ref Range    BSA 2.21 m2    LVOT stroke volume 53.58 cm3    LVIDd 4.00 3.5 - 6.0 cm    LV Systolic Volume 39.66 mL    LV Systolic Volume Index 18.1 mL/m2    LVIDs 3.16 2.1 - 4.0 cm    LV Diastolic Volume 69.91 mL    LV Diastolic Volume Index 31.92 mL/m2    IVS 1.72 (A) 0.6 - 1.1 cm    LVOT diameter 2.04 cm    LVOT area 3.3 cm2    FS 21 (A) 28 - 44 %    Left Ventricle Relative Wall Thickness 0.95 cm    Posterior Wall 1.89 (A) 0.6 - 1.1 cm    LV mass 314.02 g    LV Mass Index 143 g/m2    MV Peak E Kiran 0.86 m/s    TDI LATERAL 0.06 m/s    TDI SEPTAL 0.05 m/s    E/E' ratio 15.64 m/s    MV Peak A Kiran 0.41 m/s    TR Max Kiran 3.44 m/s    E/A ratio 2.10     E wave deceleration time 210.56 msec    LV SEPTAL E/E' RATIO 17.20 m/s    LV LATERAL E/E' RATIO 14.33 m/s    LVOT peak kiran  0.79 m/s    Left Ventricular Outflow Tract Mean Velocity 0.58 cm/s    Left Ventricular Outflow Tract Mean Gradient 1.47 mmHg    RVDD 4.81 cm    TAPSE 1.57 cm    RV/LV Ratio 1.20 cm    LA size 3.81 cm    Left Atrium Major Axis 4.46 cm    RA Major Axis 4.86 cm    AV regurgitation pressure 1/2 time [id number] ms    AR Max Kiran 1.85 m/s    AV mean gradient 4 mmHg    AV peak gradient 7 mmHg    Ao peak kiran 1.29 m/s    Ao VTI 23.90 cm    LVOT peak VTI 16.40 cm    AV valve area 2.24 cm²    AV Velocity Ratio 0.61     AV index (prosthetic) 0.69     TIMOTHY by Velocity Ratio 2.00 cm²    Mr max kiran 4.28 m/s    MV mean gradient 1 mmHg    MV peak gradient 4 mmHg    MV stenosis pressure 1/2 time 66.86 ms    MV valve area p 1/2 method 3.29 cm2    MV valve area by continuity eq 1.96 cm2    MV VTI 27.3 cm    Triscuspid Valve Regurgitation Peak Gradient 47 mmHg    PV PEAK VELOCITY 0.58 m/s    PV peak gradient 1 mmHg    Ao root annulus 2.88 cm    IVC diameter 2.47 cm    Mean e' 0.06 m/s    ZLVIDS -2.79     ZLVIDD -6.15     AORTIC VALVE CUSP SEPERATION 1.87 cm    EF 40 %    TV resting pulmonary artery pressure 62 mmHg    RV TB RVSP 18 mmHg    Est. RA pres 15 mmHg   , EKG:   Results for orders placed or performed in visit on 02/12/24   EKG 12-lead    Collection Time: 02/12/24  9:50 AM   Result Value Ref Range    QRS Duration 122 ms    OHS QTC Calculation 481 ms    Narrative    Test Reason : I10,    Vent. Rate : 090 BPM     Atrial Rate : 090 BPM     P-R Int : 254 ms          QRS Dur : 122 ms      QT Int : 394 ms       P-R-T Axes : 050 078 046 degrees     QTc Int : 481 ms    Sinus rhythm with 1st degree A-V block  Anteroseptal infarct (cited on or before 27-SEP-2022)  Abnormal ECG  When compared with ECG of 06-JUN-2023 10:45,  Questionable change in initial forces of Septal leads  Confirmed by Mahad Villaseñor DO ([id number]) on 2/12/2024 6:06:52 PM    Referred By:             Confirmed By:Mahad J. Villaseñor DO    , and X-Ray: CXR: X-Ray Chest  1 View (CXR): X-Ray Chest PA And Lateral  Order: [id number]  Status: Final result       Visible to patient: No (inaccessible in Patient Portal)       Next appt: 09/10/2024 at 08:40 AM in Family Medicine (Swati Vicente NP)    0 Result Notes  Details    Reading Physician Reading Date Result Priority   KunGus DO  [phone] 5/6/2024 STAT     Narrative & Impression  EXAMINATION:  XR CHEST PA AND LATERAL     CLINICAL HISTORY:  Chest Pain;     COMPARISON:  Chest x-ray December 30, 2021     TECHNIQUE:  Frontal and lateral views of the chest.     FINDINGS:  Interval sternotomy.  Mild cardiomegaly.  Question subtle interstitial and ground-glass infiltrate/edema within the lungs.  Suspect trace bilateral pleural fluid.  No significant pneumothorax.  Osseous structures appear unchanged.     Impression:     As above.     Point of Service: [facility]        Electronically signed by:Gus Tristan  Date:                                            05/06/2024  Time:                                           16:17

## 2024-05-07 NOTE — ED NOTES
Changed patient into a gown, reconnected monitor, medications, pants, shoes, 2 shirts, scoks, and flip phone placed in patient belongings bags.

## 2024-05-07 NOTE — ASSESSMENT & PLAN NOTE
Chronic, controlled. Latest blood pressure and vitals reviewed-     Temp:  [97.4 °F (36.3 °C)-97.5 °F (36.4 °C)]   Pulse:  [55-72]   Resp:  [15-25]   BP: (133-207)/()   SpO2:  [95 %-100 %] .   Home meds for hypertension were reviewed and noted below.   Hypertension Medications               amLODIPine (NORVASC) 5 MG tablet Take 1 tablet (5 mg total) by mouth once daily.    furosemide (LASIX) 20 MG tablet Take 1 tablet (20 mg total) by mouth once daily.            While in the hospital, will manage blood pressure as follows; Continue home antihypertensive regimen    Will utilize p.r.n. blood pressure medication only if patient's blood pressure greater than 180/110 and he develops symptoms such as worsening chest pain or shortness of breath.

## 2024-05-07 NOTE — ED NOTES
"Assumed care of pt at this time;  pt is a/a/o x 4 resp even and nonlabored;  he reports "mild" chest discomfort at this time as well as some difficulty "getting a good breath"  BP remains elevated ;  INT started and blood obtained from site for lab  "

## 2024-05-07 NOTE — ASSESSMENT & PLAN NOTE
Creatine stable for now. BMP reviewed- noted Estimated Creatinine Clearance: 34.5 mL/min (A) (based on SCr of 1.92 mg/dL (H)). according to latest data. Based on current GFR, CKD stage is stage 3 - GFR 30-59.  Monitor UOP and serial BMP and adjust therapy as needed. Renally dose meds. Avoid nephrotoxic medications and procedures.

## 2024-05-07 NOTE — PLAN OF CARE
Problem: Adult Inpatient Plan of Care  Goal: Plan of Care Review  Outcome: Progressing  Flowsheets (Taken 5/6/2024 2358)  Plan of Care Reviewed With: patient  Goal: Optimal Comfort and Wellbeing  Outcome: Progressing  Intervention: Monitor Pain and Promote Comfort  Flowsheets (Taken 5/6/2024 2358)  Pain Management Interventions:   pain management plan reviewed with patient/caregiver   pillow support provided   position adjusted   relaxation techniques promoted   quiet environment facilitated  Intervention: Provide Person-Centered Care  Flowsheets (Taken 5/6/2024 2358)  Trust Relationship/Rapport:   care explained   choices provided   emotional support provided   empathic listening provided   questions answered   questions encouraged   reassurance provided   thoughts/feelings acknowledged

## 2024-05-07 NOTE — HPI
85 y/o male with PMH of CABG (2022), HLD, HTN, CKD, and GERD who presented to [facility] ED with c/o three days of increasingly severe mid-chest pain that he describes as a sharp, intermittent pain accompanied by SOB and occasional nausea. The patient is well known to the Cardiology service here and sees Dr. Mahad Villaseñor for regular outpatient follow up. He had a CABG performed at [facility] in 2022 by Dr. Qureshi and says that he was not having any heart-related symptoms until a few days ago. He says his last visit with Dr. Villaseñor this past February was uneventful and that he has been able to continue to live independently and perform ADL without assistance these past couple of years. The patient says in addition to chest pain he has also noticed new bilateral lower extremity swelling over the past couple of days. He denied PND, orthopnea, palpitations or syncopal episodes. He denied new constitutional complaints including fevers, chills and unintended weight loss.      The ED bloodwork showed a troponin increase from 314-->360. BNP was elevated at 8121. Labs were otherwise at baseline for the patient. A chest xray showed some cardiomegaly and ground glass infiltrates/edema. The patient was given a dose of weight-based Lovenox and a loading dose of Brilinta.     Cardiology consulted for hx CABG, elevated troponin with chest pain. Family does report recent death of their mother with increased stress at home.

## 2024-05-07 NOTE — ASSESSMENT & PLAN NOTE
- Patient seen and evaluated by Dr. Wallace  - Troponin 314, 360, 327; Echo with preliminary EF 35-40% (down from 55% in 2021)  - EKG SR with frequent PVCs, no acute changes  - Plan for [facility] today. Procedure, risk (including increased risk of kidney injury with CKD), and benefits discussed in detail with patient and family. He verbalized understanding and wishes to proceed. Consent obtained and on chart.   - Further recommendations to follow

## 2024-05-07 NOTE — PLAN OF CARE
[facility]  Initial Discharge Assessment       Primary Care Provider: Swati Vicente NP    Admission Diagnosis: Shortness of breath [R06.02]  Aortic regurgitation [I35.1]  ACS (acute coronary syndrome) [I24.9]  Elevated troponin [R79.89]  Elevated brain natriuretic peptide (BNP) level [R79.89]  Chest pain [R07.9]    Admission Date: 5/6/2024  Expected Discharge Date:     Transition of Care Barriers: None    Payor: [employer] MANAGED MCARE / Plan: "[employer] MEDICARE ADVANTAGE / Product Type: Medicare Advantage /     Extended Emergency Contact Information  Primary Emergency Contact: Jennifer Page  Mobile Phone: [phone]  Relation: Daughter    Discharge Plan A: Home  Discharge Plan B: Home      [facility] [address] [place] [address], [place] - [address]  [address]  [place] [address]  Phone: [phone] Fax: [phone]      Initial Assessment (most recent)       Adult Discharge Assessment - 05/07/24 1150          Discharge Assessment    Assessment Type Discharge Planning Assessment     Source of Information patient     Communicated SYED with patient/caregiver Date not available/Unable to determine     People in Home alone     Do you expect to return to your current living situation? Yes     Do you have help at home or someone to help you manage your care at home? No     Prior to hospitilization cognitive status: Alert/Oriented     Current cognitive status: Alert/Oriented     Walking or Climbing Stairs Difficulty yes     Walking or Climbing Stairs ambulation difficulty, requires equipment     Mobility Management straight cane     Dressing/Bathing Difficulty no     Home Accessibility wheelchair accessible     Home Layout Able to live on 1st floor     Equipment Currently Used at Home cane, straight     Patient currently being followed by outpatient case management? No     Do you currently have service(s) that help you manage your care at home? No     Do  you take prescription medications? Yes     Do you have prescription coverage? Yes     Coverage [facility] Medicare     Do you have any problems affording any of your prescribed medications? No     Is the patient taking medications as prescribed? yes     How do you get to doctors appointments? car, drives self     Are you on dialysis? No     Do you take coumadin? No     Discharge Plan A Home     Discharge Plan B Home     DME Needed Upon Discharge  none     Discharge Plan discussed with: Patient     Transition of Care Barriers None        Physical Activity    On average, how many days per week do you engage in moderate to strenuous exercise (like a brisk walk)? 3 days     On average, how many minutes do you engage in exercise at this level? 40 min        Financial Resource Strain    How hard is it for you to pay for the very basics like food, housing, medical care, and heating? Not hard at all        Housing Stability    In the last 12 months, was there a time when you were not able to pay the mortgage or rent on time? No     At any time in the past 12 months, were you homeless or living in a shelter (including now)? No        Transportation Needs    Has the lack of transportation kept you from medical appointments, meetings, work or from getting things needed for daily living? No        Food Insecurity    Within the past 12 months, you worried that your food would run out before you got the money to buy more. Never true     Within the past 12 months, the food you bought just didn't last and you didn't have money to get more. Never true        Stress    Do you feel stress - tense, restless, nervous, or anxious, or unable to sleep at night because your mind is troubled all the time - these days? Not at all        Social Isolation    How often do you feel lonely or isolated from those around you?  Never        Alcohol Use    Q1: How often do you have a drink containing alcohol? Never     Q2: How many drinks containing alcohol  do you have on a typical day when you are drinking? Patient does not drink     Q3: How often do you have six or more drinks on one occasion? Never        Utilities    In the past 12 months has the electric, gas, oil, or water company threatened to shut off services in your home? No        Health Literacy    How often do you need to have someone help you when you read instructions, pamphlets, or other written material from your doctor or pharmacy? Never                        Pt lives at home alone.Pt does have a straight cane pta. No home health. Pt does not have any discharge needs at this time.SDOH updated. No anticipated needs. SS will follow. Pt is admitted as an OBS patient.

## 2024-05-07 NOTE — CONSULTS
[facility] - [facility] Medical Telemetry  Cardiology  Consult Note    Patient Name: Trung Barboza  MRN: [id number]  Admission Date: 5/6/2024  Hospital Length of Stay: 0 days  Code Status: Full Code   Attending Provider: Ronnie Michel MD   Consulting Provider: JONAH Kim  Primary Care Physician: Swati Vicente NP  Principal Problem:ACS (acute coronary syndrome)    Patient information was obtained from patient, relative(s), past medical records, ER records, and primary team.     Inpatient consult to Cardiology  Consult performed by: Radha Islas FNP  Consult ordered by: Susan Shoemaker MD        Subjective:     Chief Complaint:  chest pain and sob     HPI:   83 y/o male with PMH of CABG (2022), HLD, HTN, CKD, and GERD who presented to [facility] ED with c/o three days of increasingly severe mid-chest pain that he describes as a sharp, intermittent pain accompanied by SOB and occasional nausea. The patient is well known to the Cardiology service here and sees Dr. Mahad Villaseñor for regular outpatient follow up. He had a CABG performed at [facility] in 2022 by Dr. Qureshi and says that he was not having any heart-related symptoms until a few days ago. He says his last visit with Dr. Villaseñor this past February was uneventful and that he has been able to continue to live independently and perform ADL without assistance these past couple of years. The patient says in addition to chest pain he has also noticed new bilateral lower extremity swelling over the past couple of days. He denied PND, orthopnea, palpitations or syncopal episodes. He denied new constitutional complaints including fevers, chills and unintended weight loss.      The ED bloodwork showed a troponin increase from 314-->360. BNP was elevated at 8121. Labs were otherwise at baseline for the patient. A chest xray showed some cardiomegaly and ground glass infiltrates/edema. The patient was given a dose of weight-based Lovenox and a  loading dose of Brilinta.     Cardiology consulted for hx CABG, elevated troponin with chest pain. Family does report recent death of their mother with increased stress at home.     Past Medical History:   Diagnosis Date    Aortic regurgitation     BPH with urinary obstruction     Chronic kidney disease, stage 3b     GR 44     creat 1.8    Coronary artery disease     Essential hypertension 07/10/2021    Gastroesophageal reflux disease 09/15/2022    Gout, arthritis     PVD (peripheral vascular disease) 02/01/2024    TIA (transient ischemic attack)        Past Surgical History:   Procedure Laterality Date    ANGIOGRAM, CORONARY, WITH LEFT HEART CATHETERIZATION N/A 12/31/2021    Procedure: ANGIOGRAM,CORONARY,WITH LEFT HEART CATHETERIZATION;  Surgeon: Mahad Villaseñor DO;  Location: [facility] CATH LAB;  Service: Cardiology;  Laterality: N/A;    BIOPSY WITH TRANSRECTAL ULTRASOUND (TRUS) GUIDANCE Bilateral 03/07/2018    CHOLECYSTECTOMY      CORONARY ARTERY BYPASS GRAFT  01/07/2022    [facility]-Dr. Qureshi    LEFT HEART CATHETERIZATION Left 7/13/2021    Procedure: Left heart cath;  Surgeon: Philip Torres MD;  Location: [facility] CATH LAB;  Service: Cardiology;  Laterality: Left;       Review of patient's allergies indicates:   Allergen Reactions    Lisinopril Swelling and Anaphylaxis       No current facility-administered medications on file prior to encounter.     Current Outpatient Medications on File Prior to Encounter   Medication Sig    amLODIPine (NORVASC) 5 MG tablet Take 1 tablet (5 mg total) by mouth once daily.    aspirin (ECOTRIN) 81 MG EC tablet Take 1 tablet (81 mg total) by mouth once daily.    atorvastatin (LIPITOR) 80 MG tablet Take 1 tablet by mouth in the evening    furosemide (LASIX) 20 MG tablet Take 1 tablet (20 mg total) by mouth once daily.    multivitamin-minerals-lutein (MULTIVITAMIN 50 PLUS) Tab Take 1 tablet by mouth once daily.    pantoprazole (PROTONIX) 40 MG tablet Take 1 tablet  (40 mg total) by mouth once daily.    potassium chloride (KLOR-CON) 10 MEQ TbSR Take 1 tablet (10 mEq total) by mouth once daily.     Family History       Problem Relation (Age of Onset)    Heart disease Mother    Hypertension Mother, Son    No Known Problems Father, Sister, Sister, Sister, Sister, Brother, Brother, Brother, Brother, Daughter, Son, Son, Son, Son          Tobacco Use    Smoking status: Former     Current packs/day: 0.00     Average packs/day: 0.5 packs/day for 15.0 years (7.5 ttl pk-yrs)     Types: Cigarettes     Start date: 1973     Quit date: 1988     Years since quittin.8     Passive exposure: Never    Smokeless tobacco: Former     Types: Chew    Tobacco comments:     Dips 1 or 2 times a month   Substance and Sexual Activity    Alcohol use: Not Currently     Comment: occasional wine    Drug use: Never    Sexual activity: Not Currently     Partners: Female     Review of Systems   Constitutional: Negative for chills, diaphoresis and fever.   HENT: Negative.     Eyes: Negative.    Cardiovascular:  Positive for chest pain and dyspnea on exertion. Negative for leg swelling, orthopnea, palpitations and syncope.   Respiratory:  Positive for shortness of breath and sleep disturbances due to breathing.    Gastrointestinal: Negative.    All other systems reviewed and are negative.    Objective:     Vital Signs (Most Recent):  Temp: 97.5 °F (36.4 °C) (24)  Pulse: 72 (24)  Resp: 19 (24)  BP: (!) 177/94 (told the nurse.) (24)  SpO2: 98 % (24) Vital Signs (24h Range):  Temp:  [97.4 °F (36.3 °C)-97.5 °F (36.4 °C)] 97.5 °F (36.4 °C)  Pulse:  [55-72] 72  Resp:  [15-25] 19  SpO2:  [95 %-100 %] 98 %  BP: (133-207)/() 177/94     Weight: 96.5 kg (212 lb 11.9 oz)  Body mass index is 28.85 kg/m².    SpO2: 98 %         Intake/Output Summary (Last 24 hours) at 2024 1028  Last data filed at 2024 0530  Gross per 24 hour   Intake --   Output  "2200 ml   Net -2200 ml       Lines/Drains/Airways       Peripheral Intravenous Line  Duration                  Peripheral IV - Single Lumen 05/06/24 1645 20 G Anterior;Right Forearm <1 day         Peripheral IV - Single Lumen 05/06/24 2030 20 G Right;Lateral Antecubital <1 day                     Physical Exam  Vitals reviewed.   Constitutional:       General: He is not in acute distress.  HENT:      Nose: Nose normal.      Mouth/Throat:      Mouth: Mucous membranes are moist.      Pharynx: Oropharynx is clear.   Eyes:      General: No scleral icterus.     Pupils: Pupils are equal, round, and reactive to light.   Neck:      Vascular: No JVD.   Cardiovascular:      Rate and Rhythm: Normal rate and regular rhythm.   Pulmonary:      Effort: Pulmonary effort is normal.      Breath sounds: Normal breath sounds.   Abdominal:      General: Bowel sounds are normal.      Palpations: Abdomen is soft.   Musculoskeletal:      Right lower leg: Edema (1+) present.      Left lower leg: Edema (1+) present.   Skin:     General: Skin is warm and dry.   Neurological:      Mental Status: He is alert and oriented to person, place, and time.          Significant Labs: ABG: No results for input(s): "PH", "PCO2", "HCO3", "POCSATURATED", "BE" in the last 48 hours., Blood Culture: No results for input(s): "LABBLOO" in the last 48 hours., BMP:   Recent Labs   Lab 05/06/24  1653 05/06/24  2038     --      --    K 4.8  --    *  --    CO2 23  --    BUN 25*  --    CREATININE 1.92*  --    CALCIUM 8.9  --    MG  --  2.2   , CMP   Recent Labs   Lab 05/06/24  1653      K 4.8   *   CO2 23      BUN 25*   CREATININE 1.92*   CALCIUM 8.9   PROT 7.1   ALBUMIN 3.5   BILITOT 1.2   ALKPHOS 235*   AST 29   ALT 28   ANIONGAP 17*   , CBC   Recent Labs   Lab 05/06/24  1653   WBC 4.34*   HGB 13.2*   HCT 41.2      , Lipid Panel No results for input(s): "CHOL", "HDL", "LDLCALC", "TRIG", "CHOLHDL" in the last 48 hours., " "and Troponin No results for input(s): "TROPONINI" in the last 48 hours.    Significant Imaging: Cardiac Cath: Results for orders placed during the hospital encounter of 12/30/21    Cardiac catheterization    Conclusion  · The left ventricular systolic function was normal.  · The left ventricular end diastolic pressure was normal.  · The pre-procedure left ventricular end diastolic pressure was 11.  · The ejection fraction was calculated to be 55%.  · The ejection fraction was greater than 55% by visual estimate.  · The Acute Mrg lesion was 80% stenosed.  · The Dist RCA lesion was 50% stenosed.  · The 1st Diag lesion was 75% stenosed.  · The Ost LAD to Prox LAD lesion was 80% stenosed.  · The 2nd Diag lesion was 80% stenosed.  · The Prox Cx to Mid Cx lesion was 80% stenosed.  · The 2nd Mrg lesion was 60% stenosed.  · The estimated blood loss was none.  · There was three vessel coronary artery disease.    The procedure log was documented by No documenter listed and verified by Mahad Villaseñor DO.    Date: 12/31/2021  Time: 12:22 PM\    Severe three vessel CAD, progression of disease in distal RCA, consider CABG versus multiple vessel PCI, normal LV , Echocardiogram: Transthoracic echo (TTE) complete (Cupid Only):   Results for orders placed or performed during the hospital encounter of 05/06/24   Echo   Result Value Ref Range    BSA 2.21 m2    LVOT stroke volume 53.58 cm3    LVIDd 4.00 3.5 - 6.0 cm    LV Systolic Volume 39.66 mL    LV Systolic Volume Index 18.1 mL/m2    LVIDs 3.16 2.1 - 4.0 cm    LV Diastolic Volume 69.91 mL    LV Diastolic Volume Index 31.92 mL/m2    IVS 1.72 (A) 0.6 - 1.1 cm    LVOT diameter 2.04 cm    LVOT area 3.3 cm2    FS 21 (A) 28 - 44 %    Left Ventricle Relative Wall Thickness 0.95 cm    Posterior Wall 1.89 (A) 0.6 - 1.1 cm    LV mass 314.02 g    LV Mass Index 143 g/m2    MV Peak E Kiran 0.86 m/s    TDI LATERAL 0.06 m/s    TDI SEPTAL 0.05 m/s    E/E' ratio 15.64 m/s    MV Peak A Kiran 0.41 m/s    " TR Max Kiran 3.44 m/s    E/A ratio 2.10     E wave deceleration time 210.56 msec    LV SEPTAL E/E' RATIO 17.20 m/s    LV LATERAL E/E' RATIO 14.33 m/s    LVOT peak kiran 0.79 m/s    Left Ventricular Outflow Tract Mean Velocity 0.58 cm/s    Left Ventricular Outflow Tract Mean Gradient 1.47 mmHg    RVDD 4.81 cm    TAPSE 1.57 cm    RV/LV Ratio 1.20 cm    LA size 3.81 cm    Left Atrium Major Axis 4.46 cm    RA Major Axis 4.86 cm    AV regurgitation pressure 1/2 time [id number] ms    AR Max Kiran 1.85 m/s    AV mean gradient 4 mmHg    AV peak gradient 7 mmHg    Ao peak kiran 1.29 m/s    Ao VTI 23.90 cm    LVOT peak VTI 16.40 cm    AV valve area 2.24 cm²    AV Velocity Ratio 0.61     AV index (prosthetic) 0.69     TIMOTHY by Velocity Ratio 2.00 cm²    Mr max kiran 4.28 m/s    MV mean gradient 1 mmHg    MV peak gradient 4 mmHg    MV stenosis pressure 1/2 time 66.86 ms    MV valve area p 1/2 method 3.29 cm2    MV valve area by continuity eq 1.96 cm2    MV VTI 27.3 cm    Triscuspid Valve Regurgitation Peak Gradient 47 mmHg    PV PEAK VELOCITY 0.58 m/s    PV peak gradient 1 mmHg    Ao root annulus 2.88 cm    IVC diameter 2.47 cm    Mean e' 0.06 m/s    ZLVIDS -2.79     ZLVIDD -6.15     AORTIC VALVE CUSP SEPERATION 1.87 cm    EF 40 %    TV resting pulmonary artery pressure 62 mmHg    RV TB RVSP 18 mmHg    Est. RA pres 15 mmHg   , EKG:   Results for orders placed or performed in visit on 02/12/24   EKG 12-lead    Collection Time: 02/12/24  9:50 AM   Result Value Ref Range    QRS Duration 122 ms    OHS QTC Calculation 481 ms    Narrative    Test Reason : I10,    Vent. Rate : 090 BPM     Atrial Rate : 090 BPM     P-R Int : 254 ms          QRS Dur : 122 ms      QT Int : 394 ms       P-R-T Axes : 050 078 046 degrees     QTc Int : 481 ms    Sinus rhythm with 1st degree A-V block  Anteroseptal infarct (cited on or before 27-SEP-2022)  Abnormal ECG  When compared with ECG of 06-JUN-2023 10:45,  Questionable change in initial forces of Septal  leads  Confirmed by Mahad Villaseñor DO ([id number]) on 2/12/2024 6:06:52 PM    Referred By:             Confirmed By:Mahad Villaseñor DO    , and X-Ray: CXR: X-Ray Chest 1 View (CXR): X-Ray Chest PA And Lateral  Order: [id number]  Status: Final result       Visible to patient: No (inaccessible in Patient Portal)       Next appt: 09/10/2024 at 08:40 AM in Family Medicine (Swati Vicente NP)    0 Result Notes  Details    Reading Physician Reading Date Result Priority   Gus Tristan   [phone] 5/6/2024 STAT     Narrative & Impression  EXAMINATION:  XR CHEST PA AND LATERAL     CLINICAL HISTORY:  Chest Pain;     COMPARISON:  Chest x-ray December 30, 2021     TECHNIQUE:  Frontal and lateral views of the chest.     FINDINGS:  Interval sternotomy.  Mild cardiomegaly.  Question subtle interstitial and ground-glass infiltrate/edema within the lungs.  Suspect trace bilateral pleural fluid.  No significant pneumothorax.  Osseous structures appear unchanged.     Impression:     As above.     Point of Service: [facility]        Electronically signed by:Gus Tristan  Date:                                            05/06/2024  Time:                                           16:17     Assessment and Plan:     Acute systolic heart failure  - Patient seen and evaluated by Dr. Wallace  - Final echo results pending  - Will need GDMT initiated prior to discharge  - Further recommendations following [facility]      NSTEMI (non-ST elevated myocardial infarction)  - Patient seen and evaluated by Dr. Wallace  - Troponin 314, 360, 327; Echo with preliminary EF 35-40% (down from 55% in 2021)  - EKG SR with frequent PVCs, no acute changes  - Plan for [facility] today. Procedure, risk (including increased risk of kidney injury with CKD), and benefits discussed in detail with patient and family. He verbalized understanding and wishes to proceed. Consent obtained and on chart.   - Further recommendations to follow        VTE Risk Mitigation  (From admission, onward)           Ordered     Place ALE hose  Until discontinued         05/06/24 9333                    Thank you for your consult. I will follow-up with patient. Please contact us if you have any additional questions.    JONAH Kim  Cardiology   [facility] - [address] [place] [address]

## 2024-05-07 NOTE — SUBJECTIVE & OBJECTIVE
Past Medical History:   Diagnosis Date    Aortic regurgitation     Benign localized prostatic hyperplasia with lower urinary tract symptoms (LUTS)     Chronic kidney disease, stage 3b     GR 44     creat 1.8    Coronary artery disease     Essential (primary) hypertension     GERD (gastroesophageal reflux disease)     Gout, arthritis     TIA (transient ischemic attack)        Past Surgical History:   Procedure Laterality Date    ANGIOGRAM, CORONARY, WITH LEFT HEART CATHETERIZATION N/A 12/31/2021    Procedure: ANGIOGRAM,CORONARY,WITH LEFT HEART CATHETERIZATION;  Surgeon: Mahad Villaseñor DO;  Location: [facility] CATH LAB;  Service: Cardiology;  Laterality: N/A;    BIOPSY WITH TRANSRECTAL ULTRASOUND (TRUS) GUIDANCE Bilateral 03/07/2018    CHOLECYSTECTOMY      CORONARY ARTERY BYPASS GRAFT  01/07/2022    [facility]-Dr. Qureshi    LEFT HEART CATHETERIZATION Left 7/13/2021    Procedure: Left heart cath;  Surgeon: Philip Torres MD;  Location: [facility] CATH LAB;  Service: Cardiology;  Laterality: Left;       Review of patient's allergies indicates:   Allergen Reactions    Lisinopril Swelling and Anaphylaxis       No current facility-administered medications on file prior to encounter.     Current Outpatient Medications on File Prior to Encounter   Medication Sig    amLODIPine (NORVASC) 5 MG tablet Take 1 tablet (5 mg total) by mouth once daily.    aspirin (ECOTRIN) 81 MG EC tablet Take 1 tablet (81 mg total) by mouth once daily.    atorvastatin (LIPITOR) 80 MG tablet Take 1 tablet by mouth in the evening    furosemide (LASIX) 20 MG tablet Take 1 tablet (20 mg total) by mouth once daily.    multivitamin-minerals-lutein (MULTIVITAMIN 50 PLUS) Tab Take 1 tablet by mouth once daily.    pantoprazole (PROTONIX) 40 MG tablet Take 1 tablet (40 mg total) by mouth once daily.    potassium chloride (KLOR-CON) 10 MEQ TbSR Take 1 tablet (10 mEq total) by mouth once daily.     Family History       Problem Relation (Age of  Onset)    Heart disease Mother    Hypertension Mother, Son    No Known Problems Father, Sister, Sister, Sister, Sister, Brother, Brother, Brother, Brother, Daughter, Son, Son, Son, Son          Tobacco Use    Smoking status: Former     Current packs/day: 0.00     Average packs/day: 0.5 packs/day for 15.0 years (7.5 ttl pk-yrs)     Types: Cigarettes     Start date: 1973     Quit date: 1988     Years since quittin.8     Passive exposure: Never    Smokeless tobacco: Former     Types: Chew    Tobacco comments:     Dips 1 or 2 times a month   Substance and Sexual Activity    Alcohol use: Not Currently     Comment: occasional wine    Drug use: Never    Sexual activity: Not Currently     Partners: Female     Review of Systems   Constitutional:  Negative for chills, fatigue, fever and unexpected weight change.   Respiratory:  Positive for shortness of breath.    Cardiovascular:  Positive for chest pain and leg swelling. Negative for palpitations.   Gastrointestinal:  Positive for nausea. Negative for abdominal pain, blood in stool and vomiting.   Genitourinary:  Negative for dysuria, frequency and hematuria.   Musculoskeletal:  Negative for arthralgias.   Neurological:  Negative for dizziness, syncope, weakness and light-headedness.   Psychiatric/Behavioral:  Negative for confusion.      Objective:     Vital Signs (Most Recent):  Temp: 97.4 °F (36.3 °C) (24 1502)  Pulse: 67 (24)  Resp: 19 (24)  BP: (!) 155/93 (24)  SpO2: 98 % (24) Vital Signs (24h Range):  Temp:  [97.4 °F (36.3 °C)] 97.4 °F (36.3 °C)  Pulse:  [55-72] 67  Resp:  [15-25] 19  SpO2:  [95 %-100 %] 98 %  BP: (153-207)/() 155/93     Weight: 99.8 kg (220 lb)  Body mass index is 29.84 kg/m².     Physical Exam  Constitutional:       Appearance: Normal appearance.   HENT:      Head: Normocephalic and atraumatic.      Mouth/Throat:      Mouth: Mucous membranes are moist.      Pharynx: Oropharynx is  clear.   Eyes:      Extraocular Movements: Extraocular movements intact.      Conjunctiva/sclera: Conjunctivae normal.      Pupils: Pupils are equal, round, and reactive to light.   Cardiovascular:      Rate and Rhythm: Normal rate and regular rhythm.      Heart sounds: Murmur heard.   Pulmonary:      Effort: Pulmonary effort is normal.      Breath sounds: Normal breath sounds.   Abdominal:      General: Abdomen is flat. Bowel sounds are normal.      Palpations: Abdomen is soft.   Musculoskeletal:      Right lower leg: Edema present.      Left lower leg: Edema present.   Skin:     General: Skin is warm.      Capillary Refill: Capillary refill takes less than 2 seconds.   Neurological:      General: No focal deficit present.      Mental Status: He is alert and oriented to person, place, and time.              CRANIAL NERVES     CN III, IV, VI   Pupils are equal, round, and reactive to light.       Significant Labs: All pertinent labs within the past 24 hours have been reviewed.    Significant Imaging: I have reviewed all pertinent imaging results/findings within the past 24 hours.

## 2024-05-07 NOTE — PHARMACY MED REC
"Admission Medication History     The home medication history was taken by Billie Hunter.    You may go to "Admission" then "Reconcile Home Medications" tabs to review and/or act upon these items.     The home medication list has been updated by the Pharmacy department.   Please read ALL comments highlighted in yellow.   Please address this information as you see fit.    Feel free to contact us if you have any questions or require assistance.  Patient stated he had taken his morning meds this morning.  Patient had two bottles of his Atorvastatin, Potassium, Pantoprazole, and Furosemide.    Medications listed below were obtained from: Patient/family, Medications brought from home, and [employer]  Current Facility-Administered Medications   Medication Dose Route Frequency Provider Last Rate Last Admin    acetaminophen tablet 1,000 mg  1,000 mg Oral Q6H PRN Ketty Bernal MD        [START ON 5/7/2024] amLODIPine tablet 5 mg  5 mg Oral Daily Ketty Bernal MD        [START ON 5/7/2024] aspirin EC tablet 81 mg  81 mg Oral Daily Ketty Bernal MD        atorvastatin tablet 80 mg  80 mg Oral QHS Ketty Bernal MD        atorvastatin tablet 80 mg  80 mg Oral QHS Ketty Bernal MD        bisacodyL EC tablet 10 mg  10 mg Oral Daily PRN Ketty Bernal MD        dextromethorphan-guaiFENesin  mg/5 ml liquid 10 mL  10 mL Oral Q6H PRN Ketty Bernal MD        enoxaparin injection 100 mg  1 mg/kg Subcutaneous Once Ketty Bernal MD        [START ON 5/7/2024] furosemide tablet 20 mg  20 mg Oral Daily Ketty Bernal MD        melatonin tablet 6 mg  6 mg Oral Nightly PRN Ketty Bernal MD        morphine injection 2 mg  2 mg Intravenous Q4H PRN Ketty Bernal MD        ondansetron injection 8 mg  8 mg Intravenous Q6H PRN Ketty Bernal MD        [START ON 5/7/2024] pantoprazole EC tablet 40 mg  40 mg Oral Daily Ketty Bernal MD        " simethicone chewable tablet 80 mg  1 tablet Oral TID PRN Ketty Bernal MD        traZODone tablet 50 mg  50 mg Oral Nightly PRN Ketty Bernal MD         Current Outpatient Medications   Medication Sig Dispense Refill    amLODIPine (NORVASC) 5 MG tablet Take 1 tablet (5 mg total) by mouth once daily. 90 tablet 1    aspirin (ECOTRIN) 81 MG EC tablet Take 1 tablet (81 mg total) by mouth once daily. 30 tablet 0    atorvastatin (LIPITOR) 80 MG tablet Take 1 tablet by mouth in the evening 90 tablet 0    furosemide (LASIX) 20 MG tablet Take 1 tablet (20 mg total) by mouth once daily. 90 tablet 1    multivitamin-minerals-lutein (MULTIVITAMIN 50 PLUS) Tab Take 1 tablet by mouth once daily.      pantoprazole (PROTONIX) 40 MG tablet Take 1 tablet (40 mg total) by mouth once daily. 90 tablet 1    potassium chloride (KLOR-CON) 10 MEQ TbSR Take 1 tablet (10 mEq total) by mouth once daily. 90 tablet 1         Current Outpatient Medications on File Prior to Encounter   Medication Sig Dispense Refill Last Dose    amLODIPine (NORVASC) 5 MG tablet Take 1 tablet (5 mg total) by mouth once daily. 90 tablet 1 5/6/2024    aspirin (ECOTRIN) 81 MG EC tablet Take 1 tablet (81 mg total) by mouth once daily. 30 tablet 0 5/6/2024    atorvastatin (LIPITOR) 80 MG tablet Take 1 tablet by mouth in the evening 90 tablet 0 5/5/2024    furosemide (LASIX) 20 MG tablet Take 1 tablet (20 mg total) by mouth once daily. 90 tablet 1 5/6/2024    multivitamin-minerals-lutein (MULTIVITAMIN 50 PLUS) Tab Take 1 tablet by mouth once daily.   5/6/2024    pantoprazole (PROTONIX) 40 MG tablet Take 1 tablet (40 mg total) by mouth once daily. 90 tablet 1 5/6/2024    potassium chloride (KLOR-CON) 10 MEQ TbSR Take 1 tablet (10 mEq total) by mouth once daily. 90 tablet 1 5/6/2024         Potential issues to be addressed PRIOR TO DISCHARGE  Please discuss with the patient barriers to adherence with medication treatment plans    [facility]  Specialist - Medication History  EXT. [id number]        .

## 2024-05-07 NOTE — H&P
[facility] Hospital Medicine  History & Physical    Patient Name: Turng Barboza  MRN: [id number]  Patient Class: OP- Observation  Admission Date: 5/6/2024  Attending Physician: Ronnie Michel MD   Primary Care Provider: Swati Vicente NP         Patient information was obtained from patient, past medical records, and ER records.     Subjective:     Principal Problem:ACS (acute coronary syndrome)    Chief Complaint:   Chief Complaint   Patient presents with    Shortness of Breath    Chest Pain        HPI: This patient is a very pleasant 83yo male who presented to [facility] ED with c/o three days of increasingly severe mid-chest pain that he describes as a sharp, intermittent pain accompanied by SOB and occasional nausea. The patient is well known to the Cardiology service here and sees Dr. Mahad Villaseñor for regular outpatient follow up. He had a CABG performed at [facility] in 2022 by Dr. Qureshi and says that he was not having any heart-related symptoms until a few days ago. He says his last visit with Dr. Villaseñor this past February was uneventful and that he has been able to continue to live independently and perform ADL without assistance these past couple of years. The patient says in addition to chest pain he has also noticed new bilateral lower extremity swelling over the past couple of days. He denied PND, orthopnea, palpitations or syncopal episodes. He denied new constitutional complaints including fevers, chills and unintended weight loss.     In addition to a history of CABG, the patient also has a pmh of CAD s/p stents, AR, BPH, HLD, HTN, CKD, gout and GERD. He sees NP Swati Vicente as his PCP. He has a hx of tobacco use but says he currently does not use any nicotine containing products.    The ED bloodwork showed a troponin increase from 314-->360. BNP was elevated at 8121. Labs were otherwise at baseline for the patient. A chest xray showed some cardiomegaly  and ground glass infiltrates/edema. The patient was given a dose of weight-based Lovenox and a loading dose of Brilinta.     Past Medical History:   Diagnosis Date    Aortic regurgitation     Benign localized prostatic hyperplasia with lower urinary tract symptoms (LUTS)     Chronic kidney disease, stage 3b     GR 44     creat 1.8    Coronary artery disease     Essential (primary) hypertension     GERD (gastroesophageal reflux disease)     Gout, arthritis     TIA (transient ischemic attack)        Past Surgical History:   Procedure Laterality Date    ANGIOGRAM, CORONARY, WITH LEFT HEART CATHETERIZATION N/A 12/31/2021    Procedure: ANGIOGRAM,CORONARY,WITH LEFT HEART CATHETERIZATION;  Surgeon: Mahad Villaseñor DO;  Location: [facility] CATH LAB;  Service: Cardiology;  Laterality: N/A;    BIOPSY WITH TRANSRECTAL ULTRASOUND (TRUS) GUIDANCE Bilateral 03/07/2018    CHOLECYSTECTOMY      CORONARY ARTERY BYPASS GRAFT  01/07/2022    [facility]-Dr. Qureshi    LEFT HEART CATHETERIZATION Left 7/13/2021    Procedure: Left heart cath;  Surgeon: Philip Torres MD;  Location: [facility] CATH LAB;  Service: Cardiology;  Laterality: Left;       Review of patient's allergies indicates:   Allergen Reactions    Lisinopril Swelling and Anaphylaxis       No current facility-administered medications on file prior to encounter.     Current Outpatient Medications on File Prior to Encounter   Medication Sig    amLODIPine (NORVASC) 5 MG tablet Take 1 tablet (5 mg total) by mouth once daily.    aspirin (ECOTRIN) 81 MG EC tablet Take 1 tablet (81 mg total) by mouth once daily.    atorvastatin (LIPITOR) 80 MG tablet Take 1 tablet by mouth in the evening    furosemide (LASIX) 20 MG tablet Take 1 tablet (20 mg total) by mouth once daily.    multivitamin-minerals-lutein (MULTIVITAMIN 50 PLUS) Tab Take 1 tablet by mouth once daily.    pantoprazole (PROTONIX) 40 MG tablet Take 1 tablet (40 mg total) by mouth once daily.    potassium chloride  (KLOR-CON) 10 MEQ TbSR Take 1 tablet (10 mEq total) by mouth once daily.     Family History       Problem Relation (Age of Onset)    Heart disease Mother    Hypertension Mother, Son    No Known Problems Father, Sister, Sister, Sister, Sister, Brother, Brother, Brother, Brother, Daughter, Son, Son, Son, Son          Tobacco Use    Smoking status: Former     Current packs/day: 0.00     Average packs/day: 0.5 packs/day for 15.0 years (7.5 ttl pk-yrs)     Types: Cigarettes     Start date: 1973     Quit date: 1988     Years since quittin.8     Passive exposure: Never    Smokeless tobacco: Former     Types: Chew    Tobacco comments:     Dips 1 or 2 times a month   Substance and Sexual Activity    Alcohol use: Not Currently     Comment: occasional wine    Drug use: Never    Sexual activity: Not Currently     Partners: Female     Review of Systems   Constitutional:  Negative for chills, fatigue, fever and unexpected weight change.   Respiratory:  Positive for shortness of breath.    Cardiovascular:  Positive for chest pain and leg swelling. Negative for palpitations.   Gastrointestinal:  Positive for nausea. Negative for abdominal pain, blood in stool and vomiting.   Genitourinary:  Negative for dysuria, frequency and hematuria.   Musculoskeletal:  Negative for arthralgias.   Neurological:  Negative for dizziness, syncope, weakness and light-headedness.   Psychiatric/Behavioral:  Negative for confusion.      Objective:     Vital Signs (Most Recent):  Temp: 97.4 °F (36.3 °C) (24 1502)  Pulse: 67 (24)  Resp: 19 (24)  BP: (!) 155/93 (24)  SpO2: 98 % (24) Vital Signs (24h Range):  Temp:  [97.4 °F (36.3 °C)] 97.4 °F (36.3 °C)  Pulse:  [55-72] 67  Resp:  [15-25] 19  SpO2:  [95 %-100 %] 98 %  BP: (153-207)/() 155/93     Weight: 99.8 kg (220 lb)  Body mass index is 29.84 kg/m².     Physical Exam  Constitutional:       Appearance: Normal appearance.   HENT:       Head: Normocephalic and atraumatic.      Mouth/Throat:      Mouth: Mucous membranes are moist.      Pharynx: Oropharynx is clear.   Eyes:      Extraocular Movements: Extraocular movements intact.      Conjunctiva/sclera: Conjunctivae normal.      Pupils: Pupils are equal, round, and reactive to light.   Cardiovascular:      Rate and Rhythm: Normal rate and regular rhythm.      Heart sounds: Murmur heard.   Pulmonary:      Effort: Pulmonary effort is normal.      Breath sounds: Normal breath sounds.   Abdominal:      General: Abdomen is flat. Bowel sounds are normal.      Palpations: Abdomen is soft.   Musculoskeletal:      Right lower leg: Edema present.      Left lower leg: Edema present.   Skin:     General: Skin is warm.      Capillary Refill: Capillary refill takes less than 2 seconds.   Neurological:      General: No focal deficit present.      Mental Status: He is alert and oriented to person, place, and time.              CRANIAL NERVES     CN III, IV, VI   Pupils are equal, round, and reactive to light.       Significant Labs: All pertinent labs within the past 24 hours have been reviewed.    Significant Imaging: I have reviewed all pertinent imaging results/findings within the past 24 hours.  Assessment/Plan:     * ACS (acute coronary syndrome)  x1 dose weight based lovenox given  Loading dose of 180mg brilinta given  Continue home aspirin and statin  Do not start beta blocker until new onset CHF has been ruled out by ECHO  NPO at midnight  Cardiology consulted, thanks for assistance  Cardiac monitoring   PRN morphine and oxygen  ANNITA score 4  Heart score 6      Essential hypertension  Chronic, controlled. Latest blood pressure and vitals reviewed-     Temp:  [97.4 °F (36.3 °C)-97.5 °F (36.4 °C)]   Pulse:  [55-72]   Resp:  [15-25]   BP: (133-207)/()   SpO2:  [95 %-100 %] .   Home meds for hypertension were reviewed and noted below.   Hypertension Medications               amLODIPine (NORVASC) 5 MG  tablet Take 1 tablet (5 mg total) by mouth once daily.    furosemide (LASIX) 20 MG tablet Take 1 tablet (20 mg total) by mouth once daily.            While in the hospital, will manage blood pressure as follows; Continue home antihypertensive regimen    Will utilize p.r.n. blood pressure medication only if patient's blood pressure greater than 180/110 and he develops symptoms such as worsening chest pain or shortness of breath.    Elevated brain natriuretic peptide (BNP) level  Patient has hx AR  ECHO ordered   X1 dose IV lasix given in ED      CKD (chronic kidney disease)  Creatine stable for now. BMP reviewed- noted Estimated Creatinine Clearance: 34.5 mL/min (A) (based on SCr of 1.92 mg/dL (H)). according to latest data. Based on current GFR, CKD stage is stage 3 - GFR 30-59.  Monitor UOP and serial BMP and adjust therapy as needed. Renally dose meds. Avoid nephrotoxic medications and procedures.    HLD (hyperlipidemia)  Continue home statin      Gastroesophageal reflux disease  Continue home protonix        VTE Risk Mitigation (From admission, onward)           Ordered     Place ALE hose  Until discontinued         05/06/24 4698                           On 05/07/2024, patient should be placed in hospital observation services under my care in collaboration with Dr. Bernal.         Susan Shoemaker MD  Department of Hospital Medicine  [facility]

## 2024-05-08 VITALS
HEIGHT: 72 IN | SYSTOLIC BLOOD PRESSURE: 158 MMHG | DIASTOLIC BLOOD PRESSURE: 79 MMHG | OXYGEN SATURATION: 99 % | BODY MASS INDEX: 28.82 KG/M2 | HEART RATE: 58 BPM | WEIGHT: 212.75 LBS | TEMPERATURE: 97 F | RESPIRATION RATE: 20 BRPM

## 2024-05-08 LAB
ALBUMIN SERPL BCP-MCNC: 3.4 G/DL (ref 3.5–5)
ALBUMIN/GLOB SERPL: 1 {RATIO}
ALP SERPL-CCNC: 188 U/L (ref 45–115)
ALT SERPL W P-5'-P-CCNC: 21 U/L (ref 16–61)
ANION GAP SERPL CALCULATED.3IONS-SCNC: 11 MMOL/L (ref 7–16)
AST SERPL W P-5'-P-CCNC: 28 U/L (ref 15–37)
BASOPHILS # BLD AUTO: 0.01 K/UL (ref 0–0.2)
BASOPHILS NFR BLD AUTO: 0.3 % (ref 0–1)
BILIRUB SERPL-MCNC: 1.7 MG/DL (ref ?–1.2)
BUN SERPL-MCNC: 23 MG/DL (ref 7–18)
BUN/CREAT SERPL: 12 (ref 6–20)
CALCIUM SERPL-MCNC: 9.1 MG/DL (ref 8.5–10.1)
CATH EF QUANTITATIVE: 55 %
CHLORIDE SERPL-SCNC: 107 MMOL/L (ref 98–107)
CO2 SERPL-SCNC: 23 MMOL/L (ref 21–32)
CREAT SERPL-MCNC: 1.88 MG/DL (ref 0.7–1.3)
DIFFERENTIAL METHOD BLD: ABNORMAL
EGFR (NO RACE VARIABLE) (RUSH/TITUS): 35 ML/MIN/1.73M2
EOSINOPHIL # BLD AUTO: 0.07 K/UL (ref 0–0.5)
EOSINOPHIL NFR BLD AUTO: 1.8 % (ref 1–4)
ERYTHROCYTE [DISTWIDTH] IN BLOOD BY AUTOMATED COUNT: 14.8 % (ref 11.5–14.5)
GLOBULIN SER-MCNC: 3.3 G/DL (ref 2–4)
GLUCOSE SERPL-MCNC: 100 MG/DL (ref 74–106)
HCT VFR BLD AUTO: 36.9 % (ref 40–54)
HGB BLD-MCNC: 12.1 G/DL (ref 13.5–18)
IMM GRANULOCYTES # BLD AUTO: 0.01 K/UL (ref 0–0.04)
IMM GRANULOCYTES NFR BLD: 0.3 % (ref 0–0.4)
LYMPHOCYTES # BLD AUTO: 1.12 K/UL (ref 1–4.8)
LYMPHOCYTES NFR BLD AUTO: 29.5 % (ref 27–41)
MCH RBC QN AUTO: 31.8 PG (ref 27–31)
MCHC RBC AUTO-ENTMCNC: 32.8 G/DL (ref 32–36)
MCV RBC AUTO: 97.1 FL (ref 80–96)
MONOCYTES # BLD AUTO: 0.52 K/UL (ref 0–0.8)
MONOCYTES NFR BLD AUTO: 13.7 % (ref 2–6)
MPC BLD CALC-MCNC: 10 FL (ref 9.4–12.4)
NEUTROPHILS # BLD AUTO: 2.07 K/UL (ref 1.8–7.7)
NEUTROPHILS NFR BLD AUTO: 54.4 % (ref 53–65)
NRBC # BLD AUTO: 0 X10E3/UL
NRBC, AUTO (.00): 0 %
PLATELET # BLD AUTO: 158 K/UL (ref 150–400)
POTASSIUM SERPL-SCNC: 4.3 MMOL/L (ref 3.5–5.1)
PROT SERPL-MCNC: 6.7 G/DL (ref 6.4–8.2)
RBC # BLD AUTO: 3.8 M/UL (ref 4.6–6.2)
SODIUM SERPL-SCNC: 137 MMOL/L (ref 136–145)
URATE SERPL-MCNC: 8.5 MG/DL (ref 3.5–7.2)
WBC # BLD AUTO: 3.8 K/UL (ref 4.5–11)

## 2024-05-08 PROCEDURE — 99239 HOSP IP/OBS DSCHRG MGMT >30: CPT | Mod: ,,, | Performed by: HOSPITALIST

## 2024-05-08 PROCEDURE — 84550 ASSAY OF BLOOD/URIC ACID: CPT | Performed by: HOSPITALIST

## 2024-05-08 PROCEDURE — 99233 SBSQ HOSP IP/OBS HIGH 50: CPT | Mod: ,,, | Performed by: NURSE PRACTITIONER

## 2024-05-08 PROCEDURE — 85025 COMPLETE CBC W/AUTO DIFF WBC: CPT | Performed by: INTERNAL MEDICINE

## 2024-05-08 PROCEDURE — 25000003 PHARM REV CODE 250: Performed by: STUDENT IN AN ORGANIZED HEALTH CARE EDUCATION/TRAINING PROGRAM

## 2024-05-08 PROCEDURE — 25000003 PHARM REV CODE 250: Performed by: HOSPITALIST

## 2024-05-08 PROCEDURE — 36415 COLL VENOUS BLD VENIPUNCTURE: CPT | Performed by: INTERNAL MEDICINE

## 2024-05-08 PROCEDURE — 63600175 PHARM REV CODE 636 W HCPCS: Performed by: HOSPITALIST

## 2024-05-08 PROCEDURE — 80053 COMPREHEN METABOLIC PANEL: CPT | Performed by: INTERNAL MEDICINE

## 2024-05-08 PROCEDURE — 63600175 PHARM REV CODE 636 W HCPCS: Performed by: NURSE PRACTITIONER

## 2024-05-08 RX ORDER — FUROSEMIDE 10 MG/ML
40 INJECTION INTRAMUSCULAR; INTRAVENOUS ONCE
Status: COMPLETED | OUTPATIENT
Start: 2024-05-08 | End: 2024-05-08

## 2024-05-08 RX ORDER — METOPROLOL SUCCINATE 25 MG/1
25 TABLET, EXTENDED RELEASE ORAL DAILY
Status: DISCONTINUED | OUTPATIENT
Start: 2024-05-08 | End: 2024-05-08 | Stop reason: HOSPADM

## 2024-05-08 RX ORDER — METOPROLOL SUCCINATE 25 MG/1
25 TABLET, EXTENDED RELEASE ORAL DAILY
Qty: 30 TABLET | Refills: 1 | Status: ON HOLD | OUTPATIENT
Start: 2024-05-08 | End: 2024-06-05 | Stop reason: HOSPADM

## 2024-05-08 RX ORDER — BISACODYL 5 MG
10 TABLET, DELAYED RELEASE (ENTERIC COATED) ORAL 2 TIMES DAILY
Status: DISCONTINUED | OUTPATIENT
Start: 2024-05-08 | End: 2024-05-08 | Stop reason: HOSPADM

## 2024-05-08 RX ORDER — ISOSORBIDE DINITRATE AND HYDRALAZINE HYDROCHLORIDE 37.5; 2 MG/1; MG/1
1 TABLET ORAL 3 TIMES DAILY
Qty: 90 TABLET | Refills: 1 | Status: ON HOLD | OUTPATIENT
Start: 2024-05-08 | End: 2024-06-05

## 2024-05-08 RX ORDER — PREDNISONE 20 MG/1
20 TABLET ORAL DAILY
Status: DISCONTINUED | OUTPATIENT
Start: 2024-05-08 | End: 2024-05-08 | Stop reason: HOSPADM

## 2024-05-08 RX ORDER — ADHESIVE BANDAGE
30 BANDAGE TOPICAL DAILY
Status: DISCONTINUED | OUTPATIENT
Start: 2024-05-08 | End: 2024-05-08 | Stop reason: HOSPADM

## 2024-05-08 RX ADMIN — FUROSEMIDE 40 MG: 10 INJECTION, SOLUTION INTRAMUSCULAR; INTRAVENOUS at 10:05

## 2024-05-08 RX ADMIN — ASPIRIN 81 MG: 81 TABLET, COATED ORAL at 10:05

## 2024-05-08 RX ADMIN — AMLODIPINE BESYLATE 5 MG: 5 TABLET ORAL at 10:05

## 2024-05-08 RX ADMIN — TICAGRELOR 90 MG: 90 TABLET ORAL at 10:05

## 2024-05-08 RX ADMIN — PREDNISONE 20 MG: 20 TABLET ORAL at 10:05

## 2024-05-08 RX ADMIN — BISACODYL 10 MG: 5 TABLET, COATED ORAL at 10:05

## 2024-05-08 RX ADMIN — PANTOPRAZOLE SODIUM 40 MG: 40 TABLET, DELAYED RELEASE ORAL at 10:05

## 2024-05-08 RX ADMIN — MAGNESIUM HYDROXIDE 2400 MG: 400 SUSPENSION ORAL at 10:05

## 2024-05-08 NOTE — ASSESSMENT & PLAN NOTE
x1 dose weight based lovenox given  Loading dose of 180mg brilinta given  Continue home aspirin and statin  Do not start beta blocker until new onset CHF has been ruled out by ECHO  NPO at midnight  Cardiology consulted, thanks for assistance  Cardiac monitoring   PRN morphine and oxygen  ANNITA score 4  Heart score 6    5/7:  Plan for left heart catheterization today by Cardiology.  Patient with management

## 2024-05-08 NOTE — PROGRESS NOTES
[facility] Hospital Medicine  Progress Note    Patient Name: Trung Barboza  MRN: [id number]  Patient Class: IP- Inpatient   Admission Date: 5/6/2024  Length of Stay: 0 days  Attending Physician: Ronnie Michel MD  Primary Care Provider: Swati Vicente NP        Subjective:     Principal Problem:ACS (acute coronary syndrome)    HPI:  This patient is a very pleasant 85yo male who presented to [facility] ED with c/o three days of increasingly severe mid-chest pain that he describes as a sharp, intermittent pain accompanied by SOB and occasional nausea. The patient is well known to the Cardiology service here and sees Dr. Mahad Villaseñor for regular outpatient follow up. He had a CABG performed at [facility] in 2022 by Dr. Qureshi and says that he was not having any heart-related symptoms until a few days ago. He says his last visit with Dr. Villaseñor this past February was uneventful and that he has been able to continue to live independently and perform ADL without assistance these past couple of years. The patient says in addition to chest pain he has also noticed new bilateral lower extremity swelling over the past couple of days. He denied PND, orthopnea, palpitations or syncopal episodes. He denied new constitutional complaints including fevers, chills and unintended weight loss.     In addition to a history of CABG, the patient also has a pmh of CAD s/p stents, AR, BPH, HLD, HTN, CKD, gout and GERD. He sees NP Swati Vicente as his PCP. He has a hx of tobacco use but says he currently does not use any nicotine containing products.    The ED bloodwork showed a troponin increase from 314-->360. BNP was elevated at 8121. Labs were otherwise at baseline for the patient. A chest xray showed some cardiomegaly and ground glass infiltrates/edema. The patient was given a dose of weight-based Lovenox and a loading dose of Brilinta.     Overview/Hospital Course:  No notes on  file    Interval History:   Review of Systems   Constitutional:  Negative for chills, fatigue, fever and unexpected weight change.   Respiratory:  Positive for shortness of breath.    Cardiovascular:  Positive for chest pain and leg swelling. Negative for palpitations.   Gastrointestinal:  Positive for nausea. Negative for abdominal pain, blood in stool and vomiting.   Genitourinary:  Negative for dysuria, frequency and hematuria.   Musculoskeletal:  Negative for arthralgias.   Neurological:  Negative for dizziness, syncope, weakness and light-headedness.   Psychiatric/Behavioral:  Negative for confusion.      Objective:     Vital Signs (Most Recent):  Temp: 97.6 °F (36.4 °C) (05/07/24 1624)  Pulse: (!) 58 (05/07/24 1737)  Resp: 18 (05/07/24 1624)  BP: (!) 161/98 (05/07/24 1737)  SpO2: 99 % (05/07/24 1952) Vital Signs (24h Range):  Temp:  [97.4 °F (36.3 °C)-98 °F (36.7 °C)] 97.6 °F (36.4 °C)  Pulse:  [58-72] 58  Resp:  [16-19] 18  SpO2:  [96 %-100 %] 99 %  BP: (150-177)/(81-98) 161/98     Weight: 96.5 kg (212 lb 11.9 oz)  Body mass index is 28.85 kg/m².    Intake/Output Summary (Last 24 hours) at 5/7/2024 2335  Last data filed at 5/7/2024 1657  Gross per 24 hour   Intake --   Output 900 ml   Net -900 ml         Physical Exam  Constitutional:       Appearance: Normal appearance.   HENT:      Head: Normocephalic and atraumatic.      Mouth/Throat:      Mouth: Mucous membranes are moist.      Pharynx: Oropharynx is clear.   Eyes:      Extraocular Movements: Extraocular movements intact.      Conjunctiva/sclera: Conjunctivae normal.      Pupils: Pupils are equal, round, and reactive to light.   Cardiovascular:      Rate and Rhythm: Normal rate and regular rhythm.      Heart sounds: Murmur heard.   Pulmonary:      Effort: Pulmonary effort is normal.      Breath sounds: Normal breath sounds.   Abdominal:      General: Abdomen is flat. Bowel sounds are normal.      Palpations: Abdomen is soft.   Musculoskeletal:      Right  lower leg: Edema present.      Left lower leg: Edema present.   Skin:     General: Skin is warm.      Capillary Refill: Capillary refill takes less than 2 seconds.   Neurological:      General: No focal deficit present.      Mental Status: He is alert and oriented to person, place, and time.             Significant Labs: All pertinent labs within the past 24 hours have been reviewed.    Significant Imaging: I have reviewed all pertinent imaging results/findings within the past 24 hours.    Assessment/Plan:      * ACS (acute coronary syndrome)  x1 dose weight based lovenox given  Loading dose of 180mg brilinta given  Continue home aspirin and statin  Do not start beta blocker until new onset CHF has been ruled out by ECHO  NPO at midnight  Cardiology consulted, thanks for assistance  Cardiac monitoring   PRN morphine and oxygen  ANNITA score 4  Heart score 6    5/7:  Plan for left heart catheterization today by Cardiology.  Patient with management      CKD (chronic kidney disease)  Creatine stable for now. BMP reviewed- noted Estimated Creatinine Clearance: 34.5 mL/min (A) (based on SCr of 1.92 mg/dL (H)). according to latest data. Based on current GFR, CKD stage is stage 3 - GFR 30-59.  Monitor UOP and serial BMP and adjust therapy as needed. Renally dose meds. Avoid nephrotoxic medications and procedures.    HLD (hyperlipidemia)  Continue home statin      Elevated brain natriuretic peptide (BNP) level  Patient has hx AR  ECHO ordered   X1 dose IV lasix given in ED      Gastroesophageal reflux disease  Continue home protonix      Essential hypertension  Chronic, controlled. Latest blood pressure and vitals reviewed-     Temp:  [97.4 °F (36.3 °C)-97.5 °F (36.4 °C)]   Pulse:  [55-72]   Resp:  [15-25]   BP: (133-207)/()   SpO2:  [95 %-100 %] .   Home meds for hypertension were reviewed and noted below.   Hypertension Medications               amLODIPine (NORVASC) 5 MG tablet Take 1 tablet (5 mg total) by mouth once  daily.    furosemide (LASIX) 20 MG tablet Take 1 tablet (20 mg total) by mouth once daily.            While in the hospital, will manage blood pressure as follows; Continue home antihypertensive regimen    Will utilize p.r.n. blood pressure medication only if patient's blood pressure greater than 180/110 and he develops symptoms such as worsening chest pain or shortness of breath.      VTE Risk Mitigation (From admission, onward)           Ordered     Place ALE hose  Until discontinued         05/06/24 1756                    Discharge Planning   SYED:      Code Status: Full Code   Is the patient medically ready for discharge?:     Reason for patient still in hospital (select all that apply): Treatment  Discharge Plan A: Home                  Ronnie Michel MD  Department of Hospital Medicine   [facility]

## 2024-05-08 NOTE — ASSESSMENT & PLAN NOTE
- Patient seen and evaluated by Dr. Wallace  - Final echo results pending  - Will need GDMT initiated prior to discharge  - Further recommendations following [facility]    5/8/2024:  - Ischemic cardiomyopathy  - Echo with EF 35-40%; see full report above  - Start BIDIL TID for afterload reduction (holding off on ACE/ARB, MRA, and SGLT2 for the time being due to CKD, creatinine 1.88, and still needs staged PCI)  - Improved after IV diuresis, continue home lasix 20mg daily  - Follow up with cardiology in 2 weeks, will up titrate and add GDMT at follow up once staged PCI complete.

## 2024-05-08 NOTE — ASSESSMENT & PLAN NOTE
- Patient seen and evaluated by Dr. Wallace  - Troponin 314, 360, 327; Echo with preliminary EF 35-40% (down from 55% in 2021)  - EKG SR with frequent PVCs, no acute changes  - Plan for [facility] today. Procedure, risk (including increased risk of kidney injury with CKD), and benefits discussed in detail with patient and family. He verbalized understanding and wishes to proceed. Consent obtained and on chart.   - Further recommendations to follow    5/8/2024:  - S/P LHC with PCI of ost-prox LAD; Failed SVG to LAD, RCA; Patent SVG to OM1. Will need staged PCI of D1 as outpatient.  - Echo with EF 35-40%  - Start BIDIL TID for afterload reduction (holding off on ACE/ARB, MRA, and SGLT2 for the time being due to CKD, creatinine 1.88, and still needs staged PCI)  - Lasix IV 40 given this morning (dyspnea significantly improved after IV diuresis)  - Continue ASA, Brilinta, statin, Toprol XL at discharge  - Cardiac rehab ordered  - Okay to discharge from cardiology standpoint  - Follow up in cardiology clinic in 2 weeks

## 2024-05-08 NOTE — SUBJECTIVE & OBJECTIVE
Interval History: Patient seen today, s/p [facility] yesterday with PCI of ostial-prox LAD; Failed SVG to LAD, RCA; Patent SVG to OM1. Will need staged PCI of D1 as outpatient. Denies chest pain or sob. Right groin stable, no bleeding or hematoma.    Review of Systems   Constitutional: Negative for chills and fever.   Cardiovascular:  Negative for chest pain and dyspnea on exertion.   Respiratory:  Negative for shortness of breath.      Objective:     Vital Signs (Most Recent):  Temp: 97.4 °F (36.3 °C) (05/08/24 1216)  Pulse: (!) 58 (05/08/24 1216)  Resp: 20 (05/08/24 1216)  BP: (!) 158/79 (05/08/24 1216)  SpO2: 99 % (05/08/24 1216) Vital Signs (24h Range):  Temp:  [97.3 °F (36.3 °C)-98 °F (36.7 °C)] 97.4 °F (36.3 °C)  Pulse:  [58-83] 58  Resp:  [16-20] 20  SpO2:  [97 %-100 %] 99 %  BP: (143-194)/() 158/79     Weight: 96.5 kg (212 lb 11.9 oz)  Body mass index is 28.85 kg/m².     SpO2: 99 %         Intake/Output Summary (Last 24 hours) at 5/8/2024 1350  Last data filed at 5/8/2024 0426  Gross per 24 hour   Intake --   Output 202 ml   Net -202 ml       Lines/Drains/Airways       Peripheral Intravenous Line  Duration                  Peripheral IV - Single Lumen 05/06/24 1645 20 G Anterior;Right Forearm 1 day         Peripheral IV - Single Lumen 05/06/24 2030 20 G Right;Lateral Antecubital 1 day                       Physical Exam  Vitals reviewed.   Constitutional:       General: He is not in acute distress.  Eyes:      Pupils: Pupils are equal, round, and reactive to light.   Neck:      Vascular: No JVD.   Cardiovascular:      Rate and Rhythm: Normal rate and regular rhythm.   Pulmonary:      Effort: Pulmonary effort is normal.      Breath sounds: Normal breath sounds.   Abdominal:      General: Bowel sounds are normal.      Palpations: Abdomen is soft.   Musculoskeletal:      Right lower leg: Edema (1+) present.      Left lower leg: Edema (1+) present.   Skin:     General: Skin is warm and dry.   Neurological:       "Mental Status: He is alert and oriented to person, place, and time.            Significant Labs: ABG: No results for input(s): "PH", "PCO2", "HCO3", "POCSATURATED", "BE" in the last 48 hours., Blood Culture: No results for input(s): "LABBLOO" in the last 48 hours., BMP:   Recent Labs   Lab 05/06/24 1653 05/06/24 2038 05/08/24  0414     --  100     --  137   K 4.8  --  4.3   *  --  107   CO2 23  --  23   BUN 25*  --  23*   CREATININE 1.92*  --  1.88*   CALCIUM 8.9  --  9.1   MG  --  2.2  --    , CMP   Recent Labs   Lab 05/06/24 1653 05/08/24 0414    137   K 4.8 4.3   * 107   CO2 23 23    100   BUN 25* 23*   CREATININE 1.92* 1.88*   CALCIUM 8.9 9.1   PROT 7.1 6.7   ALBUMIN 3.5 3.4*   BILITOT 1.2 1.7*   ALKPHOS 235* 188*   AST 29 28   ALT 28 21   ANIONGAP 17* 11   , CBC   Recent Labs   Lab 05/06/24 1653 05/08/24 0414   WBC 4.34* 3.80*   HGB 13.2* 12.1*   HCT 41.2 36.9*    158   , Lipid Panel No results for input(s): "CHOL", "HDL", "LDLCALC", "TRIG", "CHOLHDL" in the last 48 hours., and Troponin No results for input(s): "TROPONINI" in the last 48 hours.    Significant Imaging: Cardiac Cath: Results for orders placed during the hospital encounter of 05/06/24    Cardiac catheterization    Conclusion    The Prox Cx to Mid Cx lesion was 80% stenosed.    The Dist RCA lesion was 50% stenosed.    The 1st Diag lesion was 75% stenosed.    The 2nd Diag lesion was 80% stenosed.    The 2nd Mrg lesion was 60% stenosed.    The Acute Mrg lesion was 80% stenosed.    The Prox RCA lesion was 60% stenosed.    The Ost LAD to Prox LAD lesion was 80% stenosed with 0% stenosis post-intervention.    The ejection fraction was calculated to be 55%.    The left ventricular systolic function was normal.    The left ventricular end diastolic pressure was normal.    The pre-procedure left ventricular end diastolic pressure was 6.    Ost LAD to Prox LAD lesion: A stent was successfully placed.    The " estimated blood loss was none.    There was three vessel coronary artery disease.    The procedure log was documented by Documenter: Neisha Mayfield RN and verified by Mahad Villaseñor DO.    Date: 5/7/2024  Time: 5:38 PM    Normal LV function with est EF 55%  Failed SVT to LAD, RCA  Patent SVT to OM1  Successful PCI with KEHINDE LAD    Is a candidate for staged PCI of D1.      and Echocardiogram: Transthoracic echo (TTE) complete (Cupid Only):   Results for orders placed or performed during the hospital encounter of 05/06/24   Echo   Result Value Ref Range    BSA 2.21 m2    LVOT stroke volume 53.58 cm3    LVIDd 4.00 3.5 - 6.0 cm    LV Systolic Volume 39.66 mL    LV Systolic Volume Index 18.1 mL/m2    LVIDs 3.16 2.1 - 4.0 cm    LV Diastolic Volume 69.91 mL    LV Diastolic Volume Index 31.92 mL/m2    IVS 1.3 (A) 0.6 - 1.1 cm    LVOT diameter 2.04 cm    LVOT area 3.3 cm2    FS 21 (A) 28 - 44 %    Left Ventricle Relative Wall Thickness 0.75 cm    Posterior Wall 1.5 (A) 0.6 - 1.1 cm    LV mass 208.96 g    LV Mass Index 95 g/m2    MV Peak E Kiran 0.86 m/s    TDI LATERAL 0.06 m/s    TDI SEPTAL 0.05 m/s    E/E' ratio 15.64 m/s    MV Peak A Kiran 0.41 m/s    TR Max Kiran 3.44 m/s    E/A ratio 2.10     E wave deceleration time 210.56 msec    LV SEPTAL E/E' RATIO 17.20 m/s    LV LATERAL E/E' RATIO 14.33 m/s    LVOT peak kiran 0.79 m/s    Left Ventricular Outflow Tract Mean Velocity 0.58 cm/s    Left Ventricular Outflow Tract Mean Gradient 1.47 mmHg    RVDD 4.81 cm    TAPSE 1.40 cm    RV/LV Ratio 1.20 cm    LA size 3.81 cm    Left Atrium Major Axis 4.46 cm    RA Major Axis 4.86 cm    AV regurgitation pressure 1/2 time 726.596846110208794 ms    AR Max Kiran 1.85 m/s    AV mean gradient 4 mmHg    AV peak gradient 7 mmHg    Ao peak kiran 1.29 m/s    Ao VTI 23.90 cm    LVOT peak VTI 16.40 cm    AV valve area 2.24 cm²    AV Velocity Ratio 0.61     AV index (prosthetic) 0.69     TIMOTHY by Velocity Ratio 2.00 cm²    Mr max kiran 4.28 m/s    MV mean  gradient 1 mmHg    MV peak gradient 4 mmHg    MV stenosis pressure 1/2 time 66.86 ms    MV valve area p 1/2 method 3.29 cm2    MV valve area by continuity eq 1.96 cm2    MV VTI 27.3 cm    Triscuspid Valve Regurgitation Peak Gradient 47 mmHg    PV PEAK VELOCITY 0.58 m/s    PV peak gradient 1 mmHg    Ao root annulus 2.88 cm    IVC diameter 2.47 cm    Mean e' 0.06 m/s    ZLVIDS -2.79     ZLVIDD -6.15     AORTIC VALVE CUSP SEPERATION 1.87 cm    TV resting pulmonary artery pressure 62 mmHg    RV TB RVSP 18 mmHg    Est. RA pres 15 mmHg    Watts's Biplane MOD Ejection Fraction 37 %    GLS 8.8 %    Narrative      Left Ventricle: The left ventricle is normal in size. Mildly increased   wall thickness. There is concentric remodeling. Moderate global   hypokinesis present. There is moderately reduced systolic function with a   visually estimated ejection fraction of 35 - 40%. Biplane (2D) method of   discs ejection fraction is 37%. Global longitudinal strain is -8.8%.   Global longitudinal strain is reduced. Grade III diastolic dysfunction.    Right Ventricle: Mild right ventricular enlargement. Systolic function   is mildly reduced.    Left Atrium: Left atrium is mildly dilated.    Right Atrium: Right atrium is moderately dilated.    Aortic Valve: The aortic valve is a trileaflet valve. Mildly calcified   cusps. There is mild aortic regurgitation with an eccentrically directed   jet.    Mitral Valve: There is no stenosis. The mean pressure gradient across   the mitral valve is 1 mmHg at a heart rate of  bpm. There is mild to   moderate regurgitation with an eccentric jet.    Tricuspid Valve: There is moderate regurgitation with an eccentrically   directed jet.    Pulmonary Artery: The estimated pulmonary artery systolic pressure is   62 mmHg.    IVC/SVC: Elevated venous pressure at 15 mmHg.

## 2024-05-08 NOTE — SUBJECTIVE & OBJECTIVE
Interval History:   Review of Systems   Constitutional:  Negative for chills, fatigue, fever and unexpected weight change.   Respiratory:  Positive for shortness of breath.    Cardiovascular:  Positive for chest pain and leg swelling. Negative for palpitations.   Gastrointestinal:  Positive for nausea. Negative for abdominal pain, blood in stool and vomiting.   Genitourinary:  Negative for dysuria, frequency and hematuria.   Musculoskeletal:  Negative for arthralgias.   Neurological:  Negative for dizziness, syncope, weakness and light-headedness.   Psychiatric/Behavioral:  Negative for confusion.      Objective:     Vital Signs (Most Recent):  Temp: 97.6 °F (36.4 °C) (05/07/24 1624)  Pulse: (!) 58 (05/07/24 1737)  Resp: 18 (05/07/24 1624)  BP: (!) 161/98 (05/07/24 1737)  SpO2: 99 % (05/07/24 1952) Vital Signs (24h Range):  Temp:  [97.4 °F (36.3 °C)-98 °F (36.7 °C)] 97.6 °F (36.4 °C)  Pulse:  [58-72] 58  Resp:  [16-19] 18  SpO2:  [96 %-100 %] 99 %  BP: (150-177)/(81-98) 161/98     Weight: 96.5 kg (212 lb 11.9 oz)  Body mass index is 28.85 kg/m².    Intake/Output Summary (Last 24 hours) at 5/7/2024 2335  Last data filed at 5/7/2024 1657  Gross per 24 hour   Intake --   Output 900 ml   Net -900 ml         Physical Exam  Constitutional:       Appearance: Normal appearance.   HENT:      Head: Normocephalic and atraumatic.      Mouth/Throat:      Mouth: Mucous membranes are moist.      Pharynx: Oropharynx is clear.   Eyes:      Extraocular Movements: Extraocular movements intact.      Conjunctiva/sclera: Conjunctivae normal.      Pupils: Pupils are equal, round, and reactive to light.   Cardiovascular:      Rate and Rhythm: Normal rate and regular rhythm.      Heart sounds: Murmur heard.   Pulmonary:      Effort: Pulmonary effort is normal.      Breath sounds: Normal breath sounds.   Abdominal:      General: Abdomen is flat. Bowel sounds are normal.      Palpations: Abdomen is soft.   Musculoskeletal:      Right lower  leg: Edema present.      Left lower leg: Edema present.   Skin:     General: Skin is warm.      Capillary Refill: Capillary refill takes less than 2 seconds.   Neurological:      General: No focal deficit present.      Mental Status: He is alert and oriented to person, place, and time.             Significant Labs: All pertinent labs within the past 24 hours have been reviewed.    Significant Imaging: I have reviewed all pertinent imaging results/findings within the past 24 hours.

## 2024-05-08 NOTE — PLAN OF CARE
[facility] - [facility] Telemetry  Discharge Final Note    Primary Care Provider: Swati Vicente NP    Expected Discharge Date: 5/8/2024    Final Discharge Note (most recent)       Final Note - 05/08/24 1043          Final Note    Assessment Type Final Discharge Note     Anticipated Discharge Disposition Home or Self Care        Post-Acute Status    Discharge Delays None known at this time                   Pt d/c home with no needs.  Consult for cardiac rehab. Ss spoke with pt and choice obtained for [facility] for cardiac rehab. Referral faxed. Ss following.    Important Message from Medicare             Contact Info       Fatou Almodovar, JONAH   Specialty: Cardiology    [address] [facility]  [facility] [address]   Phone: [phone]       Next Steps: Follow up on 5/29/2024    Instructions: Appointment scheduled on 5/29/2024 at 10:00am

## 2024-05-08 NOTE — PROGRESS NOTES
[facility] Tele[facility]  Cardiology  Progress Note    Patient Name: Trung Barboza  MRN: [id number]  Admission Date: 5/6/2024  Hospital Length of Stay: 1 days  Code Status: Full Code   Attending Physician: Ronnie Michel MD   Primary Care Physician: Swati Vicente NP  Expected Discharge Date: 5/8/2024  Principal Problem:ACS (acute coronary syndrome)    Subjective:     Hospital Course:   No notes on file    Interval History: Patient seen today, s/p [facility] yesterday with PCI of ostial-prox LAD; Failed SVG to LAD, RCA; Patent SVG to OM1. Will need staged PCI of D1 as outpatient. Denies chest pain or sob. Right groin stable, no bleeding or hematoma.    Review of Systems   Constitutional: Negative for chills and fever.   Cardiovascular:  Negative for chest pain and dyspnea on exertion.   Respiratory:  Negative for shortness of breath.      Objective:     Vital Signs (Most Recent):  Temp: 97.4 °F (36.3 °C) (05/08/24 1216)  Pulse: (!) 58 (05/08/24 1216)  Resp: 20 (05/08/24 1216)  BP: (!) 158/79 (05/08/24 1216)  SpO2: 99 % (05/08/24 1216) Vital Signs (24h Range):  Temp:  [97.3 °F (36.3 °C)-98 °F (36.7 °C)] 97.4 °F (36.3 °C)  Pulse:  [58-83] 58  Resp:  [16-20] 20  SpO2:  [97 %-100 %] 99 %  BP: (143-194)/() 158/79     Weight: 96.5 kg (212 lb 11.9 oz)  Body mass index is 28.85 kg/m².     SpO2: 99 %         Intake/Output Summary (Last 24 hours) at 5/8/2024 1350  Last data filed at 5/8/2024 0426  Gross per 24 hour   Intake --   Output 202 ml   Net -202 ml       Lines/Drains/Airways       Peripheral Intravenous Line  Duration                  Peripheral IV - Single Lumen 05/06/24 1645 20 G Anterior;Right Forearm 1 day         Peripheral IV - Single Lumen 05/06/24 2030 20 G Right;Lateral Antecubital 1 day                       Physical Exam  Vitals reviewed.   Constitutional:       General: He is not in acute distress.  Eyes:      Pupils: Pupils are equal, round, and reactive to light.   Neck:  "     Vascular: No JVD.   Cardiovascular:      Rate and Rhythm: Normal rate and regular rhythm.   Pulmonary:      Effort: Pulmonary effort is normal.      Breath sounds: Normal breath sounds.   Abdominal:      General: Bowel sounds are normal.      Palpations: Abdomen is soft.   Musculoskeletal:      Right lower leg: Edema (1+) present.      Left lower leg: Edema (1+) present.   Skin:     General: Skin is warm and dry.   Neurological:      Mental Status: He is alert and oriented to person, place, and time.            Significant Labs: ABG: No results for input(s): "PH", "PCO2", "HCO3", "POCSATURATED", "BE" in the last 48 hours., Blood Culture: No results for input(s): "LABBLOO" in the last 48 hours., BMP:   Recent Labs   Lab 05/06/24 1653 05/06/24 2038 05/08/24  0414     --  100     --  137   K 4.8  --  4.3   *  --  107   CO2 23  --  23   BUN 25*  --  23*   CREATININE 1.92*  --  1.88*   CALCIUM 8.9  --  9.1   MG  --  2.2  --    , CMP   Recent Labs   Lab 05/06/24 1653 05/08/24 0414    137   K 4.8 4.3   * 107   CO2 23 23    100   BUN 25* 23*   CREATININE 1.92* 1.88*   CALCIUM 8.9 9.1   PROT 7.1 6.7   ALBUMIN 3.5 3.4*   BILITOT 1.2 1.7*   ALKPHOS 235* 188*   AST 29 28   ALT 28 21   ANIONGAP 17* 11   , CBC   Recent Labs   Lab 05/06/24 1653 05/08/24 0414   WBC 4.34* 3.80*   HGB 13.2* 12.1*   HCT 41.2 36.9*    158   , Lipid Panel No results for input(s): "CHOL", "HDL", "LDLCALC", "TRIG", "CHOLHDL" in the last 48 hours., and Troponin No results for input(s): "TROPONINI" in the last 48 hours.    Significant Imaging: Cardiac Cath: Results for orders placed during the hospital encounter of 05/06/24    Cardiac catheterization    Conclusion    The Prox Cx to Mid Cx lesion was 80% stenosed.    The Dist RCA lesion was 50% stenosed.    The 1st Diag lesion was 75% stenosed.    The 2nd Diag lesion was 80% stenosed.    The 2nd Mrg lesion was 60% stenosed.    The Acute Mrg lesion was " 80% stenosed.    The Prox RCA lesion was 60% stenosed.    The Ost LAD to Prox LAD lesion was 80% stenosed with 0% stenosis post-intervention.    The ejection fraction was calculated to be 55%.    The left ventricular systolic function was normal.    The left ventricular end diastolic pressure was normal.    The pre-procedure left ventricular end diastolic pressure was 6.    Ost LAD to Prox LAD lesion: A stent was successfully placed.    The estimated blood loss was none.    There was three vessel coronary artery disease.    The procedure log was documented by Documenter: Neisha Mayfield RN and verified by Mahad Villaseñor DO.    Date: 5/7/2024  Time: 5:38 PM    Normal LV function with est EF 55%  Failed SVT to LAD, RCA  Patent SVT to OM1  Successful PCI with KEHINDE LAD    Is a candidate for staged PCI of D1.      and Echocardiogram: Transthoracic echo (TTE) complete (Cupid Only):   Results for orders placed or performed during the hospital encounter of 05/06/24   Echo   Result Value Ref Range    BSA 2.21 m2    LVOT stroke volume 53.58 cm3    LVIDd 4.00 3.5 - 6.0 cm    LV Systolic Volume 39.66 mL    LV Systolic Volume Index 18.1 mL/m2    LVIDs 3.16 2.1 - 4.0 cm    LV Diastolic Volume 69.91 mL    LV Diastolic Volume Index 31.92 mL/m2    IVS 1.3 (A) 0.6 - 1.1 cm    LVOT diameter 2.04 cm    LVOT area 3.3 cm2    FS 21 (A) 28 - 44 %    Left Ventricle Relative Wall Thickness 0.75 cm    Posterior Wall 1.5 (A) 0.6 - 1.1 cm    LV mass 208.96 g    LV Mass Index 95 g/m2    MV Peak E Kiran 0.86 m/s    TDI LATERAL 0.06 m/s    TDI SEPTAL 0.05 m/s    E/E' ratio 15.64 m/s    MV Peak A Kiran 0.41 m/s    TR Max Kiran 3.44 m/s    E/A ratio 2.10     E wave deceleration time 210.56 msec    LV SEPTAL E/E' RATIO 17.20 m/s    LV LATERAL E/E' RATIO 14.33 m/s    LVOT peak kiran 0.79 m/s    Left Ventricular Outflow Tract Mean Velocity 0.58 cm/s    Left Ventricular Outflow Tract Mean Gradient 1.47 mmHg    RVDD 4.81 cm    TAPSE 1.40 cm    RV/LV Ratio 1.20  cm    LA size 3.81 cm    Left Atrium Major Axis 4.46 cm    RA Major Axis 4.86 cm    AV regurgitation pressure 1/2 time 726.172122204913291 ms    AR Max Kiran 1.85 m/s    AV mean gradient 4 mmHg    AV peak gradient 7 mmHg    Ao peak kiran 1.29 m/s    Ao VTI 23.90 cm    LVOT peak VTI 16.40 cm    AV valve area 2.24 cm²    AV Velocity Ratio 0.61     AV index (prosthetic) 0.69     TIMOTHY by Velocity Ratio 2.00 cm²    Mr max kiran 4.28 m/s    MV mean gradient 1 mmHg    MV peak gradient 4 mmHg    MV stenosis pressure 1/2 time 66.86 ms    MV valve area p 1/2 method 3.29 cm2    MV valve area by continuity eq 1.96 cm2    MV VTI 27.3 cm    Triscuspid Valve Regurgitation Peak Gradient 47 mmHg    PV PEAK VELOCITY 0.58 m/s    PV peak gradient 1 mmHg    Ao root annulus 2.88 cm    IVC diameter 2.47 cm    Mean e' 0.06 m/s    ZLVIDS -2.79     ZLVIDD -6.15     AORTIC VALVE CUSP SEPERATION 1.87 cm    TV resting pulmonary artery pressure 62 mmHg    RV TB RVSP 18 mmHg    Est. RA pres 15 mmHg    Watts's Biplane MOD Ejection Fraction 37 %    GLS 8.8 %    Narrative      Left Ventricle: The left ventricle is normal in size. Mildly increased   wall thickness. There is concentric remodeling. Moderate global   hypokinesis present. There is moderately reduced systolic function with a   visually estimated ejection fraction of 35 - 40%. Biplane (2D) method of   discs ejection fraction is 37%. Global longitudinal strain is -8.8%.   Global longitudinal strain is reduced. Grade III diastolic dysfunction.    Right Ventricle: Mild right ventricular enlargement. Systolic function   is mildly reduced.    Left Atrium: Left atrium is mildly dilated.    Right Atrium: Right atrium is moderately dilated.    Aortic Valve: The aortic valve is a trileaflet valve. Mildly calcified   cusps. There is mild aortic regurgitation with an eccentrically directed   jet.    Mitral Valve: There is no stenosis. The mean pressure gradient across   the mitral valve is 1 mmHg at a  heart rate of  bpm. There is mild to   moderate regurgitation with an eccentric jet.    Tricuspid Valve: There is moderate regurgitation with an eccentrically   directed jet.    Pulmonary Artery: The estimated pulmonary artery systolic pressure is   62 mmHg.    IVC/SVC: Elevated venous pressure at 15 mmHg.       Assessment and Plan:     Brief HPI:   85 y/o male with PMH of CABG (2022), HLD, HTN, CKD, and GERD who presented to [facility] ED with c/o three days of increasingly severe mid-chest pain that he describes as a sharp, intermittent pain accompanied by SOB and occasional nausea. The patient is well known to the Cardiology service here and sees Dr. Mahad Villaseñor for regular outpatient follow up. He had a CABG performed at [facility] in 2022 by Dr. Qureshi and says that he was not having any heart-related symptoms until a few days ago. He says his last visit with Dr. Villaseñor this past February was uneventful and that he has been able to continue to live independently and perform ADL without assistance these past couple of years. The patient says in addition to chest pain he has also noticed new bilateral lower extremity swelling over the past couple of days. He denied PND, orthopnea, palpitations or syncopal episodes. He denied new constitutional complaints including fevers, chills and unintended weight loss.      The ED bloodwork showed a troponin increase from 314-->360. BNP was elevated at 8121. Labs were otherwise at baseline for the patient. A chest xray showed some cardiomegaly and ground glass infiltrates/edema. The patient was given a dose of weight-based Lovenox and a loading dose of Brilinta.      Cardiology consulted for hx CABG, elevated troponin with chest pain. Family does report recent death of their mother with increased stress at home       Acute systolic heart failure  - Patient seen and evaluated by Dr. Wallace  - Final echo results pending  - Will need GDMT initiated prior to discharge  - Further  recommendations following [facility]    5/8/2024:  - Ischemic cardiomyopathy  - Echo with EF 35-40%; see full report above  - Start BIDIL TID for afterload reduction (holding off on ACE/ARB, MRA, and SGLT2 for the time being due to CKD, creatinine 1.88, and still needs staged PCI)  - Improved after IV diuresis, continue home lasix 20mg daily  - Follow up with cardiology in 2 weeks, will up titrate and add GDMT at follow up once staged PCI complete.      NSTEMI (non-ST elevated myocardial infarction)  - Patient seen and evaluated by Dr. Wallace  - Troponin 314, 360, 327; Echo with preliminary EF 35-40% (down from 55% in 2021)  - EKG SR with frequent PVCs, no acute changes  - Plan for [facility] today. Procedure, risk (including increased risk of kidney injury with CKD), and benefits discussed in detail with patient and family. He verbalized understanding and wishes to proceed. Consent obtained and on chart.   - Further recommendations to follow    5/8/2024:  - S/P C with PCI of ost-prox LAD; Failed SVG to LAD, RCA; Patent SVG to OM1. Will need staged PCI of D1 as outpatient.  - Echo with EF 35-40%  - Start BIDIL TID for afterload reduction (holding off on ACE/ARB, MRA, and SGLT2 for the time being due to CKD, creatinine 1.88, and still needs staged PCI)  - Lasix IV 40 given this morning (dyspnea significantly improved after IV diuresis)  - Continue ASA, Brilinta, statin, Toprol XL at discharge  - Cardiac rehab ordered  - Okay to discharge from cardiology standpoint  - Follow up in cardiology clinic in 2 weeks        VTE Risk Mitigation (From admission, onward)           Ordered     Place ALE hose  Until discontinued         05/06/24 9144                    Radha Islas, JONAH  Cardiology  [facility] - [address] [place] [address]

## 2024-05-09 LAB
OHS QRS DURATION: 122 MS
OHS QRS DURATION: 126 MS
OHS QTC CALCULATION: 456 MS
OHS QTC CALCULATION: 485 MS
POC ACTIVATED CLOTTING TIME K: 291 SEC

## 2024-05-09 NOTE — ASSESSMENT & PLAN NOTE
Creatine stable for now. BMP reviewed- noted Estimated Creatinine Clearance: 35.2 mL/min (A) (based on SCr of 1.88 mg/dL (H)). according to latest data. Based on current GFR, CKD stage is stage 3 - GFR 30-59.  Monitor UOP and serial BMP and adjust therapy as needed. Renally dose meds. Avoid nephrotoxic medications and procedures.

## 2024-05-09 NOTE — ASSESSMENT & PLAN NOTE
x1 dose weight based lovenox given  Loading dose of 180mg brilinta given  Continue home aspirin and statin  Do not start beta blocker until new onset CHF has been ruled out by ECHO  NPO at midnight  Cardiology consulted, thanks for assistance  Cardiac monitoring   PRN morphine and oxygen  ANNITA score 4  Heart score 6    5/7:  Plan for left heart catheterization today by Cardiology.  Patient with management    5/8: Cardiac rehab ordered and discussed with .    Discharge with medications per cardiology.  No ACE/ARB given low blood pressure.  Now Aldactone given elevated potassium near 5.

## 2024-05-09 NOTE — DISCHARGE SUMMARY
[facility] Tele[facility] Hospital Medicine  Discharge Summary      Patient Name: Trung Barboza  MRN: [id number]  BREANA: [id number]  Patient Class: IP- Inpatient  Admission Date: 5/6/2024  Hospital Length of Stay: 1 days  Discharge Date and Time:  05/08/2024 9:42 PM  Attending Physician: No att. providers found   Discharging Provider: Ronnie Michel MD  Primary Care Provider: Swati Vicente NP    Primary Care Team: Networked reference to record PCT     HPI:   This patient is a very pleasant 83yo male who presented to [facility] ED with c/o three days of increasingly severe mid-chest pain that he describes as a sharp, intermittent pain accompanied by SOB and occasional nausea. The patient is well known to the Cardiology service here and sees Dr. Mahad Villaseñor for regular outpatient follow up. He had a CABG performed at [facility] in 2022 by Dr. Qureshi and says that he was not having any heart-related symptoms until a few days ago. He says his last visit with Dr. Villaseñor this past February was uneventful and that he has been able to continue to live independently and perform ADL without assistance these past couple of years. The patient says in addition to chest pain he has also noticed new bilateral lower extremity swelling over the past couple of days. He denied PND, orthopnea, palpitations or syncopal episodes. He denied new constitutional complaints including fevers, chills and unintended weight loss.     In addition to a history of CABG, the patient also has a pmh of CAD s/p stents, AR, BPH, HLD, HTN, CKD, gout and GERD. He sees YAZAN Vicente as his PCP. He has a hx of tobacco use but says he currently does not use any nicotine containing products.    The ED bloodwork showed a troponin increase from 314-->360. BNP was elevated at 8121. Labs were otherwise at baseline for the patient. A chest xray showed some cardiomegaly and ground glass infiltrates/edema. The patient was given a dose  of weight-based Lovenox and a loading dose of Brilinta.     Procedure(s) (LRB):  Angiogram, Coronary, with Left Heart Cath (N/A)  Percutaneous coronary intervention (N/A)      Hospital Course:   No notes on file     Goals of Care Treatment Preferences:  Code Status: Full Code      Consults:   Consults (From admission, onward)          Status Ordering Provider     Inpatient consult to Social Work  Once        Provider:  (Not yet assigned)    Ordered KOKI SERRANO     Inpatient consult to Social Work  Once        Provider:  (Not yet assigned)    Completed CARLA REYES     Inpatient consult to Cardiology  Once        Provider:  (Not yet assigned)    Completed EDMUND CARY            Cardiac/Vascular  * ACS (acute coronary syndrome)  x1 dose weight based lovenox given  Loading dose of 180mg brilinta given  Continue home aspirin and statin  Do not start beta blocker until new onset CHF has been ruled out by ECHO  NPO at midnight  Cardiology consulted, thanks for assistance  Cardiac monitoring   PRN morphine and oxygen  ANNITA score 4  Heart score 6    5/7:  Plan for left heart catheterization today by Cardiology.  Patient with management    5/8: Cardiac rehab ordered and discussed with .    Discharge with medications per cardiology.  No ACE/ARB given low blood pressure.  Now Aldactone given elevated potassium near 5.        Aortic valve regurgitation        HLD (hyperlipidemia)  Continue home statin      Elevated brain natriuretic peptide (BNP) level  Patient has hx AR  ECHO ordered   X1 dose IV lasix given in ED      Essential hypertension  Chronic, controlled. Latest blood pressure and vitals reviewed-     Temp:  [97.3 °F (36.3 °C)-98 °F (36.7 °C)]   Pulse:  [58-83]   Resp:  [16-20]   BP: (143-194)/()   SpO2:  [98 %-99 %] .   Home meds for hypertension were reviewed and noted below.   Hypertension Medications               amLODIPine (NORVASC) 5 MG tablet Take 1 tablet (5 mg total) by  mouth once daily.    furosemide (LASIX) 20 MG tablet Take 1 tablet (20 mg total) by mouth once daily.            While in the hospital, will manage blood pressure as follows; Continue home antihypertensive regimen    Will utilize p.r.n. blood pressure medication only if patient's blood pressure greater than 180/110 and he develops symptoms such as worsening chest pain or shortness of breath.    Renal/  CKD (chronic kidney disease)  Creatine stable for now. BMP reviewed- noted Estimated Creatinine Clearance: 35.2 mL/min (A) (based on SCr of 1.88 mg/dL (H)). according to latest data. Based on current GFR, CKD stage is stage 3 - GFR 30-59.  Monitor UOP and serial BMP and adjust therapy as needed. Renally dose meds. Avoid nephrotoxic medications and procedures.    GI  Gastroesophageal reflux disease  Continue home protonix        Final Active Diagnoses:    Diagnosis Date Noted POA    PRINCIPAL PROBLEM:  ACS (acute coronary syndrome) [I24.9] 05/07/2024 Yes    Elevated brain natriuretic peptide (BNP) level [R79.89] 05/07/2024 Yes    HLD (hyperlipidemia) [E78.5] 05/07/2024 Yes    CKD (chronic kidney disease) [N18.9] 05/07/2024 Yes    Aortic valve regurgitation [I35.1] 05/07/2024 Yes    NSTEMI (non-ST elevated myocardial infarction) [I21.4] 05/07/2024 Yes    Acute systolic heart failure [I50.21] 05/07/2024 Yes    Hx of CABG [Z95.1] 05/07/2024 Not Applicable    Gastroesophageal reflux disease [K21.9] 09/15/2022 Yes    Essential hypertension [I10] 07/10/2021 Yes      Problems Resolved During this Admission:       Discharged Condition: fair    Disposition: Home or Self Care    Follow Up:   Follow-up Information       Fatou Almodovar FNP Follow up on 5/29/2024.    Specialty: Cardiology  Why: Appointment scheduled on 5/29/2024 at 10:00am  Contact information:  [address] [place] [address] [facility] [address]  [phone]               Swati Vicente NP Follow up on 5/15/2024.    Specialty:  Family Medicine  Why: hospital follow-up for NSTEMI; 1:40 pm  Contact information:  [address] [facility] [address]  [place] [address]  [phone]                           Patient Instructions:      Cardiac rehab phase II   Standing Status: Future Standing Exp. Date: 05/07/25     Order Specific Question Answer Comments   Department [facility] CARDIAC REHAB    Select qualifying diagnosis: I21.4 - Non-ST elevation (NSTEMI) myocardial infarction        Significant Diagnostic Studies: Labs: BMP:   Recent Labs   Lab 05/08/24 0414         K 4.3      CO2 23   BUN 23*   CREATININE 1.88*   CALCIUM 9.1    and CBC   Recent Labs   Lab 05/08/24 0414   WBC 3.80*   HGB 12.1*   HCT 36.9*          Pending Diagnostic Studies:       Procedure Component Value Units Date/Time    EKG 12-lead [[id number]]     Order Status: Sent Lab Status: No result     EKG 12-lead [[id number]]     Order Status: Sent Lab Status: No result            Medications:  Reconciled Home Medications:      Medication List        START taking these medications      isosorbide-hydrALAZINE 20-37.5 mg 20-37.5 mg Tab  Commonly known as: BIDIL  Take 1 tablet by mouth 3 (three) times daily.     metoprolol succinate 25 MG 24 hr tablet  Commonly known as: TOPROL-XL  Take 1 tablet (25 mg total) by mouth once daily.     ticagrelor 90 mg tablet  Commonly known as: BRILINTA  Take 1 tablet (90 mg total) by mouth 2 (two) times daily.            CONTINUE taking these medications      amLODIPine 5 MG tablet  Commonly known as: NORVASC  Take 1 tablet (5 mg total) by mouth once daily.     aspirin 81 MG EC tablet  Commonly known as: ECOTRIN  Take 1 tablet (81 mg total) by mouth once daily.     atorvastatin 80 MG tablet  Commonly known as: LIPITOR  Take 1 tablet by mouth in the evening     furosemide 20 MG tablet  Commonly known as: LASIX  Take 1 tablet (20 mg total) by mouth once daily.     MULTIVITAMIN 50 PLUS Tab  Generic drug: multivitamin-minerals-lutein  Take 1  tablet by mouth once daily.     pantoprazole 40 MG tablet  Commonly known as: PROTONIX  Take 1 tablet (40 mg total) by mouth once daily.     potassium chloride 10 MEQ Tbsr  Commonly known as: KLOR-CON  Take 1 tablet (10 mEq total) by mouth once daily.              Indwelling Lines/Drains at time of discharge:   Lines/Drains/Airways       None                   Time spent on the discharge of patient: 45 minutes         Ronnie Michel MD  Department of Hospital Medicine  [facility] - [facility]

## 2024-05-09 NOTE — ASSESSMENT & PLAN NOTE
Chronic, controlled. Latest blood pressure and vitals reviewed-     Temp:  [97.3 °F (36.3 °C)-98 °F (36.7 °C)]   Pulse:  [58-83]   Resp:  [16-20]   BP: (143-194)/()   SpO2:  [98 %-99 %] .   Home meds for hypertension were reviewed and noted below.   Hypertension Medications               amLODIPine (NORVASC) 5 MG tablet Take 1 tablet (5 mg total) by mouth once daily.    furosemide (LASIX) 20 MG tablet Take 1 tablet (20 mg total) by mouth once daily.            While in the hospital, will manage blood pressure as follows; Continue home antihypertensive regimen    Will utilize p.r.n. blood pressure medication only if patient's blood pressure greater than 180/110 and he develops symptoms such as worsening chest pain or shortness of breath.

## 2024-05-10 ENCOUNTER — PATIENT OUTREACH (OUTPATIENT)
Dept: ADMINISTRATIVE | Facility: CLINIC | Age: 84
End: 2024-05-10

## 2024-05-10 NOTE — PROGRESS NOTES
C3 nurse attempted to contact Trung Barboza  for a TCC post hospital discharge follow up call. No answer. Left voicemail with callback information. The patient has a scheduled HOSFU appointment with Swati Vicente NP  on 5/15/24 @ 140.

## 2024-05-13 NOTE — PROGRESS NOTES
C3 nurse attempted to contact Trung TAMERA Barboza  for a TCC post hospital discharge follow up call. No answer. No voicemail available. The patient has a scheduled HOSFU appointment with Swati Vicente NP  on 5/15/24 @ 140. Spoke with patient daughter, Jennifer, contact information given for call back.

## 2024-05-14 ENCOUNTER — HOSPITAL ENCOUNTER (OUTPATIENT)
Dept: RADIOLOGY | Facility: HOSPITAL | Age: 84
Discharge: HOME | End: 2024-05-14
Attending: NURSE PRACTITIONER
Payer: MEDICARE

## 2024-05-14 ENCOUNTER — OFFICE VISIT (OUTPATIENT)
Dept: FAMILY MEDICINE | Facility: CLINIC | Age: 84
End: 2024-05-14
Payer: MEDICARE

## 2024-05-14 VITALS
BODY MASS INDEX: 30.34 KG/M2 | OXYGEN SATURATION: 99 % | SYSTOLIC BLOOD PRESSURE: 128 MMHG | WEIGHT: 224 LBS | DIASTOLIC BLOOD PRESSURE: 72 MMHG | HEART RATE: 65 BPM | TEMPERATURE: 98 F | HEIGHT: 72 IN

## 2024-05-14 DIAGNOSIS — I24.9 ACS (ACUTE CORONARY SYNDROME): ICD-10-CM

## 2024-05-14 DIAGNOSIS — R22.31 LOCALIZED SWELLING ON RIGHT HAND: ICD-10-CM

## 2024-05-14 DIAGNOSIS — M10.9 GOUT, ARTHRITIS: ICD-10-CM

## 2024-05-14 DIAGNOSIS — I50.21 ACUTE SYSTOLIC HEART FAILURE: ICD-10-CM

## 2024-05-14 DIAGNOSIS — I10 ESSENTIAL HYPERTENSION: ICD-10-CM

## 2024-05-14 DIAGNOSIS — R22.31 LOCALIZED SWELLING ON RIGHT HAND: Primary | ICD-10-CM

## 2024-05-14 LAB — URATE SERPL-MCNC: 7.1 MG/DL (ref 3.5–7.2)

## 2024-05-14 PROCEDURE — 1101F PT FALLS ASSESS-DOCD LE1/YR: CPT | Mod: ,,, | Performed by: NURSE PRACTITIONER

## 2024-05-14 PROCEDURE — 1159F MED LIST DOCD IN RCRD: CPT | Mod: ,,, | Performed by: NURSE PRACTITIONER

## 2024-05-14 PROCEDURE — 3078F DIAST BP <80 MM HG: CPT | Mod: ,,, | Performed by: NURSE PRACTITIONER

## 2024-05-14 PROCEDURE — 84550 ASSAY OF BLOOD/URIC ACID: CPT | Mod: ,,, | Performed by: CLINICAL MEDICAL LABORATORY

## 2024-05-14 PROCEDURE — 3074F SYST BP LT 130 MM HG: CPT | Mod: ,,, | Performed by: NURSE PRACTITIONER

## 2024-05-14 PROCEDURE — 3288F FALL RISK ASSESSMENT DOCD: CPT | Mod: ,,, | Performed by: NURSE PRACTITIONER

## 2024-05-14 PROCEDURE — 93971 EXTREMITY STUDY: CPT | Mod: TC,RT

## 2024-05-14 PROCEDURE — 99214 OFFICE O/P EST MOD 30 MIN: CPT | Mod: ,,, | Performed by: NURSE PRACTITIONER

## 2024-05-14 PROCEDURE — 1111F DSCHRG MED/CURRENT MED MERGE: CPT | Mod: ,,, | Performed by: NURSE PRACTITIONER

## 2024-05-14 PROCEDURE — 1160F RVW MEDS BY RX/DR IN RCRD: CPT | Mod: ,,, | Performed by: NURSE PRACTITIONER

## 2024-05-14 NOTE — PROGRESS NOTES
Swati Vicente NP   [facility]  [address]  [place]  [address]      PATIENT NAME: Trung Barboza  : 1940  DATE: 24  MRN: [id number]      Billing Provider: Swati Vicente NP  Level of Service: NV OFFICE/OUTPT VISIT, EST, LEVL IV, 30-39 MIN  Patient PCP Information       Provider PCP Type    Swati Vicente NP General            Reason for Visit / Chief Complaint: Hospital Follow Up (Patient presents to clinic for hospital follow up. Patient reports he was in hospital for 2 blockages in heart wall. He reports having a stent put in and will be going back end for follow up. P) and Hand Pain (Patient states since he was discharged and started on 3 new medication his right hand is swollen and left thumb has some numbness and pain. He reports being on Brilinta about a year ago and thinks that may be what is causing side effects. )         History of Present Illness / Problem Focused Workflow     84 year old male presents to clinic for hospital follow up. Patient reports he went in due to shortness of breath and chest pain. Heart cath was done with stent placement. He states he has been doing well and denies any further chest pain or shortness of breath. He does have a follow up with Cardiology NP on .   He has complaints of pain and swelling in right hand around knuckles. He does have a history of gout.  He reports being on Brilinta about a year ago and thinks that may be what is causing side effects.  However, upon further discussion he did eat a good bit of seafood over the weekend which could have also been a trigger.             Review of Systems     @Review of Systems   Constitutional:  Negative for activity change, appetite change, fatigue and fever.   HENT:  Negative for nasal congestion, ear pain, rhinorrhea, sinus pressure/congestion and sore throat.    Eyes:  Negative for pain, redness, visual disturbance and eye dryness.   Respiratory:  Negative for cough and  shortness of breath.    Cardiovascular:  Negative for chest pain and leg swelling.   Gastrointestinal:  Negative for abdominal distention, abdominal pain, constipation and diarrhea.   Endocrine: Negative for cold intolerance, heat intolerance and polyuria.   Genitourinary:  Negative for bladder incontinence, dysuria, frequency and urgency.   Musculoskeletal:  Positive for arthralgias. Negative for gait problem and myalgias.   Integumentary:  Negative for color change, rash and wound.   Allergic/Immunologic: Negative for environmental allergies and food allergies.   Neurological:  Negative for dizziness, weakness, light-headedness and headaches.   Psychiatric/Behavioral:  Negative for behavioral problems and sleep disturbance.        Medical / Social / Family History     Past Medical History:   Diagnosis Date    Aortic regurgitation     BPH with urinary obstruction     Chronic kidney disease, stage 3b     GR 44     creat 1.8    Coronary artery disease     Essential hypertension 07/10/2021    Gastroesophageal reflux disease 09/15/2022    Gout, arthritis     PVD (peripheral vascular disease) 02/01/2024    TIA (transient ischemic attack)        Past Surgical History:   Procedure Laterality Date    ANGIOGRAM, CORONARY, WITH LEFT HEART CATHETERIZATION N/A 12/31/2021    Procedure: ANGIOGRAM,CORONARY,WITH LEFT HEART CATHETERIZATION;  Surgeon: Mahad Villaseñor DO;  Location: [facility] CATH LAB;  Service: Cardiology;  Laterality: N/A;    ANGIOGRAM, CORONARY, WITH LEFT HEART CATHETERIZATION N/A 5/7/2024    Procedure: Angiogram, Coronary, with Left Heart Cath;  Surgeon: Mahad Villaseñor DO;  Location: [facility] CATH LAB;  Service: Cardiology;  Laterality: N/A;    BIOPSY WITH TRANSRECTAL ULTRASOUND (TRUS) GUIDANCE Bilateral 03/07/2018    CHOLECYSTECTOMY      [facility]  [facility]    LEFT HEART CATHETERIZATION Left 7/13/2021    Procedure: Left heart cath;  Surgeon: Philip KENNY  MD Melissa;  Location: [facility] CATH LAB;  Service: Cardiology;  Laterality: Left;    PERCUTANEOUS CORONARY INTERVENTION, ARTERY N/A 5/7/2024    Procedure: Percutaneous coronary intervention;  Surgeon: Mahad Villaseñor DO;  Location: [facility] CATH LAB;  Service: Cardiology;  Laterality: N/A;       Medications and Allergies     Medications  Outpatient Medications Marked as Taking for the 5/14/24 encounter (Office Visit) with Swati Vicente NP   Medication Sig Dispense Refill    amLODIPine (NORVASC) 5 MG tablet Take 1 tablet (5 mg total) by mouth once daily. 90 tablet 1    aspirin (ECOTRIN) 81 MG EC tablet Take 1 tablet (81 mg total) by mouth once daily. 30 tablet 0    atorvastatin (LIPITOR) 80 MG tablet Take 1 tablet by mouth in the evening 90 tablet 0    furosemide (LASIX) 20 MG tablet Take 1 tablet (20 mg total) by mouth once daily. 90 tablet 1    isosorbide-hydrALAZINE 20-37.5 mg (BIDIL) 20-37.5 mg Tab Take 1 tablet by mouth 3 (three) times daily. 90 tablet 1    metoprolol succinate (TOPROL-XL) 25 MG 24 hr tablet Take 1 tablet (25 mg total) by mouth once daily. 30 tablet 1    pantoprazole (PROTONIX) 40 MG tablet Take 1 tablet (40 mg total) by mouth once daily. 90 tablet 1    potassium chloride (KLOR-CON) 10 MEQ TbSR Take 1 tablet (10 mEq total) by mouth once daily. 90 tablet 1    ticagrelor (BRILINTA) 90 mg tablet Take 1 tablet (90 mg total) by mouth 2 (two) times daily.         Allergies  Review of patient's allergies indicates:   Allergen Reactions    Lisinopril Swelling and Anaphylaxis       Physical Examination     Vitals:    05/14/24 1322   BP: 128/72   Pulse: 65   Temp: 97.9 °F (36.6 °C)     Physical Exam  Vitals and nursing note reviewed.   HENT:      Head: Normocephalic.      Nose: Nose normal.      Mouth/Throat:      Mouth: Mucous membranes are moist.      Pharynx: Oropharynx is clear. No posterior oropharyngeal erythema.   Eyes:      Conjunctiva/sclera: Conjunctivae normal.   Cardiovascular:       Rate and Rhythm: Normal rate and regular rhythm.      Pulses: Normal pulses.      Heart sounds: Normal heart sounds.   Pulmonary:      Effort: Pulmonary effort is normal.      Breath sounds: Normal breath sounds.   Abdominal:      General: Abdomen is flat. Bowel sounds are normal. There is no distension.      Palpations: Abdomen is soft.   Musculoskeletal:         General: No swelling or tenderness.      Right hand: Swelling present. Decreased range of motion.      Cervical back: Normal range of motion.      Right lower leg: No edema.      Left lower leg: No edema.   Skin:     General: Skin is warm and dry.      Capillary Refill: Capillary refill takes less than 2 seconds.   Neurological:      Mental Status: He is alert. Mental status is at baseline.   Psychiatric:         Mood and Affect: Mood normal.         Behavior: Behavior normal.               Lab Results   Component Value Date    WBC 3.80 (L) 05/08/2024    HGB 12.1 (L) 05/08/2024    HCT 36.9 (L) 05/08/2024    MCV 97.1 (H) 05/08/2024     05/08/2024        CMP  Sodium   Date Value Ref Range Status   05/08/2024 137 136 - 145 mmol/L Final     Potassium   Date Value Ref Range Status   05/08/2024 4.3 3.5 - 5.1 mmol/L Final     Chloride   Date Value Ref Range Status   05/08/2024 107 98 - 107 mmol/L Final     CO2   Date Value Ref Range Status   05/08/2024 23 21 - 32 mmol/L Final     Glucose   Date Value Ref Range Status   05/08/2024 100 74 - 106 mg/dL Final     BUN   Date Value Ref Range Status   05/08/2024 23 (H) 7 - 18 mg/dL Final     Creatinine   Date Value Ref Range Status   05/08/2024 1.88 (H) 0.70 - 1.30 mg/dL Final     Calcium   Date Value Ref Range Status   05/08/2024 9.1 8.5 - 10.1 mg/dL Final     Total Protein   Date Value Ref Range Status   05/08/2024 6.7 6.4 - 8.2 g/dL Final     Albumin   Date Value Ref Range Status   05/08/2024 3.4 (L) 3.5 - 5.0 g/dL Final     Bilirubin, Total   Date Value Ref Range Status   05/08/2024 1.7 (H) >0.0 - 1.2 mg/dL  Final     Alk Phos   Date Value Ref Range Status   05/08/2024 188 (H) 45 - 115 U/L Final     AST   Date Value Ref Range Status   05/08/2024 28 15 - 37 U/L Final     ALT   Date Value Ref Range Status   05/08/2024 21 16 - 61 U/L Final     Anion Gap   Date Value Ref Range Status   05/08/2024 11 7 - 16 mmol/L Final     eGFR   Date Value Ref Range Status   05/08/2024 35 (L) >=60 mL/min/1.73m2 Final     Procedures   Assessment and Plan (including Health Maintenance)   :    Plan:     Problem List Items Addressed This Visit          Cardiac/Vascular    Essential hypertension    Current Assessment & Plan     Well controlled. Continue current meds.          ACS (acute coronary syndrome)    Current Assessment & Plan     He denies any further shortness of breath or chest pain. Instructed on importance of taking meds as ordered.   Continue Amlodipine, Atorvastatin, ASA, BIDI, Toprol, and Brilinta. Follow up with Cardiology as scheduled on 5/29.          Acute systolic heart failure    Current Assessment & Plan     No swelling to BLE or shortness of breath. Continue current meds and follow up with cardiology as scheduled.             Orthopedic    Gout, arthritis    Current Assessment & Plan     Gout flare to right hand which patient believes is caused by Brilinta. However, he did have some seafood over the weekend which is a known trigger for him. He has colchicine at home and states he started taking it this AM. Increase water intake. Follow low purine diet. Will discuss with Cardiology and let them decide at his follow up if they want to switch him to another antiplatelet or continue Brilinta. Instructed on importance of taking as ordered for now.           Other Visit Diagnoses       Localized swelling on right hand    -  Primary    Relevant Orders    Uric Acid (Completed)    US Upper Extremity Veins Right (Completed)    X-Ray Hand 3 View Right (Completed)            Health Maintenance Topics with due status: Not Due        Topic Last Completion Date    TETANUS VACCINE 12/11/2018    Lipid Panel 03/08/2024       Future Appointments   Date Time Provider Department Center   5/29/2024 10:00 AM Fatou Almodovar, FNP OB CARD [facility]   9/10/2024  8:40 AM Swati Vicente NP [facility] [place]   1/16/2025  3:00 PM AWV NURSE [facility] [place]        Health Maintenance Due   Topic Date Due    Shingles Vaccine (1 of 2) Never done    RSV Vaccine (Age 60+ and Pregnant patients) (1 - 1-dose 60+ series) Never done    COVID-19 Vaccine (5 - 2023-24 season) 09/01/2023          Signature:  Swati Vicente NP  [address]  [address]    Date of encounter: 5/14/24

## 2024-05-14 NOTE — PROGRESS NOTES
C3 nurse attempted to contact Trung Barboza  for a TCC post hospital discharge follow up call. No answer. No voicemail available. The patient has a scheduled HOSFU appointment with Swati Vicente NP  on 5/15/24 @ 140.

## 2024-05-28 NOTE — ASSESSMENT & PLAN NOTE
Gout flare to right hand which patient believes is caused by Brilinta. However, he did have some seafood over the weekend which is a known trigger for him. He has colchicine at home and states he started taking it this AM. Increase water intake. Follow low purine diet. Will discuss with Cardiology and let them decide at his follow up if they want to switch him to another antiplatelet or continue Brilinta. Instructed on importance of taking as ordered for now.   
He denies any further shortness of breath or chest pain. Instructed on importance of taking meds as ordered.   Continue Amlodipine, Atorvastatin, ASA, BIDI, Toprol, and Brilinta. Follow up with Cardiology as scheduled on 5/29.   
No swelling to BLE or shortness of breath. Continue current meds and follow up with cardiology as scheduled.   
Well controlled. Continue current meds.   
negative

## 2024-05-29 ENCOUNTER — OFFICE VISIT (OUTPATIENT)
Dept: CARDIOLOGY | Facility: CLINIC | Age: 84
End: 2024-05-29
Payer: MEDICARE

## 2024-05-29 VITALS
DIASTOLIC BLOOD PRESSURE: 70 MMHG | HEIGHT: 72 IN | BODY MASS INDEX: 29.39 KG/M2 | OXYGEN SATURATION: 99 % | HEART RATE: 65 BPM | SYSTOLIC BLOOD PRESSURE: 130 MMHG | WEIGHT: 217 LBS

## 2024-05-29 DIAGNOSIS — I50.21 ACUTE SYSTOLIC HEART FAILURE: ICD-10-CM

## 2024-05-29 DIAGNOSIS — I25.10 CORONARY ARTERY DISEASE, UNSPECIFIED VESSEL OR LESION TYPE, UNSPECIFIED WHETHER ANGINA PRESENT, UNSPECIFIED WHETHER NATIVE OR TRANSPLANTED HEART: Primary | ICD-10-CM

## 2024-05-29 DIAGNOSIS — I10 ESSENTIAL HYPERTENSION: ICD-10-CM

## 2024-05-29 DIAGNOSIS — I50.23 ACUTE ON CHRONIC SYSTOLIC HEART FAILURE: ICD-10-CM

## 2024-05-29 DIAGNOSIS — E78.5 HYPERLIPIDEMIA, UNSPECIFIED HYPERLIPIDEMIA TYPE: ICD-10-CM

## 2024-05-29 DIAGNOSIS — I21.4 NSTEMI (NON-ST ELEVATION MYOCARDIAL INFARCTION): ICD-10-CM

## 2024-05-29 PROCEDURE — 3075F SYST BP GE 130 - 139MM HG: CPT | Mod: CPTII,,, | Performed by: NURSE PRACTITIONER

## 2024-05-29 PROCEDURE — 1101F PT FALLS ASSESS-DOCD LE1/YR: CPT | Mod: CPTII,,, | Performed by: NURSE PRACTITIONER

## 2024-05-29 PROCEDURE — 1111F DSCHRG MED/CURRENT MED MERGE: CPT | Mod: CPTII,,, | Performed by: NURSE PRACTITIONER

## 2024-05-29 PROCEDURE — 3078F DIAST BP <80 MM HG: CPT | Mod: CPTII,,, | Performed by: NURSE PRACTITIONER

## 2024-05-29 PROCEDURE — 99215 OFFICE O/P EST HI 40 MIN: CPT | Mod: S$PBB,,, | Performed by: NURSE PRACTITIONER

## 2024-05-29 PROCEDURE — 99214 OFFICE O/P EST MOD 30 MIN: CPT | Mod: PBBFAC | Performed by: NURSE PRACTITIONER

## 2024-05-29 PROCEDURE — 3288F FALL RISK ASSESSMENT DOCD: CPT | Mod: CPTII,,, | Performed by: NURSE PRACTITIONER

## 2024-05-29 RX ORDER — NITROGLYCERIN 0.4 MG/1
0.4 TABLET SUBLINGUAL EVERY 5 MIN PRN
Qty: 25 TABLET | Refills: 2 | Status: SHIPPED | OUTPATIENT
Start: 2024-05-29 | End: 2025-05-29

## 2024-05-29 NOTE — PATIENT INSTRUCTIONS
We will schedule your next stent - we will call you  Follow up with me 2 weeks after your next heart cath/stent

## 2024-05-29 NOTE — PROGRESS NOTES
PCP: Swati Vicente NP    Referring Provider:     Subjective:   Trung Barboza is a 84 y.o. male with hx of MI, CAD s/p CABG with subsequent stents, TIA, CKD, HTN, HLD, gout, and GERD who presents for follow up for NSTEMI.     Pt was hospitalized here at [facility] 5/6/24 for NSTEMI and underwent [facility] s/p PCI to prox LAD with recommendation for staged PCI of D1. Echo showed EF of 35-40%. He was discharged on DAPT with ASA and Brilinta. He was started on BIDIL TID for afterload reduction due to CKD (no ACE/ARB, ARNI, MRA or SGLT2i). He was also discharged on statin, Toprol XL, norvasc, lasix and potassium chloride.     Today, pt denies any chest pain or SOB on exam. He states he had mild chest pain a few days ago but it was nothing like the chest pain he had prior to his last hospitalization. He has c/o SOB with exertion. Denies edema, orthopnea or PND. BP and HR are well controlled today. Of note, his wife passed away in January and family states he has declined since that time.         Fhx: Mother - heart disease, HTN  Shx: Former smoker (quit 1988), no etoh or drug use    EKG   Results for orders placed or performed during the hospital encounter of 06/02/24   EKG 12-lead    Collection Time: 06/02/24 10:18 AM   Result Value Ref Range    QRS Duration 124 ms    [facility] QTC Calculation 439 ms    Narrative    Test Reason : R06.02,    Vent. Rate : 071 BPM     Atrial Rate : 000 BPM     P-R Int : 264 ms          QRS Dur : 124 ms      QT Int : 420 ms       P-R-T Axes : -52 023 035 degrees     QTc Int : 439 ms    Possible ectopic atrial rhythm with frequent PVCs  Anterior infarct - age undetermined  Inferior ST abnormality is nonspecific  Abnormal ECG    Confirmed by Sonia CAMPOS, Lyric COTTO ([id number]) on 6/5/2024 7:34:17 PM    Referred By: NOLVIA   SELF           Confirmed By:Lyric Scott MD     ECHO Results for orders placed during the hospital encounter of 05/06/24    Echo    Interpretation Summary    Left Ventricle: The left  ventricle is normal in size. Mildly increased wall thickness. There is concentric remodeling. Moderate global hypokinesis present. There is moderately reduced systolic function with a visually estimated ejection fraction of 35 - 40%. Biplane (2D) method of discs ejection fraction is 37%. Global longitudinal strain is -8.8%. Global longitudinal strain is reduced. Grade III diastolic dysfunction.    Right Ventricle: Mild right ventricular enlargement. Systolic function is mildly reduced.    Left Atrium: Left atrium is mildly dilated.    Right Atrium: Right atrium is moderately dilated.    Aortic Valve: The aortic valve is a trileaflet valve. Mildly calcified cusps. There is mild aortic regurgitation with an eccentrically directed jet.    Mitral Valve: There is no stenosis. The mean pressure gradient across the mitral valve is 1 mmHg at a heart rate of  bpm. There is mild to moderate regurgitation with an eccentric jet.    Tricuspid Valve: There is moderate regurgitation with an eccentrically directed jet.    Pulmonary Artery: The estimated pulmonary artery systolic pressure is 62 mmHg.    IVC/SVC: Elevated venous pressure at 15 mmHg.    [facility] Results for orders placed during the hospital encounter of 06/02/24    Cardiac catheterization    Conclusion    The 1st Mrg lesion was 100% stenosed.    The 2nd Diag lesion was 100% stenosed.    The 1st Diag lesion was 70% stenosed.    The left ventricular end diastolic pressure was normal.    The pre-procedure left ventricular end diastolic pressure was 7.    The estimated blood loss was none.    There was two vessel coronary artery disease.    Two vessel CAD with branch vessel disease  LM - moderate size, with mild luminal irregularities  LAD - small size, patent stent in the prox-mid segment. The mid and distal LAD have moderate diffuse disease with negative remodeling. There is a large D1 with 70% ostial-prox stenosis. There is a small D2 with ostial ; there is an atretic  SVG graft to the second diagonal with severe diffuse disease  Lcx - Moderate size with mild luminal irregularities. OM1 is a large branch that is 100% occluded (); it fills via robust R-L collaterals.  RCA - Dominant vessel with mild diffuse disease. The R-PDA gives off collateral to the OM1.  Atretic SVG to small D2 with severe disease at the anastomosis  Normal left sided filling pressures, LVEDP - 7mmHg    Plan:  Stop brillinta (shortness of breath); switch to Plavix  Stop chlorthalidone (CKD); and up titrate anti-anginal therapy - start metop 25mg bid. Consider ranexa    The procedure log was documented by Documenter: Cindy Rasmussen and verified by Philip Torres MD.    Date: 6/4/2024  Time: 3:36 PM        Lab Results   Component Value Date     06/05/2024    K 4.0 06/05/2024     (H) 06/05/2024    CO2 24 06/05/2024    BUN 28 (H) 06/05/2024    CREATININE 1.89 (H) 06/05/2024    CALCIUM 9.4 06/05/2024    ANIONGAP 10 06/05/2024    ESTGFRAFRICA 59 (L) 07/25/2021    EGFRNONAA 25 (L) 02/28/2022       Lab Results   Component Value Date    CHOL 197 03/08/2024    CHOL 225 (H) 02/09/2023    CHOL 153 07/11/2021     Lab Results   Component Value Date    HDL 73 (H) 03/08/2024    HDL 61 (H) 02/09/2023    HDL 48 07/11/2021     Lab Results   Component Value Date    LDLCALC 108 03/08/2024    LDLCALC 120 02/09/2023    LDLCALC 71 07/11/2021     Lab Results   Component Value Date    TRIG 80 03/08/2024    TRIG 220 (H) 02/09/2023    TRIG 169 (H) 07/11/2021     Lab Results   Component Value Date    CHOLHDL 2.7 03/08/2024    CHOLHDL 3.7 02/09/2023    CHOLHDL 3.2 07/11/2021       Lab Results   Component Value Date    WBC 4.14 (L) 06/04/2024    HGB 11.6 (L) 06/04/2024    HCT 34.7 (L) 06/04/2024    MCV 95.9 06/04/2024     06/04/2024           Current Outpatient Medications:     aspirin (ECOTRIN) 81 MG EC tablet, Take 1 tablet (81 mg total) by mouth once daily., Disp: 30 tablet, Rfl: 0    atorvastatin  (LIPITOR) 80 MG tablet, Take 1 tablet by mouth in the evening, Disp: 90 tablet, Rfl: 0    furosemide (LASIX) 20 MG tablet, Take 1 tablet (20 mg total) by mouth once daily., Disp: 90 tablet, Rfl: 1    potassium chloride (KLOR-CON) 10 MEQ TbSR, Take 1 tablet (10 mEq total) by mouth once daily., Disp: 90 tablet, Rfl: 1    clopidogreL (PLAVIX) 75 mg tablet, Take 1 tablet (75 mg total) by mouth once daily., Disp: 30 tablet, Rfl: 0    isosorbide dinitrate (ISORDIL) 20 MG tablet, Take 1 tablet (20 mg total) by mouth 3 (three) times daily., Disp: 90 tablet, Rfl: 11    isosorbide-hydrALAZINE 20-37.5 mg (BIDIL) 20-37.5 mg Tab, Take 2 tablets by mouth 3 (three) times daily., Disp: 90 tablet, Rfl: 1    metoprolol succinate (TOPROL-XL) 50 MG 24 hr tablet, Take 1 tablet (50 mg total) by mouth once daily., Disp: 90 tablet, Rfl: 3    multivitamin with minerals tablet, Take 1 tablet by mouth once daily., Disp: , Rfl:     nitroGLYCERIN (NITROSTAT) 0.4 MG SL tablet, Place 1 tablet (0.4 mg total) under the tongue every 5 (five) minutes as needed for Chest pain., Disp: 25 tablet, Rfl: 2  No current facility-administered medications for this visit.    Review of Systems   Constitutional:  Negative for chills, diaphoresis, fever and malaise/fatigue.   Respiratory:  Negative for cough and shortness of breath.    Cardiovascular:  Negative for chest pain, palpitations, orthopnea, leg swelling and PND.        SOB on exertion   Gastrointestinal:  Negative for abdominal pain, nausea and vomiting.   Musculoskeletal:  Negative for falls.   Neurological:  Negative for focal weakness and weakness.         Objective:   /70 (BP Location: Left arm, Patient Position: Sitting)   Pulse 65   Ht 6' (1.829 m)   Wt 98.4 kg (217 lb)   SpO2 99%   BMI 29.43 kg/m²     Physical Exam  Constitutional:       General: He is not in acute distress.     Appearance: Normal appearance.   Cardiovascular:      Rate and Rhythm: Normal rate and regular rhythm.    Pulmonary:      Effort: Pulmonary effort is normal.      Breath sounds: Normal breath sounds.   Musculoskeletal:      Cervical back: Neck supple. No rigidity.      Right lower leg: No edema.      Left lower leg: No edema.   Skin:     General: Skin is warm and dry.   Neurological:      Mental Status: He is alert and oriented to person, place, and time.   Psychiatric:         Mood and Affect: Mood normal.         Behavior: Behavior normal.           Assessment:     1. Coronary artery disease, unspecified vessel or lesion type, unspecified whether angina present, unspecified whether native or transplanted heart  Basic Metabolic Panel    CBC Auto Differential      2. Essential hypertension  Basic Metabolic Panel    CBC Auto Differential      3. Acute systolic heart failure  Basic Metabolic Panel    CBC Auto Differential      4. NSTEMI (non-ST elevation myocardial infarction)        5. Acute on chronic systolic heart failure        6. Hyperlipidemia, unspecified hyperlipidemia type              Plan:   NSTEMI (non-ST elevation myocardial infarction)  - s/p PCI to prox LAD, failed SVG to LAD and RCA, patent SVG to OM1  - has c/o mild infrequent chest pain  - SL NTG prn for chest pain  - will schedule staged PCI of D1  - continue DAPT with ASA and Brilinta x 1 year, ASA indefinitely  - continue high intensity statin with lipitor 80mg daily  - no ACE/ARB, ARNI, MRA or SGLT2i due to CKD  - continue Toprol XL, bidil, and norvasc  - labs today    Essential hypertension  - well controlled on current meds    Acute on chronic systolic heart failure  - EF 35-40%  - NYHA class II, stage C  - euvolemic, warm  - continue lasix 20mg daily  - no ACE/ARB, ARNI, MRA or SGLT2i due to CKD  - continue bidil tid, Toprol XL, and norvasc  - labs today    HLD (hyperlipidemia)  - continue high intensity statin with lipitor 80mg daily    Follow up with me 2 weeks after [facility]

## 2024-05-30 ENCOUNTER — TELEPHONE (OUTPATIENT)
Dept: CARDIOLOGY | Facility: CLINIC | Age: 84
End: 2024-05-30
Payer: MEDICARE

## 2024-05-30 DIAGNOSIS — I25.10 CORONARY ARTERY DISEASE INVOLVING NATIVE HEART, UNSPECIFIED VESSEL OR LESION TYPE, UNSPECIFIED WHETHER ANGINA PRESENT: Primary | ICD-10-CM

## 2024-05-30 RX ORDER — SODIUM CHLORIDE 0.9 % (FLUSH) 0.9 %
2 SYRINGE (ML) INJECTION
Status: CANCELLED | OUTPATIENT
Start: 2024-06-18

## 2024-05-30 NOTE — TELEPHONE ENCOUNTER
Notified pt and daughter (Jennifer), of stent placement procedure that is scheduled for June 18th, arrival time 7:00am. Pt and Jennifer voiced understanding.

## 2024-05-31 ENCOUNTER — EXTERNAL CHRONIC CARE MANAGEMENT (OUTPATIENT)
Dept: FAMILY MEDICINE | Facility: CLINIC | Age: 84
End: 2024-05-31
Payer: MEDICARE

## 2024-05-31 PROCEDURE — G0511 CCM/BHI BY RHC/FQHC 20MIN MO: HCPCS | Mod: ,,, | Performed by: FAMILY MEDICINE

## 2024-06-02 ENCOUNTER — HOSPITAL ENCOUNTER (INPATIENT)
Facility: HOSPITAL | Age: 84
LOS: 1 days | Discharge: HOME HEALTH | DRG: 280 | End: 2024-06-05
Attending: EMERGENCY MEDICINE | Admitting: HOSPITALIST
Payer: MEDICARE

## 2024-06-02 DIAGNOSIS — R07.9 CHEST PAIN, UNSPECIFIED TYPE: Primary | ICD-10-CM

## 2024-06-02 DIAGNOSIS — I21.4 NSTEMI (NON-ST ELEVATION MYOCARDIAL INFARCTION): ICD-10-CM

## 2024-06-02 DIAGNOSIS — I50.23 ACUTE ON CHRONIC SYSTOLIC HEART FAILURE: ICD-10-CM

## 2024-06-02 DIAGNOSIS — R79.89 ELEVATED TROPONIN: ICD-10-CM

## 2024-06-02 DIAGNOSIS — R06.02 SHORTNESS OF BREATH: ICD-10-CM

## 2024-06-02 DIAGNOSIS — I21.4 NSTEMI (NON-ST ELEVATED MYOCARDIAL INFARCTION): ICD-10-CM

## 2024-06-02 PROBLEM — I50.9 CHF (CONGESTIVE HEART FAILURE): Status: ACTIVE | Noted: 2024-06-02

## 2024-06-02 PROBLEM — I50.21 ACUTE SYSTOLIC HEART FAILURE: Status: RESOLVED | Noted: 2024-05-07 | Resolved: 2024-06-02

## 2024-06-02 LAB
ALBUMIN SERPL BCP-MCNC: 3.5 G/DL (ref 3.5–5)
ALBUMIN/GLOB SERPL: 0.9 {RATIO}
ALP SERPL-CCNC: 333 U/L (ref 45–115)
ALT SERPL W P-5'-P-CCNC: 42 U/L (ref 16–61)
ANION GAP SERPL CALCULATED.3IONS-SCNC: 9 MMOL/L (ref 7–16)
APTT PPP: 33.2 SECONDS (ref 25.2–37.3)
APTT PPP: 37.1 SECONDS (ref 25.2–37.3)
AST SERPL W P-5'-P-CCNC: 51 U/L (ref 15–37)
BACTERIA #/AREA URNS HPF: ABNORMAL /HPF
BASOPHILS # BLD AUTO: 0.02 K/UL (ref 0–0.2)
BASOPHILS # BLD AUTO: 0.03 K/UL (ref 0–0.2)
BASOPHILS NFR BLD AUTO: 0.4 % (ref 0–1)
BASOPHILS NFR BLD AUTO: 0.6 % (ref 0–1)
BILIRUB SERPL-MCNC: 1.2 MG/DL (ref ?–1.2)
BILIRUB UR QL STRIP: NEGATIVE
BUN SERPL-MCNC: 30 MG/DL (ref 7–18)
BUN/CREAT SERPL: 15 (ref 6–20)
CALCIUM SERPL-MCNC: 8.7 MG/DL (ref 8.5–10.1)
CHLORIDE SERPL-SCNC: 113 MMOL/L (ref 98–107)
CLARITY UR: CLEAR
CO2 SERPL-SCNC: 22 MMOL/L (ref 21–32)
COLOR UR: YELLOW
CREAT SERPL-MCNC: 2.02 MG/DL (ref 0.7–1.3)
DIFFERENTIAL METHOD BLD: ABNORMAL
DIFFERENTIAL METHOD BLD: ABNORMAL
EGFR (NO RACE VARIABLE) (RUSH/TITUS): 32 ML/MIN/1.73M2
EOSINOPHIL # BLD AUTO: 0.06 K/UL (ref 0–0.5)
EOSINOPHIL # BLD AUTO: 0.07 K/UL (ref 0–0.5)
EOSINOPHIL NFR BLD AUTO: 1.3 % (ref 1–4)
EOSINOPHIL NFR BLD AUTO: 1.5 % (ref 1–4)
ERYTHROCYTE [DISTWIDTH] IN BLOOD BY AUTOMATED COUNT: 13.8 % (ref 11.5–14.5)
ERYTHROCYTE [DISTWIDTH] IN BLOOD BY AUTOMATED COUNT: 14 % (ref 11.5–14.5)
GLOBULIN SER-MCNC: 3.8 G/DL (ref 2–4)
GLUCOSE SERPL-MCNC: 157 MG/DL (ref 74–106)
GLUCOSE UR STRIP-MCNC: NORMAL MG/DL
HCT VFR BLD AUTO: 38.4 % (ref 40–54)
HCT VFR BLD AUTO: 38.8 % (ref 40–54)
HGB BLD-MCNC: 12.3 G/DL (ref 13.5–18)
HGB BLD-MCNC: 12.4 G/DL (ref 13.5–18)
IMM GRANULOCYTES # BLD AUTO: 0.01 K/UL (ref 0–0.04)
IMM GRANULOCYTES # BLD AUTO: 0.01 K/UL (ref 0–0.04)
IMM GRANULOCYTES NFR BLD: 0.2 % (ref 0–0.4)
IMM GRANULOCYTES NFR BLD: 0.2 % (ref 0–0.4)
INR BLD: 1.25
KETONES UR STRIP-SCNC: NEGATIVE MG/DL
LEUKOCYTE ESTERASE UR QL STRIP: NEGATIVE
LYMPHOCYTES # BLD AUTO: 1.42 K/UL (ref 1–4.8)
LYMPHOCYTES # BLD AUTO: 1.51 K/UL (ref 1–4.8)
LYMPHOCYTES NFR BLD AUTO: 30.5 % (ref 27–41)
LYMPHOCYTES NFR BLD AUTO: 32.7 % (ref 27–41)
MCH RBC QN AUTO: 30.9 PG (ref 27–31)
MCH RBC QN AUTO: 31.5 PG (ref 27–31)
MCHC RBC AUTO-ENTMCNC: 32 G/DL (ref 32–36)
MCHC RBC AUTO-ENTMCNC: 32 G/DL (ref 32–36)
MCV RBC AUTO: 96.8 FL (ref 80–96)
MCV RBC AUTO: 98.5 FL (ref 80–96)
MONOCYTES # BLD AUTO: 0.51 K/UL (ref 0–0.8)
MONOCYTES # BLD AUTO: 0.65 K/UL (ref 0–0.8)
MONOCYTES NFR BLD AUTO: 11 % (ref 2–6)
MONOCYTES NFR BLD AUTO: 14 % (ref 2–6)
MPC BLD CALC-MCNC: 10.4 FL (ref 9.4–12.4)
MPC BLD CALC-MCNC: 9.9 FL (ref 9.4–12.4)
MUCOUS, UA: ABNORMAL /LPF
NEUTROPHILS # BLD AUTO: 2.48 K/UL (ref 1.8–7.7)
NEUTROPHILS # BLD AUTO: 2.5 K/UL (ref 1.8–7.7)
NEUTROPHILS NFR BLD AUTO: 53.4 % (ref 53–65)
NEUTROPHILS NFR BLD AUTO: 54.2 % (ref 53–65)
NITRITE UR QL STRIP: NEGATIVE
NRBC # BLD AUTO: 0 X10E3/UL
NRBC # BLD AUTO: 0 X10E3/UL
NRBC, AUTO (.00): 0 %
NRBC, AUTO (.00): 0 %
NT-PROBNP SERPL-MCNC: 5058 PG/ML (ref 1–450)
PH UR STRIP: 6 PH UNITS
PLATELET # BLD AUTO: 203 K/UL (ref 150–400)
PLATELET # BLD AUTO: 207 K/UL (ref 150–400)
POTASSIUM SERPL-SCNC: 4.1 MMOL/L (ref 3.5–5.1)
PROT SERPL-MCNC: 7.3 G/DL (ref 6.4–8.2)
PROT UR QL STRIP: 20
PROTHROMBIN TIME: 15.6 SECONDS (ref 11.7–14.7)
RBC # BLD AUTO: 3.9 M/UL (ref 4.6–6.2)
RBC # BLD AUTO: 4.01 M/UL (ref 4.6–6.2)
RBC # UR STRIP: 0.1 /UL
RBC #/AREA URNS HPF: 98 /HPF
SODIUM SERPL-SCNC: 140 MMOL/L (ref 136–145)
SP GR UR STRIP: 1.02
TROPONIN I SERPL DL<=0.01 NG/ML-MCNC: 226.7 PG/ML
TROPONIN I SERPL DL<=0.01 NG/ML-MCNC: 226.9 PG/ML
TROPONIN I SERPL DL<=0.01 NG/ML-MCNC: 232.9 PG/ML
UROBILINOGEN UR STRIP-ACNC: 2 MG/DL
WBC # BLD AUTO: 4.62 K/UL (ref 4.5–11)
WBC # BLD AUTO: 4.65 K/UL (ref 4.5–11)
WBC #/AREA URNS HPF: 4 /HPF

## 2024-06-02 PROCEDURE — 93005 ELECTROCARDIOGRAM TRACING: CPT

## 2024-06-02 PROCEDURE — G0378 HOSPITAL OBSERVATION PER HR: HCPCS

## 2024-06-02 PROCEDURE — 25000003 PHARM REV CODE 250

## 2024-06-02 PROCEDURE — 85730 THROMBOPLASTIN TIME PARTIAL: CPT

## 2024-06-02 PROCEDURE — 36415 COLL VENOUS BLD VENIPUNCTURE: CPT

## 2024-06-02 PROCEDURE — 96366 THER/PROPH/DIAG IV INF ADDON: CPT

## 2024-06-02 PROCEDURE — 84484 ASSAY OF TROPONIN QUANT: CPT | Mod: 91

## 2024-06-02 PROCEDURE — 84484 ASSAY OF TROPONIN QUANT: CPT | Performed by: EMERGENCY MEDICINE

## 2024-06-02 PROCEDURE — 85610 PROTHROMBIN TIME: CPT

## 2024-06-02 PROCEDURE — 96365 THER/PROPH/DIAG IV INF INIT: CPT

## 2024-06-02 PROCEDURE — 96375 TX/PRO/DX INJ NEW DRUG ADDON: CPT

## 2024-06-02 PROCEDURE — 81003 URINALYSIS AUTO W/O SCOPE: CPT

## 2024-06-02 PROCEDURE — 93010 ELECTROCARDIOGRAM REPORT: CPT | Mod: ,,, | Performed by: INTERNAL MEDICINE

## 2024-06-02 PROCEDURE — 85730 THROMBOPLASTIN TIME PARTIAL: CPT | Mod: 91 | Performed by: FAMILY MEDICINE

## 2024-06-02 PROCEDURE — 80053 COMPREHEN METABOLIC PANEL: CPT | Performed by: EMERGENCY MEDICINE

## 2024-06-02 PROCEDURE — 36415 COLL VENOUS BLD VENIPUNCTURE: CPT | Performed by: EMERGENCY MEDICINE

## 2024-06-02 PROCEDURE — 83880 ASSAY OF NATRIURETIC PEPTIDE: CPT | Performed by: EMERGENCY MEDICINE

## 2024-06-02 PROCEDURE — 99285 EMERGENCY DEPT VISIT HI MDM: CPT | Mod: 25

## 2024-06-02 PROCEDURE — 99223 1ST HOSP IP/OBS HIGH 75: CPT | Mod: GC,,, | Performed by: FAMILY MEDICINE

## 2024-06-02 PROCEDURE — 85025 COMPLETE CBC W/AUTO DIFF WBC: CPT | Performed by: EMERGENCY MEDICINE

## 2024-06-02 PROCEDURE — 63600175 PHARM REV CODE 636 W HCPCS

## 2024-06-02 PROCEDURE — 85025 COMPLETE CBC W/AUTO DIFF WBC: CPT | Mod: 91

## 2024-06-02 RX ORDER — ATORVASTATIN CALCIUM 80 MG/1
80 TABLET, FILM COATED ORAL NIGHTLY
Status: DISCONTINUED | OUTPATIENT
Start: 2024-06-02 | End: 2024-06-02

## 2024-06-02 RX ORDER — ISOSORBIDE DINITRATE 20 MG/1
20 TABLET ORAL 3 TIMES DAILY
Status: DISCONTINUED | OUTPATIENT
Start: 2024-06-02 | End: 2024-06-05 | Stop reason: HOSPADM

## 2024-06-02 RX ORDER — FUROSEMIDE 10 MG/ML
20 INJECTION INTRAMUSCULAR; INTRAVENOUS DAILY
Status: DISCONTINUED | OUTPATIENT
Start: 2024-06-02 | End: 2024-06-05

## 2024-06-02 RX ORDER — MORPHINE SULFATE 2 MG/ML
2 INJECTION, SOLUTION INTRAMUSCULAR; INTRAVENOUS EVERY 4 HOURS PRN
Status: DISCONTINUED | OUTPATIENT
Start: 2024-06-02 | End: 2024-06-05 | Stop reason: HOSPADM

## 2024-06-02 RX ORDER — ACETAMINOPHEN 325 MG/1
650 TABLET ORAL EVERY 6 HOURS PRN
Status: DISCONTINUED | OUTPATIENT
Start: 2024-06-02 | End: 2024-06-04

## 2024-06-02 RX ORDER — TALC
6 POWDER (GRAM) TOPICAL NIGHTLY PRN
Status: DISCONTINUED | OUTPATIENT
Start: 2024-06-02 | End: 2024-06-05 | Stop reason: HOSPADM

## 2024-06-02 RX ORDER — HYDRALAZINE HYDROCHLORIDE 20 MG/ML
10 INJECTION INTRAMUSCULAR; INTRAVENOUS EVERY 6 HOURS PRN
Status: DISCONTINUED | OUTPATIENT
Start: 2024-06-02 | End: 2024-06-05 | Stop reason: HOSPADM

## 2024-06-02 RX ORDER — AMLODIPINE BESYLATE 5 MG/1
5 TABLET ORAL DAILY
Status: DISCONTINUED | OUTPATIENT
Start: 2024-06-02 | End: 2024-06-05

## 2024-06-02 RX ORDER — HEPARIN SODIUM,PORCINE/D5W 25000/250
0-40 INTRAVENOUS SOLUTION INTRAVENOUS CONTINUOUS
Status: DISCONTINUED | OUTPATIENT
Start: 2024-06-02 | End: 2024-06-04

## 2024-06-02 RX ORDER — ASPIRIN 81 MG/1
81 TABLET ORAL DAILY
Status: DISCONTINUED | OUTPATIENT
Start: 2024-06-02 | End: 2024-06-05 | Stop reason: HOSPADM

## 2024-06-02 RX ORDER — ONDANSETRON HYDROCHLORIDE 2 MG/ML
4 INJECTION, SOLUTION INTRAVENOUS EVERY 6 HOURS PRN
Status: DISCONTINUED | OUTPATIENT
Start: 2024-06-02 | End: 2024-06-04

## 2024-06-02 RX ORDER — PANTOPRAZOLE SODIUM 40 MG/1
40 TABLET, DELAYED RELEASE ORAL DAILY
Status: DISCONTINUED | OUTPATIENT
Start: 2024-06-02 | End: 2024-06-05 | Stop reason: HOSPADM

## 2024-06-02 RX ORDER — ATORVASTATIN CALCIUM 80 MG/1
80 TABLET, FILM COATED ORAL NIGHTLY
Status: DISCONTINUED | OUTPATIENT
Start: 2024-06-02 | End: 2024-06-05 | Stop reason: HOSPADM

## 2024-06-02 RX ORDER — ASPIRIN 81 MG/1
81 TABLET ORAL DAILY
Status: DISCONTINUED | OUTPATIENT
Start: 2024-06-02 | End: 2024-06-02

## 2024-06-02 RX ADMIN — ATORVASTATIN CALCIUM 80 MG: 80 TABLET, FILM COATED ORAL at 04:06

## 2024-06-02 RX ADMIN — HYDRALAZINE HYDROCHLORIDE 35 MG: 10 TABLET, FILM COATED ORAL at 05:06

## 2024-06-02 RX ADMIN — ASPIRIN 81 MG: 81 TABLET, COATED ORAL at 04:06

## 2024-06-02 RX ADMIN — HEPARIN SODIUM 15 UNITS/KG/HR: 10000 INJECTION, SOLUTION INTRAVENOUS at 11:06

## 2024-06-02 RX ADMIN — ISOSORBIDE DINITRATE 20 MG: 20 TABLET ORAL at 09:06

## 2024-06-02 RX ADMIN — HYDRALAZINE HYDROCHLORIDE 35 MG: 10 TABLET, FILM COATED ORAL at 09:06

## 2024-06-02 RX ADMIN — AMLODIPINE BESYLATE 5 MG: 5 TABLET ORAL at 04:06

## 2024-06-02 RX ADMIN — ISOSORBIDE DINITRATE 20 MG: 20 TABLET ORAL at 05:06

## 2024-06-02 RX ADMIN — TICAGRELOR 90 MG: 90 TABLET ORAL at 04:06

## 2024-06-02 RX ADMIN — PANTOPRAZOLE SODIUM 40 MG: 40 TABLET, DELAYED RELEASE ORAL at 04:06

## 2024-06-02 RX ADMIN — NITROGLYCERIN 1 INCH: 20 OINTMENT TOPICAL at 02:06

## 2024-06-02 RX ADMIN — TICAGRELOR 90 MG: 90 TABLET ORAL at 09:06

## 2024-06-02 RX ADMIN — FUROSEMIDE 20 MG: 10 INJECTION, SOLUTION INTRAVENOUS at 02:06

## 2024-06-02 RX ADMIN — HEPARIN SODIUM 12 UNITS/KG/HR: 10000 INJECTION, SOLUTION INTRAVENOUS at 04:06

## 2024-06-02 NOTE — ASSESSMENT & PLAN NOTE
Chronic, controlled. Latest blood pressure and vitals reviewed-     Temp:  [97.7 °F (36.5 °C)-98.1 °F (36.7 °C)]   Pulse:  [57-71]   Resp:  [13-18]   BP: (134-167)/(65-89)   SpO2:  [96 %-98 %] .   Home meds for hypertension were reviewed and noted below.   Hypertension Medications               amLODIPine (NORVASC) 5 MG tablet Take 1 tablet (5 mg total) by mouth once daily.    furosemide (LASIX) 20 MG tablet Take 1 tablet (20 mg total) by mouth once daily.    isosorbide-hydrALAZINE 20-37.5 mg (BIDIL) 20-37.5 mg Tab Take 1 tablet by mouth 3 (three) times daily.    metoprolol succinate (TOPROL-XL) 25 MG 24 hr tablet Take 1 tablet (25 mg total) by mouth once daily.    nitroGLYCERIN (NITROSTAT) 0.4 MG SL tablet Place 1 tablet (0.4 mg total) under the tongue every 5 (five) minutes as needed for Chest pain.            While in the hospital, will manage blood pressure as follows; Adjust home antihypertensive regimen as follows- continue amlodipine, bidil and lasix . Hold metoprolol secondary to low-normal HR    Will utilize p.r.n. blood pressure medication only if patient's blood pressure greater than 180/110 and he develops symptoms such as worsening chest pain or shortness of breath.  6/3  - start Chlorthalidone

## 2024-06-02 NOTE — H&P
" [facility] Hospital Medicine  History & Physical    Patient Name: Trung Barboza  MRN: [id number]  Patient Class: OP- Observation  Admission Date: 6/2/2024  Attending Physician: Ketan Renae Jr., MD   Primary Care Provider: Swati Vicente NP         Patient information was obtained from patient, past medical records, and ER records.     Subjective:     Principal Problem:NSTEMI (non-ST elevation myocardial infarction)    Chief Complaint:   Chief Complaint   Patient presents with    Shortness of Breath    Chest Pain     Pt presents to ed with c/o chest pain that started today and continuing SOB that is getting worse. Recently had stent placed by Dr. Villaseñor and was scheduled for another heart cath this month         HPI: This patient is a pleasant 83yo male with a pmh of HTN, CKD, CVA, PVD, HLD, GERD and CAD s/p stent placement 5/7 who presented to the ED with c/o central, sharp chest pain that he says started around 3 am this morning. The patient was recently hospitalized and had a stent placed in his mid LAD. Cardiology planned to perform a LHC later this month with possible further stent placement. There patient says he had not had any chest pain since discharge until this morning, but had been feeling weak and fatigued which he thought may have been due to his new medications. He also described his urine as appearing "brown" and said he had had some generalized abdominal pain. He endorsed some SOB associated with his chest pain and said when he woke up he felt like he was being "smothered." He denied fevers, chills and new constitutional complaints.     ED workup showed elevated but flat troponins at 226.7-->226.9. ProBNP was elevated but lower than previous at 5058. EKG showed non specific ST changes in inferior leads but no obvious elevations. Labs were otherwise around baseline.    Past Medical History:   Diagnosis Date    Aortic regurgitation     BPH with urinary " obstruction     Chronic kidney disease, stage 3b     GR 44     creat 1.8    Coronary artery disease     Essential hypertension 07/10/2021    Gastroesophageal reflux disease 09/15/2022    Gout, arthritis     PVD (peripheral vascular disease) 02/01/2024    TIA (transient ischemic attack)        Past Surgical History:   Procedure Laterality Date    ANGIOGRAM, CORONARY, WITH LEFT HEART CATHETERIZATION N/A 12/31/2021    Procedure: ANGIOGRAM,CORONARY,WITH LEFT HEART CATHETERIZATION;  Surgeon: Mahad Villaseñor DO;  Location: [facility] CATH LAB;  Service: Cardiology;  Laterality: N/A;    ANGIOGRAM, CORONARY, WITH LEFT HEART CATHETERIZATION N/A 5/7/2024    Procedure: Angiogram, Coronary, with Left Heart Cath;  Surgeon: Mahad Villaseñor DO;  Location: [facility] CATH LAB;  Service: Cardiology;  Laterality: N/A;    BIOPSY WITH TRANSRECTAL ULTRASOUND (TRUS) GUIDANCE Bilateral 03/07/2018    CHOLECYSTECTOMY      CORONARY ARTERY BYPASS GRAFT  01/07/2022    [facility]-Dr. Qureshi    LEFT HEART CATHETERIZATION Left 7/13/2021    Procedure: Left heart cath;  Surgeon: Philip Torres MD;  Location: [facility] CATH LAB;  Service: Cardiology;  Laterality: Left;    PERCUTANEOUS CORONARY INTERVENTION, ARTERY N/A 5/7/2024    Procedure: Percutaneous coronary intervention;  Surgeon: Mahad Villaseñor DO;  Location: [facility] CATH LAB;  Service: Cardiology;  Laterality: N/A;       Review of patient's allergies indicates:   Allergen Reactions    Lisinopril Swelling and Anaphylaxis       No current facility-administered medications on file prior to encounter.     Current Outpatient Medications on File Prior to Encounter   Medication Sig    amLODIPine (NORVASC) 5 MG tablet Take 1 tablet (5 mg total) by mouth once daily. (Patient taking differently: Take 5 mg by mouth every evening.)    aspirin (ECOTRIN) 81 MG EC tablet Take 1 tablet (81 mg total) by mouth once daily.    atorvastatin (LIPITOR) 80 MG tablet Take 1 tablet by mouth in the  evening    furosemide (LASIX) 20 MG tablet Take 1 tablet (20 mg total) by mouth once daily.    isosorbide-hydrALAZINE 20-37.5 mg (BIDIL) 20-37.5 mg Tab Take 1 tablet by mouth 3 (three) times daily.    metoprolol succinate (TOPROL-XL) 25 MG 24 hr tablet Take 1 tablet (25 mg total) by mouth once daily. (Patient taking differently: Take 25 mg by mouth every evening.)    multivitamin with minerals tablet Take 1 tablet by mouth once daily.    nitroGLYCERIN (NITROSTAT) 0.4 MG SL tablet Place 1 tablet (0.4 mg total) under the tongue every 5 (five) minutes as needed for Chest pain.    pantoprazole (PROTONIX) 40 MG tablet Take 1 tablet (40 mg total) by mouth once daily.    potassium chloride (KLOR-CON) 10 MEQ TbSR Take 1 tablet (10 mEq total) by mouth once daily.    ticagrelor (BRILINTA) 90 mg tablet Take 1 tablet (90 mg total) by mouth 2 (two) times daily.     Family History       Problem Relation (Age of Onset)    Heart disease Mother    Hypertension Mother, Son    No Known Problems Father, Sister, Sister, Sister, Sister, Brother, Brother, Brother, Brother, Daughter, Son, Son, Son, Son          Tobacco Use    Smoking status: Former     Current packs/day: 0.00     Average packs/day: 0.5 packs/day for 15.0 years (7.5 ttl pk-yrs)     Types: Cigarettes     Start date: 1973     Quit date: 1988     Years since quittin.9     Passive exposure: Never    Smokeless tobacco: Former     Types: Chew    Tobacco comments:     Dips 1 or 2 times a month   Substance and Sexual Activity    Alcohol use: Not Currently     Comment: occasional wine    Drug use: Never    Sexual activity: Not Currently     Partners: Female     Review of Systems   Constitutional:  Positive for fatigue. Negative for appetite change, chills, fever and unexpected weight change.   Respiratory:  Positive for shortness of breath. Negative for cough.    Cardiovascular:  Positive for chest pain and leg swelling. Negative for palpitations.   Gastrointestinal:   Positive for abdominal pain. Negative for blood in stool, diarrhea, nausea and vomiting.   Genitourinary:  Positive for frequency. Negative for hematuria and urgency.   Musculoskeletal:  Negative for arthralgias.   Neurological:  Positive for weakness. Negative for dizziness, syncope and light-headedness.   Psychiatric/Behavioral:  Negative for confusion.      Objective:     Vital Signs (Most Recent):  Temp: 97.7 °F (36.5 °C) (06/02/24 1515)  Pulse: 62 (06/02/24 1515)  Resp: 15 (06/02/24 1515)  BP: (!) 167/89 (06/02/24 1515)  SpO2: 96 % (06/02/24 1515) Vital Signs (24h Range):  Temp:  [97.7 °F (36.5 °C)-98.1 °F (36.7 °C)] 97.7 °F (36.5 °C)  Pulse:  [57-71] 62  Resp:  [13-18] 15  SpO2:  [96 %-98 %] 96 %  BP: (134-167)/(65-89) 167/89     Weight: 101.2 kg (223 lb)  Body mass index is 30.24 kg/m².     Physical Exam  Constitutional:       Appearance: Normal appearance.   HENT:      Head: Normocephalic and atraumatic.      Mouth/Throat:      Mouth: Mucous membranes are moist.      Pharynx: Oropharynx is clear.   Eyes:      Extraocular Movements: Extraocular movements intact.      Conjunctiva/sclera: Conjunctivae normal.      Pupils: Pupils are equal, round, and reactive to light.   Cardiovascular:      Rate and Rhythm: Normal rate and regular rhythm.      Pulses: Normal pulses.      Heart sounds: Normal heart sounds.   Pulmonary:      Effort: Pulmonary effort is normal.      Breath sounds: Normal breath sounds.   Chest:      Chest wall: Tenderness present.   Abdominal:      General: Abdomen is flat. Bowel sounds are normal.      Palpations: Abdomen is soft.   Musculoskeletal:      Right lower leg: Edema present.      Left lower leg: Edema present.   Skin:     General: Skin is warm.      Capillary Refill: Capillary refill takes less than 2 seconds.   Neurological:      General: No focal deficit present.      Mental Status: He is alert and oriented to person, place, and time.              CRANIAL NERVES     CN III, IV, VI    Pupils are equal, round, and reactive to light.       Significant Labs: All pertinent labs within the past 24 hours have been reviewed.    Significant Imaging: I have reviewed all pertinent imaging results/findings within the past 24 hours.  Assessment/Plan:     * NSTEMI (non-ST elevation myocardial infarction)  Start heparin infusion  Continue aspirin and brilinta  Continue statin  Hold metoprolol due to low-normal HR  Cardiology consulted-appreciate assistance  ANNITA score 4  Heart score 7  PRN morphine      CHF (congestive heart failure)  Patient is identified as having Combined Systolic and Diastolic heart failure that is Chronic. CHF is currently controlled. Latest ECHO performed and demonstrates- Results for orders placed during the hospital encounter of 05/06/24    Echo    Interpretation Summary    Left Ventricle: The left ventricle is normal in size. Mildly increased wall thickness. There is concentric remodeling. Moderate global hypokinesis present. There is moderately reduced systolic function with a visually estimated ejection fraction of 35 - 40%. Biplane (2D) method of discs ejection fraction is 37%. Global longitudinal strain is -8.8%. Global longitudinal strain is reduced. Grade III diastolic dysfunction.    Right Ventricle: Mild right ventricular enlargement. Systolic function is mildly reduced.    Left Atrium: Left atrium is mildly dilated.    Right Atrium: Right atrium is moderately dilated.    Aortic Valve: The aortic valve is a trileaflet valve. Mildly calcified cusps. There is mild aortic regurgitation with an eccentrically directed jet.    Mitral Valve: There is no stenosis. The mean pressure gradient across the mitral valve is 1 mmHg at a heart rate of  bpm. There is mild to moderate regurgitation with an eccentric jet.    Tricuspid Valve: There is moderate regurgitation with an eccentrically directed jet.    Pulmonary Artery: The estimated pulmonary artery systolic pressure is 62 mmHg.     "IVC/SVC: Elevated venous pressure at 15 mmHg.  . Continue Furosemide and monitor clinical status closely. Monitor on telemetry. Patient is off CHF pathway.  Monitor strict Is&Os and daily weights.  Place on fluid restriction of 2 L. Cardiology has been consulted. Continue to stress to patient importance of self efficacy and  on diet for CHF. Last BNP reviewed- and noted below No results for input(s): "BNP", "BNPTRIAGEBLO" in the last 168 hours.    Essential hypertension  Chronic, controlled. Latest blood pressure and vitals reviewed-     Temp:  [97.7 °F (36.5 °C)-98.1 °F (36.7 °C)]   Pulse:  [57-71]   Resp:  [13-18]   BP: (134-167)/(65-89)   SpO2:  [96 %-98 %] .   Home meds for hypertension were reviewed and noted below.   Hypertension Medications               amLODIPine (NORVASC) 5 MG tablet Take 1 tablet (5 mg total) by mouth once daily.    furosemide (LASIX) 20 MG tablet Take 1 tablet (20 mg total) by mouth once daily.    isosorbide-hydrALAZINE 20-37.5 mg (BIDIL) 20-37.5 mg Tab Take 1 tablet by mouth 3 (three) times daily.    metoprolol succinate (TOPROL-XL) 25 MG 24 hr tablet Take 1 tablet (25 mg total) by mouth once daily.    nitroGLYCERIN (NITROSTAT) 0.4 MG SL tablet Place 1 tablet (0.4 mg total) under the tongue every 5 (five) minutes as needed for Chest pain.            While in the hospital, will manage blood pressure as follows; Adjust home antihypertensive regimen as follows- continue amlodipine, bidil and lasix . Hold metoprolol secondary to low-normal HR    Will utilize p.r.n. blood pressure medication only if patient's blood pressure greater than 180/110 and he develops symptoms such as worsening chest pain or shortness of breath.    CKD (chronic kidney disease)  Creatine stable for now. BMP reviewed- noted Estimated Creatinine Clearance: 33.5 mL/min (A) (based on SCr of 2.02 mg/dL (H)). according to latest data. Based on current GFR, CKD stage is stage 3 - GFR 30-59.  Monitor UOP and serial " BMP and adjust therapy as needed. Renally dose meds. Avoid nephrotoxic medications and procedures.    Monitor BMP    HLD (hyperlipidemia)  Continue statin      Gastroesophageal reflux disease  Home protonix        VTE Risk Mitigation (From admission, onward)           Ordered     heparin 25,000 units in dextrose 5% (100 units/ml) IV bolus from bag LOW INTENSITY nomogram - [facility]  Once        Question:  Heparin Infusion Adjustment (DO NOT MODIFY ANSWER)  Answer:  \\[email or web address]\epic\Images\Pharmacy\HeparinInfusions\heparin LOW INTENSITY nomogram for [facility] [id number].pdf    06/02/24 1425     heparin 25,000 units in dextrose 5% 250 mL (100 units/mL) infusion LOW INTENSITY nomogram - [facility]  Continuous        Question:  Begin at (units/kg/hr)  Answer:  12    06/02/24 1425     heparin 25,000 units in dextrose 5% (100 units/ml) IV bolus from bag LOW INTENSITY nomogram - [facility]  As needed (PRN)        Question:  Heparin Infusion Adjustment (DO NOT MODIFY ANSWER)  Answer:  \\[email or web address]\epic\Images\Pharmacy\HeparinInfusions\heparin LOW INTENSITY nomogram for [facility] [id number].pdf    06/02/24 1425     heparin 25,000 units in dextrose 5% (100 units/ml) IV bolus from bag LOW INTENSITY nomogram - [facility]  As needed (PRN)        Question:  Heparin Infusion Adjustment (DO NOT MODIFY ANSWER)  Answer:  \\[email or web address]\epic\Images\Pharmacy\HeparinInfusions\heparin LOW INTENSITY nomogram for [facility] [id number].pdf    06/02/24 1425                           On 06/02/2024, patient should be placed in hospital observation services under my care in collaboration with Dr. Renae.         Susan Shoemaker MD  Department of Hospital Medicine  [facility] - [facility]

## 2024-06-02 NOTE — PHARMACY MED REC
"Admission Medication History     The home medication history was taken by Billie Hunter.    You may go to "Admission" then "Reconcile Home Medications" tabs to review and/or act upon these items.     The home medication list has been updated by the Pharmacy department.   Please read ALL comments highlighted in yellow.   Please address this information as you see fit.    Feel free to contact us if you have any questions or require assistance.  Medication Added:  Multivitamin with minerals  Patient noted he takes several of his medications at night. He got short of breath last night after taking his nightly medications.      Medications listed below were obtained from: Patient/family, Medications brought from home, and Analytic software- [employer]  (Not in a hospital admission)        Current Outpatient Medications on File Prior to Encounter   Medication Sig Dispense Refill Last Dose    amLODIPine (NORVASC) 5 MG tablet Take 1 tablet (5 mg total) by mouth once daily. (Patient taking differently: Take 5 mg by mouth every evening.) 90 tablet 1 6/1/2024    aspirin (ECOTRIN) 81 MG EC tablet Take 1 tablet (81 mg total) by mouth once daily. 30 tablet 0 6/2/2024    atorvastatin (LIPITOR) 80 MG tablet Take 1 tablet by mouth in the evening 90 tablet 0 6/1/2024    furosemide (LASIX) 20 MG tablet Take 1 tablet (20 mg total) by mouth once daily. 90 tablet 1 6/2/2024    isosorbide-hydrALAZINE 20-37.5 mg (BIDIL) 20-37.5 mg Tab Take 1 tablet by mouth 3 (three) times daily. 90 tablet 1 6/2/2024    metoprolol succinate (TOPROL-XL) 25 MG 24 hr tablet Take 1 tablet (25 mg total) by mouth once daily. (Patient taking differently: Take 25 mg by mouth every evening.) 30 tablet 1 6/1/2024    multivitamin with minerals tablet Take 1 tablet by mouth once daily.   6/2/2024    nitroGLYCERIN (NITROSTAT) 0.4 MG SL tablet Place 1 tablet (0.4 mg total) under the tongue every 5 (five) minutes as needed for Chest pain. 25 tablet 2     pantoprazole " (PROTONIX) 40 MG tablet Take 1 tablet (40 mg total) by mouth once daily. 90 tablet 1 6/2/2024    potassium chloride (KLOR-CON) 10 MEQ TbSR Take 1 tablet (10 mEq total) by mouth once daily. 90 tablet 1 6/2/2024    ticagrelor (BRILINTA) 90 mg tablet Take 1 tablet (90 mg total) by mouth 2 (two) times daily.   6/2/2024         Potential issues to be addressed PRIOR TO DISCHARGE  Patient has concerns that taking so many medications at night might have led to his shortness of breath last night.    Billie Hunter  Pharmacy [facility] Specialist - Medication History  [phone]. [phone]     .

## 2024-06-02 NOTE — ED TRIAGE NOTES
Chief Complaint   Patient presents with    Shortness of Breath    Chest Pain     Pt presents to ed with c/o chest pain that started today and continuing SOB that is getting worse. Recently had stent placed by Dr. Villaseñor and was scheduled for another heart cath this month

## 2024-06-02 NOTE — ASSESSMENT & PLAN NOTE
Creatine stable for now. BMP reviewed- noted Estimated Creatinine Clearance: 33.5 mL/min (A) (based on SCr of 2.02 mg/dL (H)). according to latest data. Based on current GFR, CKD stage is stage 3 - GFR 30-59.  Monitor UOP and serial BMP and adjust therapy as needed. Renally dose meds. Avoid nephrotoxic medications and procedures.    Monitor BMP

## 2024-06-02 NOTE — Clinical Note
90 ml of contrast were injected throughout the case. 110 mL of contrast was the total wasted during the case. 200 mL was the total amount used during the case. No

## 2024-06-02 NOTE — ED PROVIDER NOTES
Encounter Date: 6/2/2024       History     Chief Complaint   Patient presents with    Shortness of Breath    Chest Pain     Pt presents to ed with c/o chest pain that started today and continuing SOB that is getting worse. Recently had stent placed by Dr. Villaseñor and was scheduled for another heart cath this month      Patient is a 84-year-old male with history of CHF and coronary artery disease.  Patient states he woke up at about 2:00 a.m. in the morning with shortness breath and chest tightness which was improved with 2 sublingual nitroglycerin.  Since that time patient has had some mild persistent chest pain and shortness breath.  Worse with exertion.  No fever, no cough.  Patient does report mild increased swelling in legs.  Patient states that he had a stent placed about 2 weeks ago and is supposed to be scheduled beginning 1 more stent.        Review of patient's allergies indicates:   Allergen Reactions    Lisinopril Swelling and Anaphylaxis     Past Medical History:   Diagnosis Date    Aortic regurgitation     BPH with urinary obstruction     Chronic kidney disease, stage 3b     GR 44     creat 1.8    Coronary artery disease     Essential hypertension 07/10/2021    Gastroesophageal reflux disease 09/15/2022    Gout, arthritis     PVD (peripheral vascular disease) 02/01/2024    TIA (transient ischemic attack)      Past Surgical History:   Procedure Laterality Date    ANGIOGRAM, CORONARY, WITH LEFT HEART CATHETERIZATION N/A 12/31/2021    Procedure: ANGIOGRAM,CORONARY,WITH LEFT HEART CATHETERIZATION;  Surgeon: Mahad Villaseñor DO;  Location: [facility] CATH LAB;  Service: Cardiology;  Laterality: N/A;    ANGIOGRAM, CORONARY, WITH LEFT HEART CATHETERIZATION N/A 5/7/2024    Procedure: Angiogram, Coronary, with Left Heart Cath;  Surgeon: Mahad Villaseñor DO;  Location: [facility] CATH LAB;  Service: Cardiology;  Laterality: N/A;    BIOPSY WITH TRANSRECTAL ULTRASOUND (TRUS) GUIDANCE Bilateral 03/07/2018     CHOLECYSTECTOMY      CORONARY ARTERY BYPASS GRAFT  2022    [facility]-Dr. Qureshi    LEFT HEART CATHETERIZATION Left 2021    Procedure: Left heart cath;  Surgeon: Philip Torres MD;  Location: [facility] CATH LAB;  Service: Cardiology;  Laterality: Left;    PERCUTANEOUS CORONARY INTERVENTION, ARTERY N/A 2024    Procedure: Percutaneous coronary intervention;  Surgeon: Mahad Villaseñor DO;  Location: [facility] CATH LAB;  Service: Cardiology;  Laterality: N/A;     Family History   Problem Relation Name Age of Onset    Heart disease Mother      Hypertension Mother      No Known Problems Father      No Known Problems Sister      No Known Problems Sister      No Known Problems Sister      No Known Problems Sister      No Known Problems Brother      No Known Problems Brother      No Known Problems Brother      No Known Problems Brother      No Known Problems Daughter      Hypertension Son      No Known Problems Son      No Known Problems Son      No Known Problems Son      No Known Problems Son       Social History     Tobacco Use    Smoking status: Former     Current packs/day: 0.00     Average packs/day: 0.5 packs/day for 15.0 years (7.5 ttl pk-yrs)     Types: Cigarettes     Start date: 1973     Quit date: 1988     Years since quittin.9     Passive exposure: Never    Smokeless tobacco: Former     Types: Chew    Tobacco comments:     Dips 1 or 2 times a month   Substance Use Topics    Alcohol use: Not Currently     Comment: occasional wine    Drug use: Never     Review of Systems   Respiratory:  Positive for chest tightness and shortness of breath.    Cardiovascular:  Positive for chest pain and leg swelling.   All other systems reviewed and are negative.      Physical Exam     Initial Vitals [24 1025]   BP Pulse Resp Temp SpO2   (!) 155/65 71 18 98.1 °F (36.7 °C) 98 %      MAP       --         Physical Exam    Nursing note and vitals reviewed.  Constitutional: He appears  well-developed and well-nourished.   HENT:   Head: Normocephalic and atraumatic.   Mouth/Throat: Oropharynx is clear and moist.   Eyes: Pupils are equal, round, and reactive to light.   Neck: Neck supple.   Normal range of motion.  Cardiovascular:  Normal rate and regular rhythm.           Pulmonary/Chest: Effort normal and breath sounds normal.   Abdominal: Abdomen is soft. He exhibits no distension.   Musculoskeletal:         General: Edema present. Normal range of motion.      Cervical back: Normal range of motion and neck supple.      Comments: Plus one pitting edema bilateral lower extremities     Neurological: He is alert.   Skin: Skin is warm. Capillary refill takes less than 2 seconds.   Psychiatric: He has a normal mood and affect.         Medical Screening Exam   See Full Note    ED Course   Procedures  Labs Reviewed   COMPREHENSIVE METABOLIC PANEL - Abnormal; Notable for the following components:       Result Value    Chloride 113 (*)     Glucose 157 (*)     BUN 30 (*)     Creatinine 2.02 (*)     Alk Phos 333 (*)     AST 51 (*)     eGFR 32 (*)     All other components within normal limits   TROPONIN I - Abnormal; Notable for the following components:    Troponin I High Sensitivity 226.7 (*)     All other components within normal limits   NT-PRO NATRIURETIC PEPTIDE - Abnormal; Notable for the following components:    ProBNP 5,058 (*)     All other components within normal limits   CBC WITH DIFFERENTIAL - Abnormal; Notable for the following components:    RBC 3.90 (*)     Hemoglobin 12.3 (*)     Hematocrit 38.4 (*)     MCV 98.5 (*)     MCH 31.5 (*)     Monocytes % 11.0 (*)     All other components within normal limits   TROPONIN I - Abnormal; Notable for the following components:    Troponin I High Sensitivity 226.9 (*)     All other components within normal limits   CBC W/ AUTO DIFFERENTIAL    Narrative:     The following orders were created for panel order CBC auto differential.  Procedure                                Abnormality         Status                     ---------                               -----------         ------                     CBC with Differential[[id number]]       Abnormal            Final result                 Please view results for these tests on the individual orders.          Imaging Results              X-Ray Chest AP Portable (Final result)  Result time 06/02/24 11:46:27      Final result by Nam Holguin DO (06/02/24 11:46:27)                   Impression:      No acute pulmonary disease      Electronically signed by: Nam Holguin  Date:    06/02/2024  Time:    11:46               Narrative:    EXAMINATION:  XR CHEST AP PORTABLE    CLINICAL HISTORY:  Dyspnea;    TECHNIQUE:  XR CHEST AP PORTABLE    COMPARISON:  5/6/24    FINDINGS:  No lines or tubes.    Lungs are clear.    Normal pleura.    Cardiac silhouette is similar to comparison exam.    No obvious acute bone findings.                                       Medications   nitroGLYCERIN 2% TD oint ointment 1 inch (has no administration in time range)     Medical Decision Making  Amount and/or Complexity of Data Reviewed  Labs: ordered.  Radiology: ordered.    Risk  Prescription drug management.               ED Course as of 06/02/24 1358   Sun Jun 02, 2024   1043 Medical decision-making:  Differential diagnosis includes CHF exacerbation, STEMI, NSTEMI, ACS, pneumonia.  All testing ordered and interpreted by me. [BB]   1153 Troponin is elevated at 226.  This is decreased from 3 weeks ago when it was in the 300s. [BB]   1317 Repeat troponin is elevated but not changed from initial troponin. [BB]   1348 CMP shows elevated creatinine of 2 which is slightly worse than baseline. [BB]   1349 Chest x-ray by my interpretation shows no acute disease. [BB]   1354 Since patient is already scheduled to have another stent in his symptoms are worsening I made decision to admit patient and discussed case with internal medicine  hospitalist on-call who agrees with admission. [BB]      ED Course User Index  [BB] Giles Sims MD                             Clinical Impression:   Final diagnoses:  [R06.02] Shortness of breath  [R07.9] Chest pain, unspecified type (Primary)  [R79.89] Elevated troponin        ED Disposition Condition    Observation Stable                Giles Sims MD  06/02/24 7942       Giles iSms MD  06/02/24 9522

## 2024-06-02 NOTE — SUBJECTIVE & OBJECTIVE
Past Medical History:   Diagnosis Date    Aortic regurgitation     BPH with urinary obstruction     Chronic kidney disease, stage 3b     GR 44     creat 1.8    Coronary artery disease     Essential hypertension 07/10/2021    Gastroesophageal reflux disease 09/15/2022    Gout, arthritis     PVD (peripheral vascular disease) 02/01/2024    TIA (transient ischemic attack)        Past Surgical History:   Procedure Laterality Date    ANGIOGRAM, CORONARY, WITH LEFT HEART CATHETERIZATION N/A 12/31/2021    Procedure: ANGIOGRAM,CORONARY,WITH LEFT HEART CATHETERIZATION;  Surgeon: Mahad Villaseñor DO;  Location: [facility] CATH LAB;  Service: Cardiology;  Laterality: N/A;    ANGIOGRAM, CORONARY, WITH LEFT HEART CATHETERIZATION N/A 5/7/2024    Procedure: Angiogram, Coronary, with Left Heart Cath;  Surgeon: Mahad Villaseñor DO;  Location: [facility] CATH LAB;  Service: Cardiology;  Laterality: N/A;    BIOPSY WITH TRANSRECTAL ULTRASOUND (TRUS) GUIDANCE Bilateral 03/07/2018    CHOLECYSTECTOMY      CORONARY ARTERY BYPASS GRAFT  01/07/2022    [facility]-Dr. Qureshi    LEFT HEART CATHETERIZATION Left 7/13/2021    Procedure: Left heart cath;  Surgeon: Philip Torres MD;  Location: [facility] CATH LAB;  Service: Cardiology;  Laterality: Left;    PERCUTANEOUS CORONARY INTERVENTION, ARTERY N/A 5/7/2024    Procedure: Percutaneous coronary intervention;  Surgeon: Mahad Villaseñor DO;  Location: [facility] CATH LAB;  Service: Cardiology;  Laterality: N/A;       Review of patient's allergies indicates:   Allergen Reactions    Lisinopril Swelling and Anaphylaxis       No current facility-administered medications on file prior to encounter.     Current Outpatient Medications on File Prior to Encounter   Medication Sig    amLODIPine (NORVASC) 5 MG tablet Take 1 tablet (5 mg total) by mouth once daily. (Patient taking differently: Take 5 mg by mouth every evening.)    aspirin (ECOTRIN) 81 MG EC tablet Take 1 tablet (81 mg total) by mouth  once daily.    atorvastatin (LIPITOR) 80 MG tablet Take 1 tablet by mouth in the evening    furosemide (LASIX) 20 MG tablet Take 1 tablet (20 mg total) by mouth once daily.    isosorbide-hydrALAZINE 20-37.5 mg (BIDIL) 20-37.5 mg Tab Take 1 tablet by mouth 3 (three) times daily.    metoprolol succinate (TOPROL-XL) 25 MG 24 hr tablet Take 1 tablet (25 mg total) by mouth once daily. (Patient taking differently: Take 25 mg by mouth every evening.)    multivitamin with minerals tablet Take 1 tablet by mouth once daily.    nitroGLYCERIN (NITROSTAT) 0.4 MG SL tablet Place 1 tablet (0.4 mg total) under the tongue every 5 (five) minutes as needed for Chest pain.    pantoprazole (PROTONIX) 40 MG tablet Take 1 tablet (40 mg total) by mouth once daily.    potassium chloride (KLOR-CON) 10 MEQ TbSR Take 1 tablet (10 mEq total) by mouth once daily.    ticagrelor (BRILINTA) 90 mg tablet Take 1 tablet (90 mg total) by mouth 2 (two) times daily.     Family History       Problem Relation (Age of Onset)    Heart disease Mother    Hypertension Mother, Son    No Known Problems Father, Sister, Sister, Sister, Sister, Brother, Brother, Brother, Brother, Daughter, Son, Son, Son, Son          Tobacco Use    Smoking status: Former     Current packs/day: 0.00     Average packs/day: 0.5 packs/day for 15.0 years (7.5 ttl pk-yrs)     Types: Cigarettes     Start date: 1973     Quit date: 1988     Years since quittin.9     Passive exposure: Never    Smokeless tobacco: Former     Types: Chew    Tobacco comments:     Dips 1 or 2 times a month   Substance and Sexual Activity    Alcohol use: Not Currently     Comment: occasional wine    Drug use: Never    Sexual activity: Not Currently     Partners: Female     Review of Systems   Constitutional:  Positive for fatigue. Negative for appetite change, chills, fever and unexpected weight change.   Respiratory:  Positive for shortness of breath. Negative for cough.    Cardiovascular:  Positive  for chest pain and leg swelling. Negative for palpitations.   Gastrointestinal:  Positive for abdominal pain. Negative for blood in stool, diarrhea, nausea and vomiting.   Genitourinary:  Positive for frequency. Negative for hematuria and urgency.   Musculoskeletal:  Negative for arthralgias.   Neurological:  Positive for weakness. Negative for dizziness, syncope and light-headedness.   Psychiatric/Behavioral:  Negative for confusion.      Objective:     Vital Signs (Most Recent):  Temp: 97.7 °F (36.5 °C) (06/02/24 1515)  Pulse: 62 (06/02/24 1515)  Resp: 15 (06/02/24 1515)  BP: (!) 167/89 (06/02/24 1515)  SpO2: 96 % (06/02/24 1515) Vital Signs (24h Range):  Temp:  [97.7 °F (36.5 °C)-98.1 °F (36.7 °C)] 97.7 °F (36.5 °C)  Pulse:  [57-71] 62  Resp:  [13-18] 15  SpO2:  [96 %-98 %] 96 %  BP: (134-167)/(65-89) 167/89     Weight: 101.2 kg (223 lb)  Body mass index is 30.24 kg/m².     Physical Exam  Constitutional:       Appearance: Normal appearance.   HENT:      Head: Normocephalic and atraumatic.      Mouth/Throat:      Mouth: Mucous membranes are moist.      Pharynx: Oropharynx is clear.   Eyes:      Extraocular Movements: Extraocular movements intact.      Conjunctiva/sclera: Conjunctivae normal.      Pupils: Pupils are equal, round, and reactive to light.   Cardiovascular:      Rate and Rhythm: Normal rate and regular rhythm.      Pulses: Normal pulses.      Heart sounds: Normal heart sounds.   Pulmonary:      Effort: Pulmonary effort is normal.      Breath sounds: Normal breath sounds.   Chest:      Chest wall: Tenderness present.   Abdominal:      General: Abdomen is flat. Bowel sounds are normal.      Palpations: Abdomen is soft.   Musculoskeletal:      Right lower leg: Edema present.      Left lower leg: Edema present.   Skin:     General: Skin is warm.      Capillary Refill: Capillary refill takes less than 2 seconds.   Neurological:      General: No focal deficit present.      Mental Status: He is alert and  oriented to person, place, and time.              CRANIAL NERVES     CN III, IV, VI   Pupils are equal, round, and reactive to light.       Significant Labs: All pertinent labs within the past 24 hours have been reviewed.    Significant Imaging: I have reviewed all pertinent imaging results/findings within the past 24 hours.

## 2024-06-02 NOTE — HPI
"This patient is a pleasant 85yo male with a pmh of HTN, CKD, CVA, PVD, HLD, GERD and CAD s/p stent placement 5/7 who presented to the ED with c/o central, sharp chest pain that he says started around 3 am this morning. The patient was recently hospitalized and had a stent placed in his mid LAD. Cardiology planned to perform a LHC later this month with possible further stent placement. There patient says he had not had any chest pain since discharge until this morning, but had been feeling weak and fatigued which he thought may have been due to his new medications. He also described his urine as appearing "brown" and said he had had some generalized abdominal pain. He endorsed some SOB associated with his chest pain and said when he woke up he felt like he was being "smothered." He denied fevers, chills and new constitutional complaints.     ED workup showed elevated but flat troponins at 226.7-->226.9. ProBNP was elevated but lower than previous at 5058. EKG showed non specific ST changes in inferior leads but no obvious elevations. Labs were otherwise around baseline.  "

## 2024-06-02 NOTE — ASSESSMENT & PLAN NOTE
Chronic, controlled. Latest blood pressure and vitals reviewed-     Temp:  [97.7 °F (36.5 °C)-98.1 °F (36.7 °C)]   Pulse:  [57-71]   Resp:  [13-18]   BP: (134-167)/(65-89)   SpO2:  [96 %-98 %] .   Home meds for hypertension were reviewed and noted below.   Hypertension Medications               amLODIPine (NORVASC) 5 MG tablet Take 1 tablet (5 mg total) by mouth once daily.    furosemide (LASIX) 20 MG tablet Take 1 tablet (20 mg total) by mouth once daily.    isosorbide-hydrALAZINE 20-37.5 mg (BIDIL) 20-37.5 mg Tab Take 1 tablet by mouth 3 (three) times daily.    metoprolol succinate (TOPROL-XL) 25 MG 24 hr tablet Take 1 tablet (25 mg total) by mouth once daily.    nitroGLYCERIN (NITROSTAT) 0.4 MG SL tablet Place 1 tablet (0.4 mg total) under the tongue every 5 (five) minutes as needed for Chest pain.            While in the hospital, will manage blood pressure as follows; Adjust home antihypertensive regimen as follows- continue amlodipine and lasix    Will utilize p.r.n. blood pressure medication only if patient's blood pressure greater than 180/110 and he develops symptoms such as worsening chest pain or shortness of breath.

## 2024-06-02 NOTE — NURSING
Received patient from ER. Patient walked to bed. Admission education provided. Verbalized understanding.

## 2024-06-02 NOTE — Clinical Note
A percutaneous stick to the right femoral artery was performed. Ultrasound guidance was used to obtain access. no

## 2024-06-02 NOTE — Clinical Note
Diagnosis: Shortness of breath [786.05.ICD-9-CM]   Future Attending Provider: MIRELA SHIN JR [[id number]]   Special Needs:: No Special Needs [1]

## 2024-06-02 NOTE — ASSESSMENT & PLAN NOTE
"Patient is identified as having Combined Systolic and Diastolic heart failure that is Chronic. CHF is currently controlled. Latest ECHO performed and demonstrates- Results for orders placed during the hospital encounter of 05/06/24    Echo    Interpretation Summary    Left Ventricle: The left ventricle is normal in size. Mildly increased wall thickness. There is concentric remodeling. Moderate global hypokinesis present. There is moderately reduced systolic function with a visually estimated ejection fraction of 35 - 40%. Biplane (2D) method of discs ejection fraction is 37%. Global longitudinal strain is -8.8%. Global longitudinal strain is reduced. Grade III diastolic dysfunction.    Right Ventricle: Mild right ventricular enlargement. Systolic function is mildly reduced.    Left Atrium: Left atrium is mildly dilated.    Right Atrium: Right atrium is moderately dilated.    Aortic Valve: The aortic valve is a trileaflet valve. Mildly calcified cusps. There is mild aortic regurgitation with an eccentrically directed jet.    Mitral Valve: There is no stenosis. The mean pressure gradient across the mitral valve is 1 mmHg at a heart rate of  bpm. There is mild to moderate regurgitation with an eccentric jet.    Tricuspid Valve: There is moderate regurgitation with an eccentrically directed jet.    Pulmonary Artery: The estimated pulmonary artery systolic pressure is 62 mmHg.    IVC/SVC: Elevated venous pressure at 15 mmHg.  . Continue Furosemide and monitor clinical status closely. Monitor on telemetry. Patient is off CHF pathway.  Monitor strict Is&Os and daily weights.  Place on fluid restriction of 2 L. Cardiology has been consulted. Continue to stress to patient importance of self efficacy and  on diet for CHF. Last BNP reviewed- and noted below No results for input(s): "BNP", "BNPTRIAGEBLO" in the last 168 hours.  "

## 2024-06-02 NOTE — ASSESSMENT & PLAN NOTE
Start heparin infusion  Continue aspirin and brilinta  Continue statin  Hold metoprolol due to low-normal HR  Cardiology consulted-appreciate assistance  ANNITA score 4  Heart score 7  PRN morphine

## 2024-06-03 PROBLEM — I50.23 ACUTE ON CHRONIC SYSTOLIC HEART FAILURE: Status: ACTIVE | Noted: 2024-06-03

## 2024-06-03 LAB
ANION GAP SERPL CALCULATED.3IONS-SCNC: 10 MMOL/L (ref 7–16)
APTT PPP: 161.2 SECONDS (ref 25.2–37.3)
APTT PPP: 36.4 SECONDS (ref 25.2–37.3)
APTT PPP: 41.4 SECONDS (ref 25.2–37.3)
APTT PPP: >200 SECONDS (ref 25.2–37.3)
BASOPHILS # BLD AUTO: 0.02 K/UL (ref 0–0.2)
BASOPHILS NFR BLD AUTO: 0.4 % (ref 0–1)
BUN SERPL-MCNC: 28 MG/DL (ref 7–18)
BUN/CREAT SERPL: 15 (ref 6–20)
CALCIUM SERPL-MCNC: 9.4 MG/DL (ref 8.5–10.1)
CHLORIDE SERPL-SCNC: 111 MMOL/L (ref 98–107)
CK SERPL-CCNC: 171 U/L (ref 39–308)
CO2 SERPL-SCNC: 24 MMOL/L (ref 21–32)
CREAT SERPL-MCNC: 1.92 MG/DL (ref 0.7–1.3)
D DIMER PPP FEU-MCNC: 0.77 ΜG/ML (ref 0–0.47)
DIFFERENTIAL METHOD BLD: ABNORMAL
EGFR (NO RACE VARIABLE) (RUSH/TITUS): 34 ML/MIN/1.73M2
EOSINOPHIL # BLD AUTO: 0.08 K/UL (ref 0–0.5)
EOSINOPHIL NFR BLD AUTO: 1.6 % (ref 1–4)
ERYTHROCYTE [DISTWIDTH] IN BLOOD BY AUTOMATED COUNT: 13.7 % (ref 11.5–14.5)
GLUCOSE SERPL-MCNC: 112 MG/DL (ref 74–106)
HCT VFR BLD AUTO: 36.3 % (ref 40–54)
HGB BLD-MCNC: 12 G/DL (ref 13.5–18)
IMM GRANULOCYTES # BLD AUTO: 0.01 K/UL (ref 0–0.04)
IMM GRANULOCYTES NFR BLD: 0.2 % (ref 0–0.4)
LYMPHOCYTES # BLD AUTO: 1.42 K/UL (ref 1–4.8)
LYMPHOCYTES NFR BLD AUTO: 29.2 % (ref 27–41)
MAGNESIUM SERPL-MCNC: 2 MG/DL (ref 1.7–2.3)
MCH RBC QN AUTO: 31.3 PG (ref 27–31)
MCHC RBC AUTO-ENTMCNC: 33.1 G/DL (ref 32–36)
MCV RBC AUTO: 94.8 FL (ref 80–96)
MONOCYTES # BLD AUTO: 0.6 K/UL (ref 0–0.8)
MONOCYTES NFR BLD AUTO: 12.3 % (ref 2–6)
MPC BLD CALC-MCNC: 10.1 FL (ref 9.4–12.4)
NEUTROPHILS # BLD AUTO: 2.74 K/UL (ref 1.8–7.7)
NEUTROPHILS NFR BLD AUTO: 56.3 % (ref 53–65)
NRBC # BLD AUTO: 0 X10E3/UL
NRBC, AUTO (.00): 0 %
PLATELET # BLD AUTO: 200 K/UL (ref 150–400)
POTASSIUM SERPL-SCNC: 4.1 MMOL/L (ref 3.5–5.1)
RBC # BLD AUTO: 3.83 M/UL (ref 4.6–6.2)
SODIUM SERPL-SCNC: 141 MMOL/L (ref 136–145)
WBC # BLD AUTO: 4.87 K/UL (ref 4.5–11)

## 2024-06-03 PROCEDURE — 63600175 PHARM REV CODE 636 W HCPCS

## 2024-06-03 PROCEDURE — 96376 TX/PRO/DX INJ SAME DRUG ADON: CPT

## 2024-06-03 PROCEDURE — 82550 ASSAY OF CK (CPK): CPT

## 2024-06-03 PROCEDURE — 99215 OFFICE O/P EST HI 40 MIN: CPT | Mod: 25,FS,, | Performed by: STUDENT IN AN ORGANIZED HEALTH CARE EDUCATION/TRAINING PROGRAM

## 2024-06-03 PROCEDURE — 85730 THROMBOPLASTIN TIME PARTIAL: CPT | Mod: 91 | Performed by: HOSPITALIST

## 2024-06-03 PROCEDURE — 99233 SBSQ HOSP IP/OBS HIGH 50: CPT | Mod: GC,,, | Performed by: HOSPITALIST

## 2024-06-03 PROCEDURE — 36415 COLL VENOUS BLD VENIPUNCTURE: CPT | Performed by: HOSPITALIST

## 2024-06-03 PROCEDURE — 25000003 PHARM REV CODE 250

## 2024-06-03 PROCEDURE — 96366 THER/PROPH/DIAG IV INF ADDON: CPT

## 2024-06-03 PROCEDURE — 85379 FIBRIN DEGRADATION QUANT: CPT | Performed by: HOSPITALIST

## 2024-06-03 PROCEDURE — 85610 PROTHROMBIN TIME: CPT

## 2024-06-03 PROCEDURE — 85730 THROMBOPLASTIN TIME PARTIAL: CPT | Mod: 91

## 2024-06-03 PROCEDURE — 25000003 PHARM REV CODE 250: Performed by: REGISTERED NURSE

## 2024-06-03 PROCEDURE — G0378 HOSPITAL OBSERVATION PER HR: HCPCS

## 2024-06-03 PROCEDURE — 85018 HEMOGLOBIN: CPT

## 2024-06-03 PROCEDURE — 85025 COMPLETE CBC W/AUTO DIFF WBC: CPT

## 2024-06-03 PROCEDURE — 83735 ASSAY OF MAGNESIUM: CPT

## 2024-06-03 PROCEDURE — 36415 COLL VENOUS BLD VENIPUNCTURE: CPT

## 2024-06-03 PROCEDURE — 80048 BASIC METABOLIC PNL TOTAL CA: CPT

## 2024-06-03 RX ORDER — DOCUSATE SODIUM 100 MG/1
100 CAPSULE, LIQUID FILLED ORAL DAILY
Status: DISCONTINUED | OUTPATIENT
Start: 2024-06-03 | End: 2024-06-05 | Stop reason: HOSPADM

## 2024-06-03 RX ORDER — POLYETHYLENE GLYCOL 3350 17 G/17G
17 POWDER, FOR SOLUTION ORAL 2 TIMES DAILY PRN
Status: DISCONTINUED | OUTPATIENT
Start: 2024-06-03 | End: 2024-06-05 | Stop reason: HOSPADM

## 2024-06-03 RX ORDER — CHLORTHALIDONE 25 MG/1
25 TABLET ORAL DAILY
Status: DISCONTINUED | OUTPATIENT
Start: 2024-06-03 | End: 2024-06-04

## 2024-06-03 RX ADMIN — ATORVASTATIN CALCIUM 80 MG: 80 TABLET, FILM COATED ORAL at 09:06

## 2024-06-03 RX ADMIN — HYDRALAZINE HYDROCHLORIDE 35 MG: 10 TABLET, FILM COATED ORAL at 09:06

## 2024-06-03 RX ADMIN — AMLODIPINE BESYLATE 5 MG: 5 TABLET ORAL at 09:06

## 2024-06-03 RX ADMIN — PANTOPRAZOLE SODIUM 40 MG: 40 TABLET, DELAYED RELEASE ORAL at 09:06

## 2024-06-03 RX ADMIN — DOCUSATE SODIUM 100 MG: 100 CAPSULE, LIQUID FILLED ORAL at 11:06

## 2024-06-03 RX ADMIN — HEPARIN SODIUM 12 UNITS/KG/HR: 10000 INJECTION, SOLUTION INTRAVENOUS at 12:06

## 2024-06-03 RX ADMIN — ISOSORBIDE DINITRATE 20 MG: 20 TABLET ORAL at 09:06

## 2024-06-03 RX ADMIN — FUROSEMIDE 20 MG: 10 INJECTION, SOLUTION INTRAVENOUS at 09:06

## 2024-06-03 RX ADMIN — CHLORTHALIDONE 25 MG: 25 TABLET ORAL at 11:06

## 2024-06-03 RX ADMIN — HYDRALAZINE HYDROCHLORIDE 35 MG: 10 TABLET, FILM COATED ORAL at 02:06

## 2024-06-03 RX ADMIN — HYDRALAZINE HYDROCHLORIDE 35 MG: 10 TABLET, FILM COATED ORAL at 05:06

## 2024-06-03 RX ADMIN — TICAGRELOR 90 MG: 90 TABLET ORAL at 09:06

## 2024-06-03 RX ADMIN — HEPARIN SODIUM 15 UNITS/KG/HR: 10000 INJECTION, SOLUTION INTRAVENOUS at 09:06

## 2024-06-03 RX ADMIN — TICAGRELOR 90 MG: 90 TABLET ORAL at 11:06

## 2024-06-03 RX ADMIN — ISOSORBIDE DINITRATE 20 MG: 20 TABLET ORAL at 03:06

## 2024-06-03 NOTE — ASSESSMENT & PLAN NOTE
Patient is identified as having Combined Systolic and Diastolic heart failure that is Chronic. CHF is currently controlled. Latest ECHO performed and demonstrates- Results for orders placed during the hospital encounter of 05/06/24    Echo    Interpretation Summary    Left Ventricle: The left ventricle is normal in size. Mildly increased wall thickness. There is concentric remodeling. Moderate global hypokinesis present. There is moderately reduced systolic function with a visually estimated ejection fraction of 35 - 40%. Biplane (2D) method of discs ejection fraction is 37%. Global longitudinal strain is -8.8%. Global longitudinal strain is reduced. Grade III diastolic dysfunction.    Right Ventricle: Mild right ventricular enlargement. Systolic function is mildly reduced.    Left Atrium: Left atrium is mildly dilated.    Right Atrium: Right atrium is moderately dilated.    Aortic Valve: The aortic valve is a trileaflet valve. Mildly calcified cusps. There is mild aortic regurgitation with an eccentrically directed jet.    Mitral Valve: There is no stenosis. The mean pressure gradient across the mitral valve is 1 mmHg at a heart rate of  bpm. There is mild to moderate regurgitation with an eccentric jet.    Tricuspid Valve: There is moderate regurgitation with an eccentrically directed jet.    Pulmonary Artery: The estimated pulmonary artery systolic pressure is 62 mmHg.    IVC/SVC: Elevated venous pressure at 15 mmHg.  . Continue Furosemide and monitor clinical status closely. Monitor on telemetry. Patient is off CHF pathway.  Monitor strict Is&Os and daily weights.  Place on fluid restriction of 2 L. Cardiology has been consulted. Continue to stress to patient importance of self efficacy and  on diet for CHF. Last BNP reviewed- and noted below   6/3  - pro-BNP 6469  - Cardiology consulted  6/4  - [facility] scheduled for today  - appreciate cardiology recommendations

## 2024-06-03 NOTE — ASSESSMENT & PLAN NOTE
Start heparin infusion  Continue aspirin and brilinta  Continue statin  Hold metoprolol due to low-normal HR  Cardiology consulted-appreciate assistance  ANNITA score 4  Heart score 7  PRN morphine  6/3  - cardiac telemetry  - troponin 226.7 to 226.9 to 232.9  - dc Ticagrelor 2/2 gross hematuria  - dc Heparin drip 2/2 to gross hematuria  - cardiology consulted   - [facility] planned for today  - appreciate cardiology recommendations

## 2024-06-03 NOTE — ASSESSMENT & PLAN NOTE
Start heparin infusion  Continue aspirin and brilinta  Continue statin  Hold metoprolol due to low-normal HR  Cardiology consulted-appreciate assistance  ANNITA score 4  Heart score 7  PRN morphine  6/3  - cardiac telemetry  - troponin 226.7 to 226.9 to 232.9  - dc Ticagrelor 2/2 gross hematuria  - dc Heparin drip 2/2 to gross hematuria  - cardiology consulted   - appreciate cardiology recommendations  6/4  - [facility] scheduled for today  - appreciate cardiology recommendations

## 2024-06-03 NOTE — SUBJECTIVE & OBJECTIVE
Past Medical History:   Diagnosis Date    Aortic regurgitation     BPH with urinary obstruction     Chronic kidney disease, stage 3b     GR 44     creat 1.8    Coronary artery disease     Essential hypertension 07/10/2021    Gastroesophageal reflux disease 09/15/2022    Gout, arthritis     PVD (peripheral vascular disease) 02/01/2024    TIA (transient ischemic attack)        Past Surgical History:   Procedure Laterality Date    ANGIOGRAM, CORONARY, WITH LEFT HEART CATHETERIZATION N/A 12/31/2021    Procedure: ANGIOGRAM,CORONARY,WITH LEFT HEART CATHETERIZATION;  Surgeon: Mahad Villaseñor DO;  Location: [facility] CATH LAB;  Service: Cardiology;  Laterality: N/A;    ANGIOGRAM, CORONARY, WITH LEFT HEART CATHETERIZATION N/A 5/7/2024    Procedure: Angiogram, Coronary, with Left Heart Cath;  Surgeon: Mahad Villaseñor DO;  Location: [facility] CATH LAB;  Service: Cardiology;  Laterality: N/A;    BIOPSY WITH TRANSRECTAL ULTRASOUND (TRUS) GUIDANCE Bilateral 03/07/2018    CHOLECYSTECTOMY      CORONARY ARTERY BYPASS GRAFT  01/07/2022    [facility]-Dr. Qureshi    LEFT HEART CATHETERIZATION Left 7/13/2021    Procedure: Left heart cath;  Surgeon: Philip Torres MD;  Location: [facility] CATH LAB;  Service: Cardiology;  Laterality: Left;    PERCUTANEOUS CORONARY INTERVENTION, ARTERY N/A 5/7/2024    Procedure: Percutaneous coronary intervention;  Surgeon: Mahad Villaseñor DO;  Location: [facility] CATH LAB;  Service: Cardiology;  Laterality: N/A;       Review of patient's allergies indicates:   Allergen Reactions    Lisinopril Swelling and Anaphylaxis       No current facility-administered medications on file prior to encounter.     Current Outpatient Medications on File Prior to Encounter   Medication Sig    amLODIPine (NORVASC) 5 MG tablet Take 1 tablet (5 mg total) by mouth once daily. (Patient taking differently: Take 5 mg by mouth every evening.)    aspirin (ECOTRIN) 81 MG EC tablet Take 1 tablet (81 mg total) by mouth  once daily.    atorvastatin (LIPITOR) 80 MG tablet Take 1 tablet by mouth in the evening    furosemide (LASIX) 20 MG tablet Take 1 tablet (20 mg total) by mouth once daily.    isosorbide-hydrALAZINE 20-37.5 mg (BIDIL) 20-37.5 mg Tab Take 1 tablet by mouth 3 (three) times daily.    metoprolol succinate (TOPROL-XL) 25 MG 24 hr tablet Take 1 tablet (25 mg total) by mouth once daily. (Patient taking differently: Take 25 mg by mouth every evening.)    multivitamin with minerals tablet Take 1 tablet by mouth once daily.    nitroGLYCERIN (NITROSTAT) 0.4 MG SL tablet Place 1 tablet (0.4 mg total) under the tongue every 5 (five) minutes as needed for Chest pain.    pantoprazole (PROTONIX) 40 MG tablet Take 1 tablet (40 mg total) by mouth once daily.    potassium chloride (KLOR-CON) 10 MEQ TbSR Take 1 tablet (10 mEq total) by mouth once daily.    ticagrelor (BRILINTA) 90 mg tablet Take 1 tablet (90 mg total) by mouth 2 (two) times daily.     Family History       Problem Relation (Age of Onset)    Heart disease Mother    Hypertension Mother, Son    No Known Problems Father, Sister, Sister, Sister, Sister, Brother, Brother, Brother, Brother, Daughter, Son, Son, Son, Son          Tobacco Use    Smoking status: Former     Current packs/day: 0.00     Average packs/day: 0.5 packs/day for 15.0 years (7.5 ttl pk-yrs)     Types: Cigarettes     Start date: 1973     Quit date: 1988     Years since quittin.9     Passive exposure: Never    Smokeless tobacco: Former     Types: Chew    Tobacco comments:     Dips 1 or 2 times a month   Substance and Sexual Activity    Alcohol use: Not Currently     Comment: occasional wine    Drug use: Never    Sexual activity: Not Currently     Partners: Female     Review of Systems   Cardiovascular:  Positive for chest pain and dyspnea on exertion.   Respiratory:  Positive for shortness of breath.    All other systems reviewed and are negative.    Objective:     Vital Signs (Most  Recent):  Temp: 98.5 °F (36.9 °C) (06/03/24 1134)  Pulse: 70 (06/03/24 1134)  Resp: 18 (06/03/24 1134)  BP: (!) 163/83 (06/03/24 1151)  SpO2: 97 % (06/03/24 1134) Vital Signs (24h Range):  Temp:  [97.7 °F (36.5 °C)-98.6 °F (37 °C)] 98.5 °F (36.9 °C)  Pulse:  [54-74] 70  Resp:  [13-18] 18  SpO2:  [96 %-99 %] 97 %  BP: (130-179)/(63-89) 163/83     Weight: 101.2 kg (223 lb)  Body mass index is 30.24 kg/m².    SpO2: 97 %       No intake or output data in the 24 hours ending 06/03/24 1334    Lines/Drains/Airways       Peripheral Intravenous Line  Duration                  Peripheral IV - Single Lumen 06/02/24 1024 20 G Anterior;Left Forearm 1 day                     Physical Exam  Vitals reviewed.   Constitutional:       Appearance: Normal appearance.   HENT:      Head: Normocephalic.      Mouth/Throat:      Mouth: Mucous membranes are moist.   Eyes:      Extraocular Movements: Extraocular movements intact.   Cardiovascular:      Rate and Rhythm: Normal rate.      Pulses: Normal pulses.      Heart sounds: Normal heart sounds.   Pulmonary:      Effort: Pulmonary effort is normal.      Breath sounds: Normal breath sounds.   Abdominal:      General: Bowel sounds are normal. There is no distension.      Palpations: Abdomen is soft.   Musculoskeletal:      Cervical back: Normal range of motion.   Skin:     General: Skin is warm.      Capillary Refill: Capillary refill takes less than 2 seconds.   Neurological:      General: No focal deficit present.      Mental Status: He is alert and oriented to person, place, and time.   Psychiatric:         Mood and Affect: Mood normal.         Behavior: Behavior normal.          Significant Labs: All pertinent lab results from the last 24 hours have been reviewed.

## 2024-06-03 NOTE — PROGRESS NOTES
[facility] - [address] [place] [address]  Cardiology  Progress Note    Patient Name: Trung Barboza  MRN: [id number]  Admission Date: 6/2/2024  Hospital Length of Stay: 0 days  Code Status: Full Code   Attending Physician: Ronnie Michel MD   Primary Care Physician: Swati Vicente NP  Expected Discharge Date:   Principal Problem:NSTEMI (non-ST elevation myocardial infarction)    Subjective:     Hospital Course:   No notes on file    Past Medical History:   Diagnosis Date    Aortic regurgitation     BPH with urinary obstruction     Chronic kidney disease, stage 3b     GR 44     creat 1.8    Coronary artery disease     Essential hypertension 07/10/2021    Gastroesophageal reflux disease 09/15/2022    Gout, arthritis     PVD (peripheral vascular disease) 02/01/2024    TIA (transient ischemic attack)        Past Surgical History:   Procedure Laterality Date    ANGIOGRAM, CORONARY, WITH LEFT HEART CATHETERIZATION N/A 12/31/2021    Procedure: ANGIOGRAM,CORONARY,WITH LEFT HEART CATHETERIZATION;  Surgeon: Mahad Villaseñor DO;  Location: [facility] CATH LAB;  Service: Cardiology;  Laterality: N/A;    ANGIOGRAM, CORONARY, WITH LEFT HEART CATHETERIZATION N/A 5/7/2024    Procedure: Angiogram, Coronary, with Left Heart Cath;  Surgeon: Mahad Villaseñor DO;  Location: [facility] CATH LAB;  Service: Cardiology;  Laterality: N/A;    BIOPSY WITH TRANSRECTAL ULTRASOUND (TRUS) GUIDANCE Bilateral 03/07/2018    CHOLECYSTECTOMY      CORONARY ARTERY BYPASS GRAFT  01/07/2022    [facility]-Dr. Qureshi    LEFT HEART CATHETERIZATION Left 7/13/2021    Procedure: Left heart cath;  Surgeon: Philip Torres MD;  Location: [facility] CATH LAB;  Service: Cardiology;  Laterality: Left;    PERCUTANEOUS CORONARY INTERVENTION, ARTERY N/A 5/7/2024    Procedure: Percutaneous coronary intervention;  Surgeon: Mahad Villaseñor DO;  Location: [facility] CATH LAB;  Service: Cardiology;  Laterality: N/A;       Review of patient's  allergies indicates:   Allergen Reactions    Lisinopril Swelling and Anaphylaxis       No current facility-administered medications on file prior to encounter.     Current Outpatient Medications on File Prior to Encounter   Medication Sig    amLODIPine (NORVASC) 5 MG tablet Take 1 tablet (5 mg total) by mouth once daily. (Patient taking differently: Take 5 mg by mouth every evening.)    aspirin (ECOTRIN) 81 MG EC tablet Take 1 tablet (81 mg total) by mouth once daily.    atorvastatin (LIPITOR) 80 MG tablet Take 1 tablet by mouth in the evening    furosemide (LASIX) 20 MG tablet Take 1 tablet (20 mg total) by mouth once daily.    isosorbide-hydrALAZINE 20-37.5 mg (BIDIL) 20-37.5 mg Tab Take 1 tablet by mouth 3 (three) times daily.    metoprolol succinate (TOPROL-XL) 25 MG 24 hr tablet Take 1 tablet (25 mg total) by mouth once daily. (Patient taking differently: Take 25 mg by mouth every evening.)    multivitamin with minerals tablet Take 1 tablet by mouth once daily.    nitroGLYCERIN (NITROSTAT) 0.4 MG SL tablet Place 1 tablet (0.4 mg total) under the tongue every 5 (five) minutes as needed for Chest pain.    pantoprazole (PROTONIX) 40 MG tablet Take 1 tablet (40 mg total) by mouth once daily.    potassium chloride (KLOR-CON) 10 MEQ TbSR Take 1 tablet (10 mEq total) by mouth once daily.    ticagrelor (BRILINTA) 90 mg tablet Take 1 tablet (90 mg total) by mouth 2 (two) times daily.     Family History       Problem Relation (Age of Onset)    Heart disease Mother    Hypertension Mother, Son    No Known Problems Father, Sister, Sister, Sister, Sister, Brother, Brother, Brother, Brother, Daughter, Son, Son, Son, Son          Tobacco Use    Smoking status: Former     Current packs/day: 0.00     Average packs/day: 0.5 packs/day for 15.0 years (7.5 ttl pk-yrs)     Types: Cigarettes     Start date: 1973     Quit date: 1988     Years since quittin.9     Passive exposure: Never    Smokeless tobacco: Former      Types: Chew    Tobacco comments:     Dips 1 or 2 times a month   Substance and Sexual Activity    Alcohol use: Not Currently     Comment: occasional wine    Drug use: Never    Sexual activity: Not Currently     Partners: Female     Review of Systems   Cardiovascular:  Positive for chest pain and dyspnea on exertion.   Respiratory:  Positive for shortness of breath.    All other systems reviewed and are negative.    Objective:     Vital Signs (Most Recent):  Temp: 98.5 °F (36.9 °C) (06/03/24 1134)  Pulse: 70 (06/03/24 1134)  Resp: 18 (06/03/24 1134)  BP: (!) 163/83 (06/03/24 1151)  SpO2: 97 % (06/03/24 1134) Vital Signs (24h Range):  Temp:  [97.7 °F (36.5 °C)-98.6 °F (37 °C)] 98.5 °F (36.9 °C)  Pulse:  [54-74] 70  Resp:  [13-18] 18  SpO2:  [96 %-99 %] 97 %  BP: (130-179)/(63-89) 163/83     Weight: 101.2 kg (223 lb)  Body mass index is 30.24 kg/m².    SpO2: 97 %       No intake or output data in the 24 hours ending 06/03/24 1334    Lines/Drains/Airways       Peripheral Intravenous Line  Duration                  Peripheral IV - Single Lumen 06/02/24 1024 20 G Anterior;Left Forearm 1 day                     Physical Exam  Vitals reviewed.   Constitutional:       Appearance: Normal appearance.   HENT:      Head: Normocephalic.      Mouth/Throat:      Mouth: Mucous membranes are moist.   Eyes:      Extraocular Movements: Extraocular movements intact.   Cardiovascular:      Rate and Rhythm: Normal rate.      Pulses: Normal pulses.      Heart sounds: Normal heart sounds.   Pulmonary:      Effort: Pulmonary effort is normal.      Breath sounds: Normal breath sounds.   Abdominal:      General: Bowel sounds are normal. There is no distension.      Palpations: Abdomen is soft.   Musculoskeletal:      Cervical back: Normal range of motion.   Skin:     General: Skin is warm.      Capillary Refill: Capillary refill takes less than 2 seconds.   Neurological:      General: No focal deficit present.      Mental Status: He is alert  and oriented to person, place, and time.   Psychiatric:         Mood and Affect: Mood normal.         Behavior: Behavior normal.          Significant Labs: All pertinent lab results from the last 24 hours have been reviewed.      Assessment and Plan:         * NSTEMI (non-ST elevation myocardial infarction)  6/3:  Troponins 226> 226> 232.  EKG nonspecific EKG changes no elevation noted.  Currently on heparin drip.  Plan for left heart catheterization tomorrow.  Currently on ASA 81 mg, statin, Isorbid, Brilinta    CHF (congestive heart failure)  Patient is identified as having Combined Systolic and Diastolic heart failure that is Acute on chronic. CHF is currently controlled. Latest ECHO performed and demonstrates- Results for orders placed during the hospital encounter of 05/06/24    Echo    Interpretation Summary    Left Ventricle: The left ventricle is normal in size. Mildly increased wall thickness. There is concentric remodeling. Moderate global hypokinesis present. There is moderately reduced systolic function with a visually estimated ejection fraction of 35 - 40%. Biplane (2D) method of discs ejection fraction is 37%. Global longitudinal strain is -8.8%. Global longitudinal strain is reduced. Grade III diastolic dysfunction.    Right Ventricle: Mild right ventricular enlargement. Systolic function is mildly reduced.    Left Atrium: Left atrium is mildly dilated.    Right Atrium: Right atrium is moderately dilated.    Aortic Valve: The aortic valve is a trileaflet valve. Mildly calcified cusps. There is mild aortic regurgitation with an eccentrically directed jet.    Mitral Valve: There is no stenosis. The mean pressure gradient across the mitral valve is 1 mmHg at a heart rate of  bpm. There is mild to moderate regurgitation with an eccentric jet.    Tricuspid Valve: There is moderate regurgitation with an eccentrically directed jet.    Pulmonary Artery: The estimated pulmonary artery systolic pressure is 62  "mmHg.    IVC/SVC: Elevated venous pressure at 15 mmHg.  . Continue Furosemide and monitor clinical status closely. Monitor on telemetry. Monitor strict Is&Os and daily weights.  Place on fluid restriction of 2 L. Cardiology has been consulted. Continue to stress to patient importance of self efficacy and  on diet for CHF. Last BNP reviewed- and noted below No results for input(s): "BNP", "BNPTRIAGEBLO" in the last 168 hours.    HLD (hyperlipidemia)  6/3:  Atorvastatin resumed          VTE Risk Mitigation (From admission, onward)           Ordered     heparin 25,000 units in dextrose 5% 250 mL (100 units/mL) infusion LOW INTENSITY nomogram - [facility]  Continuous        Question:  Begin at (units/kg/hr)  Answer:  12    06/02/24 1425     heparin 25,000 units in dextrose 5% (100 units/ml) IV bolus from bag LOW INTENSITY nomogram - [facility]  As needed (PRN)        Question:  Heparin Infusion Adjustment (DO NOT MODIFY ANSWER)  Answer:  \\[email or web address]\epic\Images\Pharmacy\HeparinInfusions\heparin LOW INTENSITY nomogram for [facility] [id number]    06/02/24 1425     heparin 25,000 units in dextrose 5% (100 units/ml) IV bolus from bag LOW INTENSITY nomogram - [facility]  As needed (PRN)        Question:  Heparin Infusion Adjustment (DO NOT MODIFY ANSWER)  Answer:  \\[email or web address]\epic\Images\Pharmacy\HeparinInfusions\heparin LOW INTENSITY nomogram for [facility] [id number]    06/02/24 1425                    Jerrod Wesley, HEMANT  Cardiology  [facility] - [address] [place] [address] Telemetry    "

## 2024-06-03 NOTE — ASSESSMENT & PLAN NOTE
Creatine stable for now. BMP reviewed- noted Estimated Creatinine Clearance: 35.2 mL/min (A) (based on SCr of 1.92 mg/dL (H)). according to latest data. Based on current GFR, CKD stage is stage 3 - GFR 30-59.  Monitor UOP and serial BMP and adjust therapy as needed. Renally dose meds. Avoid nephrotoxic medications and procedures.  Monitor BMP

## 2024-06-03 NOTE — ASSESSMENT & PLAN NOTE
"Patient is identified as having Combined Systolic and Diastolic heart failure that is Acute on chronic. CHF is currently controlled. Latest ECHO performed and demonstrates- Results for orders placed during the hospital encounter of 05/06/24    Echo    Interpretation Summary    Left Ventricle: The left ventricle is normal in size. Mildly increased wall thickness. There is concentric remodeling. Moderate global hypokinesis present. There is moderately reduced systolic function with a visually estimated ejection fraction of 35 - 40%. Biplane (2D) method of discs ejection fraction is 37%. Global longitudinal strain is -8.8%. Global longitudinal strain is reduced. Grade III diastolic dysfunction.    Right Ventricle: Mild right ventricular enlargement. Systolic function is mildly reduced.    Left Atrium: Left atrium is mildly dilated.    Right Atrium: Right atrium is moderately dilated.    Aortic Valve: The aortic valve is a trileaflet valve. Mildly calcified cusps. There is mild aortic regurgitation with an eccentrically directed jet.    Mitral Valve: There is no stenosis. The mean pressure gradient across the mitral valve is 1 mmHg at a heart rate of  bpm. There is mild to moderate regurgitation with an eccentric jet.    Tricuspid Valve: There is moderate regurgitation with an eccentrically directed jet.    Pulmonary Artery: The estimated pulmonary artery systolic pressure is 62 mmHg.    IVC/SVC: Elevated venous pressure at 15 mmHg.  . Continue Furosemide and monitor clinical status closely. Monitor on telemetry. Monitor strict Is&Os and daily weights.  Place on fluid restriction of 2 L. Cardiology has been consulted. Continue to stress to patient importance of self efficacy and  on diet for CHF. Last BNP reviewed- and noted below No results for input(s): "BNP", "BNPTRIAGEBLO" in the last 168 hours.  "

## 2024-06-03 NOTE — SUBJECTIVE & OBJECTIVE
Interval History: Patient was lying in the bed comfortably at the time of encounter and didn't complain of any overnight events. Patient was updated on his latest labs and imaging results.  Patient is scheduled to have [facility] today.    Review of Systems   Constitutional:  Negative for activity change, appetite change, chills, diaphoresis, fatigue and fever.   HENT:  Negative for congestion.    Eyes: Negative.    Respiratory:  Negative for cough, choking, shortness of breath, wheezing and stridor.    Cardiovascular:  Negative for chest pain, palpitations and leg swelling.   Gastrointestinal:  Negative for abdominal distention, abdominal pain, anal bleeding, blood in stool, constipation, nausea and vomiting.   Genitourinary:  Negative for dysuria, flank pain, frequency, hematuria and urgency.   Musculoskeletal:  Negative for back pain.   Skin: Negative.    Neurological:  Negative for numbness and headaches.   Hematological:  Negative for adenopathy.   Psychiatric/Behavioral: Negative.       Objective:     Vital Signs (Most Recent):  Temp: 98.6 °F (37 °C) (06/03/24 0712)  Pulse: 71 (06/03/24 0712)  Resp: 18 (06/03/24 0712)  BP: (!) 148/63 (06/03/24 0712)  SpO2: 99 % (06/03/24 0712) Vital Signs (24h Range):  Temp:  [97.7 °F (36.5 °C)-98.6 °F (37 °C)] 98.6 °F (37 °C)  Pulse:  [54-74] 71  Resp:  [13-18] 18  SpO2:  [96 %-99 %] 99 %  BP: (130-179)/(63-89) 148/63     Weight: 101.2 kg (223 lb)  Body mass index is 30.24 kg/m².  No intake or output data in the 24 hours ending 06/03/24 1111      Physical Exam  Vitals and nursing note reviewed.   Constitutional:       General: He is not in acute distress.     Appearance: Normal appearance. He is not ill-appearing.   HENT:      Head: Normocephalic and atraumatic.      Nose: No congestion or rhinorrhea.   Eyes:      Extraocular Movements: Extraocular movements intact.      Conjunctiva/sclera: Conjunctivae normal.      Pupils: Pupils are equal, round, and reactive to light.    Cardiovascular:      Rate and Rhythm: Normal rate and regular rhythm.      Pulses: Normal pulses.      Heart sounds: Murmur heard.   Pulmonary:      Effort: Pulmonary effort is normal. No respiratory distress.      Breath sounds: Normal breath sounds. No wheezing.   Abdominal:      General: Bowel sounds are normal. There is no distension.      Palpations: Abdomen is soft.      Tenderness: There is no abdominal tenderness.   Musculoskeletal:      Cervical back: Normal range of motion and neck supple. No rigidity.      Right lower leg: No edema.      Left lower leg: No edema.   Skin:     General: Skin is warm.      Capillary Refill: Capillary refill takes less than 2 seconds.   Neurological:      General: No focal deficit present.      Mental Status: He is alert and oriented to person, place, and time.   Psychiatric:         Mood and Affect: Mood normal.         Behavior: Behavior normal.         Thought Content: Thought content normal.         Judgment: Judgment normal.             Significant Labs: All pertinent labs within the past 24 hours have been reviewed.    Significant Imaging: I have reviewed all pertinent imaging results/findings within the past 24 hours.

## 2024-06-03 NOTE — ASSESSMENT & PLAN NOTE
Patient is identified as having Combined Systolic and Diastolic heart failure that is Chronic. CHF is currently controlled. Latest ECHO performed and demonstrates- Results for orders placed during the hospital encounter of 05/06/24    Echo    Interpretation Summary    Left Ventricle: The left ventricle is normal in size. Mildly increased wall thickness. There is concentric remodeling. Moderate global hypokinesis present. There is moderately reduced systolic function with a visually estimated ejection fraction of 35 - 40%. Biplane (2D) method of discs ejection fraction is 37%. Global longitudinal strain is -8.8%. Global longitudinal strain is reduced. Grade III diastolic dysfunction.    Right Ventricle: Mild right ventricular enlargement. Systolic function is mildly reduced.    Left Atrium: Left atrium is mildly dilated.    Right Atrium: Right atrium is moderately dilated.    Aortic Valve: The aortic valve is a trileaflet valve. Mildly calcified cusps. There is mild aortic regurgitation with an eccentrically directed jet.    Mitral Valve: There is no stenosis. The mean pressure gradient across the mitral valve is 1 mmHg at a heart rate of  bpm. There is mild to moderate regurgitation with an eccentric jet.    Tricuspid Valve: There is moderate regurgitation with an eccentrically directed jet.    Pulmonary Artery: The estimated pulmonary artery systolic pressure is 62 mmHg.    IVC/SVC: Elevated venous pressure at 15 mmHg.  . Continue Furosemide and monitor clinical status closely. Monitor on telemetry. Patient is off CHF pathway.  Monitor strict Is&Os and daily weights.  Place on fluid restriction of 2 L. Cardiology has been consulted. Continue to stress to patient importance of self efficacy and  on diet for CHF. Last BNP reviewed- and noted below   6/3  - pro-BNP 6912  - Cardiology consulted  - [facility] planned today

## 2024-06-03 NOTE — NURSING
"Patient received from [facility]. Patient states he has "bad news" this morning. He has hematuria and is also passing blood in his stool. States he was not supposed to be taking the Brilinta, patient has had doses yesterday. Reported to provider. Patient voiced no other concerns or overnight events. WCTM.   "

## 2024-06-03 NOTE — ASSESSMENT & PLAN NOTE
6/3:  Troponins 226> 226> 232.  EKG nonspecific EKG changes no elevation noted.  Currently on heparin drip.  Plan for left heart catheterization tomorrow.  Currently on ASA 81 mg, statin, Isorbid, Brilinta

## 2024-06-03 NOTE — PROGRESS NOTES
"[facility] Hospital Medicine  Progress Note    Patient Name: Trung Barboza  MRN: [id number]  Patient Class: OP- Observation   Admission Date: 6/2/2024  Length of Stay: 0 days  Attending Physician: Ronnie Michel MD  Primary Care Provider: Swati Vicente NP        Subjective:     Principal Problem:NSTEMI (non-ST elevation myocardial infarction)        HPI:  This patient is a pleasant 85yo male with a pmh of HTN, CKD, CVA, PVD, HLD, GERD and CAD s/p stent placement 5/7 who presented to the ED with c/o central, sharp chest pain that he says started around 3 am this morning. The patient was recently hospitalized and had a stent placed in his mid LAD. Cardiology planned to perform a LHC later this month with possible further stent placement. There patient says he had not had any chest pain since discharge until this morning, but had been feeling weak and fatigued which he thought may have been due to his new medications. He also described his urine as appearing "brown" and said he had had some generalized abdominal pain. He endorsed some SOB associated with his chest pain and said when he woke up he felt like he was being "smothered." He denied fevers, chills and new constitutional complaints.     ED workup showed elevated but flat troponins at 226.7-->226.9. ProBNP was elevated but lower than previous at 5058. EKG showed non specific ST changes in inferior leads but no obvious elevations. Labs were otherwise around baseline.    Overview/Hospital Course:  No notes on file    Interval History: Patient was lying in the bed comfortably at the time of encounter and didn't complain of any overnight events. Patient was updated on his latest labs and imaging results.  Patient is scheduled to have LHC today.    Review of Systems   Constitutional:  Negative for activity change, appetite change, chills, diaphoresis, fatigue and fever.   HENT:  Negative for congestion.    Eyes: " Negative.    Respiratory:  Negative for cough, choking, shortness of breath, wheezing and stridor.    Cardiovascular:  Negative for chest pain, palpitations and leg swelling.   Gastrointestinal:  Negative for abdominal distention, abdominal pain, anal bleeding, blood in stool, constipation, nausea and vomiting.   Genitourinary:  Negative for dysuria, flank pain, frequency, hematuria and urgency.   Musculoskeletal:  Negative for back pain.   Skin: Negative.    Neurological:  Negative for numbness and headaches.   Hematological:  Negative for adenopathy.   Psychiatric/Behavioral: Negative.       Objective:     Vital Signs (Most Recent):  Temp: 98.6 °F (37 °C) (06/03/24 0712)  Pulse: 71 (06/03/24 0712)  Resp: 18 (06/03/24 0712)  BP: (!) 148/63 (06/03/24 0712)  SpO2: 99 % (06/03/24 0712) Vital Signs (24h Range):  Temp:  [97.7 °F (36.5 °C)-98.6 °F (37 °C)] 98.6 °F (37 °C)  Pulse:  [54-74] 71  Resp:  [13-18] 18  SpO2:  [96 %-99 %] 99 %  BP: (130-179)/(63-89) 148/63     Weight: 101.2 kg (223 lb)  Body mass index is 30.24 kg/m².  No intake or output data in the 24 hours ending 06/03/24 1111      Physical Exam  Vitals and nursing note reviewed.   Constitutional:       General: He is not in acute distress.     Appearance: Normal appearance. He is not ill-appearing.   HENT:      Head: Normocephalic and atraumatic.      Nose: No congestion or rhinorrhea.   Eyes:      Extraocular Movements: Extraocular movements intact.      Conjunctiva/sclera: Conjunctivae normal.      Pupils: Pupils are equal, round, and reactive to light.   Cardiovascular:      Rate and Rhythm: Normal rate and regular rhythm.      Pulses: Normal pulses.      Heart sounds: Murmur heard.   Pulmonary:      Effort: Pulmonary effort is normal. No respiratory distress.      Breath sounds: Normal breath sounds. No wheezing.   Abdominal:      General: Bowel sounds are normal. There is no distension.      Palpations: Abdomen is soft.      Tenderness: There is no  abdominal tenderness.   Musculoskeletal:      Cervical back: Normal range of motion and neck supple. No rigidity.      Right lower leg: No edema.      Left lower leg: No edema.   Skin:     General: Skin is warm.      Capillary Refill: Capillary refill takes less than 2 seconds.   Neurological:      General: No focal deficit present.      Mental Status: He is alert and oriented to person, place, and time.   Psychiatric:         Mood and Affect: Mood normal.         Behavior: Behavior normal.         Thought Content: Thought content normal.         Judgment: Judgment normal.             Significant Labs: All pertinent labs within the past 24 hours have been reviewed.    Significant Imaging: I have reviewed all pertinent imaging results/findings within the past 24 hours.    Assessment/Plan:      * NSTEMI (non-ST elevation myocardial infarction)  Start heparin infusion  Continue aspirin and brilinta  Continue statin  Hold metoprolol due to low-normal HR  Cardiology consulted-appreciate assistance  ANNITA score 4  Heart score 7  PRN morphine  6/3  - cardiac telemetry  - troponin 226.7 to 226.9 to 232.9  - dc Ticagrelor 2/2 gross hematuria  - dc Heparin drip 2/2 to gross hematuria  - cardiology consulted   - appreciate cardiology recommendations      CHF (congestive heart failure)  Patient is identified as having Combined Systolic and Diastolic heart failure that is Chronic. CHF is currently controlled. Latest ECHO performed and demonstrates- Results for orders placed during the hospital encounter of 05/06/24    Echo    Interpretation Summary    Left Ventricle: The left ventricle is normal in size. Mildly increased wall thickness. There is concentric remodeling. Moderate global hypokinesis present. There is moderately reduced systolic function with a visually estimated ejection fraction of 35 - 40%. Biplane (2D) method of discs ejection fraction is 37%. Global longitudinal strain is -8.8%. Global longitudinal strain is  reduced. Grade III diastolic dysfunction.    Right Ventricle: Mild right ventricular enlargement. Systolic function is mildly reduced.    Left Atrium: Left atrium is mildly dilated.    Right Atrium: Right atrium is moderately dilated.    Aortic Valve: The aortic valve is a trileaflet valve. Mildly calcified cusps. There is mild aortic regurgitation with an eccentrically directed jet.    Mitral Valve: There is no stenosis. The mean pressure gradient across the mitral valve is 1 mmHg at a heart rate of  bpm. There is mild to moderate regurgitation with an eccentric jet.    Tricuspid Valve: There is moderate regurgitation with an eccentrically directed jet.    Pulmonary Artery: The estimated pulmonary artery systolic pressure is 62 mmHg.    IVC/SVC: Elevated venous pressure at 15 mmHg.  . Continue Furosemide and monitor clinical status closely. Monitor on telemetry. Patient is off CHF pathway.  Monitor strict Is&Os and daily weights.  Place on fluid restriction of 2 L. Cardiology has been consulted. Continue to stress to patient importance of self efficacy and  on diet for CHF. Last BNP reviewed- and noted below   6/3  - pro-BNP 5208  - Cardiology consulted       CKD (chronic kidney disease)  Creatine stable for now. BMP reviewed- noted Estimated Creatinine Clearance: 35.2 mL/min (A) (based on SCr of 1.92 mg/dL (H)). according to latest data. Based on current GFR, CKD stage is stage 3 - GFR 30-59.  Monitor UOP and serial BMP and adjust therapy as needed. Renally dose meds. Avoid nephrotoxic medications and procedures.  Monitor BMP    Essential hypertension  Chronic, controlled. Latest blood pressure and vitals reviewed-     Temp:  [97.7 °F (36.5 °C)-98.1 °F (36.7 °C)]   Pulse:  [57-71]   Resp:  [13-18]   BP: (134-167)/(65-89)   SpO2:  [96 %-98 %] .   Home meds for hypertension were reviewed and noted below.   Hypertension Medications               amLODIPine (NORVASC) 5 MG tablet Take 1 tablet (5 mg total) by  mouth once daily.    furosemide (LASIX) 20 MG tablet Take 1 tablet (20 mg total) by mouth once daily.    isosorbide-hydrALAZINE 20-37.5 mg (BIDIL) 20-37.5 mg Tab Take 1 tablet by mouth 3 (three) times daily.    metoprolol succinate (TOPROL-XL) 25 MG 24 hr tablet Take 1 tablet (25 mg total) by mouth once daily.    nitroGLYCERIN (NITROSTAT) 0.4 MG SL tablet Place 1 tablet (0.4 mg total) under the tongue every 5 (five) minutes as needed for Chest pain.            While in the hospital, will manage blood pressure as follows; Adjust home antihypertensive regimen as follows- continue amlodipine, bidil and lasix . Hold metoprolol secondary to low-normal HR    Will utilize p.r.n. blood pressure medication only if patient's blood pressure greater than 180/110 and he develops symptoms such as worsening chest pain or shortness of breath.  6/3  - start Chlorthalidone      HLD (hyperlipidemia)  -Continue statin      Gastroesophageal reflux disease  - continue home protonix        VTE Risk Mitigation (From admission, onward)           Ordered     heparin 25,000 units in dextrose 5% 250 mL (100 units/mL) infusion LOW INTENSITY nomogram - [facility]  Continuous        Question:  Begin at (units/kg/hr)  Answer:  12    06/02/24 1425     heparin 25,000 units in dextrose 5% (100 units/ml) IV bolus from bag LOW INTENSITY nomogram - [facility]  As needed (PRN)        Question:  Heparin Infusion Adjustment (DO NOT MODIFY ANSWER)  Answer:  \\[email or web address]\epic\Images\Pharmacy\HeparinInfusions\heparin LOW INTENSITY nomogram [facility] [id number].pdf    06/02/24 1425     heparin 25,000 units in dextrose 5% (100 units/ml) IV bolus from bag LOW INTENSITY nomogram - [facility]  As needed (PRN)        Question:  Heparin Infusion Adjustment (DO NOT MODIFY ANSWER)  Answer:  \\[email or web address]\epic\Images\Pharmacy\HeparinInfusions\heparin LOW INTENSITY nomogram for [facility] [id number].pdf    06/02/24 1425                    Discharge Planning   SYED:      Code Status: Full Code   Is the  patient medically ready for discharge?:     Reason for patient still in hospital (select all that apply): Treatment                     Esther Villegas MD  Department of Hospital Medicine   [facility]

## 2024-06-03 NOTE — PLAN OF CARE
[facility]  Initial [facility]charge Assessment       Primary Care Provider: Swati Vicente NP    Admission Diagnosis: Shortness of breath [R06.02]  Elevated troponin [R79.89]  Chest pain, unspecified type [R07.9]    Admission Date: 6/2/2024  Expected Discharge Date: 6/4/2024    Transition of Care Barriers: None    Payor: [facility] MANAGED MCARE / Plan: [employer]ICARE ADVANTAGE / Product Type: Medicare Advantage /     Extended Emergency Contact Information  Primary Emergency Contact: CamiloJennifer  Mobile Phone: [phone]  Relation: Daughter    Discharge Plan A: Home, Home Health  Discharge Plan B: Home, Home Health      [facility] [address] [facility] [place] - [address]  [address] [address]  Phone: [phone] Fax: [phone]    [facility]  [address] [place] [address]  Phone: [phone] Fax: [phone]      Initial Assessment (most recent)       Adult Discharge Assessment - 06/03/24 1442          Discharge Assessment    Assessment Type Discharge Planning Assessment     Source of Information patient     Does patient/caregiver understand observation status Yes     Communicated SYED with patient/caregiver Yes     People in Home alone     Do you expect to return to your current living situation? Yes     Do you have help at home or someone to help you manage your care at home? Yes     Who are your caregiver(s) and their phone number(s)? daughter eriberto lives next door to patient [phone]     Prior to hospitilization cognitive status: Alert/Oriented     Current cognitive status: Alert/Oriented     Walking or Climbing Stairs Difficulty yes     Walking or Climbing Stairs ambulation difficulty, requires equipment;stair climbing difficulty, requires equipment     Mobility Management uses quad cane     Dressing/Bathing Difficulty no     Equipment Currently Used at Home cane, quad     Patient currently  being followed by outpatient case management? No     Do you currently have service(s) that help you manage your care at home? Yes     Name and Contact number of agency [facility]     Is the pt/caregiver preference to resume services with current agency Yes     Do you take prescription medications? Yes     Do you have any problems affording any of your prescribed medications? No     Is the patient taking medications as prescribed? yes     Who is going to help you get home at discharge? family     How do you get to doctors appointments? car, drives self;family or friend will provide     Are you on dialysis? No     Do you take coumadin? No     Discharge Plan A Home;Home Health     Discharge Plan B Home;Home Health     DME Needed Upon Discharge  none     Discharge Plan discussed with: Patient     Transition of Care Barriers None                   Spoke with patient in his room. He lives alone. His daughter eriberto lives next door. Nephew ro is at the bedside. He uses a cane. He is current with [facility]. Verbal choice to resume at [place]. Will fax. He states that he was independent with care prior to admit. Patient is observation at this time. Dc plan is home with home health. Cm will follow.  SDOH was done 3 weeks ago.

## 2024-06-03 NOTE — CONSULTS
[facility]  Cardiology  Consult Note    Patient Name: Trung Barboza  MRN: [id number]  Admission Date: 6/2/2024  Hospital Length of Stay: 0 days  Code Status: Full Code   Attending Provider: Ronnie Michel MD   Consulting Provider: HEMANT Sparrow  Primary Care Physician: Swati Vicente NP  Principal Problem:NSTEMI (non-ST elevation myocardial infarction)    Patient information was obtained from patient, past medical records, and ER records.     Inpatient consult to Cardiology  Consult performed by: Jerrod Wesley ACNP  Consult ordered by: Susan Shoemaker MD  Reason for consult: nstemi        Subjective:     Chief Complaint:  Chest pain     HPI:   84-year-old male who was seen in consult by Cardiology after presenting to the emergency room with chest pain.  The patient reports onset of substernal chest pain without radiation or relief until arrival at the ED. the patient denies fever, chills, nausea, vomiting.  He does report malaise within the last couple of days and also occasional shortness of breath with the chest pain.  The patient is status post PCI mid LAD 5/7/24 by Dr. Villaseñor.  He is scheduled for staged PCI with his next LHC to be 6/18.  Troponins were 226 flat.    Past Medical History:   Diagnosis Date    Aortic regurgitation     BPH with urinary obstruction     Chronic kidney disease, stage 3b     GR 44     creat 1.8    Coronary artery disease     Essential hypertension 07/10/2021    Gastroesophageal reflux disease 09/15/2022    Gout, arthritis     PVD (peripheral vascular disease) 02/01/2024    TIA (transient ischemic attack)        Past Surgical History:   Procedure Laterality Date    ANGIOGRAM, CORONARY, WITH LEFT HEART CATHETERIZATION N/A 12/31/2021    Procedure: ANGIOGRAM,CORONARY,WITH LEFT HEART CATHETERIZATION;  Surgeon: Mahad Villaseñor DO;  Location: [facility] CATH LAB;  Service: Cardiology;  Laterality: N/A;    ANGIOGRAM, CORONARY, WITH  LEFT HEART CATHETERIZATION N/A 5/7/2024    Procedure: Angiogram, Coronary, with Left Heart Cath;  Surgeon: Mahad Villaseñor DO;  Location: [facility] CATH LAB;  Service: Cardiology;  Laterality: N/A;    BIOPSY WITH TRANSRECTAL ULTRASOUND (TRUS) GUIDANCE Bilateral 03/07/2018    CHOLECYSTECTOMY      CORONARY ARTERY BYPASS GRAFT  01/07/2022    [facility]-Dr. Qureshi    LEFT HEART CATHETERIZATION Left 7/13/2021    Procedure: Left heart cath;  Surgeon: Philip Torres MD;  Location: [facility] CATH LAB;  Service: Cardiology;  Laterality: Left;    PERCUTANEOUS CORONARY INTERVENTION, ARTERY N/A 5/7/2024    Procedure: Percutaneous coronary intervention;  Surgeon: Mahad Villaseñor DO;  Location: [facility] CATH LAB;  Service: Cardiology;  Laterality: N/A;       Review of patient's allergies indicates:   Allergen Reactions    Lisinopril Swelling and Anaphylaxis       No current facility-administered medications on file prior to encounter.     Current Outpatient Medications on File Prior to Encounter   Medication Sig    amLODIPine (NORVASC) 5 MG tablet Take 1 tablet (5 mg total) by mouth once daily. (Patient taking differently: Take 5 mg by mouth every evening.)    aspirin (ECOTRIN) 81 MG EC tablet Take 1 tablet (81 mg total) by mouth once daily.    atorvastatin (LIPITOR) 80 MG tablet Take 1 tablet by mouth in the evening    furosemide (LASIX) 20 MG tablet Take 1 tablet (20 mg total) by mouth once daily.    isosorbide-hydrALAZINE 20-37.5 mg (BIDIL) 20-37.5 mg Tab Take 1 tablet by mouth 3 (three) times daily.    metoprolol succinate (TOPROL-XL) 25 MG 24 hr tablet Take 1 tablet (25 mg total) by mouth once daily. (Patient taking differently: Take 25 mg by mouth every evening.)    multivitamin with minerals tablet Take 1 tablet by mouth once daily.    nitroGLYCERIN (NITROSTAT) 0.4 MG SL tablet Place 1 tablet (0.4 mg total) under the tongue every 5 (five) minutes as needed for Chest pain.    pantoprazole (PROTONIX) 40 MG  tablet Take 1 tablet (40 mg total) by mouth once daily.    potassium chloride (KLOR-CON) 10 MEQ TbSR Take 1 tablet (10 mEq total) by mouth once daily.    ticagrelor (BRILINTA) 90 mg tablet Take 1 tablet (90 mg total) by mouth 2 (two) times daily.     Family History       Problem Relation (Age of Onset)    Heart disease Mother    Hypertension Mother, Son    No Known Problems Father, Sister, Sister, Sister, Sister, Brother, Brother, Brother, Brother, Daughter, Son, Son, Son, Son          Tobacco Use    Smoking status: Former     Current packs/day: 0.00     Average packs/day: 0.5 packs/day for 15.0 years (7.5 ttl pk-yrs)     Types: Cigarettes     Start date: 1973     Quit date: 1988     Years since quittin.9     Passive exposure: Never    Smokeless tobacco: Former     Types: Chew    Tobacco comments:     Dips 1 or 2 times a month   Substance and Sexual Activity    Alcohol use: Not Currently     Comment: occasional wine    Drug use: Never    Sexual activity: Not Currently     Partners: Female     Review of Systems   Cardiovascular:  Positive for chest pain and dyspnea on exertion.   Respiratory:  Positive for shortness of breath.    All other systems reviewed and are negative.    Objective:     Vital Signs (Most Recent):  Temp: 98.5 °F (36.9 °C) (24 1134)  Pulse: 70 (24 1134)  Resp: 18 (24 1134)  BP: (!) 163/83 (24 1151)  SpO2: 97 % (24 1134) Vital Signs (24h Range):  Temp:  [97.7 °F (36.5 °C)-98.6 °F (37 °C)] 98.5 °F (36.9 °C)  Pulse:  [54-74] 70  Resp:  [13-18] 18  SpO2:  [96 %-99 %] 97 %  BP: (130-179)/(63-89) 163/83     Weight: 101.2 kg (223 lb)  Body mass index is 30.24 kg/m².    SpO2: 97 %       No intake or output data in the 24 hours ending 24 1334    Lines/Drains/Airways       Peripheral Intravenous Line  Duration                  Peripheral IV - Single Lumen 06/02/24 1024 20 G Anterior;Left Forearm 1 day                     Physical Exam  Vitals reviewed.    Constitutional:       Appearance: Normal appearance.   HENT:      Head: Normocephalic.      Mouth/Throat:      Mouth: Mucous membranes are moist.   Eyes:      Extraocular Movements: Extraocular movements intact.   Cardiovascular:      Rate and Rhythm: Normal rate.      Pulses: Normal pulses.      Heart sounds: Normal heart sounds.   Pulmonary:      Effort: Pulmonary effort is normal.      Breath sounds: Normal breath sounds.   Abdominal:      General: Bowel sounds are normal. There is no distension.      Palpations: Abdomen is soft.   Musculoskeletal:      Cervical back: Normal range of motion.   Skin:     General: Skin is warm.      Capillary Refill: Capillary refill takes less than 2 seconds.   Neurological:      General: No focal deficit present.      Mental Status: He is alert and oriented to person, place, and time.   Psychiatric:         Mood and Affect: Mood normal.         Behavior: Behavior normal.          Significant Labs: All pertinent lab results from the last 24 hours have been reviewed.      Assessment and Plan:     * NSTEMI (non-ST elevation myocardial infarction)  6/3:  Troponins 226> 226> 232.  EKG nonspecific EKG changes no elevation noted.  Currently on heparin drip.  Plan for left heart catheterization tomorrow.  Currently on ASA 81 mg, statin, Isorbid, Brilinta    CHF (congestive heart failure)  Patient is identified as having Combined Systolic and Diastolic heart failure that is Acute on chronic. CHF is currently controlled. Latest ECHO performed and demonstrates- Results for orders placed during the hospital encounter of 05/06/24    Echo    Interpretation Summary    Left Ventricle: The left ventricle is normal in size. Mildly increased wall thickness. There is concentric remodeling. Moderate global hypokinesis present. There is moderately reduced systolic function with a visually estimated ejection fraction of 35 - 40%. Biplane (2D) method of discs ejection fraction is 37%. Global  "longitudinal strain is -8.8%. Global longitudinal strain is reduced. Grade III diastolic dysfunction.    Right Ventricle: Mild right ventricular enlargement. Systolic function is mildly reduced.    Left Atrium: Left atrium is mildly dilated.    Right Atrium: Right atrium is moderately dilated.    Aortic Valve: The aortic valve is a trileaflet valve. Mildly calcified cusps. There is mild aortic regurgitation with an eccentrically directed jet.    Mitral Valve: There is no stenosis. The mean pressure gradient across the mitral valve is 1 mmHg at a heart rate of  bpm. There is mild to moderate regurgitation with an eccentric jet.    Tricuspid Valve: There is moderate regurgitation with an eccentrically directed jet.    Pulmonary Artery: The estimated pulmonary artery systolic pressure is 62 mmHg.    IVC/SVC: Elevated venous pressure at 15 mmHg.  . Continue Furosemide and monitor clinical status closely. Monitor on telemetry. Monitor strict Is&Os and daily weights.  Place on fluid restriction of 2 L. Cardiology has been consulted. Continue to stress to patient importance of self efficacy and  on diet for CHF. Last BNP reviewed- and noted below No results for input(s): "BNP", "BNPTRIAGEBLO" in the last 168 hours.    HLD (hyperlipidemia)  6/3:  Atorvastatin resumed          VTE Risk Mitigation (From admission, onward)           Ordered     heparin 25,000 units in dextrose 5% 250 mL (100 units/mL) infusion LOW INTENSITY nomogram - [facility]  Continuous        Question:  Begin at (units/kg/hr)  Answer:  12 06/02/24 1425     heparin 25,000 units in dextrose 5% (100 units/ml) IV bolus from bag LOW INTENSITY nomogram - [facility]  As needed (PRN)        Question:  Heparin Infusion Adjustment (DO NOT MODIFY ANSWER)  Answer:  \\[email or web address]\epic\Images\Pharmacy\HeparinInfusions\heparin LOW INTENSITY nomogram for [facility] [id number].pdf    06/02/24 1425     heparin 25,000 units in dextrose 5% (100 units/ml) IV bolus from bag LOW INTENSITY " nomogram - [facility]  As needed (PRN)        Question:  Heparin Infusion Adjustment (DO NOT MODIFY ANSWER)  Answer:  [email or web address]\epic\Images\Pharmacy\HeparinInfusions\heparin LOW INTENSITY nomogram for [facility] [id number].pdf    06/02/24 0310                    Thank you for your consult. I will follow-up with patient. Please contact us if you have any additional questions.    Jerrod Wesley, DARRYNP  Cardiology   [facility] - [address] [place] [address]

## 2024-06-03 NOTE — HPI
84-year-old male who was seen in consult by Cardiology after presenting to the emergency room with chest pain.  The patient reports onset of substernal chest pain without radiation or relief until arrival at the ED. the patient denies fever, chills, nausea, vomiting.  He does report malaise within the last couple of days and also occasional shortness of breath with the chest pain.  The patient is status post PCI mid LAD 5/7/24 by Dr. Villaseñor.  He is scheduled for staged PCI with his next LHC to be 6/18.  Troponins were 226 flat.

## 2024-06-04 PROBLEM — R31.0 GROSS HEMATURIA: Status: ACTIVE | Noted: 2024-06-04

## 2024-06-04 LAB
ALBUMIN SERPL BCP-MCNC: 3.2 G/DL (ref 3.5–5)
ALBUMIN/GLOB SERPL: 0.9 {RATIO}
ALP SERPL-CCNC: 285 U/L (ref 45–115)
ALT SERPL W P-5'-P-CCNC: 28 U/L (ref 16–61)
ANION GAP SERPL CALCULATED.3IONS-SCNC: 9 MMOL/L (ref 7–16)
APTT PPP: 108 SECONDS (ref 25.2–37.3)
APTT PPP: 56.3 SECONDS (ref 25.2–37.3)
APTT PPP: 74 SECONDS (ref 25.2–37.3)
APTT PPP: 74.5 SECONDS (ref 25.2–37.3)
APTT PPP: >200 SECONDS (ref 25.2–37.3)
AST SERPL W P-5'-P-CCNC: 33 U/L (ref 15–37)
BACTERIA #/AREA URNS HPF: ABNORMAL /HPF
BASOPHILS # BLD AUTO: 0.01 K/UL (ref 0–0.2)
BASOPHILS NFR BLD AUTO: 0.2 % (ref 0–1)
BILIRUB SERPL-MCNC: 1.2 MG/DL (ref ?–1.2)
BILIRUB UR QL STRIP: NEGATIVE
BUN SERPL-MCNC: 30 MG/DL (ref 7–18)
BUN/CREAT SERPL: 15 (ref 6–20)
CALCIUM SERPL-MCNC: 8.8 MG/DL (ref 8.5–10.1)
CHLORIDE SERPL-SCNC: 109 MMOL/L (ref 98–107)
CLARITY UR: CLEAR
CO2 SERPL-SCNC: 25 MMOL/L (ref 21–32)
COLOR UR: COLORLESS
CREAT SERPL-MCNC: 1.94 MG/DL (ref 0.7–1.3)
DIFFERENTIAL METHOD BLD: ABNORMAL
EGFR (NO RACE VARIABLE) (RUSH/TITUS): 34 ML/MIN/1.73M2
EOSINOPHIL # BLD AUTO: 0.09 K/UL (ref 0–0.5)
EOSINOPHIL NFR BLD AUTO: 2.2 % (ref 1–4)
ERYTHROCYTE [DISTWIDTH] IN BLOOD BY AUTOMATED COUNT: 13.7 % (ref 11.5–14.5)
GLOBULIN SER-MCNC: 3.5 G/DL (ref 2–4)
GLUCOSE SERPL-MCNC: 110 MG/DL (ref 74–106)
GLUCOSE UR STRIP-MCNC: NORMAL MG/DL
HCT VFR BLD AUTO: 34.7 % (ref 40–54)
HCT VFR BLD AUTO: 35.8 % (ref 40–54)
HGB BLD-MCNC: 11.6 G/DL (ref 13.5–18)
HGB BLD-MCNC: 11.8 G/DL (ref 13.5–18)
IMM GRANULOCYTES # BLD AUTO: 0.01 K/UL (ref 0–0.04)
IMM GRANULOCYTES NFR BLD: 0.2 % (ref 0–0.4)
INR BLD: 1.32
KETONES UR STRIP-SCNC: NEGATIVE MG/DL
LEUKOCYTE ESTERASE UR QL STRIP: NEGATIVE
LYMPHOCYTES # BLD AUTO: 1.33 K/UL (ref 1–4.8)
LYMPHOCYTES NFR BLD AUTO: 32.1 % (ref 27–41)
MCH RBC QN AUTO: 32 PG (ref 27–31)
MCHC RBC AUTO-ENTMCNC: 33.4 G/DL (ref 32–36)
MCV RBC AUTO: 95.9 FL (ref 80–96)
MONOCYTES # BLD AUTO: 0.58 K/UL (ref 0–0.8)
MONOCYTES NFR BLD AUTO: 14 % (ref 2–6)
MPC BLD CALC-MCNC: 10 FL (ref 9.4–12.4)
NEUTROPHILS # BLD AUTO: 2.12 K/UL (ref 1.8–7.7)
NEUTROPHILS NFR BLD AUTO: 51.3 % (ref 53–65)
NITRITE UR QL STRIP: NEGATIVE
NRBC # BLD AUTO: 0 X10E3/UL
NRBC, AUTO (.00): 0 %
PH UR STRIP: 7 PH UNITS
PLATELET # BLD AUTO: 193 K/UL (ref 150–400)
POTASSIUM SERPL-SCNC: 3.8 MMOL/L (ref 3.5–5.1)
PROT SERPL-MCNC: 6.7 G/DL (ref 6.4–8.2)
PROT UR QL STRIP: NEGATIVE
PROTHROMBIN TIME: 16.2 SECONDS (ref 11.7–14.7)
RBC # BLD AUTO: 3.62 M/UL (ref 4.6–6.2)
RBC # UR STRIP: ABNORMAL /UL
RBC #/AREA URNS HPF: 34 /HPF
SODIUM SERPL-SCNC: 139 MMOL/L (ref 136–145)
SP GR UR STRIP: 1
URATE SERPL-MCNC: 8.9 MG/DL (ref 3.5–7.2)
UROBILINOGEN UR STRIP-ACNC: NORMAL MG/DL
WBC # BLD AUTO: 4.14 K/UL (ref 4.5–11)
WBC #/AREA URNS HPF: <1 /HPF

## 2024-06-04 PROCEDURE — B2111ZZ FLUOROSCOPY OF MULTIPLE CORONARY ARTERIES USING LOW OSMOLAR CONTRAST: ICD-10-PCS | Performed by: STUDENT IN AN ORGANIZED HEALTH CARE EDUCATION/TRAINING PROGRAM

## 2024-06-04 PROCEDURE — 25000003 PHARM REV CODE 250: Performed by: STUDENT IN AN ORGANIZED HEALTH CARE EDUCATION/TRAINING PROGRAM

## 2024-06-04 PROCEDURE — 99152 MOD SED SAME PHYS/QHP 5/>YRS: CPT | Mod: ,,, | Performed by: STUDENT IN AN ORGANIZED HEALTH CARE EDUCATION/TRAINING PROGRAM

## 2024-06-04 PROCEDURE — 85730 THROMBOPLASTIN TIME PARTIAL: CPT | Performed by: HOSPITALIST

## 2024-06-04 PROCEDURE — 99153 MOD SED SAME PHYS/QHP EA: CPT | Performed by: STUDENT IN AN ORGANIZED HEALTH CARE EDUCATION/TRAINING PROGRAM

## 2024-06-04 PROCEDURE — 85025 COMPLETE CBC W/AUTO DIFF WBC: CPT

## 2024-06-04 PROCEDURE — 80053 COMPREHEN METABOLIC PANEL: CPT

## 2024-06-04 PROCEDURE — 96376 TX/PRO/DX INJ SAME DRUG ADON: CPT

## 2024-06-04 PROCEDURE — 25500020 PHARM REV CODE 255: Performed by: STUDENT IN AN ORGANIZED HEALTH CARE EDUCATION/TRAINING PROGRAM

## 2024-06-04 PROCEDURE — 94761 N-INVAS EAR/PLS OXIMETRY MLT: CPT

## 2024-06-04 PROCEDURE — 93459 L HRT ART/GRFT ANGIO: CPT | Mod: 26,,, | Performed by: STUDENT IN AN ORGANIZED HEALTH CARE EDUCATION/TRAINING PROGRAM

## 2024-06-04 PROCEDURE — 99152 MOD SED SAME PHYS/QHP 5/>YRS: CPT | Performed by: STUDENT IN AN ORGANIZED HEALTH CARE EDUCATION/TRAINING PROGRAM

## 2024-06-04 PROCEDURE — 11000001 HC ACUTE MED/SURG PRIVATE ROOM

## 2024-06-04 PROCEDURE — 25000003 PHARM REV CODE 250

## 2024-06-04 PROCEDURE — 99233 SBSQ HOSP IP/OBS HIGH 50: CPT | Mod: GC,,, | Performed by: HOSPITALIST

## 2024-06-04 PROCEDURE — C1760 CLOSURE DEV, VASC: HCPCS | Performed by: STUDENT IN AN ORGANIZED HEALTH CARE EDUCATION/TRAINING PROGRAM

## 2024-06-04 PROCEDURE — 63600175 PHARM REV CODE 636 W HCPCS: Performed by: STUDENT IN AN ORGANIZED HEALTH CARE EDUCATION/TRAINING PROGRAM

## 2024-06-04 PROCEDURE — 4A023N7 MEASUREMENT OF CARDIAC SAMPLING AND PRESSURE, LEFT HEART, PERCUTANEOUS APPROACH: ICD-10-PCS | Performed by: STUDENT IN AN ORGANIZED HEALTH CARE EDUCATION/TRAINING PROGRAM

## 2024-06-04 PROCEDURE — 93459 L HRT ART/GRFT ANGIO: CPT | Performed by: STUDENT IN AN ORGANIZED HEALTH CARE EDUCATION/TRAINING PROGRAM

## 2024-06-04 PROCEDURE — 36415 COLL VENOUS BLD VENIPUNCTURE: CPT

## 2024-06-04 PROCEDURE — 96366 THER/PROPH/DIAG IV INF ADDON: CPT

## 2024-06-04 PROCEDURE — 85730 THROMBOPLASTIN TIME PARTIAL: CPT | Mod: 91

## 2024-06-04 PROCEDURE — 27201423 OPTIME MED/SURG SUP & DEVICES STERILE SUPPLY: Performed by: STUDENT IN AN ORGANIZED HEALTH CARE EDUCATION/TRAINING PROGRAM

## 2024-06-04 PROCEDURE — 81003 URINALYSIS AUTO W/O SCOPE: CPT

## 2024-06-04 PROCEDURE — G0378 HOSPITAL OBSERVATION PER HR: HCPCS

## 2024-06-04 PROCEDURE — 84550 ASSAY OF BLOOD/URIC ACID: CPT

## 2024-06-04 PROCEDURE — C1894 INTRO/SHEATH, NON-LASER: HCPCS | Performed by: STUDENT IN AN ORGANIZED HEALTH CARE EDUCATION/TRAINING PROGRAM

## 2024-06-04 PROCEDURE — 25000003 PHARM REV CODE 250: Performed by: REGISTERED NURSE

## 2024-06-04 PROCEDURE — 63600175 PHARM REV CODE 636 W HCPCS

## 2024-06-04 DEVICE — ANGIO-SEAL VIP VASCULAR CLOSURE DEVICE
Type: IMPLANTABLE DEVICE | Site: GROIN | Status: FUNCTIONAL
Brand: ANGIO-SEAL

## 2024-06-04 RX ORDER — FENTANYL CITRATE 50 UG/ML
INJECTION, SOLUTION INTRAMUSCULAR; INTRAVENOUS
Status: DISCONTINUED | OUTPATIENT
Start: 2024-06-04 | End: 2024-06-04 | Stop reason: HOSPADM

## 2024-06-04 RX ORDER — LIDOCAINE HYDROCHLORIDE 10 MG/ML
INJECTION INFILTRATION; PERINEURAL
Status: DISCONTINUED | OUTPATIENT
Start: 2024-06-04 | End: 2024-06-04 | Stop reason: HOSPADM

## 2024-06-04 RX ORDER — SODIUM CHLORIDE 9 MG/ML
INJECTION, SOLUTION INTRAVENOUS
Status: DISCONTINUED | OUTPATIENT
Start: 2024-06-04 | End: 2024-06-05 | Stop reason: HOSPADM

## 2024-06-04 RX ORDER — ACETAMINOPHEN 325 MG/1
650 TABLET ORAL EVERY 4 HOURS PRN
Status: DISCONTINUED | OUTPATIENT
Start: 2024-06-04 | End: 2024-06-05 | Stop reason: HOSPADM

## 2024-06-04 RX ORDER — ONDANSETRON 4 MG/1
8 TABLET, ORALLY DISINTEGRATING ORAL EVERY 8 HOURS PRN
Status: DISCONTINUED | OUTPATIENT
Start: 2024-06-04 | End: 2024-06-05 | Stop reason: HOSPADM

## 2024-06-04 RX ORDER — METOPROLOL TARTRATE 25 MG/1
25 TABLET, FILM COATED ORAL 2 TIMES DAILY
Status: DISCONTINUED | OUTPATIENT
Start: 2024-06-04 | End: 2024-06-05

## 2024-06-04 RX ORDER — NITROGLYCERIN 5 MG/ML
INJECTION, SOLUTION INTRAVENOUS
Status: DISCONTINUED | OUTPATIENT
Start: 2024-06-04 | End: 2024-06-04 | Stop reason: HOSPADM

## 2024-06-04 RX ORDER — CLOPIDOGREL BISULFATE 75 MG/1
300 TABLET ORAL ONCE
Status: COMPLETED | OUTPATIENT
Start: 2024-06-05 | End: 2024-06-05

## 2024-06-04 RX ORDER — MIDAZOLAM HYDROCHLORIDE 1 MG/ML
INJECTION INTRAMUSCULAR; INTRAVENOUS
Status: DISCONTINUED | OUTPATIENT
Start: 2024-06-04 | End: 2024-06-04 | Stop reason: HOSPADM

## 2024-06-04 RX ORDER — CLOPIDOGREL BISULFATE 75 MG/1
75 TABLET ORAL DAILY
Status: DISCONTINUED | OUTPATIENT
Start: 2024-06-06 | End: 2024-06-05 | Stop reason: HOSPADM

## 2024-06-04 RX ADMIN — CHLORTHALIDONE 25 MG: 25 TABLET ORAL at 09:06

## 2024-06-04 RX ADMIN — FUROSEMIDE 20 MG: 10 INJECTION, SOLUTION INTRAVENOUS at 09:06

## 2024-06-04 RX ADMIN — PANTOPRAZOLE SODIUM 40 MG: 40 TABLET, DELAYED RELEASE ORAL at 09:06

## 2024-06-04 RX ADMIN — ASPIRIN 81 MG: 81 TABLET, COATED ORAL at 09:06

## 2024-06-04 RX ADMIN — HYDRALAZINE HYDROCHLORIDE 35 MG: 10 TABLET, FILM COATED ORAL at 09:06

## 2024-06-04 RX ADMIN — TICAGRELOR 90 MG: 90 TABLET ORAL at 09:06

## 2024-06-04 RX ADMIN — HYDRALAZINE HYDROCHLORIDE 35 MG: 10 TABLET, FILM COATED ORAL at 05:06

## 2024-06-04 RX ADMIN — ISOSORBIDE DINITRATE 20 MG: 20 TABLET ORAL at 09:06

## 2024-06-04 RX ADMIN — DOCUSATE SODIUM 100 MG: 100 CAPSULE, LIQUID FILLED ORAL at 09:06

## 2024-06-04 RX ADMIN — ATORVASTATIN CALCIUM 80 MG: 80 TABLET, FILM COATED ORAL at 09:06

## 2024-06-04 RX ADMIN — AMLODIPINE BESYLATE 5 MG: 5 TABLET ORAL at 09:06

## 2024-06-04 RX ADMIN — HEPARIN SODIUM 11 UNITS/KG/HR: 10000 INJECTION, SOLUTION INTRAVENOUS at 04:06

## 2024-06-04 RX ADMIN — METOPROLOL TARTRATE 25 MG: 25 TABLET, FILM COATED ORAL at 09:06

## 2024-06-04 NOTE — PLAN OF CARE
Problem: Adult Inpatient Plan of Care  Goal: Plan of Care Review  Outcome: Not Progressing  Goal: Patient-Specific Goal (Individualized)  Outcome: Not Progressing  Goal: Absence of Hospital-Acquired Illness or Injury  Outcome: Not Progressing  Goal: Optimal Comfort and Wellbeing  Outcome: Not Progressing  Goal: Readiness for Transition of Care  Outcome: Not Progressing     Problem: Wound  Goal: Optimal Coping  Outcome: Not Progressing  Goal: Optimal Functional Ability  Outcome: Not Progressing  Goal: Absence of Infection Signs and Symptoms  Outcome: Not Progressing  Goal: Improved Oral Intake  Outcome: Not Progressing  Goal: Optimal Pain Control and Function  Outcome: Not Progressing  Goal: Skin Health and Integrity  Outcome: Not Progressing  Goal: Optimal Wound Healing  Outcome: Not Progressing

## 2024-06-04 NOTE — ASSESSMENT & PLAN NOTE
6/3  - dc Ticagrelor 2/2 gross hematuria  - dc Heparin drip 2/2 to gross hematuria  6/4  - patient had repeat episode of hematuria  - monitor  - UA pending

## 2024-06-04 NOTE — SUBJECTIVE & OBJECTIVE
Interval History: Patient was lying in the bed comfortably at the time of encounter and didn't complain of any overnight events. Patient was updated on his latest labs and imaging results.  - patient started hematuria last night. Night team d/c heparin as aPTT WAS >200'S.  - symptoms improving  - [facility] today    Review of Systems   Constitutional:  Negative for activity change, appetite change, chills, diaphoresis, fatigue and fever.   HENT:  Negative for congestion.    Eyes: Negative.    Respiratory:  Negative for cough, choking, shortness of breath, wheezing and stridor.    Cardiovascular:  Negative for chest pain, palpitations and leg swelling.   Gastrointestinal:  Negative for abdominal distention, abdominal pain, anal bleeding, blood in stool, constipation, nausea and vomiting.   Genitourinary:  Positive for hematuria. Negative for dysuria, flank pain, frequency and urgency.   Musculoskeletal:  Negative for back pain.   Skin: Negative.    Neurological:  Negative for numbness and headaches.   Hematological:  Negative for adenopathy.   Psychiatric/Behavioral: Negative.       Objective:     Vital Signs (Most Recent):  Temp: 98.2 °F (36.8 °C) (06/04/24 1135)  Pulse: 77 (06/04/24 1135)  Resp: 17 (06/04/24 1135)  BP: (!) 148/66 (06/04/24 1135)  SpO2: 97 % (06/04/24 1135) Vital Signs (24h Range):  Temp:  [97.8 °F (36.6 °C)-98.8 °F (37.1 °C)] 98.2 °F (36.8 °C)  Pulse:  [49-77] 77  Resp:  [17-18] 17  SpO2:  [95 %-99 %] 97 %  BP: (136-161)/(63-83) 148/66     Weight: 101.2 kg (223 lb)  Body mass index is 30.24 kg/m².    Intake/Output Summary (Last 24 hours) at 6/4/2024 1315  Last data filed at 6/4/2024 0407  Gross per 24 hour   Intake 238.79 ml   Output 1050 ml   Net -811.21 ml         Physical Exam  Vitals and nursing note reviewed.   Constitutional:       General: He is not in acute distress.     Appearance: Normal appearance. He is not ill-appearing.   HENT:      Head: Normocephalic and atraumatic.      Nose: No  congestion or rhinorrhea.   Eyes:      Extraocular Movements: Extraocular movements intact.      Conjunctiva/sclera: Conjunctivae normal.      Pupils: Pupils are equal, round, and reactive to light.   Cardiovascular:      Rate and Rhythm: Normal rate and regular rhythm.      Pulses: Normal pulses.      Heart sounds: Murmur heard.   Pulmonary:      Effort: Pulmonary effort is normal. No respiratory distress.      Breath sounds: Normal breath sounds. No wheezing.   Abdominal:      General: Bowel sounds are normal. There is no distension.      Palpations: Abdomen is soft.      Tenderness: There is no abdominal tenderness.   Musculoskeletal:      Cervical back: Normal range of motion and neck supple. No rigidity.      Right lower leg: No edema.      Left lower leg: No edema.   Skin:     General: Skin is warm.      Capillary Refill: Capillary refill takes less than 2 seconds.   Neurological:      General: No focal deficit present.      Mental Status: He is alert and oriented to person, place, and time.   Psychiatric:         Mood and Affect: Mood normal.         Behavior: Behavior normal.         Thought Content: Thought content normal.         Judgment: Judgment normal.             Significant Labs: All pertinent labs within the past 24 hours have been reviewed.    Significant Imaging: I have reviewed all pertinent imaging results/findings within the past 24 hours.

## 2024-06-04 NOTE — PROGRESS NOTES
"[facility] Tele[facility] Hospital Medicine  Progress Note    Patient Name: Trung Barboza  MRN: [id number]  Patient Class: IP- Inpatient   Admission Date: 6/2/2024  Length of Stay: 0 days  Attending Physician: Ronnie Michel MD  Primary Care Provider: Swati Vicente NP        Subjective:     Principal Problem:NSTEMI (non-ST elevation myocardial infarction)        HPI:  This patient is a pleasant 85yo male with a pmh of HTN, CKD, CVA, PVD, HLD, GERD and CAD s/p stent placement 5/7 who presented to the ED with c/o central, sharp chest pain that he says started around 3 am this morning. The patient was recently hospitalized and had a stent placed in his mid LAD. Cardiology planned to perform a LHC later this month with possible further stent placement. There patient says he had not had any chest pain since discharge until this morning, but had been feeling weak and fatigued which he thought may have been due to his new medications. He also described his urine as appearing "brown" and said he had had some generalized abdominal pain. He endorsed some SOB associated with his chest pain and said when he woke up he felt like he was being "smothered." He denied fevers, chills and new constitutional complaints.     ED workup showed elevated but flat troponins at 226.7-->226.9. ProBNP was elevated but lower than previous at 5058. EKG showed non specific ST changes in inferior leads but no obvious elevations. Labs were otherwise around baseline.    Overview/Hospital Course:  No notes on file    Interval History: Patient was lying in the bed comfortably at the time of encounter and didn't complain of any overnight events. Patient was updated on his latest labs and imaging results.  - patient started hematuria last night. Night team d/c heparin as aPTT WAS >200'S.  - symptoms improving  - LHC today    Review of Systems   Constitutional:  Negative for activity change, appetite change, chills, " diaphoresis, fatigue and fever.   HENT:  Negative for congestion.    Eyes: Negative.    Respiratory:  Negative for cough, choking, shortness of breath, wheezing and stridor.    Cardiovascular:  Negative for chest pain, palpitations and leg swelling.   Gastrointestinal:  Negative for abdominal distention, abdominal pain, anal bleeding, blood in stool, constipation, nausea and vomiting.   Genitourinary:  Positive for hematuria. Negative for dysuria, flank pain, frequency and urgency.   Musculoskeletal:  Negative for back pain.   Skin: Negative.    Neurological:  Negative for numbness and headaches.   Hematological:  Negative for adenopathy.   Psychiatric/Behavioral: Negative.       Objective:     Vital Signs (Most Recent):  Temp: 98.2 °F (36.8 °C) (06/04/24 1135)  Pulse: 77 (06/04/24 1135)  Resp: 17 (06/04/24 1135)  BP: (!) 148/66 (06/04/24 1135)  SpO2: 97 % (06/04/24 1135) Vital Signs (24h Range):  Temp:  [97.8 °F (36.6 °C)-98.8 °F (37.1 °C)] 98.2 °F (36.8 °C)  Pulse:  [49-77] 77  Resp:  [17-18] 17  SpO2:  [95 %-99 %] 97 %  BP: (136-161)/(63-83) 148/66     Weight: 101.2 kg (223 lb)  Body mass index is 30.24 kg/m².    Intake/Output Summary (Last 24 hours) at 6/4/2024 1315  Last data filed at 6/4/2024 0407  Gross per 24 hour   Intake 238.79 ml   Output 1050 ml   Net -811.21 ml         Physical Exam  Vitals and nursing note reviewed.   Constitutional:       General: He is not in acute distress.     Appearance: Normal appearance. He is not ill-appearing.   HENT:      Head: Normocephalic and atraumatic.      Nose: No congestion or rhinorrhea.   Eyes:      Extraocular Movements: Extraocular movements intact.      Conjunctiva/sclera: Conjunctivae normal.      Pupils: Pupils are equal, round, and reactive to light.   Cardiovascular:      Rate and Rhythm: Normal rate and regular rhythm.      Pulses: Normal pulses.      Heart sounds: Murmur heard.   Pulmonary:      Effort: Pulmonary effort is normal. No respiratory distress.       Breath sounds: Normal breath sounds. No wheezing.   Abdominal:      General: Bowel sounds are normal. There is no distension.      Palpations: Abdomen is soft.      Tenderness: There is no abdominal tenderness.   Musculoskeletal:      Cervical back: Normal range of motion and neck supple. No rigidity.      Right lower leg: No edema.      Left lower leg: No edema.   Skin:     General: Skin is warm.      Capillary Refill: Capillary refill takes less than 2 seconds.   Neurological:      General: No focal deficit present.      Mental Status: He is alert and oriented to person, place, and time.   Psychiatric:         Mood and Affect: Mood normal.         Behavior: Behavior normal.         Thought Content: Thought content normal.         Judgment: Judgment normal.             Significant Labs: All pertinent labs within the past 24 hours have been reviewed.    Significant Imaging: I have reviewed all pertinent imaging results/findings within the past 24 hours.    Assessment/Plan:      * NSTEMI (non-ST elevation myocardial infarction)  Start heparin infusion  Continue aspirin and brilinta  Continue statin  Hold metoprolol due to low-normal HR  Cardiology consulted-appreciate assistance  ANNITA score 4  Heart score 7  PRN morphine  6/3  - cardiac telemetry  - troponin 226.7 to 226.9 to 232.9  - dc Ticagrelor 2/2 gross hematuria  - dc Heparin drip 2/2 to gross hematuria  - cardiology consulted   - appreciate cardiology recommendations  6/4  - [facility] scheduled for today  - appreciate cardiology recommendations    Acute on chronic systolic heart failure    Patient is identified as having Combined Systolic and Diastolic heart failure that is Chronic. CHF is currently controlled. Latest ECHO performed and demonstrates- Results for orders placed during the hospital encounter of 05/06/24     Echo     Interpretation Summary    Left Ventricle: The left ventricle is normal in size. Mildly increased wall thickness. There is concentric  remodeling. Moderate global hypokinesis present. There is moderately reduced systolic function with a visually estimated ejection fraction of 35 - 40%. Biplane (2D) method of discs ejection fraction is 37%. Global longitudinal strain is -8.8%. Global longitudinal strain is reduced. Grade III diastolic dysfunction.    Right Ventricle: Mild right ventricular enlargement. Systolic function is mildly reduced.    Left Atrium: Left atrium is mildly dilated.    Right Atrium: Right atrium is moderately dilated.    Aortic Valve: The aortic valve is a trileaflet valve. Mildly calcified cusps. There is mild aortic regurgitation with an eccentrically directed jet.    Mitral Valve: There is no stenosis. The mean pressure gradient across the mitral valve is 1 mmHg at a heart rate of  bpm. There is mild to moderate regurgitation with an eccentric jet.    Tricuspid Valve: There is moderate regurgitation with an eccentrically directed jet.    Pulmonary Artery: The estimated pulmonary artery systolic pressure is 62 mmHg.    IVC/SVC: Elevated venous pressure at 15 mmHg.  . Continue Furosemide and monitor clinical status closely. Monitor on telemetry. Patient is off CHF pathway.  Monitor strict Is&Os and daily weights.  Place on fluid restriction of 2 L. Cardiology has been consulted. Continue to stress to patient importance of self efficacy and  on diet for CHF. Last BNP reviewed- and noted below   6/3  - pro-BNP 5878  - Cardiology consulted  6/4  - [facility] scheduled for today  - appreciate cardiology recommendations    CKD (chronic kidney disease)  Creatine stable for now. BMP reviewed- noted Estimated Creatinine Clearance: 35.2 mL/min (A) (based on SCr of 1.92 mg/dL (H)). according to latest data. Based on current GFR, CKD stage is stage 3 - GFR 30-59.  Monitor UOP and serial BMP and adjust therapy as needed. Renally dose meds. Avoid nephrotoxic medications and procedures.  Monitor BMP    Essential hypertension  Chronic,  controlled. Latest blood pressure and vitals reviewed-     Temp:  [97.7 °F (36.5 °C)-98.1 °F (36.7 °C)]   Pulse:  [57-71]   Resp:  [13-18]   BP: (134-167)/(65-89)   SpO2:  [96 %-98 %] .   Home meds for hypertension were reviewed and noted below.   Hypertension Medications               amLODIPine (NORVASC) 5 MG tablet Take 1 tablet (5 mg total) by mouth once daily.    furosemide (LASIX) 20 MG tablet Take 1 tablet (20 mg total) by mouth once daily.    isosorbide-hydrALAZINE 20-37.5 mg (BIDIL) 20-37.5 mg Tab Take 1 tablet by mouth 3 (three) times daily.    metoprolol succinate (TOPROL-XL) 25 MG 24 hr tablet Take 1 tablet (25 mg total) by mouth once daily.    nitroGLYCERIN (NITROSTAT) 0.4 MG SL tablet Place 1 tablet (0.4 mg total) under the tongue every 5 (five) minutes as needed for Chest pain.            While in the hospital, will manage blood pressure as follows; Adjust home antihypertensive regimen as follows- continue amlodipine, bidil and lasix . Hold metoprolol secondary to low-normal HR    Will utilize p.r.n. blood pressure medication only if patient's blood pressure greater than 180/110 and he develops symptoms such as worsening chest pain or shortness of breath.  6/3  - start Chlorthalidone      Gross hematuria  6/3  - dc Ticagrelor 2/2 gross hematuria  - dc Heparin drip 2/2 to gross hematuria  6/4  - patient had repeat episode of hematuria  - monitor  - UA pending    HLD (hyperlipidemia)  -Continue statin      Gastroesophageal reflux disease  - continue home protonix        VTE Risk Mitigation (From admission, onward)           Ordered     heparin 25,000 units in dextrose 5% 250 mL (100 units/mL) infusion LOW INTENSITY nomogram - [facility]  Continuous        Question:  Begin at (units/kg/hr)  Answer:  12 06/02/24 1790     heparin 25,000 units in dextrose 5% (100 units/ml) IV bolus from bag LOW INTENSITY nomogram - [facility]  As needed (PRN)        Question:  Heparin Infusion Adjustment (DO NOT MODIFY ANSWER)   Answer:  \\[email or web address]\epic\Images\Pharmacy\HeparinInfusions\heparin LOW INTENSITY nomogram for [facility] [id number].pdf    06/02/24 1425     heparin 25,000 units in dextrose 5% (100 units/ml) IV bolus from bag LOW INTENSITY nomogram - [facility]  As needed (PRN)        Question:  Heparin Infusion Adjustment (DO NOT MODIFY ANSWER)  Answer:  [email or web address]\epic\Images\Pharmacy\HeparinInfusions\heparin LOW INTENSITY nomogram for [facility] [id number].pdf    06/02/24 1425                    Discharge Planning   SYED: 6/4/2024     Code Status: Full Code   Is the patient medically ready for discharge?:     Reason for patient still in hospital (select all that apply): Treatment  Discharge Plan A: Home, Home Health                  Esther Villegas MD  Department of Hospital Medicine   [facility]

## 2024-06-04 NOTE — PLAN OF CARE
Problem: Adult Inpatient Plan of Care  Goal: Plan of Care Review  6/4/2024 1843 by Zoe Rodríguez RN  Outcome: Progressing  6/4/2024 0802 by Zoe Rodríguez RN  Outcome: Progressing  Goal: Patient-Specific Goal (Individualized)  6/4/2024 1843 by Zoe Rodríguez RN  Outcome: Progressing  6/4/2024 0802 by Zoe Rodríguez RN  Outcome: Progressing  Goal: Absence of Hospital-Acquired Illness or Injury  6/4/2024 1843 by Zoe Rodríguez RN  Outcome: Progressing  6/4/2024 0802 by Zoe Rodríguez RN  Outcome: Progressing  Goal: Optimal Comfort and Wellbeing  6/4/2024 1843 by Zoe Rodríguez RN  Outcome: Progressing  6/4/2024 0802 by Zoe Rodríguez RN  Outcome: Progressing  Goal: Readiness for Transition of Care  6/4/2024 1843 by Zoe Rodríguez RN  Outcome: Progressing  6/4/2024 0802 by Zoe Rodríguez RN  Outcome: Progressing     Problem: Wound  Goal: Optimal Coping  6/4/2024 1843 by Zoe Rodríguez RN  Outcome: Progressing  6/4/2024 0802 by Zoe Rodríguez RN  Outcome: Progressing  Goal: Optimal Functional Ability  6/4/2024 1843 by Zoe Rodríguez RN  Outcome: Progressing  6/4/2024 0802 by Zoe Rodríguez RN  Outcome: Progressing  Goal: Absence of Infection Signs and Symptoms  6/4/2024 1843 by Zoe Rodríguez RN  Outcome: Progressing  6/4/2024 0802 by Zoe Rodríguez RN  Outcome: Progressing  Goal: Improved Oral Intake  6/4/2024 1843 by Zoe Rodríguez RN  Outcome: Progressing  6/4/2024 0802 by Zoe Rodríguez RN  Outcome: Progressing  Goal: Optimal Pain Control and Function  6/4/2024 1843 by Zoe Rodríguez RN  Outcome: Progressing  6/4/2024 0802 by Zoe Rodríguez RN  Outcome: Progressing  Goal: Skin Health and Integrity  6/4/2024 1843 by Zoe Rodríguez RN  Outcome: Progressing  6/4/2024 0802 by Zoe Rodríguez RN  Outcome: Progressing  Goal: Optimal Wound Healing  6/4/2024 1843 by Zoe Rodríguez, RN  Outcome: Progressing  6/4/2024 0802 by Zoe Rodríguez, RN  Outcome: Progressing

## 2024-06-04 NOTE — ASSESSMENT & PLAN NOTE
Patient is identified as having Combined Systolic and Diastolic heart failure that is Chronic. CHF is currently controlled. Latest ECHO performed and demonstrates- Results for orders placed during the hospital encounter of 05/06/24     Echo     Interpretation Summary    Left Ventricle: The left ventricle is normal in size. Mildly increased wall thickness. There is concentric remodeling. Moderate global hypokinesis present. There is moderately reduced systolic function with a visually estimated ejection fraction of 35 - 40%. Biplane (2D) method of discs ejection fraction is 37%. Global longitudinal strain is -8.8%. Global longitudinal strain is reduced. Grade III diastolic dysfunction.    Right Ventricle: Mild right ventricular enlargement. Systolic function is mildly reduced.    Left Atrium: Left atrium is mildly dilated.    Right Atrium: Right atrium is moderately dilated.    Aortic Valve: The aortic valve is a trileaflet valve. Mildly calcified cusps. There is mild aortic regurgitation with an eccentrically directed jet.    Mitral Valve: There is no stenosis. The mean pressure gradient across the mitral valve is 1 mmHg at a heart rate of  bpm. There is mild to moderate regurgitation with an eccentric jet.    Tricuspid Valve: There is moderate regurgitation with an eccentrically directed jet.    Pulmonary Artery: The estimated pulmonary artery systolic pressure is 62 mmHg.    IVC/SVC: Elevated venous pressure at 15 mmHg.  . Continue Furosemide and monitor clinical status closely. Monitor on telemetry. Patient is off CHF pathway.  Monitor strict Is&Os and daily weights.  Place on fluid restriction of 2 L. Cardiology has been consulted. Continue to stress to patient importance of self efficacy and  on diet for CHF. Last BNP reviewed- and noted below   6/3  - pro-BNP 1673  - Cardiology consulted  6/4  - [facility] scheduled for today  - appreciate cardiology recommendations

## 2024-06-04 NOTE — CARE UPDATE
Patient has mild to moderate hematuria with no cloths as nurse describes. Patient with current NSTEMI and Hx of CAD s/p stenting one month ago on aspirin, plavix and currently on heparin Drip.   Stable H/H. Cardiology is planning to do LHC tomorrow.   Will continue the heparin drip and DAPT over night while monitoring the urine and H/H.     Update:  Ordered a PTT it came >200. Will stop heparin.  Continue montoring PTT and H/H

## 2024-06-05 VITALS
DIASTOLIC BLOOD PRESSURE: 84 MMHG | SYSTOLIC BLOOD PRESSURE: 131 MMHG | BODY MASS INDEX: 27.53 KG/M2 | HEIGHT: 72 IN | HEART RATE: 54 BPM | WEIGHT: 203.25 LBS | TEMPERATURE: 98 F | OXYGEN SATURATION: 99 % | RESPIRATION RATE: 18 BRPM

## 2024-06-05 LAB
ALBUMIN SERPL BCP-MCNC: 3.4 G/DL (ref 3.5–5)
ALBUMIN/GLOB SERPL: 0.9 {RATIO}
ALP SERPL-CCNC: 309 U/L (ref 45–115)
ALT SERPL W P-5'-P-CCNC: 28 U/L (ref 16–61)
ANION GAP SERPL CALCULATED.3IONS-SCNC: 10 MMOL/L (ref 7–16)
AST SERPL W P-5'-P-CCNC: 28 U/L (ref 15–37)
BILIRUB SERPL-MCNC: 1.2 MG/DL (ref ?–1.2)
BUN SERPL-MCNC: 28 MG/DL (ref 7–18)
BUN/CREAT SERPL: 15 (ref 6–20)
CALCIUM SERPL-MCNC: 9.4 MG/DL (ref 8.5–10.1)
CHLORIDE SERPL-SCNC: 108 MMOL/L (ref 98–107)
CO2 SERPL-SCNC: 24 MMOL/L (ref 21–32)
CREAT SERPL-MCNC: 1.89 MG/DL (ref 0.7–1.3)
EGFR (NO RACE VARIABLE) (RUSH/TITUS): 35 ML/MIN/1.73M2
GLOBULIN SER-MCNC: 3.9 G/DL (ref 2–4)
GLUCOSE SERPL-MCNC: 104 MG/DL (ref 74–106)
OHS QRS DURATION: 124 MS
OHS QTC CALCULATION: 439 MS
POTASSIUM SERPL-SCNC: 4 MMOL/L (ref 3.5–5.1)
PROT SERPL-MCNC: 7.3 G/DL (ref 6.4–8.2)
SODIUM SERPL-SCNC: 138 MMOL/L (ref 136–145)

## 2024-06-05 PROCEDURE — 99233 SBSQ HOSP IP/OBS HIGH 50: CPT | Mod: GT,,, | Performed by: REGISTERED NURSE

## 2024-06-05 PROCEDURE — 25000003 PHARM REV CODE 250: Performed by: STUDENT IN AN ORGANIZED HEALTH CARE EDUCATION/TRAINING PROGRAM

## 2024-06-05 PROCEDURE — 99239 HOSP IP/OBS DSCHRG MGMT >30: CPT | Mod: GC,,, | Performed by: HOSPITALIST

## 2024-06-05 PROCEDURE — 80053 COMPREHEN METABOLIC PANEL: CPT

## 2024-06-05 PROCEDURE — 25000003 PHARM REV CODE 250

## 2024-06-05 PROCEDURE — 36415 COLL VENOUS BLD VENIPUNCTURE: CPT

## 2024-06-05 PROCEDURE — 63600175 PHARM REV CODE 636 W HCPCS

## 2024-06-05 PROCEDURE — 25000242 PHARM REV CODE 250 ALT 637 W/ HCPCS

## 2024-06-05 RX ORDER — METOPROLOL SUCCINATE 50 MG/1
50 TABLET, EXTENDED RELEASE ORAL DAILY
Status: DISCONTINUED | OUTPATIENT
Start: 2024-06-06 | End: 2024-06-05 | Stop reason: HOSPADM

## 2024-06-05 RX ORDER — FLUTICASONE PROPIONATE 50 MCG
2 SPRAY, SUSPENSION (ML) NASAL DAILY
Status: DISCONTINUED | OUTPATIENT
Start: 2024-06-05 | End: 2024-06-05 | Stop reason: HOSPADM

## 2024-06-05 RX ORDER — ISOSORBIDE DINITRATE AND HYDRALAZINE HYDROCHLORIDE 37.5; 2 MG/1; MG/1
2 TABLET ORAL 3 TIMES DAILY
Qty: 90 TABLET | Refills: 1 | Status: SHIPPED | OUTPATIENT
Start: 2024-06-05 | End: 2024-06-12 | Stop reason: SDUPTHER

## 2024-06-05 RX ORDER — METOPROLOL SUCCINATE 50 MG/1
50 TABLET, EXTENDED RELEASE ORAL DAILY
Qty: 90 TABLET | Refills: 3 | Status: SHIPPED | OUTPATIENT
Start: 2024-06-06 | End: 2025-06-06

## 2024-06-05 RX ORDER — ISOSORBIDE DINITRATE 20 MG/1
20 TABLET ORAL 3 TIMES DAILY
Qty: 90 TABLET | Refills: 11 | Status: SHIPPED | OUTPATIENT
Start: 2024-06-05 | End: 2024-06-19 | Stop reason: ALTCHOICE

## 2024-06-05 RX ORDER — CETIRIZINE HYDROCHLORIDE 10 MG/1
10 TABLET ORAL DAILY
Status: COMPLETED | OUTPATIENT
Start: 2024-06-05 | End: 2024-06-05

## 2024-06-05 RX ORDER — AMLODIPINE BESYLATE 10 MG/1
10 TABLET ORAL DAILY
Status: DISCONTINUED | OUTPATIENT
Start: 2024-06-06 | End: 2024-06-05

## 2024-06-05 RX ORDER — CLOPIDOGREL BISULFATE 75 MG/1
75 TABLET ORAL DAILY
Qty: 30 TABLET | Refills: 0 | Status: SHIPPED | OUTPATIENT
Start: 2024-06-06 | End: 2024-06-12 | Stop reason: SDUPTHER

## 2024-06-05 RX ADMIN — FLUTICASONE PROPIONATE 100 MCG: 50 SPRAY, METERED NASAL at 08:06

## 2024-06-05 RX ADMIN — ISOSORBIDE DINITRATE 20 MG: 20 TABLET ORAL at 02:06

## 2024-06-05 RX ADMIN — ISOSORBIDE DINITRATE 20 MG: 20 TABLET ORAL at 08:06

## 2024-06-05 RX ADMIN — FUROSEMIDE 20 MG: 10 INJECTION, SOLUTION INTRAVENOUS at 08:06

## 2024-06-05 RX ADMIN — ASPIRIN 81 MG: 81 TABLET, COATED ORAL at 08:06

## 2024-06-05 RX ADMIN — PANTOPRAZOLE SODIUM 40 MG: 40 TABLET, DELAYED RELEASE ORAL at 08:06

## 2024-06-05 RX ADMIN — HYDRALAZINE HYDROCHLORIDE 35 MG: 10 TABLET, FILM COATED ORAL at 02:06

## 2024-06-05 RX ADMIN — AMLODIPINE BESYLATE 5 MG: 5 TABLET ORAL at 08:06

## 2024-06-05 RX ADMIN — METOPROLOL TARTRATE 25 MG: 25 TABLET, FILM COATED ORAL at 08:06

## 2024-06-05 RX ADMIN — HYDRALAZINE HYDROCHLORIDE 35 MG: 10 TABLET, FILM COATED ORAL at 05:06

## 2024-06-05 RX ADMIN — CLOPIDOGREL BISULFATE 300 MG: 75 TABLET ORAL at 08:06

## 2024-06-05 RX ADMIN — DOCUSATE SODIUM 100 MG: 100 CAPSULE, LIQUID FILLED ORAL at 08:06

## 2024-06-05 RX ADMIN — CETIRIZINE HYDROCHLORIDE 10 MG: 10 TABLET, FILM COATED ORAL at 05:06

## 2024-06-05 NOTE — NURSING
0705 Patient awake and alert. Dressing to rt groin puncture site clean dry and intact. No swelling or bleeding at site. Patient states his allergies have been acting up. Nasal drainage and itching draining eyes. Will notify MD

## 2024-06-05 NOTE — PLAN OF CARE
[facility] Hospital Telemetry  Discharge Final Note    Primary Care Provider: Swati Vicente NP    Expected Discharge Date: 6/5/2024    Final Discharge Note (most recent)       Final Note - 06/05/24 1428          Final Note    Assessment Type Final Discharge Note     Anticipated Discharge Disposition Home-Health Care Svc        Post-Acute Status    Post-Acute Authorization Home Health     Home Health Status Set-up Complete/Auth obtained     Patient choice form signed by patient/caregiver List with quality metrics by geographic area provided;List from CMS Compare;List from System Post-Acute Care     Discharge Delays None known at this time                     Important Message from Medicare  Important Message from Medicare regarding Discharge Appeal Rights: Explained to patient/caregiver, Signed/date by patient/caregiver     Date IMM was signed: 06/05/24  Time IMM was signed: 0920     Follow-up providers       Jerrod Wesley ACNP   Specialty: Cardiology, Emergency Medicine    [address] [place] [id number]   Phone: [phone]       Next Steps: Follow up on 6/19/2024    Instructions: 2 weeks with Jerrod Wesley NP (Dr. Villaseñor patient); 10:00 am    Swati Vicente NP   Specialty: Family Medicine   Relationship: PCP - General    [address] [place] [address] [place] [id number]   Phone: [phone]       Next Steps: Follow up on 6/12/2024    Instructions: hospital follow up; 2:20 pm              After-discharge care                Home Medical Care       [facility]   Service: Home Nursing, Home Rehabilitation    [address] [facility] [place] [address]   Phone: [phone]                             [place] home with home health. Cardiac rehab with [facility]. Patient aware.

## 2024-06-05 NOTE — ASSESSMENT & PLAN NOTE
6/5:  Echo shows ejection fraction 35-40% with systolic dysfunction.  Bidil increased 2 tablets t.i.d., Toprol-XL 50 mg, Lasix 20 mg daily.  Stop Norvasc, chlorthalidone.  We will add losartan outpatient

## 2024-06-05 NOTE — PROGRESS NOTES
[facility] - [facility] Telemetry  Cardiology  Progress Note    Patient Name: Trung Barboza  MRN: [id number]  Admission Date: 6/2/2024  Hospital Length of Stay: 1 days  Code Status: Full Code   Attending Physician: Ronnie Michel MD   Primary Care Physician: Swati Vicente NP  Expected Discharge Date: 6/5/2024  Principal Problem:NSTEMI (non-ST elevation myocardial infarction)    Subjective:     Hospital Course:   No notes on file    Interval History:      Review of Systems   Cardiovascular:  Negative for chest pain and dyspnea on exertion.   Respiratory:  Negative for shortness of breath.    All other systems reviewed and are negative.    Objective:     Vital Signs (Most Recent):  Temp: 97.8 °F (36.6 °C) (06/05/24 1159)  Pulse: (!) 54 (06/05/24 1159)  Resp: 18 (06/05/24 1159)  BP: 131/84 (06/05/24 1159)  SpO2: 99 % (06/05/24 1159) Vital Signs (24h Range):  Temp:  [97.6 °F (36.4 °C)-98.3 °F (36.8 °C)] 97.8 °F (36.6 °C)  Pulse:  [48-77] 54  Resp:  [16-18] 18  SpO2:  [97 %-100 %] 99 %  BP: (123-179)/(56-92) 131/84     Weight: 92.2 kg (203 lb 4.2 oz)  Body mass index is 27.57 kg/m².     SpO2: 99 %         Intake/Output Summary (Last 24 hours) at 6/5/2024 1216  Last data filed at 6/4/2024 1845  Gross per 24 hour   Intake 198.04 ml   Output --   Net 198.04 ml       Lines/Drains/Airways       Peripheral Intravenous Line  Duration                  Peripheral IV - Single Lumen 06/02/24 1024 20 G Anterior;Left Forearm 3 days                       Physical Exam  Vitals reviewed.   Constitutional:       Appearance: Normal appearance.   HENT:      Head: Normocephalic.      Mouth/Throat:      Mouth: Mucous membranes are moist.   Eyes:      Extraocular Movements: Extraocular movements intact.   Cardiovascular:      Rate and Rhythm: Normal rate.      Pulses: Normal pulses.      Heart sounds: Normal heart sounds.   Pulmonary:      Effort: Pulmonary effort is normal.      Breath sounds: Normal breath sounds.    Abdominal:      General: Bowel sounds are normal. There is no distension.      Palpations: Abdomen is soft.   Musculoskeletal:      Cervical back: Normal range of motion.   Skin:     General: Skin is warm.      Capillary Refill: Capillary refill takes less than 2 seconds.   Neurological:      General: No focal deficit present.      Mental Status: He is alert and oriented to person, place, and time.   Psychiatric:         Mood and Affect: Mood normal.         Behavior: Behavior normal.            Significant Labs: All pertinent lab results from the last 24 hours have been reviewed.      Assessment and Plan:     Brief HPI: 84-year-old male who was seen in consult by Cardiology after presenting to the emergency room with chest pain.  The patient reports onset of substernal chest pain without radiation or relief until arrival at the ED. the patient denies fever, chills, nausea, vomiting.  He does report malaise within the last couple of days and also occasional shortness of breath with the chest pain.  The patient is status post PCI mid LAD 5/7/24 by Dr. Villaseñor.  He is scheduled for staged PCI with his next [facility] to be 6/18.  Troponins were 226 flat.    * NSTEMI (non-ST elevation myocardial infarction)  6/3:  Troponins 226> 226> 232.  EKG nonspecific EKG changes no elevation noted.  Currently on heparin drip.  Plan for left heart catheterization tomorrow.  Currently on ASA 81 mg, statin, Isorbid, Brilinta  6/5: [facility] two-vessel CAD.  No intervention.  Due to shortness of breath from Brilinta the patient changes to Plavix body increased to 2 tablets t.i.d., statin, Toprol-XL 50 mg daily, Lasix 20 mg daily at discharge.    Acute on chronic systolic heart failure  6/5:  Echo shows ejection fraction 35-40% with systolic dysfunction.  Bidil increased 2 tablets t.i.d., Toprol-XL 50 mg, Lasix 20 mg daily.  Stop Norvasc, chlorthalidone.  We will add losartan outpatient    HLD (hyperlipidemia)  6/3:  Atorvastatin  resumed          VTE Risk Mitigation (From admission, onward)      None            Jerrod Wesley, ACNP  Cardiology  [facility] - [address] [place] [address]     no

## 2024-06-05 NOTE — PLAN OF CARE
Consult for cardiac rehab. Spoke with patient. He is current with [facility]. Faxed and notified nikky.

## 2024-06-05 NOTE — PLAN OF CARE
Changed to inpatient. Spoke with patient in his room. IM done. Dc plan is home with [facility]. Cm will follow.

## 2024-06-05 NOTE — DISCHARGE INSTRUCTIONS
Discharge instructions reviewed with patient and copy given to patient. Patient voiced understanding of appts, meds, dressing changes and the following:                                                                                          CARE OF YOUR PUNCTURE SITE  *You or someone else, if you are unable, must inspect the site daily.  *DO NOT sit in a bathtub or pool of water for 5 days or until wound has healed.  *You may shower 24 hours after the procedure. Always use a clean washcloth to care for your incision and the area around it and a second washcloth for your body. Remove the bandaid before showering. Gently clean site using soap and water while standing in the shower. Dry thoroughly.  *After your shower, apply an antibacterial ointment to the site and cover with a bandaid the first day after your cath.  If you choose not to shower this day, you will still need to clean and redress your cath site.  *If you start bleeding at the site lay down while either you or someone else holds pressure over the site for 10 minutes without letting up. Seek help immediately if it does not stop bleeding.                                                                                                          ACTIVITY  *Fatigue is common for 1-2 days.  *You may resume normal activity in 2-3 days, letting pain be your guide.  *Limit lifting over 10 pounds for one week or until wound heals.   *Over the next few days, if you have to cough, laugh, or sneeze, hold pressure on the site with your hand while doing so.                                                                                          NORMAL OBSERVATIONS  *Soreness or tenderness that may last one week.  *Mild oozing from the site.  *Possible bruising that could last 2 weeks.  *Formation of a small lump (dime to quarter size) which may last up to 6 weeks.        CALL THE DOCTOR IMMEDIATELY OR GO TO THE EMERGENCY DEPARTMENT IF YOU EXPERIENCE ANY OF THE  FOLLOWING  *Significant bleeding that does not stop after 10 minutes of applying firm pressure.  *Increased swelling at access site or extremity.  *Unusual pain at access site:extremity or back pain.  *Signs of infection:redness, warmth to touch, drainage other than a little blood or pink tinged drainage on the bandaid, poorly healing puncture site, fever, or chills.

## 2024-06-05 NOTE — ASSESSMENT & PLAN NOTE
Start heparin infusion  Continue aspirin and brilinta  Continue statin  Hold metoprolol due to low-normal HR  Cardiology consulted-appreciate assistance  ANNITA score 4  Heart score 7  PRN morphine  6/3  - cardiac telemetry  - troponin 226.7 to 226.9 to 232.9  - dc Ticagrelor 2/2 gross hematuria  - dc Heparin drip 2/2 to gross hematuria  - cardiology consulted   - appreciate cardiology recommendations  6/4  - [facility] scheduled for today  - appreciate cardiology recommendations  6/5  - [facility]    The 1st Mrg lesion was 100% stenosed.    The 2nd Diag lesion was 100% stenosed.    The 1st Diag lesion was 70% stenosed.    The left ventricular end diastolic pressure was normal.    The pre-procedure left ventricular end diastolic pressure was 7.    The estimated blood loss was none.    There was two vessel coronary artery disease.     Two vessel CAD with branch vessel disease  LM - moderate size, with mild luminal irregularities  LAD - small size, patent stent in the prox-mid segment. The mid and distal LAD have moderate diffuse disease with negative remodeling. There is a large D1 with 70% ostial-prox stenosis. There is a small D2 with ostial ; there is an atretic SVG graft to the second diagonal with severe diffuse disease  Lcx - Moderate size with mild luminal irregularities. OM1 is a large branch that is 100% occluded (); it fills via robust R-L collaterals.   RCA - Dominant vessel with mild diffuse disease. The R-PDA gives off collateral to the OM1.   Atretic SVG to small D2 with severe disease at the anastomosis  Normal left sided filling pressures, LVEDP - 7mmHg     Plan:  Stop brillinta (shortness of breath); switch to Plavix  Stop chlorthalidone (CKD); and up titrate anti-anginal therapy - start metop 25mg bid. Consider ranexa    - Patient is medically stable to be discharged home and has maximally benefited from this hospital stay. Follow up appointments with the PCP and specialists were secured at discharge.  -  CARDIOLOGY increased the dosage of metoprolol to 50 mg QD  - Increased the dose of Imdur  - stopped Norvasc  - Lasix 20 mg  - Plavix 75 mg QD  - STOP BRILLINTA  - WILL start losartan out patient  - Increased dose of Bidil  - Isordil 20 mg TID

## 2024-06-05 NOTE — PLAN OF CARE
Problem: Adult Inpatient Plan of Care  Goal: Plan of Care Review  Outcome: Adequate for Care Transition  Goal: Patient-Specific Goal (Individualized)  Outcome: Adequate for Care Transition  Goal: Absence of Hospital-Acquired Illness or Injury  Outcome: Adequate for Care Transition  Goal: Optimal Comfort and Wellbeing  Outcome: Adequate for Care Transition  Goal: Readiness for Transition of Care  Outcome: Adequate for Care Transition     Problem: Wound  Goal: Optimal Coping  Outcome: Adequate for Care Transition  Goal: Optimal Functional Ability  Outcome: Adequate for Care Transition  Goal: Absence of Infection Signs and Symptoms  Outcome: Adequate for Care Transition  Intervention: Prevent or Manage Infection  Flowsheets (Taken 6/5/2024 1351)  Isolation Precautions: precautions maintained  Goal: Improved Oral Intake  Outcome: Adequate for Care Transition  Goal: Optimal Pain Control and Function  Outcome: Adequate for Care Transition  Intervention: Prevent or Manage Pain  Flowsheets (Taken 6/5/2024 1357)  Pain Management Interventions: care clustered  Goal: Skin Health and Integrity  Outcome: Adequate for Care Transition  Goal: Optimal Wound Healing  Outcome: Adequate for Care Transition

## 2024-06-05 NOTE — SUBJECTIVE & OBJECTIVE
Interval History:      Review of Systems   Cardiovascular:  Negative for chest pain and dyspnea on exertion.   Respiratory:  Negative for shortness of breath.    All other systems reviewed and are negative.    Objective:     Vital Signs (Most Recent):  Temp: 97.8 °F (36.6 °C) (06/05/24 1159)  Pulse: (!) 54 (06/05/24 1159)  Resp: 18 (06/05/24 1159)  BP: 131/84 (06/05/24 1159)  SpO2: 99 % (06/05/24 1159) Vital Signs (24h Range):  Temp:  [97.6 °F (36.4 °C)-98.3 °F (36.8 °C)] 97.8 °F (36.6 °C)  Pulse:  [48-77] 54  Resp:  [16-18] 18  SpO2:  [97 %-100 %] 99 %  BP: (123-179)/(56-92) 131/84     Weight: 92.2 kg (203 lb 4.2 oz)  Body mass index is 27.57 kg/m².     SpO2: 99 %         Intake/Output Summary (Last 24 hours) at 6/5/2024 1216  Last data filed at 6/4/2024 1845  Gross per 24 hour   Intake 198.04 ml   Output --   Net 198.04 ml       Lines/Drains/Airways       Peripheral Intravenous Line  Duration                  Peripheral IV - Single Lumen 06/02/24 1024 20 G Anterior;Left Forearm 3 days                       Physical Exam  Vitals reviewed.   Constitutional:       Appearance: Normal appearance.   HENT:      Head: Normocephalic.      Mouth/Throat:      Mouth: Mucous membranes are moist.   Eyes:      Extraocular Movements: Extraocular movements intact.   Cardiovascular:      Rate and Rhythm: Normal rate.      Pulses: Normal pulses.      Heart sounds: Normal heart sounds.   Pulmonary:      Effort: Pulmonary effort is normal.      Breath sounds: Normal breath sounds.   Abdominal:      General: Bowel sounds are normal. There is no distension.      Palpations: Abdomen is soft.   Musculoskeletal:      Cervical back: Normal range of motion.   Skin:     General: Skin is warm.      Capillary Refill: Capillary refill takes less than 2 seconds.   Neurological:      General: No focal deficit present.      Mental Status: He is alert and oriented to person, place, and time.   Psychiatric:         Mood and Affect: Mood normal.          Behavior: Behavior normal.            Significant Labs: All pertinent lab results from the last 24 hours have been reviewed.

## 2024-06-05 NOTE — ASSESSMENT & PLAN NOTE
Patient is identified as having Combined Systolic and Diastolic heart failure that is Chronic. CHF is currently controlled. Latest ECHO performed and demonstrates- Results for orders placed during the hospital encounter of 05/06/24     Echo     Interpretation Summary    Left Ventricle: The left ventricle is normal in size. Mildly increased wall thickness. There is concentric remodeling. Moderate global hypokinesis present. There is moderately reduced systolic function with a visually estimated ejection fraction of 35 - 40%. Biplane (2D) method of discs ejection fraction is 37%. Global longitudinal strain is -8.8%. Global longitudinal strain is reduced. Grade III diastolic dysfunction.    Right Ventricle: Mild right ventricular enlargement. Systolic function is mildly reduced.    Left Atrium: Left atrium is mildly dilated.    Right Atrium: Right atrium is moderately dilated.    Aortic Valve: The aortic valve is a trileaflet valve. Mildly calcified cusps. There is mild aortic regurgitation with an eccentrically directed jet.    Mitral Valve: There is no stenosis. The mean pressure gradient across the mitral valve is 1 mmHg at a heart rate of  bpm. There is mild to moderate regurgitation with an eccentric jet.    Tricuspid Valve: There is moderate regurgitation with an eccentrically directed jet.    Pulmonary Artery: The estimated pulmonary artery systolic pressure is 62 mmHg.    IVC/SVC: Elevated venous pressure at 15 mmHg.  . Continue Furosemide and monitor clinical status closely. Monitor on telemetry. Patient is off CHF pathway.  Monitor strict Is&Os and daily weights.  Place on fluid restriction of 2 L. Cardiology has been consulted. Continue to stress to patient importance of self efficacy and  on diet for CHF. Last BNP reviewed- and noted below   6/3  - pro-BNP 2102  - Cardiology consulted  6/4  - [facility] scheduled for today  - appreciate cardiology recommendations  6/5  - [facility]    The 1st Mrg lesion was 100%  stenosed.    The 2nd Diag lesion was 100% stenosed.    The 1st Diag lesion was 70% stenosed.    The left ventricular end diastolic pressure was normal.    The pre-procedure left ventricular end diastolic pressure was 7.    The estimated blood loss was none.    There was two vessel coronary artery disease.     Two vessel CAD with branch vessel disease  LM - moderate size, with mild luminal irregularities  LAD - small size, patent stent in the prox-mid segment. The mid and distal LAD have moderate diffuse disease with negative remodeling. There is a large D1 with 70% ostial-prox stenosis. There is a small D2 with ostial ; there is an atretic SVG graft to the second diagonal with severe diffuse disease  Lcx - Moderate size with mild luminal irregularities. OM1 is a large branch that is 100% occluded (); it fills via robust R-L collaterals.   RCA - Dominant vessel with mild diffuse disease. The R-PDA gives off collateral to the OM1.   Atretic SVG to small D2 with severe disease at the anastomosis  Normal left sided filling pressures, LVEDP - 7mmHg     Plan:  Stop brillinta (shortness of breath); switch to Plavix  Stop chlorthalidone (CKD); and up titrate anti-anginal therapy - start metop 25mg bid. Consider ranexa  6/5  - Patient is medically stable to be discharged home and has maximally benefited from this hospital stay. Follow up appointments with the PCP and specialists were secured at discharge.  - CARDIOLOGY increased the dosage of metoprolol to 50 mg QD  - Increased the dose of Imdur  - stopped Norvasc  - Lasix 20 mg  - Plavix 75 mg QD  - STOP BRILLINTA  - WILL start losartan out patient  - Increased dose of Bidil  - Isordil 20 mg TID  - cardiology will start aldactone outpatient

## 2024-06-05 NOTE — ASSESSMENT & PLAN NOTE
6/3:  Troponins 226> 226> 232.  EKG nonspecific EKG changes no elevation noted.  Currently on heparin drip.  Plan for left heart catheterization tomorrow.  Currently on ASA 81 mg, statin, Isorbid, Brilinta  6/5: [facility] two-vessel CAD.  No intervention.  Due to shortness of breath from Brilinta the patient changes to Plavix body increased to 2 tablets t.i.d., statin, Toprol-XL 50 mg daily, Lasix 20 mg daily at discharge.

## 2024-06-05 NOTE — ASSESSMENT & PLAN NOTE
Chronic, controlled. Latest blood pressure and vitals reviewed-     Temp:  [97.6 °F (36.4 °C)-98.4 °F (36.9 °C)]   Pulse:  [48-77]   Resp:  [16-18]   BP: (123-179)/(56-92)   SpO2:  [97 %-100 %] .   Home meds for hypertension were reviewed and noted below.   Hypertension Medications               amLODIPine (NORVASC) 5 MG tablet Take 1 tablet (5 mg total) by mouth once daily.    furosemide (LASIX) 20 MG tablet Take 1 tablet (20 mg total) by mouth once daily.    isosorbide-hydrALAZINE 20-37.5 mg (BIDIL) 20-37.5 mg Tab Take 1 tablet by mouth 3 (three) times daily.    metoprolol succinate (TOPROL-XL) 25 MG 24 hr tablet Take 1 tablet (25 mg total) by mouth once daily.    nitroGLYCERIN (NITROSTAT) 0.4 MG SL tablet Place 1 tablet (0.4 mg total) under the tongue every 5 (five) minutes as needed for Chest pain.            While in the hospital, will manage blood pressure as follows; Adjust home antihypertensive regimen as follows- continue amlodipine, bidil and lasix . Hold metoprolol secondary to low-normal HR    Will utilize p.r.n. blood pressure medication only if patient's blood pressure greater than 180/110 and he develops symptoms such as worsening chest pain or shortness of breath.  6/3  - start Chlorthalidone  6/5  - metoprolol 25 mg BID  - DC chlorthalidone  6/5  Patient is medically stable to be discharged home and has maximally benefited from this hospital stay. Follow up appointments with the PCP and specialists were secured at discharge.

## 2024-06-05 NOTE — DISCHARGE SUMMARY
"[facility] Hospital Medicine  Discharge Summary      Patient Name: Trung Barboza  MRN: [id number]  BREANA: [id number]  Patient Class: IP- Inpatient  Admission Date: 6/2/2024  Hospital Length of Stay: 1 days  Discharge Date and Time:  06/05/2024 2:17 PM  Attending Physician: Ronnie Michel MD   Discharging Provider: Esther Villegas MD  Primary Care Provider: Swati Vicente NP    Primary Care Team: Networked reference to record PCT     HPI:   This patient is a pleasant 85yo male with a pmh of HTN, CKD, CVA, PVD, HLD, GERD and CAD s/p stent placement 5/7 who presented to the ED with c/o central, sharp chest pain that he says started around 3 am this morning. The patient was recently hospitalized and had a stent placed in his mid LAD. Cardiology planned to perform a LHC later this month with possible further stent placement. There patient says he had not had any chest pain since discharge until this morning, but had been feeling weak and fatigued which he thought may have been due to his new medications. He also described his urine as appearing "brown" and said he had had some generalized abdominal pain. He endorsed some SOB associated with his chest pain and said when he woke up he felt like he was being "smothered." He denied fevers, chills and new constitutional complaints.     ED workup showed elevated but flat troponins at 226.7-->226.9. ProBNP was elevated but lower than previous at 5058. EKG showed non specific ST changes in inferior leads but no obvious elevations. Labs were otherwise around baseline.    Procedure(s) (LRB):  ANGIOGRAM, CORONARY, INCLUDING BYPASS GRAFT, WITH LEFT HEART CATHETERIZATION (N/A)      Hospital Course:   Patient is medically stable to be discharged home and has maximally benefited from this hospital stay. Follow up appointments with the PCP and specialists were secured at discharge.         Goals of Care Treatment Preferences:  Code Status: " Full Code      Consults:   Consults (From admission, onward)          Status Ordering Provider     Inpatient consult to Social Work  Once        Provider:  (Not yet assigned)    Completed WYATT CASTILLO     Inpatient consult to Cardiology  Once        Provider:  Rodney Eldridge MD    Completed EDMUND CARY            Cardiac/Vascular  * NSTEMI (non-ST elevation myocardial infarction)  Start heparin infusion  Continue aspirin and brilinta  Continue statin  Hold metoprolol due to low-normal HR  Cardiology consulted-appreciate assistance  ANNITA score 4  Heart score 7  PRN morphine  6/3  - cardiac telemetry  - troponin 226.7 to 226.9 to 232.9  - dc Ticagrelor 2/2 gross hematuria  - dc Heparin drip 2/2 to gross hematuria  - cardiology consulted   - appreciate cardiology recommendations  6/4  - [facility] scheduled for today  - appreciate cardiology recommendations  6/5  - [facility]    The 1st Mrg lesion was 100% stenosed.    The 2nd Diag lesion was 100% stenosed.    The 1st Diag lesion was 70% stenosed.    The left ventricular end diastolic pressure was normal.    The pre-procedure left ventricular end diastolic pressure was 7.    The estimated blood loss was none.    There was two vessel coronary artery disease.     Two vessel CAD with branch vessel disease  LM - moderate size, with mild luminal irregularities  LAD - small size, patent stent in the prox-mid segment. The mid and distal LAD have moderate diffuse disease with negative remodeling. There is a large D1 with 70% ostial-prox stenosis. There is a small D2 with ostial ; there is an atretic SVG graft to the second diagonal with severe diffuse disease  Lcx - Moderate size with mild luminal irregularities. OM1 is a large branch that is 100% occluded (); it fills via robust R-L collaterals.   RCA - Dominant vessel with mild diffuse disease. The R-PDA gives off collateral to the OM1.   Atretic SVG to small D2 with severe disease at the anastomosis  Normal left sided  filling pressures, LVEDP - 7mmHg     Plan:  Stop brillinta (shortness of breath); switch to Plavix  Stop chlorthalidone (CKD); and up titrate anti-anginal therapy - start metop 25mg bid. Consider ranexa    - Patient is medically stable to be discharged home and has maximally benefited from this hospital stay. Follow up appointments with the PCP and specialists were secured at discharge.  - CARDIOLOGY increased the dosage of metoprolol to 50 mg QD  - Increased the dose of Imdur  - stopped Norvasc  - Lasix 20 mg  - Plavix 75 mg QD  - STOP BRILLINTA  - WILL start losartan out patient  - Increased dose of Bidil  - Isordil 20 mg TID    Essential hypertension  Chronic, controlled. Latest blood pressure and vitals reviewed-     Temp:  [97.6 °F (36.4 °C)-98.4 °F (36.9 °C)]   Pulse:  [48-77]   Resp:  [16-18]   BP: (123-179)/(56-92)   SpO2:  [97 %-100 %] .   Home meds for hypertension were reviewed and noted below.   Hypertension Medications               amLODIPine (NORVASC) 5 MG tablet Take 1 tablet (5 mg total) by mouth once daily.    furosemide (LASIX) 20 MG tablet Take 1 tablet (20 mg total) by mouth once daily.    isosorbide-hydrALAZINE 20-37.5 mg (BIDIL) 20-37.5 mg Tab Take 1 tablet by mouth 3 (three) times daily.    metoprolol succinate (TOPROL-XL) 25 MG 24 hr tablet Take 1 tablet (25 mg total) by mouth once daily.    nitroGLYCERIN (NITROSTAT) 0.4 MG SL tablet Place 1 tablet (0.4 mg total) under the tongue every 5 (five) minutes as needed for Chest pain.            While in the hospital, will manage blood pressure as follows; Adjust home antihypertensive regimen as follows- continue amlodipine, bidil and lasix . Hold metoprolol secondary to low-normal HR    Will utilize p.r.n. blood pressure medication only if patient's blood pressure greater than 180/110 and he develops symptoms such as worsening chest pain or shortness of breath.  6/3  - start Chlorthalidone  6/5  - metoprolol 25 mg BID  - DC  chlorthalidone  6/5  Patient is medically stable to be discharged home and has maximally benefited from this hospital stay. Follow up appointments with the PCP and specialists were secured at discharge.      Acute on chronic systolic heart failure    Patient is identified as having Combined Systolic and Diastolic heart failure that is Chronic. CHF is currently controlled. Latest ECHO performed and demonstrates- Results for orders placed during the hospital encounter of 05/06/24     Echo     Interpretation Summary    Left Ventricle: The left ventricle is normal in size. Mildly increased wall thickness. There is concentric remodeling. Moderate global hypokinesis present. There is moderately reduced systolic function with a visually estimated ejection fraction of 35 - 40%. Biplane (2D) method of discs ejection fraction is 37%. Global longitudinal strain is -8.8%. Global longitudinal strain is reduced. Grade III diastolic dysfunction.    Right Ventricle: Mild right ventricular enlargement. Systolic function is mildly reduced.    Left Atrium: Left atrium is mildly dilated.    Right Atrium: Right atrium is moderately dilated.    Aortic Valve: The aortic valve is a trileaflet valve. Mildly calcified cusps. There is mild aortic regurgitation with an eccentrically directed jet.    Mitral Valve: There is no stenosis. The mean pressure gradient across the mitral valve is 1 mmHg at a heart rate of  bpm. There is mild to moderate regurgitation with an eccentric jet.    Tricuspid Valve: There is moderate regurgitation with an eccentrically directed jet.    Pulmonary Artery: The estimated pulmonary artery systolic pressure is 62 mmHg.    IVC/SVC: Elevated venous pressure at 15 mmHg.  . Continue Furosemide and monitor clinical status closely. Monitor on telemetry. Patient is off CHF pathway.  Monitor strict Is&Os and daily weights.  Place on fluid restriction of 2 L. Cardiology has been consulted. Continue to stress to patient  importance of self efficacy and  on diet for CHF. Last BNP reviewed- and noted below   6/3  - pro-BNP 5058  - Cardiology consulted  6/4  - [facility] scheduled for today  - appreciate cardiology recommendations  6/5  - [facility]    The 1st Mrg lesion was 100% stenosed.    The 2nd Diag lesion was 100% stenosed.    The 1st Diag lesion was 70% stenosed.    The left ventricular end diastolic pressure was normal.    The pre-procedure left ventricular end diastolic pressure was 7.    The estimated blood loss was none.    There was two vessel coronary artery disease.     Two vessel CAD with branch vessel disease  LM - moderate size, with mild luminal irregularities  LAD - small size, patent stent in the prox-mid segment. The mid and distal LAD have moderate diffuse disease with negative remodeling. There is a large D1 with 70% ostial-prox stenosis. There is a small D2 with ostial ; there is an atretic SVG graft to the second diagonal with severe diffuse disease  Lcx - Moderate size with mild luminal irregularities. OM1 is a large branch that is 100% occluded (); it fills via robust R-L collaterals.   RCA - Dominant vessel with mild diffuse disease. The R-PDA gives off collateral to the OM1.   Atretic SVG to small D2 with severe disease at the anastomosis  Normal left sided filling pressures, LVEDP - 7mmHg     Plan:  Stop brillinta (shortness of breath); switch to Plavix  Stop chlorthalidone (CKD); and up titrate anti-anginal therapy - start metop 25mg bid. Consider ranexa  6/5  - Patient is medically stable to be discharged home and has maximally benefited from this hospital stay. Follow up appointments with the PCP and specialists were secured at discharge.  - CARDIOLOGY increased the dosage of metoprolol to 50 mg QD  - Increased the dose of Imdur  - stopped Norvasc  - Lasix 20 mg  - Plavix 75 mg QD  - STOP BRILLINTA  - WILL start losartan out patient  - Increased dose of Bidil  - Isordil 20 mg TID  - cardiology will  start aldactone outpatient    Renal/  CKD (chronic kidney disease)  Creatine stable for now. BMP reviewed- noted Estimated Creatinine Clearance: 31.1 mL/min (A) (based on SCr of 1.94 mg/dL (H)). according to latest data. Based on current GFR, CKD stage is stage 3 - GFR 30-59.  Monitor UOP and serial BMP and adjust therapy as needed. Renally dose meds. Avoid nephrotoxic medications and procedures.  Monitor BMP  6/5  - Stopped chlorthalidone        Final Active Diagnoses:    Diagnosis Date Noted POA    PRINCIPAL PROBLEM:  NSTEMI (non-ST elevation myocardial infarction) [I21.4] 05/07/2024 Yes    Acute on chronic systolic heart failure [I50.23] 06/03/2024 Yes    CKD (chronic kidney disease) [N18.9] 05/07/2024 Yes    Essential hypertension [I10] 07/10/2021 Yes    Gross hematuria [R31.0] 06/04/2024 No    HLD (hyperlipidemia) [E78.5] 05/07/2024 Yes    Gastroesophageal reflux disease [K21.9] 09/15/2022 Yes      Problems Resolved During this Admission:       Discharged Condition: stable    Disposition:     Follow Up:   Follow-up Information       Jerrod Wesley ACNP Follow up on 6/19/2024.    Specialties: Cardiology, Emergency Medicine  Why: 2 weeks with Jerrod Wesley NP (Dr. Villaseñor patient); 10:00 am  Contact information:  [address] [place] [id number]  [phone]               Swati Vicente NP Follow up on 6/12/2024.    Specialty: Family Medicine  Why: hospital follow up; 2:20 pm  Contact information:  [address] [address] [place] [address]  [phone]                           Patient Instructions:      Diet Cardiac     Activity as tolerated       Significant Diagnostic Studies: Labs: All labs within the past 24 hours have been reviewed    Pending Diagnostic Studies:       Procedure Component Value Units Date/Time    EXTRA TUBES [[id number]] Collected: 06/04/24 2725    Order Status: Sent Lab Status: In process Updated: 06/04/24 1207    Specimen: Blood, Venous     Narrative:      The following orders were  created for panel order EXTRA TUBES.  Procedure                               Abnormality         Status                     ---------                               -----------         ------                     Lavender Top Hold[[id number]]                               In process                   Please view results for these tests on the individual orders.    EXTRA TUBES [[id number]]     Order Status: Sent Lab Status: No result     Specimen: Blood, Venous     EXTRA TUBES [[id number]]     Order Status: Sent Lab Status: No result     Specimen: Blood, Venous     EXTRA TUBES [[id number]]     Order Status: Sent Lab Status: No result     Specimen: Blood, Venous            Medications:  Reconciled Home Medications:      Medication List        START taking these medications      clopidogreL 75 mg tablet  Commonly known as: PLAVIX  Take 1 tablet (75 mg total) by mouth once daily.  Start taking on: June 6, 2024     isosorbide dinitrate 20 MG tablet  Commonly known as: ISORDIL  Take 1 tablet (20 mg total) by mouth 3 (three) times daily.            CHANGE how you take these medications      isosorbide-hydrALAZINE 20-37.5 mg 20-37.5 mg Tab  Commonly known as: BIDIL  Take 2 tablets by mouth 3 (three) times daily.  What changed: how much to take     metoprolol succinate 50 MG 24 hr tablet  Commonly known as: TOPROL-XL  Take 1 tablet (50 mg total) by mouth once daily.  Start taking on: June 6, 2024  What changed:   medication strength  how much to take            CONTINUE taking these medications      aspirin 81 MG EC tablet  Commonly known as: ECOTRIN  Take 1 tablet (81 mg total) by mouth once daily.     atorvastatin 80 MG tablet  Commonly known as: LIPITOR  Take 1 tablet by mouth in the evening     furosemide 20 MG tablet  Commonly known as: LASIX  Take 1 tablet (20 mg total) by mouth once daily.     multivitamin with minerals tablet  Take 1 tablet by mouth once daily.     nitroGLYCERIN 0.4 MG SL tablet  Commonly known as:  NITROSTAT  Place 1 tablet (0.4 mg total) under the tongue every 5 (five) minutes as needed for Chest pain.     potassium chloride 10 MEQ Tbsr  Commonly known as: KLOR-CON  Take 1 tablet (10 mEq total) by mouth once daily.            STOP taking these medications      amLODIPine 5 MG tablet  Commonly known as: NORVASC     pantoprazole 40 MG tablet  Commonly known as: PROTONIX     ticagrelor 90 mg tablet  Commonly known as: BRILINTA              Indwelling Lines/Drains at time of discharge:   Lines/Drains/Airways       None                   Time spent on the discharge of patient: 60 minutes         Esther Villegas MD  Department of Hospital Medicine  [facility]

## 2024-06-05 NOTE — HOSPITAL COURSE
Patient is medically stable to be discharged home and has maximally benefited from this hospital stay. Follow up appointments with the PCP and specialists were secured at discharge.

## 2024-06-06 ENCOUNTER — PATIENT OUTREACH (OUTPATIENT)
Dept: ADMINISTRATIVE | Facility: CLINIC | Age: 84
End: 2024-06-06

## 2024-06-06 ENCOUNTER — DOCUMENTATION ONLY (OUTPATIENT)
Dept: HEPATOLOGY | Facility: HOSPITAL | Age: 84
End: 2024-06-06
Payer: MEDICARE

## 2024-06-06 ENCOUNTER — TELEPHONE (OUTPATIENT)
Dept: CARDIOLOGY | Facility: CLINIC | Age: 84
End: 2024-06-06
Payer: MEDICARE

## 2024-06-06 NOTE — TELEPHONE ENCOUNTER
Pt's daughter notified that he did not need to take Isordil.  Patient needs to take Bidil.  She (Jennifer Page) stated that the pharmacy was calling this am about meds.  She stated that she would call the pharmacy back.

## 2024-06-06 NOTE — ASSESSMENT & PLAN NOTE
- EF 35-40%  - NYHA class II, stage C  - euvolemic, warm  - continue lasix 20mg daily  - no ACE/ARB, ARNI, MRA or SGLT2i due to CKD  - continue bidil tid, Toprol XL, and norvasc  - labs today

## 2024-06-06 NOTE — PROGRESS NOTES
At discharge from from the hospital on 6/5/2024, patient was not started on Spironolactone. Cardiology recommended not to start it at the discharge and that it will be started outpatient. SGLT was also not recommended by Cardiology at the moment.  He was called to check on him and was advised to keep all the appointments that were set up at discharge.   Patient verbalized to have understood the information provided.

## 2024-06-06 NOTE — PROGRESS NOTES
C3 nurse spoke with Trung Barboza  for a TCC post hospital discharge follow up call. The patient has a scheduled HOSFU appointment with Swati Vicente NP  on 6/12/24 @ 220. Pt unable to review medications during call and agreed to a callback in the morning.

## 2024-06-06 NOTE — ASSESSMENT & PLAN NOTE
- s/p PCI to prox LAD, failed SVG to LAD and RCA, patent SVG to OM1  - has c/o mild infrequent chest pain  - SL NTG prn for chest pain  - will schedule staged PCI of D1  - continue DAPT with ASA and Brilinta x 1 year, ASA indefinitely  - continue high intensity statin with lipitor 80mg daily  - no ACE/ARB, ARNI, MRA or SGLT2i due to CKD  - continue Toprol XL, bidil, and norvasc  - labs today

## 2024-06-07 NOTE — PROGRESS NOTES
C3 nurse contacted Trung Barboza to review medications. Pt stated he is not at home and requested a call back.

## 2024-06-12 ENCOUNTER — OFFICE VISIT (OUTPATIENT)
Dept: FAMILY MEDICINE | Facility: CLINIC | Age: 84
End: 2024-06-12
Payer: MEDICARE

## 2024-06-12 VITALS
TEMPERATURE: 98 F | HEIGHT: 72 IN | BODY MASS INDEX: 27.22 KG/M2 | DIASTOLIC BLOOD PRESSURE: 68 MMHG | HEART RATE: 60 BPM | OXYGEN SATURATION: 97 % | WEIGHT: 201 LBS | RESPIRATION RATE: 22 BRPM | SYSTOLIC BLOOD PRESSURE: 126 MMHG

## 2024-06-12 DIAGNOSIS — N18.31 STAGE 3A CHRONIC KIDNEY DISEASE: ICD-10-CM

## 2024-06-12 DIAGNOSIS — I50.23 ACUTE ON CHRONIC SYSTOLIC HEART FAILURE: ICD-10-CM

## 2024-06-12 DIAGNOSIS — I25.10 CORONARY ARTERY DISEASE INVOLVING NATIVE HEART, UNSPECIFIED VESSEL OR LESION TYPE, UNSPECIFIED WHETHER ANGINA PRESENT: ICD-10-CM

## 2024-06-12 DIAGNOSIS — E87.6 DIURETIC-INDUCED HYPOKALEMIA: ICD-10-CM

## 2024-06-12 DIAGNOSIS — I10 ESSENTIAL HYPERTENSION: ICD-10-CM

## 2024-06-12 DIAGNOSIS — E78.5 HYPERLIPIDEMIA, UNSPECIFIED HYPERLIPIDEMIA TYPE: Primary | ICD-10-CM

## 2024-06-12 DIAGNOSIS — T50.2X5A DIURETIC-INDUCED HYPOKALEMIA: ICD-10-CM

## 2024-06-12 PROCEDURE — 3074F SYST BP LT 130 MM HG: CPT | Mod: ,,, | Performed by: NURSE PRACTITIONER

## 2024-06-12 PROCEDURE — 1111F DSCHRG MED/CURRENT MED MERGE: CPT | Mod: ,,, | Performed by: NURSE PRACTITIONER

## 2024-06-12 PROCEDURE — 3288F FALL RISK ASSESSMENT DOCD: CPT | Mod: ,,, | Performed by: NURSE PRACTITIONER

## 2024-06-12 PROCEDURE — 1126F AMNT PAIN NOTED NONE PRSNT: CPT | Mod: ,,, | Performed by: NURSE PRACTITIONER

## 2024-06-12 PROCEDURE — 1101F PT FALLS ASSESS-DOCD LE1/YR: CPT | Mod: ,,, | Performed by: NURSE PRACTITIONER

## 2024-06-12 PROCEDURE — 3078F DIAST BP <80 MM HG: CPT | Mod: ,,, | Performed by: NURSE PRACTITIONER

## 2024-06-12 PROCEDURE — 99213 OFFICE O/P EST LOW 20 MIN: CPT | Mod: ,,, | Performed by: NURSE PRACTITIONER

## 2024-06-12 RX ORDER — POTASSIUM CHLORIDE 750 MG/1
10 TABLET, EXTENDED RELEASE ORAL DAILY
Qty: 90 TABLET | Refills: 1 | Status: SHIPPED | OUTPATIENT
Start: 2024-06-12 | End: 2024-06-19

## 2024-06-12 RX ORDER — CLOPIDOGREL BISULFATE 75 MG/1
75 TABLET ORAL DAILY
Qty: 90 TABLET | Refills: 1 | Status: SHIPPED | OUTPATIENT
Start: 2024-06-12 | End: 2024-06-19 | Stop reason: SDUPTHER

## 2024-06-12 RX ORDER — FUROSEMIDE 20 MG/1
20 TABLET ORAL DAILY
Qty: 90 TABLET | Refills: 1 | Status: SHIPPED | OUTPATIENT
Start: 2024-06-12 | End: 2024-06-19 | Stop reason: SDUPTHER

## 2024-06-12 RX ORDER — ATORVASTATIN CALCIUM 80 MG/1
80 TABLET, FILM COATED ORAL NIGHTLY
Qty: 90 TABLET | Refills: 1 | Status: SHIPPED | OUTPATIENT
Start: 2024-06-12 | End: 2024-06-19 | Stop reason: SDUPTHER

## 2024-06-12 RX ORDER — ISOSORBIDE DINITRATE AND HYDRALAZINE HYDROCHLORIDE 37.5; 2 MG/1; MG/1
2 TABLET ORAL 3 TIMES DAILY
Qty: 540 TABLET | Refills: 1 | Status: SHIPPED | OUTPATIENT
Start: 2024-06-12

## 2024-06-12 NOTE — PROGRESS NOTES
Swati Vicente NP   [facility]  [address] [address] [facility] [address]  [place]  [address]      PATIENT NAME: Trung Barboza  : 1940  DATE: 24  MRN: [id number]      Billing Provider: Swati Vicente NP  Level of Service: [place] OFFICE/OUTPT VISIT, EST, LEVL III, 20-29 MIN  Patient PCP Information       Provider PCP Type    Swati Vicente NP General            Reason for Visit / Chief Complaint: Transitional Care (Patient is here for follow up from hospital stay. He was admitted to [facility] in [place] for chest pain with shortness of breath on 2024. He has brought his medications with him today. Will reconcile with discharge summary. Patient is unsure if he needs to keep his f/u appointment with cardiology next week. He reports he is feeling better today. )         History of Present Illness / Problem Focused Workflow     84 year old male presents to clinic for hospital follow up. He was admitted to [facility] in [place] for chest pain with shortness of breath on 2024. He previously had stent placement on  with plans to follow up for LHC with possible further stent placement. He had been doing well since previous discharge. However, he reports he developed chest pain accompanied with shortness of breath early morning of 24 and decided he should go on in to ER. Cardiology went ahead and performed LHC on  and he was found to have two vessel CAD. No intervention during cath, and plans were made to manage with medications. HE was discharged home on 24 with follow up appt scheduled with Dr Villaseñor for .   He states he has been doing well since he has been home and denies further chest pain or shortness of breath. He did bring all mediations with him today and they were reviewed, discussed with patient, and reconciled.           Review of Systems     @Review of Systems   Constitutional:  Negative for activity change, appetite change, fatigue and fever.   HENT:  Negative for  nasal congestion, ear pain, rhinorrhea, sinus pressure/congestion and sore throat.    Eyes:  Negative for pain, redness, visual disturbance and eye dryness.   Respiratory:  Negative for cough and shortness of breath.    Cardiovascular:  Negative for chest pain and leg swelling.   Gastrointestinal:  Negative for abdominal distention, abdominal pain, constipation and diarrhea.   Endocrine: Negative for cold intolerance, heat intolerance and polyuria.   Genitourinary:  Negative for bladder incontinence, dysuria, frequency and urgency.   Musculoskeletal:  Negative for arthralgias, gait problem and myalgias.   Integumentary:  Negative for color change, rash and wound.   Allergic/Immunologic: Negative for environmental allergies and food allergies.   Neurological:  Negative for dizziness, weakness, light-headedness and headaches.   Psychiatric/Behavioral:  Negative for behavioral problems and sleep disturbance.        Medical / Social / Family History     Past Medical History:   Diagnosis Date    Aortic regurgitation     BPH with urinary obstruction     Chronic kidney disease, stage 3b     GR 44     creat 1.8    Coronary artery disease     Essential hypertension 07/10/2021    Gastroesophageal reflux disease 09/15/2022    Gout, arthritis     Myocardial infarction     PVD (peripheral vascular disease) 02/01/2024    TIA (transient ischemic attack)        Past Surgical History:   Procedure Laterality Date    ANGIOGRAM, CORONARY, WITH LEFT HEART CATHETERIZATION N/A 12/31/2021    Procedure: ANGIOGRAM,CORONARY,WITH LEFT HEART CATHETERIZATION;  Surgeon: Mahad Villaseñor DO;  Location: [facility] CATH LAB;  Service: Cardiology;  Laterality: N/A;    ANGIOGRAM, CORONARY, WITH LEFT HEART CATHETERIZATION N/A 5/7/2024    Procedure: Angiogram, Coronary, with Left Heart Cath;  Surgeon: Mahad Villaseñor DO;  Location: [facility] CATH LAB;  Service: Cardiology;  Laterality: N/A;    BIOPSY WITH TRANSRECTAL ULTRASOUND (TRUS) GUIDANCE Bilateral  03/07/2018    CHOLECYSTECTOMY      CORONARY ANGIOGRAPHY INCLUDING BYPASS GRAFTS WITH CATHETERIZATION OF LEFT HEART N/A 6/4/2024    Procedure: ANGIOGRAM, CORONARY, INCLUDING BYPASS GRAFT, WITH LEFT HEART CATHETERIZATION;  Surgeon: Philip Torres MD;  Location: [facility] CATH LAB;  Service: Cardiology;  Laterality: N/A;    CORONARY ARTERY BYPASS GRAFT  01/07/2022    [facility]-Dr. Qureshi    LEFT HEART CATHETERIZATION Left 7/13/2021    Procedure: Left heart cath;  Surgeon: Philip Torres MD;  Location: [facility] CATH LAB;  Service: Cardiology;  Laterality: Left;    PERCUTANEOUS CORONARY INTERVENTION, ARTERY N/A 5/7/2024    Procedure: Percutaneous coronary intervention;  Surgeon: Mahad Villaseñor DO;  Location: [facility] CATH LAB;  Service: Cardiology;  Laterality: N/A;       Medications and Allergies     Medications  Outpatient Medications Marked as Taking for the 6/12/24 encounter (Office Visit) with Swati Vicente NP   Medication Sig Dispense Refill    aspirin (ECOTRIN) 81 MG EC tablet Take 1 tablet (81 mg total) by mouth once daily. 30 tablet 0    metoprolol succinate (TOPROL-XL) 50 MG 24 hr tablet Take 1 tablet (50 mg total) by mouth once daily. 90 tablet 3    multivitamin with minerals tablet Take 1 tablet by mouth once daily.      nitroGLYCERIN (NITROSTAT) 0.4 MG SL tablet Place 1 tablet (0.4 mg total) under the tongue every 5 (five) minutes as needed for Chest pain. 25 tablet 2    [DISCONTINUED] atorvastatin (LIPITOR) 80 MG tablet Take 1 tablet by mouth in the evening 90 tablet 0    [DISCONTINUED] clopidogreL (PLAVIX) 75 mg tablet Take 1 tablet (75 mg total) by mouth once daily. 30 tablet 0    [DISCONTINUED] furosemide (LASIX) 20 MG tablet Take 1 tablet (20 mg total) by mouth once daily. 90 tablet 1    [DISCONTINUED] isosorbide-hydrALAZINE 20-37.5 mg (BIDIL) 20-37.5 mg Tab Take 2 tablets by mouth 3 (three) times daily. 90 tablet 1    [DISCONTINUED] potassium chloride (KLOR-CON) 10 MEQ TbSR  Take 1 tablet (10 mEq total) by mouth once daily. 90 tablet 1       Allergies  Review of patient's allergies indicates:   Allergen Reactions    Lisinopril Swelling and Anaphylaxis       Physical Examination     Vitals:    06/12/24 1433   BP: 126/68   Pulse: 60   Resp: (!) 22   Temp: 98.2 °F (36.8 °C)     Physical Exam  Vitals and nursing note reviewed.   HENT:      Head: Normocephalic.      Nose: Nose normal.      Mouth/Throat:      Mouth: Mucous membranes are moist.      Pharynx: Oropharynx is clear. No posterior oropharyngeal erythema.   Eyes:      Conjunctiva/sclera: Conjunctivae normal.   Cardiovascular:      Rate and Rhythm: Normal rate and regular rhythm.      Pulses: Normal pulses.      Heart sounds: Normal heart sounds.   Pulmonary:      Effort: Pulmonary effort is normal.      Breath sounds: Normal breath sounds.   Abdominal:      General: Abdomen is flat. Bowel sounds are normal. There is no distension.      Palpations: Abdomen is soft.   Musculoskeletal:         General: No swelling or tenderness. Normal range of motion.      Cervical back: Normal range of motion.      Right lower leg: No edema.      Left lower leg: No edema.   Skin:     General: Skin is warm and dry.      Capillary Refill: Capillary refill takes less than 2 seconds.   Neurological:      Mental Status: He is alert. Mental status is at baseline.   Psychiatric:         Mood and Affect: Mood normal.         Behavior: Behavior normal.               Lab Results   Component Value Date    WBC 4.14 (L) 06/04/2024    HGB 11.6 (L) 06/04/2024    HCT 34.7 (L) 06/04/2024    MCV 95.9 06/04/2024     06/04/2024        CMP  Sodium   Date Value Ref Range Status   06/19/2024 140 136 - 145 mmol/L Final     Potassium   Date Value Ref Range Status   06/19/2024 3.8 3.5 - 5.1 mmol/L Final     Chloride   Date Value Ref Range Status   06/19/2024 109 (H) 98 - 107 mmol/L Final     CO2   Date Value Ref Range Status   06/19/2024 25 21 - 32 mmol/L Final      Glucose   Date Value Ref Range Status   06/19/2024 88 74 - 106 mg/dL Final     BUN   Date Value Ref Range Status   06/19/2024 27 (H) 7 - 18 mg/dL Final     Creatinine   Date Value Ref Range Status   06/19/2024 1.77 (H) 0.70 - 1.30 mg/dL Final     Calcium   Date Value Ref Range Status   06/19/2024 9.2 8.5 - 10.1 mg/dL Final     Total Protein   Date Value Ref Range Status   06/05/2024 7.3 6.4 - 8.2 g/dL Final     Albumin   Date Value Ref Range Status   06/05/2024 3.4 (L) 3.5 - 5.0 g/dL Final     Bilirubin, Total   Date Value Ref Range Status   06/05/2024 1.2 >0.0 - 1.2 mg/dL Final     Alk Phos   Date Value Ref Range Status   06/05/2024 309 (H) 45 - 115 U/L Final     AST   Date Value Ref Range Status   06/05/2024 28 15 - 37 U/L Final     ALT   Date Value Ref Range Status   06/05/2024 28 16 - 61 U/L Final     Anion Gap   Date Value Ref Range Status   06/19/2024 10 7 - 16 mmol/L Final     eGFR   Date Value Ref Range Status   06/19/2024 37 (L) >=60 mL/min/1.73m2 Final     Procedures   Assessment and Plan (including Health Maintenance)   :    Plan:     Problem List Items Addressed This Visit          Cardiac/Vascular    Essential hypertension    Current Assessment & Plan     Blood pressure well controlled. Continue current medications. Follow up with cardiology as scheduled.          HLD (hyperlipidemia) - Primary    Current Assessment & Plan     Lab Results   Component Value Date    LDLCALC 108 03/08/2024     Continue low fat/low cholesterol diet and Atorvastatin as ordered.          Acute on chronic systolic heart failure    Current Assessment & Plan     No s/sx of fluid overload. Denies shortness of breath. Continue bidil tid, Toprol XL, Norvasc, and Lasix as ordered. Follow low sodium diet. Follow up with cardiology as scheduled.             Renal/    CKD (chronic kidney disease)     Other Visit Diagnoses       Diuretic-induced hypokalemia        Coronary artery disease involving native heart, unspecified vessel  or lesion type, unspecified whether angina present        Relevant Medications    isosorbide-hydrALAZINE 20-37.5 mg (BIDIL) 20-37.5 mg Tab            Health Maintenance Topics with due status: Not Due       Topic Last Completion Date    TETANUS VACCINE 12/11/2018    Lipid Panel 03/08/2024       Future Appointments   Date Time Provider Department Center   7/17/2024  8:20 AM Swati Vicente NP [facility] [place]   8/7/2024  2:20 PM Mahad Villaseñor DO [facility]   9/10/2024  8:40 AM Swati Vicente NP [facility] [place]   1/16/2025  3:00 PM AWV NURSE ASHLEY [facility] [place]        Health Maintenance Due   Topic Date Due    Shingles Vaccine (1 of 2) Never done    RSV Vaccine (Age 60+ and Pregnant patients) (1 - 1-dose 60+ series) Never done    COVID-19 Vaccine (5 - 2023-24 season) 09/01/2023          Signature:  [facility]  [address] [place] [address], [place]  [address]    Date of encounter: 6/12/24

## 2024-06-18 ENCOUNTER — DOCUMENT SCAN (OUTPATIENT)
Dept: HOME HEALTH SERVICES | Facility: HOSPITAL | Age: 84
End: 2024-06-18
Payer: MEDICARE

## 2024-06-19 ENCOUNTER — OFFICE VISIT (OUTPATIENT)
Dept: CARDIOLOGY | Facility: CLINIC | Age: 84
End: 2024-06-19
Payer: MEDICARE

## 2024-06-19 VITALS
BODY MASS INDEX: 29.21 KG/M2 | HEART RATE: 61 BPM | DIASTOLIC BLOOD PRESSURE: 88 MMHG | HEIGHT: 72 IN | WEIGHT: 215.63 LBS | SYSTOLIC BLOOD PRESSURE: 142 MMHG | OXYGEN SATURATION: 99 %

## 2024-06-19 DIAGNOSIS — I25.10 CORONARY ARTERY DISEASE INVOLVING NATIVE HEART, UNSPECIFIED VESSEL OR LESION TYPE, UNSPECIFIED WHETHER ANGINA PRESENT: Primary | ICD-10-CM

## 2024-06-19 DIAGNOSIS — E78.5 HYPERLIPIDEMIA, UNSPECIFIED HYPERLIPIDEMIA TYPE: ICD-10-CM

## 2024-06-19 DIAGNOSIS — I21.4 NSTEMI (NON-ST ELEVATION MYOCARDIAL INFARCTION): Primary | ICD-10-CM

## 2024-06-19 DIAGNOSIS — I10 ESSENTIAL HYPERTENSION: ICD-10-CM

## 2024-06-19 DIAGNOSIS — N18.31 STAGE 3A CHRONIC KIDNEY DISEASE: ICD-10-CM

## 2024-06-19 PROCEDURE — 99999 PR PBB SHADOW E&M-EST. PATIENT-LVL III: CPT | Mod: PBBFAC,,, | Performed by: REGISTERED NURSE

## 2024-06-19 PROCEDURE — 1111F DSCHRG MED/CURRENT MED MERGE: CPT | Mod: CPTII,,, | Performed by: REGISTERED NURSE

## 2024-06-19 PROCEDURE — 99213 OFFICE O/P EST LOW 20 MIN: CPT | Mod: S$PBB,,, | Performed by: REGISTERED NURSE

## 2024-06-19 PROCEDURE — 3079F DIAST BP 80-89 MM HG: CPT | Mod: CPTII,,, | Performed by: REGISTERED NURSE

## 2024-06-19 PROCEDURE — 1159F MED LIST DOCD IN RCRD: CPT | Mod: CPTII,,, | Performed by: REGISTERED NURSE

## 2024-06-19 PROCEDURE — 3077F SYST BP >= 140 MM HG: CPT | Mod: CPTII,,, | Performed by: REGISTERED NURSE

## 2024-06-19 PROCEDURE — 99213 OFFICE O/P EST LOW 20 MIN: CPT | Mod: PBBFAC | Performed by: REGISTERED NURSE

## 2024-06-19 PROCEDURE — 1101F PT FALLS ASSESS-DOCD LE1/YR: CPT | Mod: CPTII,,, | Performed by: REGISTERED NURSE

## 2024-06-19 PROCEDURE — 3288F FALL RISK ASSESSMENT DOCD: CPT | Mod: CPTII,,, | Performed by: REGISTERED NURSE

## 2024-06-19 RX ORDER — SPIRONOLACTONE 25 MG/1
12.5 TABLET ORAL DAILY
Qty: 15 TABLET | Refills: 11 | Status: SHIPPED | OUTPATIENT
Start: 2024-06-19 | End: 2025-06-19

## 2024-06-19 RX ORDER — CLOPIDOGREL BISULFATE 75 MG/1
75 TABLET ORAL DAILY
Qty: 90 TABLET | Refills: 6 | Status: SHIPPED | OUTPATIENT
Start: 2024-06-19

## 2024-06-19 RX ORDER — ATORVASTATIN CALCIUM 80 MG/1
80 TABLET, FILM COATED ORAL NIGHTLY
Qty: 90 TABLET | Refills: 6 | Status: SHIPPED | OUTPATIENT
Start: 2024-06-19

## 2024-06-19 RX ORDER — FUROSEMIDE 20 MG/1
20 TABLET ORAL DAILY
Qty: 90 TABLET | Refills: 3 | Status: SHIPPED | OUTPATIENT
Start: 2024-06-19

## 2024-06-19 NOTE — PROGRESS NOTES
PCP: Swati Vicente NP    Referring Provider:     Subjective:   Trung Barboza is a 84 y.o. male with hx of  hx of MI, CAD s/p CABG with subsequent stents, TIA, CKD, HTN, HLD, gout, and GERD who presents for follow up for NSTEMI.    Pt was hospitalized at [facility] ([facility] s/p PCI to prox LAD) in 2024 with unsuccessful PCI of D1. EF  35-40%.   Today the patient denies lower extremity edema, palpitations, chest pain or shortness of breath.  No reported sublingual nitro use since discharge        Fhx:  Family History   Problem Relation Name Age of Onset    Heart disease Mother      Hypertension Mother      No Known Problems Father      No Known Problems Sister      No Known Problems Sister      No Known Problems Sister      No Known Problems Sister      No Known Problems Brother      No Known Problems Brother      No Known Problems Brother      No Known Problems Brother      No Known Problems Daughter      Hypertension Son      No Known Problems Son      No Known Problems Son      No Known Problems Son      No Known Problems Son       Shx:   Social History     Socioeconomic History    Marital status:     Number of children: 7   Occupational History    Occupation: retired   Tobacco Use    Smoking status: Former     Current packs/day: 0.00     Average packs/day: 0.5 packs/day for 15.0 years (7.5 ttl pk-yrs)     Types: Cigarettes     Start date: 1973     Quit date: 1988     Years since quittin.9     Passive exposure: Never    Smokeless tobacco: Former     Types: Chew   Substance and Sexual Activity    Alcohol use: Never    Drug use: Never    Sexual activity: Not Currently     Partners: Female   Social History Narrative    Patient lives alone. Patient has family close. His daughter Jennifer lives next door.      Social Determinants of Health     Financial Resource Strain: Low Risk  (2024)    Overall Financial Resource Strain (CARDIA)     Difficulty of Paying Living Expenses: Not hard  at all   Food Insecurity: No Food Insecurity (5/7/2024)    Hunger Vital Sign     Worried About Running Out of Food in the Last Year: Never true     Ran Out of Food in the Last Year: Never true   Transportation Needs: No Transportation Needs (5/7/2024)    TRANSPORTATION NEEDS     Transportation : No   Physical Activity: Insufficiently Active (5/7/2024)    Exercise Vital Sign     Days of Exercise per Week: 3 days     Minutes of Exercise per Session: 40 min   Stress: No Stress Concern Present (5/7/2024)    [employer] - Occupational Stress Questionnaire     Feeling of Stress : Not at all   Housing Stability: Low Risk  (5/7/2024)    Housing Stability Vital Sign     Unable to Pay for Housing in the Last Year: No     Homeless in the Last Year: No       EKG 6/19/24 - sinus rhythm first-degree AV block with occasional PVC (similar to previous EKG)  ECHO Results for orders placed during the hospital encounter of 05/06/24    Echo    Interpretation Summary    Left Ventricle: The left ventricle is normal in size. Mildly increased wall thickness. There is concentric remodeling. Moderate global hypokinesis present. There is moderately reduced systolic function with a visually estimated ejection fraction of 35 - 40%. Biplane (2D) method of discs ejection fraction is 37%. Global longitudinal strain is -8.8%. Global longitudinal strain is reduced. Grade III diastolic dysfunction.    Right Ventricle: Mild right ventricular enlargement. Systolic function is mildly reduced.    Left Atrium: Left atrium is mildly dilated.    Right Atrium: Right atrium is moderately dilated.    Aortic Valve: The aortic valve is a trileaflet valve. Mildly calcified cusps. There is mild aortic regurgitation with an eccentrically directed jet.    Mitral Valve: There is no stenosis. The mean pressure gradient across the mitral valve is 1 mmHg at a heart rate of  bpm. There is mild to moderate regurgitation with an eccentric jet.     Tricuspid Valve: There is moderate regurgitation with an eccentrically directed jet.    Pulmonary Artery: The estimated pulmonary artery systolic pressure is 62 mmHg.    IVC/SVC: Elevated venous pressure at 15 mmHg.    [facility] Results for orders placed during the hospital encounter of 06/02/24    Cardiac catheterization    Conclusion    The 1st Mrg lesion was 100% stenosed.    The 2nd Diag lesion was 100% stenosed.    The 1st Diag lesion was 70% stenosed.    The left ventricular end diastolic pressure was normal.    The pre-procedure left ventricular end diastolic pressure was 7.    The estimated blood loss was none.    There was two vessel coronary artery disease.    Two vessel CAD with branch vessel disease  LM - moderate size, with mild luminal irregularities  LAD - small size, patent stent in the prox-mid segment. The mid and distal LAD have moderate diffuse disease with negative remodeling. There is a large D1 with 70% ostial-prox stenosis. There is a small D2 with ostial ; there is an atretic SVG graft to the second diagonal with severe diffuse disease  Lcx - Moderate size with mild luminal irregularities. OM1 is a large branch that is 100% occluded (); it fills via robust R-L collaterals.  RCA - Dominant vessel with mild diffuse disease. The R-PDA gives off collateral to the OM1.  Atretic SVG to small D2 with severe disease at the anastomosis  Normal left sided filling pressures, LVEDP - 7mmHg    Plan:  Stop brillinta (shortness of breath); switch to Plavix  Stop chlorthalidone (CKD); and up titrate anti-anginal therapy - start metop 25mg bid. Consider ranexa    The procedure log was documented by Documenter: Cindy Rasmussen and verified by Philip Torres MD.    Date: 6/4/2024  Time: 3:36 PM        Lab Results   Component Value Date     06/05/2024    K 4.0 06/05/2024     (H) 06/05/2024    CO2 24 06/05/2024    BUN 28 (H) 06/05/2024    CREATININE 1.89 (H) 06/05/2024    CALCIUM 9.4  06/05/2024    ANIONGAP 10 06/05/2024    ESTGFRAFRICA 59 (L) 07/25/2021    EGFRNONAA 25 (L) 02/28/2022       Lab Results   Component Value Date    CHOL 197 03/08/2024    CHOL 225 (H) 02/09/2023    CHOL 153 07/11/2021     Lab Results   Component Value Date    HDL 73 (H) 03/08/2024    HDL 61 (H) 02/09/2023    HDL 48 07/11/2021     Lab Results   Component Value Date    LDLCALC 108 03/08/2024    LDLCALC 120 02/09/2023    LDLCALC 71 07/11/2021     Lab Results   Component Value Date    TRIG 80 03/08/2024    TRIG 220 (H) 02/09/2023    TRIG 169 (H) 07/11/2021     Lab Results   Component Value Date    CHOLHDL 2.7 03/08/2024    CHOLHDL 3.7 02/09/2023    CHOLHDL 3.2 07/11/2021       Lab Results   Component Value Date    WBC 4.14 (L) 06/04/2024    HGB 11.6 (L) 06/04/2024    HCT 34.7 (L) 06/04/2024    MCV 95.9 06/04/2024     06/04/2024           Current Outpatient Medications:     aspirin (ECOTRIN) 81 MG EC tablet, Take 1 tablet (81 mg total) by mouth once daily., Disp: 30 tablet, Rfl: 0    isosorbide-hydrALAZINE 20-37.5 mg (BIDIL) 20-37.5 mg Tab, Take 2 tablets by mouth 3 (three) times daily., Disp: 540 tablet, Rfl: 1    metoprolol succinate (TOPROL-XL) 50 MG 24 hr tablet, Take 1 tablet (50 mg total) by mouth once daily., Disp: 90 tablet, Rfl: 3    multivitamin with minerals tablet, Take 1 tablet by mouth once daily., Disp: , Rfl:     nitroGLYCERIN (NITROSTAT) 0.4 MG SL tablet, Place 1 tablet (0.4 mg total) under the tongue every 5 (five) minutes as needed for Chest pain., Disp: 25 tablet, Rfl: 2    atorvastatin (LIPITOR) 80 MG tablet, Take 1 tablet (80 mg total) by mouth every evening., Disp: 90 tablet, Rfl: 6    clopidogreL (PLAVIX) 75 mg tablet, Take 1 tablet (75 mg total) by mouth once daily., Disp: 90 tablet, Rfl: 6    furosemide (LASIX) 20 MG tablet, Take 1 tablet (20 mg total) by mouth once daily., Disp: 90 tablet, Rfl: 3    spironolactone (ALDACTONE) 25 MG tablet, Take 0.5 tablets (12.5 mg total) by mouth once  daily., Disp: 15 tablet, Rfl: 11    Review of Systems   Respiratory:  Negative for shortness of breath.    Cardiovascular:  Negative for chest pain and leg swelling.   Gastrointestinal:  Negative for nausea.   Musculoskeletal:  Negative for back pain.   Neurological:  Negative for dizziness.   All other systems reviewed and are negative.          Objective:   BP (!) 142/88 (BP Location: Left arm, Patient Position: Sitting)   Pulse 61   Ht 6' (1.829 m)   Wt 97.8 kg (215 lb 9.6 oz)   SpO2 99%   BMI 29.24 kg/m²     Physical Exam  Vitals reviewed.   Constitutional:       General: He is not in acute distress.     Appearance: He is not ill-appearing.   HENT:      Head: Normocephalic and atraumatic.      Mouth/Throat:      Mouth: Mucous membranes are moist.   Eyes:      Extraocular Movements: Extraocular movements intact.   Cardiovascular:      Rate and Rhythm: Normal rate and regular rhythm.      Pulses: Normal pulses.   Pulmonary:      Effort: Pulmonary effort is normal.      Breath sounds: Normal breath sounds.   Abdominal:      General: Bowel sounds are normal.      Palpations: Abdomen is soft.   Musculoskeletal:         General: No swelling. Normal range of motion.      Cervical back: Normal range of motion.   Skin:     General: Skin is warm and dry.      Capillary Refill: Capillary refill takes less than 2 seconds.   Neurological:      General: No focal deficit present.      Mental Status: He is alert and oriented to person, place, and time.   Psychiatric:         Mood and Affect: Mood normal.         Behavior: Behavior normal.           Assessment:     1. Coronary artery disease involving native heart, unspecified vessel or lesion type, unspecified whether angina present  clopidogreL (PLAVIX) 75 mg tablet   - currently treated with ASA 81 mg, Plavix, Lipitor, beta-blocker  - BIDIL TID for afterload reduction due to CKD and anaphylaxis to Ace.  - EF 35 40%   - Aldactone 12.5 mg daily started  - follow up with  Dr. Villaseñor 6 weeks   2. Hyperlipidemia, unspecified hyperlipidemia type  atorvastatin (LIPITOR) 80 MG tablet   - we will need lipid panel next office visit.  Continue statin   3. Essential hypertension  furosemide (LASIX) 20 MG tablet      4. Stage 3a chronic kidney disease  furosemide (LASIX) 20 MG tablet   - BNP ordered  -Aldactone 12.5 mg started  -p.o. potassium stopped Basic Metabolic Panel            Plan:

## 2024-06-21 ENCOUNTER — DOCUMENT SCAN (OUTPATIENT)
Dept: HOME HEALTH SERVICES | Facility: HOSPITAL | Age: 84
End: 2024-06-21
Payer: MEDICARE

## 2024-06-21 NOTE — PHYSICIAN QUERY
"Please clarify the conflicting documentation in regards to the TYPE and ACUITY of the heart failure   To respond, click "New Note" select your response press enter then sign to complete the query   .  Acute on Chronic Systolic Heart Failure (HFrEF or HFmrEF)            "

## 2024-06-30 ENCOUNTER — EXTERNAL CHRONIC CARE MANAGEMENT (OUTPATIENT)
Dept: FAMILY MEDICINE | Facility: CLINIC | Age: 84
End: 2024-06-30
Payer: MEDICARE

## 2024-06-30 PROBLEM — R31.0 GROSS HEMATURIA: Status: RESOLVED | Noted: 2024-06-04 | Resolved: 2024-06-30

## 2024-06-30 PROCEDURE — G0511 CCM/BHI BY RHC/FQHC 20MIN MO: HCPCS | Mod: ,,, | Performed by: FAMILY MEDICINE

## 2024-07-01 NOTE — ASSESSMENT & PLAN NOTE
Lab Results   Component Value Date    LDLCALC 108 03/08/2024     Continue low fat/low cholesterol diet and Atorvastatin as ordered.

## 2024-07-01 NOTE — ASSESSMENT & PLAN NOTE
Blood pressure well controlled. Continue current medications. Follow up with cardiology as scheduled.

## 2024-07-01 NOTE — ASSESSMENT & PLAN NOTE
No s/sx of fluid overload. Denies shortness of breath. Continue bidil tid, Toprol XL, Norvasc, and Lasix as ordered. Follow low sodium diet. Follow up with cardiology as scheduled.

## 2024-07-03 ENCOUNTER — TELEPHONE (OUTPATIENT)
Dept: FAMILY MEDICINE | Facility: CLINIC | Age: 84
End: 2024-07-03
Payer: MEDICARE

## 2024-07-03 DIAGNOSIS — M10.9 GOUT, ARTHRITIS: Primary | ICD-10-CM

## 2024-07-03 RX ORDER — COLCHICINE 0.6 MG/1
TABLET ORAL
Qty: 60 TABLET | Refills: 1 | Status: SHIPPED | OUTPATIENT
Start: 2024-07-03

## 2024-07-03 NOTE — TELEPHONE ENCOUNTER
Patient called, he is requesting a new rx for colchicine, gout flare up started yesterday.  Last uric acid level 05/2024, last ov 06/19/24. F/U appt 07/17/24 Pharmacy/[facility]

## 2024-07-17 ENCOUNTER — OFFICE VISIT (OUTPATIENT)
Dept: FAMILY MEDICINE | Facility: CLINIC | Age: 84
End: 2024-07-17
Payer: MEDICARE

## 2024-07-17 VITALS
BODY MASS INDEX: 28.17 KG/M2 | HEART RATE: 68 BPM | DIASTOLIC BLOOD PRESSURE: 56 MMHG | SYSTOLIC BLOOD PRESSURE: 96 MMHG | OXYGEN SATURATION: 100 % | WEIGHT: 208 LBS | TEMPERATURE: 98 F | HEIGHT: 72 IN | RESPIRATION RATE: 20 BRPM

## 2024-07-17 DIAGNOSIS — I50.23 ACUTE ON CHRONIC SYSTOLIC HEART FAILURE: Primary | ICD-10-CM

## 2024-07-17 DIAGNOSIS — I10 ESSENTIAL HYPERTENSION: ICD-10-CM

## 2024-07-17 PROCEDURE — 3288F FALL RISK ASSESSMENT DOCD: CPT | Mod: ,,, | Performed by: NURSE PRACTITIONER

## 2024-07-17 PROCEDURE — 1159F MED LIST DOCD IN RCRD: CPT | Mod: ,,, | Performed by: NURSE PRACTITIONER

## 2024-07-17 PROCEDURE — 1160F RVW MEDS BY RX/DR IN RCRD: CPT | Mod: ,,, | Performed by: NURSE PRACTITIONER

## 2024-07-17 PROCEDURE — 3078F DIAST BP <80 MM HG: CPT | Mod: ,,, | Performed by: NURSE PRACTITIONER

## 2024-07-17 PROCEDURE — 1126F AMNT PAIN NOTED NONE PRSNT: CPT | Mod: ,,, | Performed by: NURSE PRACTITIONER

## 2024-07-17 PROCEDURE — 99213 OFFICE O/P EST LOW 20 MIN: CPT | Mod: ,,, | Performed by: NURSE PRACTITIONER

## 2024-07-17 PROCEDURE — 3074F SYST BP LT 130 MM HG: CPT | Mod: ,,, | Performed by: NURSE PRACTITIONER

## 2024-07-17 PROCEDURE — 1101F PT FALLS ASSESS-DOCD LE1/YR: CPT | Mod: ,,, | Performed by: NURSE PRACTITIONER

## 2024-07-17 RX ORDER — POTASSIUM CHLORIDE 750 MG/1
10 CAPSULE, EXTENDED RELEASE ORAL DAILY
COMMUNITY

## 2024-07-17 NOTE — PROGRESS NOTES
Swati Vicente NP   [place] [id number]  [place]  [address]      PATIENT NAME: Trung Barboza  : 1940  DATE: 24  MRN: [id number]      Billing Provider: Swati Vicente NP  Level of Service: GA OFFICE/OUTPT VISIT, EST, LEVL III, 20-29 MIN  Patient PCP Information       Provider PCP Type    Swati Vicente NP General            Reason for Visit / Chief Complaint: Follow-up (Patient is an 84 year old male who presents to the clinic related to 6 week follow up.    Patient denies any complications since last visit. ) and Hypertension (Patient reports he has been to see cardiologist/Dr. Wesley.  Per note on 24 stop brillinta, switch to Plavix, stop chlorthalidone(CKD), titrate antianginal therapy, start metoprolol 25 mg twice daily, consider Ranexa.  /)         History of Present Illness / Problem Focused Workflow     84 year old male who presents to the clinic related to 6 week follow up.    Patient denies any complications since last visit. States he has had no further chest pain and shortness of breath has improved.  Hypertension: Patient reports he has been to see cardiologist/Dr. Wesley.  Per note on 24 stop brillinta, switch to Plavix, stop chlorthalidone(CKD), titrate antianginal therapy, start metoprolol 25 mg twice daily, consider Ranexa.              Review of Systems     @Review of Systems   Constitutional:  Negative for activity change, appetite change, fatigue and fever.   HENT:  Negative for nasal congestion, ear pain, rhinorrhea, sinus pressure/congestion and sore throat.    Eyes:  Negative for pain, redness, visual disturbance and eye dryness.   Respiratory:  Negative for cough and shortness of breath.    Cardiovascular:  Negative for chest pain and leg swelling.   Gastrointestinal:  Negative for abdominal distention, abdominal pain, constipation and diarrhea.   Endocrine: Negative for cold intolerance, heat intolerance and polyuria.    Genitourinary:  Negative for bladder incontinence, dysuria, frequency and urgency.   Musculoskeletal:  Negative for arthralgias, gait problem and myalgias.   Integumentary:  Negative for color change, rash and wound.   Allergic/Immunologic: Negative for environmental allergies and food allergies.   Neurological:  Negative for dizziness, weakness, light-headedness and headaches.   Psychiatric/Behavioral:  Negative for behavioral problems and sleep disturbance.        Medical / Social / Family History     Past Medical History:   Diagnosis Date    Aortic regurgitation     BPH with urinary obstruction     Chronic kidney disease, stage 3b     GR 44     creat 1.8    Coronary artery disease     Essential hypertension 07/10/2021    Gastroesophageal reflux disease 09/15/2022    Gout, arthritis     Myocardial infarction     PVD (peripheral vascular disease) 02/01/2024    TIA (transient ischemic attack)        Past Surgical History:   Procedure Laterality Date    ANGIOGRAM, CORONARY, WITH LEFT HEART CATHETERIZATION N/A 12/31/2021    Procedure: ANGIOGRAM,CORONARY,WITH LEFT HEART CATHETERIZATION;  Surgeon: Mahad Villaseñor DO;  Location: [facility];  Service: Cardiology;  Laterality: N/A;    ANGIOGRAM, CORONARY, WITH LEFT HEART CATHETERIZATION N/A 5/7/2024    Procedure: Angiogram, Coronary, with Left Heart Cath;  Surgeon: Mahad Villaseñor DO;  Location: [facility];  Service: Cardiology;  Laterality: N/A;    BIOPSY WITH TRANSRECTAL ULTRASOUND (TRUS) GUIDANCE Bilateral 03/07/2018    CHOLECYSTECTOMY      CORONARY ANGIOGRAPHY INCLUDING BYPASS GRAFTS WITH CATHETERIZATION OF LEFT HEART N/A 6/4/2024    Procedure: ANGIOGRAM, CORONARY, INCLUDING BYPASS GRAFT, WITH LEFT HEART CATHETERIZATION;  Surgeon: Philip Torres MD;  Location: [facility];  Service: Cardiology;  Laterality: N/A;    CORONARY ARTERY BYPASS GRAFT  01/07/2022    [facility]-Dr. Qureshi    LEFT HEART CATHETERIZATION Left 7/13/2021     Procedure: Left heart cath;  Surgeon: Philip Torres MD;  Location: [facility] CATH LAB;  Service: Cardiology;  Laterality: Left;    PERCUTANEOUS CORONARY INTERVENTION, ARTERY N/A 5/7/2024    Procedure: Percutaneous coronary intervention;  Surgeon: Mahad Villaseñor DO;  Location: [facility] CATH LAB;  Service: Cardiology;  Laterality: N/A;       Medications and Allergies     Medications  Outpatient Medications Marked as Taking for the 7/17/24 encounter (Office Visit) with Swati Vicente NP   Medication Sig Dispense Refill    aspirin (ECOTRIN) 81 MG EC tablet Take 1 tablet (81 mg total) by mouth once daily. 30 tablet 0    atorvastatin (LIPITOR) 80 MG tablet Take 1 tablet (80 mg total) by mouth every evening. 90 tablet 6    clopidogreL (PLAVIX) 75 mg tablet Take 1 tablet (75 mg total) by mouth once daily. 90 tablet 6    colchicine (COLCRYS) 0.6 mg tablet Take 2 tablet at first sign of gout flare, then take 0.6 mg ( 1 tablet an hour later).  Then take 1 tablet once or twice daily until flare resolves. 60 tablet 1    furosemide (LASIX) 20 MG tablet Take 1 tablet (20 mg total) by mouth once daily. 90 tablet 3    isosorbide-hydrALAZINE 20-37.5 mg (BIDIL) 20-37.5 mg Tab Take 2 tablets by mouth 3 (three) times daily. 540 tablet 1    metoprolol succinate (TOPROL-XL) 50 MG 24 hr tablet Take 1 tablet (50 mg total) by mouth once daily. 90 tablet 3    multivitamin with minerals tablet Take 1 tablet by mouth once daily.      nitroGLYCERIN (NITROSTAT) 0.4 MG SL tablet Place 1 tablet (0.4 mg total) under the tongue every 5 (five) minutes as needed for Chest pain. 25 tablet 2    potassium chloride (MICRO-K) 10 MEQ CpSR Take 10 mEq by mouth once daily.         Allergies  Review of patient's allergies indicates:   Allergen Reactions    Lisinopril Swelling and Anaphylaxis       Physical Examination     Vitals:    07/17/24 0828   BP: (!) 96/56   Pulse: 68   Resp: 20   Temp: 97.8 °F (36.6 °C)     Physical Exam  Vitals and nursing  note reviewed.   HENT:      Head: Normocephalic.      Right Ear: Tympanic membrane normal.      Left Ear: Tympanic membrane normal.      Nose: Nose normal.      Mouth/Throat:      Mouth: Mucous membranes are moist.      Pharynx: Oropharynx is clear. No posterior oropharyngeal erythema.   Eyes:      Conjunctiva/sclera: Conjunctivae normal.   Cardiovascular:      Rate and Rhythm: Normal rate and regular rhythm.      Pulses: Normal pulses.      Heart sounds: Normal heart sounds.   Pulmonary:      Effort: Pulmonary effort is normal.      Breath sounds: Normal breath sounds.   Abdominal:      General: Abdomen is flat. Bowel sounds are normal. There is no distension.      Palpations: Abdomen is soft.   Musculoskeletal:         General: No swelling or tenderness. Normal range of motion.      Cervical back: Normal range of motion.      Right lower leg: No edema.      Left lower leg: No edema.   Skin:     General: Skin is warm and dry.      Capillary Refill: Capillary refill takes less than 2 seconds.   Neurological:      Mental Status: He is alert. Mental status is at baseline.   Psychiatric:         Mood and Affect: Mood normal.         Behavior: Behavior normal.               Lab Results   Component Value Date    WBC 4.14 (L) 06/04/2024    HGB 11.6 (L) 06/04/2024    HCT 34.7 (L) 06/04/2024    MCV 95.9 06/04/2024     06/04/2024        CMP  Sodium   Date Value Ref Range Status   06/19/2024 140 136 - 145 mmol/L Final     Potassium   Date Value Ref Range Status   06/19/2024 3.8 3.5 - 5.1 mmol/L Final     Chloride   Date Value Ref Range Status   06/19/2024 109 (H) 98 - 107 mmol/L Final     CO2   Date Value Ref Range Status   06/19/2024 25 21 - 32 mmol/L Final     Glucose   Date Value Ref Range Status   06/19/2024 88 74 - 106 mg/dL Final     BUN   Date Value Ref Range Status   06/19/2024 27 (H) 7 - 18 mg/dL Final     Creatinine   Date Value Ref Range Status   06/19/2024 1.77 (H) 0.70 - 1.30 mg/dL Final     Calcium   Date  Value Ref Range Status   06/19/2024 9.2 8.5 - 10.1 mg/dL Final     Total Protein   Date Value Ref Range Status   06/05/2024 7.3 6.4 - 8.2 g/dL Final     Albumin   Date Value Ref Range Status   06/05/2024 3.4 (L) 3.5 - 5.0 g/dL Final     Bilirubin, Total   Date Value Ref Range Status   06/05/2024 1.2 >0.0 - 1.2 mg/dL Final     Alk Phos   Date Value Ref Range Status   06/05/2024 309 (H) 45 - 115 U/L Final     AST   Date Value Ref Range Status   06/05/2024 28 15 - 37 U/L Final     ALT   Date Value Ref Range Status   06/05/2024 28 16 - 61 U/L Final     Anion Gap   Date Value Ref Range Status   06/19/2024 10 7 - 16 mmol/L Final     eGFR   Date Value Ref Range Status   06/19/2024 37 (L) >=60 mL/min/1.73m2 Final     Procedures   Assessment and Plan (including Health Maintenance)   :    Plan:     Problem List Items Addressed This Visit          Cardiac/Vascular    Acute on chronic systolic heart failure - Primary    Current Assessment & Plan     No s/sx of fluid overload. Denies shortness of breath. Continue bidil tid, Toprol XL, and Lasix as ordered. Follow low sodium diet. Follow up with cardiology as scheduled.             Health Maintenance Topics with due status: Not Due       Topic Last Completion Date    TETANUS VACCINE 12/11/2018    Influenza Vaccine 12/08/2023    Lipid Panel 03/08/2024       Future Appointments   Date Time Provider Department Center   8/7/2024  2:20 PM Mahad Villaseñor DO [facility]   9/10/2024  8:40 AM Swati Vicente NP [facility] [place]   1/16/2025  3:00 PM AW[facility] NURSE [facility] [place]        Health Maintenance Due   Topic Date Due    Shingles Vaccine (1 of 2) Never done    RSV Vaccine (Age 60+ and Pregnant patients) (1 - 1-dose 60+ series) Never done    COVID-19 Vaccine (5 - 2023-24 season) 09/01/2023          Signature:  Swati Vicente NP  [address] [place]  [address]    Date of encounter: 7/17/24

## 2024-07-17 NOTE — ASSESSMENT & PLAN NOTE
No s/sx of fluid overload. Denies shortness of breath. Continue bidil tid, Toprol XL, and Lasix as ordered. Follow low sodium diet. Follow up with cardiology as scheduled.

## 2024-07-24 ENCOUNTER — EXTERNAL HOME HEALTH (OUTPATIENT)
Dept: HOME HEALTH SERVICES | Facility: HOSPITAL | Age: 84
End: 2024-07-24
Payer: MEDICARE

## 2024-07-25 ENCOUNTER — EXTERNAL HOME HEALTH (OUTPATIENT)
Dept: HOME HEALTH SERVICES | Facility: HOSPITAL | Age: 84
End: 2024-07-25
Payer: MEDICARE

## 2024-07-29 DIAGNOSIS — T50.2X5A DIURETIC-INDUCED HYPOKALEMIA: Primary | ICD-10-CM

## 2024-07-29 DIAGNOSIS — E87.6 DIURETIC-INDUCED HYPOKALEMIA: Primary | ICD-10-CM

## 2024-07-29 NOTE — TELEPHONE ENCOUNTER
----- Message from Criselda Ca sent at 7/29/2024  9:24 AM CDT -----  Mrs Dominguez called today for Mr Barboza said he will need his Plavix and Potassium sent to [facility] in [place]. [phone]. Thanks

## 2024-07-30 RX ORDER — POTASSIUM CHLORIDE 750 MG/1
10 CAPSULE, EXTENDED RELEASE ORAL DAILY
Qty: 90 CAPSULE | Refills: 1 | Status: SHIPPED | OUTPATIENT
Start: 2024-07-30

## 2024-07-31 ENCOUNTER — EXTERNAL CHRONIC CARE MANAGEMENT (OUTPATIENT)
Dept: FAMILY MEDICINE | Facility: CLINIC | Age: 84
End: 2024-07-31
Payer: MEDICARE

## 2024-07-31 PROCEDURE — G0511 CCM/BHI BY RHC/FQHC 20MIN MO: HCPCS | Mod: ,,, | Performed by: FAMILY MEDICINE

## 2024-08-07 ENCOUNTER — OFFICE VISIT (OUTPATIENT)
Dept: CARDIOLOGY | Facility: CLINIC | Age: 84
End: 2024-08-07
Payer: MEDICARE

## 2024-08-07 VITALS
HEIGHT: 72 IN | WEIGHT: 225.19 LBS | OXYGEN SATURATION: 97 % | SYSTOLIC BLOOD PRESSURE: 132 MMHG | DIASTOLIC BLOOD PRESSURE: 90 MMHG | HEART RATE: 63 BPM | BODY MASS INDEX: 30.5 KG/M2

## 2024-08-07 DIAGNOSIS — Z95.1 HX OF CABG: ICD-10-CM

## 2024-08-07 DIAGNOSIS — I73.9 PVD (PERIPHERAL VASCULAR DISEASE): ICD-10-CM

## 2024-08-07 DIAGNOSIS — I10 ESSENTIAL HYPERTENSION: ICD-10-CM

## 2024-08-07 DIAGNOSIS — I35.1 NONRHEUMATIC AORTIC VALVE INSUFFICIENCY: ICD-10-CM

## 2024-08-07 DIAGNOSIS — I21.4 NSTEMI (NON-ST ELEVATION MYOCARDIAL INFARCTION): Primary | ICD-10-CM

## 2024-08-07 DIAGNOSIS — E78.2 MIXED HYPERLIPIDEMIA: ICD-10-CM

## 2024-08-07 PROCEDURE — 99214 OFFICE O/P EST MOD 30 MIN: CPT | Mod: PBBFAC | Performed by: INTERNAL MEDICINE

## 2024-08-07 PROCEDURE — 3080F DIAST BP >= 90 MM HG: CPT | Mod: CPTII,,, | Performed by: INTERNAL MEDICINE

## 2024-08-07 PROCEDURE — 99214 OFFICE O/P EST MOD 30 MIN: CPT | Mod: S$PBB,,, | Performed by: INTERNAL MEDICINE

## 2024-08-07 PROCEDURE — 99999 PR PBB SHADOW E&M-EST. PATIENT-LVL IV: CPT | Mod: PBBFAC,,, | Performed by: INTERNAL MEDICINE

## 2024-08-07 PROCEDURE — 3075F SYST BP GE 130 - 139MM HG: CPT | Mod: CPTII,,, | Performed by: INTERNAL MEDICINE

## 2024-08-07 PROCEDURE — 1159F MED LIST DOCD IN RCRD: CPT | Mod: CPTII,,, | Performed by: INTERNAL MEDICINE

## 2024-08-07 NOTE — PROGRESS NOTES
PCP: Swati Vicente NP    Referring Provider:     Subjective:   Trung Barboza is a 84 y.o. male with hx of MI, CAD s/p CABG with subsequent stents, TIA, CKD, HTN, HLD, gout, and GERD who presents for follow up.    He reports being nauseated that he attributes to his medications. He reports feeling nauseated over the last couple months. He daughter states that the patient has a poor appetite and thinks he may be taking his pills on an empty stomach. He reports having chest tightness intermittently over 3 days last week. The pain was substernal, nonradiating, 6/10, improved with rubbing his chest. He also reports associated shortness of breath. Since the tightness improved he has since had some tenderness on his chest with palpation in the same area. The patient denies palpitations, leg pain, leg swelling,     He is active with yard work and minimal walking around his house and to the mailbox. He denies any symptoms with activity.     Fhx:   Family History   Problem Relation Name Age of Onset    Heart disease Mother      Hypertension Mother      No Known Problems Father      No Known Problems Sister      No Known Problems Sister      No Known Problems Sister      No Known Problems Sister      No Known Problems Brother      No Known Problems Brother      No Known Problems Brother      No Known Problems Brother      No Known Problems Daughter      Hypertension Son      No Known Problems Son      No Known Problems Son      No Known Problems Son      No Known Problems Son        Shx:   Past Surgical History:   Procedure Laterality Date    ANGIOGRAM, CORONARY, WITH LEFT HEART CATHETERIZATION N/A 12/31/2021    Procedure: ANGIOGRAM,CORONARY,WITH LEFT HEART CATHETERIZATION;  Surgeon: Mahad Villaseñor DO;  Location: [facility] CATH LAB;  Service: Cardiology;  Laterality: N/A;    ANGIOGRAM, CORONARY, WITH LEFT HEART CATHETERIZATION N/A 5/7/2024    Procedure: Angiogram, Coronary, with Left Heart Cath;  Surgeon: Mahad Villaseñor  DO RENAY;  Location: [facility] CATH LAB;  Service: Cardiology;  Laterality: N/A;    BIOPSY WITH TRANSRECTAL ULTRASOUND (TRUS) GUIDANCE Bilateral 03/07/2018    CHOLECYSTECTOMY      CORONARY ANGIOGRAPHY INCLUDING BYPASS GRAFTS WITH CATHETERIZATION OF LEFT HEART N/A 6/4/2024    Procedure: ANGIOGRAM, CORONARY, INCLUDING BYPASS GRAFT, WITH LEFT HEART CATHETERIZATION;  Surgeon: Phiilp Torres MD;  Location: [facility] CATH LAB;  Service: Cardiology;  Laterality: N/A;    [facility]    LEFT HEART CATHETERIZATION Left 7/13/2021    Procedure: Left heart cath;  Surgeon: Philip Torres MD;  Location: [facility] CATH LAB;  Service: Cardiology;  Laterality: Left;    PERCUTANEOUS CORONARY INTERVENTION, ARTERY N/A 5/7/2024    Procedure: Percutaneous coronary intervention;  Surgeon: Mahad Villaseñor DO;  Location: [facility] CATH LAB;  Service: Cardiology;  Laterality: N/A;      .     ECHO - Results for orders placed during the hospital encounter of 05/06/24    Echo    Interpretation Summary    Left Ventricle: The left ventricle is normal in size. Mildly increased wall thickness. There is concentric remodeling. Moderate global hypokinesis present. There is moderately reduced systolic function with a visually estimated ejection fraction of 35 - 40%. Biplane (2D) method of discs ejection fraction is 37%. Global longitudinal strain is -8.8%. Global longitudinal strain is reduced. Grade III diastolic dysfunction.    Right Ventricle: Mild right ventricular enlargement. Systolic function is mildly reduced.    Left Atrium: Left atrium is mildly dilated.    Right Atrium: Right atrium is moderately dilated.    Aortic Valve: The aortic valve is a trileaflet valve. Mildly calcified cusps. There is mild aortic regurgitation with an eccentrically directed jet.    Mitral Valve: There is no stenosis. The mean pressure gradient across the mitral valve is 1 mmHg at a heart rate of   bpm. There is mild to moderate regurgitation with an eccentric jet.    Tricuspid Valve: There is moderate regurgitation with an eccentrically directed jet.    Pulmonary Artery: The estimated pulmonary artery systolic pressure is 62 mmHg.    IVC/SVC: Elevated venous pressure at 15 mmHg.       CATH - Results for orders placed during the hospital encounter of 06/02/24    Cardiac catheterization    Conclusion    The 1st Mrg lesion was 100% stenosed.    The 2nd Diag lesion was 100% stenosed.    The 1st Diag lesion was 70% stenosed.    The left ventricular end diastolic pressure was normal.    The pre-procedure left ventricular end diastolic pressure was 7.    The estimated blood loss was none.    There was two vessel coronary artery disease.    Two vessel CAD with branch vessel disease  LM - moderate size, with mild luminal irregularities  LAD - small size, patent stent in the prox-mid segment. The mid and distal LAD have moderate diffuse disease with negative remodeling. There is a large D1 with 70% ostial-prox stenosis. There is a small D2 with ostial ; there is an atretic SVG graft to the second diagonal with severe diffuse disease  Lcx - Moderate size with mild luminal irregularities. OM1 is a large branch that is 100% occluded (); it fills via robust R-L collaterals.  RCA - Dominant vessel with mild diffuse disease. The R-PDA gives off collateral to the OM1.  Atretic SVG to small D2 with severe disease at the anastomosis  Normal left sided filling pressures, LVEDP - 7mmHg    Plan:  Stop brillinta (shortness of breath); switch to Plavix  Stop chlorthalidone (CKD); and up titrate anti-anginal therapy - start metop 25mg bid. Consider ranexa    The procedure log was documented by Documenter: Cindy Rasmussen and verified by Philip Torres MD.    Date: 6/4/2024  Time: 3:36 PM       Stress - Results for orders placed during the hospital encounter of 07/10/21    Nuclear Stress Test    Interpretation Summary     The EKG portion of this study is positive for ischemia.    The patient reported chest pain during the stress test.    During stress, occasional PVCs are noted.       Lab Results   Component Value Date     06/19/2024    K 3.8 06/19/2024     (H) 06/19/2024    CO2 25 06/19/2024    BUN 27 (H) 06/19/2024    CREATININE 1.77 (H) 06/19/2024    CALCIUM 9.2 06/19/2024    ANIONGAP 10 06/19/2024    ESTGFRAFRICA 59 (L) 07/25/2021    EGFRNONAA 25 (L) 02/28/2022       Lab Results   Component Value Date    CHOL 197 03/08/2024    CHOL 225 (H) 02/09/2023    CHOL 153 07/11/2021     Lab Results   Component Value Date    HDL 73 (H) 03/08/2024    HDL 61 (H) 02/09/2023    HDL 48 07/11/2021     Lab Results   Component Value Date    LDLCALC 108 03/08/2024    LDLCALC 120 02/09/2023    LDLCALC 71 07/11/2021     Lab Results   Component Value Date    TRIG 80 03/08/2024    TRIG 220 (H) 02/09/2023    TRIG 169 (H) 07/11/2021     Lab Results   Component Value Date    CHOLHDL 2.7 03/08/2024    CHOLHDL 3.7 02/09/2023    CHOLHDL 3.2 07/11/2021       Lab Results   Component Value Date    WBC 4.14 (L) 06/04/2024    HGB 11.6 (L) 06/04/2024    HCT 34.7 (L) 06/04/2024    MCV 95.9 06/04/2024     06/04/2024           Current Outpatient Medications:     aspirin (ECOTRIN) 81 MG EC tablet, Take 1 tablet (81 mg total) by mouth once daily., Disp: 30 tablet, Rfl: 0    atorvastatin (LIPITOR) 80 MG tablet, Take 1 tablet (80 mg total) by mouth every evening., Disp: 90 tablet, Rfl: 6    clopidogreL (PLAVIX) 75 mg tablet, Take 1 tablet (75 mg total) by mouth once daily., Disp: 90 tablet, Rfl: 6    colchicine (COLCRYS) 0.6 mg tablet, Take 2 tablet at first sign of gout flare, then take 0.6 mg ( 1 tablet an hour later).  Then take 1 tablet once or twice daily until flare resolves., Disp: 60 tablet, Rfl: 1    furosemide (LASIX) 20 MG tablet, Take 1 tablet (20 mg total) by mouth once daily., Disp: 90 tablet, Rfl: 3    isosorbide-hydrALAZINE 20-37.5 mg  (BIDIL) 20-37.5 mg Tab, Take 2 tablets by mouth 3 (three) times daily., Disp: 540 tablet, Rfl: 1    metoprolol succinate (TOPROL-XL) 50 MG 24 hr tablet, Take 1 tablet (50 mg total) by mouth once daily., Disp: 90 tablet, Rfl: 3    multivitamin with minerals tablet, Take 1 tablet by mouth once daily., Disp: , Rfl:     nitroGLYCERIN (NITROSTAT) 0.4 MG SL tablet, Place 1 tablet (0.4 mg total) under the tongue every 5 (five) minutes as needed for Chest pain., Disp: 25 tablet, Rfl: 2    potassium chloride (MICRO-K) 10 MEQ CpSR, Take 1 capsule (10 mEq total) by mouth once daily., Disp: 90 capsule, Rfl: 1    Review of Systems   Constitutional: Positive for decreased appetite and weight gain. Negative for chills and fever.        Gained 6-7 lbs over 6 weeks.    HENT:  Negative for congestion, ear pain and sore throat.    Eyes:  Negative for blurred vision and pain.   Cardiovascular:  Positive for chest pain. Negative for leg swelling and palpitations.   Respiratory:  Positive for shortness of breath. Negative for cough.    Skin:  Negative for dry skin, itching and rash.   Musculoskeletal:  Positive for back pain and joint pain. Negative for neck pain.        Right wrist pain secondary to gout.    Gastrointestinal:  Positive for nausea. Negative for abdominal pain, constipation, diarrhea and vomiting.   Genitourinary:  Negative for dysuria, frequency and hematuria.   Neurological:  Negative for dizziness, headaches, numbness and paresthesias.   Psychiatric/Behavioral:  Negative for depression. The patient is not nervous/anxious.           Objective:   BP (!) 132/90 (BP Location: Left arm, Patient Position: Sitting)   Pulse 63   Ht 6' (1.829 m)   Wt 102.2 kg (225 lb 3.2 oz)   SpO2 97%   BMI 30.54 kg/m²       Physical Exam  Constitutional:       Appearance: Normal appearance. He is normal weight.   HENT:      Head: Normocephalic and atraumatic.      Nose: Nose normal.      Mouth/Throat:      Mouth: Mucous membranes are  moist.   Eyes:      Pupils: Pupils are equal, round, and reactive to light.   Cardiovascular:      Rate and Rhythm: Normal rate and regular rhythm.      Pulses: Normal pulses.      Heart sounds: Normal heart sounds.      Comments: Chest pain reproducible,   Pulmonary:      Effort: Pulmonary effort is normal.      Breath sounds: Normal breath sounds.   Abdominal:      Palpations: Abdomen is soft.   Musculoskeletal:         General: Normal range of motion.      Cervical back: Normal range of motion.      Right lower leg: Edema present.      Left lower leg: Edema present.   Skin:     General: Skin is warm and dry.      Findings: Erythema present.      Comments: Erythematous right wrist secondary to gout.    Neurological:      General: No focal deficit present.      Mental Status: He is alert and oriented to person, place, and time.   Psychiatric:         Mood and Affect: Mood normal.         Thought Content: Thought content normal.         Judgment: Judgment normal.         Assessment:   CAD: severe triple vessel disease, post CABG,   2.  stable class 1 angina, remains asymptomatic  3.  Hypertension: controlled  Plan:   Follow up in six months, sooner if symptoms change.

## 2024-08-07 NOTE — LETTER
August 7, 2024      [facility] - Cardiology  [address] [facility] [place] [address]  Phone: [phone]  Fax: [phone]       Patient: Trung Barboza   YOB: 1940  Date of Visit: 08/07/2024    To Whom It May Concern:    Mliena Barboza  was at [facility] on 08/07/2024. The patient was accompanied by Jennifer Page. If you have any questions or concerns, or if I can be of further assistance, please do not hesitate to contact me.    Sincerely,              Adam Rabago LPN

## 2024-08-12 PROBLEM — I21.4 NSTEMI (NON-ST ELEVATED MYOCARDIAL INFARCTION): Status: RESOLVED | Noted: 2024-05-07 | Resolved: 2024-08-12

## 2024-08-15 ENCOUNTER — TELEPHONE (OUTPATIENT)
Dept: FAMILY MEDICINE | Facility: CLINIC | Age: 84
End: 2024-08-15
Payer: MEDICARE

## 2024-08-15 DIAGNOSIS — I10 ESSENTIAL HYPERTENSION: Primary | ICD-10-CM

## 2024-08-15 NOTE — TELEPHONE ENCOUNTER
Pt's daughter Jennifer Page called regarding his isosorbide-hydralazine medication stating that this medication is very expensive and hard for him to afford on a fixed income. She asked if there are any alternatives or anything you can recommend that may be more affordable.

## 2024-08-19 ENCOUNTER — TELEPHONE (OUTPATIENT)
Dept: PHARMACY | Facility: CLINIC | Age: 84
End: 2024-08-19

## 2024-08-19 NOTE — TELEPHONE ENCOUNTER
We can see if he can get pharmacy assistance through our new program or we can change them to separate pills. I will send in referral to pharmacy assistance team. If they can't help get it lowered then we will look at maybe  from him. Please let patient/daughter know. Thanks

## 2024-08-19 NOTE — TELEPHONE ENCOUNTER
We have reviewed Mr. Barboza current medication list and/or insurance status. Unfortunately, The Pharmacy Patient Assistance Team is unable to assist at this time due to the following reasons      [facility]/ [facility] is not currently accepting applications for new or renewal patients. Patient has been put on [facility]'s waiting list. [facility] will send the patient and myself and email once the preston begins accepting applications  and There are no Manufacture or Co-pay Savings Programs available for requested medication.            Carla Kraft  Pharmacy Patient Assistance Team

## 2024-08-27 ENCOUNTER — DOCUMENT SCAN (OUTPATIENT)
Dept: HOME HEALTH SERVICES | Facility: HOSPITAL | Age: 84
End: 2024-08-27
Payer: MEDICARE

## 2024-08-31 ENCOUNTER — EXTERNAL CHRONIC CARE MANAGEMENT (OUTPATIENT)
Dept: FAMILY MEDICINE | Facility: CLINIC | Age: 84
End: 2024-08-31
Payer: MEDICARE

## 2024-08-31 PROCEDURE — G0511 CCM/BHI BY RHC/FQHC 20MIN MO: HCPCS | Mod: ,,, | Performed by: NURSE PRACTITIONER

## 2024-09-02 PROBLEM — I21.4 NSTEMI (NON-ST ELEVATION MYOCARDIAL INFARCTION): Status: RESOLVED | Noted: 2024-05-07 | Resolved: 2024-09-02

## 2024-09-04 ENCOUNTER — TELEPHONE (OUTPATIENT)
Dept: FAMILY MEDICINE | Facility: CLINIC | Age: 84
End: 2024-09-04
Payer: MEDICARE

## 2024-09-04 NOTE — TELEPHONE ENCOUNTER
Pt's daughter Jennifer Page called regarding his isosorbide-hydralazine medication stating that this medication is very expensive and hard for him to afford on a fixed income. She asked if there are any alternatives or anything you can recommend that may be more affordable.     I had contacted the pharmacy assistance program and they stated they could not assist because they are not currently accepting applications for new or renewal patients. He has been put on the waiting list.  Also looked at  the medication to see if that would be cheaper but it really was not. Will discuss with DARRYN SparrowP with Cardiology to see if we can try something that may be more affordable for patient.

## 2024-09-10 ENCOUNTER — OFFICE VISIT (OUTPATIENT)
Dept: FAMILY MEDICINE | Facility: CLINIC | Age: 84
End: 2024-09-10
Payer: MEDICARE

## 2024-09-10 VITALS
TEMPERATURE: 98 F | HEIGHT: 72 IN | SYSTOLIC BLOOD PRESSURE: 121 MMHG | HEART RATE: 56 BPM | RESPIRATION RATE: 18 BRPM | DIASTOLIC BLOOD PRESSURE: 66 MMHG | BODY MASS INDEX: 31.02 KG/M2 | OXYGEN SATURATION: 98 % | WEIGHT: 229 LBS

## 2024-09-10 DIAGNOSIS — R79.89 OTHER SPECIFIED ABNORMAL FINDINGS OF BLOOD CHEMISTRY: ICD-10-CM

## 2024-09-10 DIAGNOSIS — E53.8 DEFICIENCY OF OTHER SPECIFIED B GROUP VITAMINS: ICD-10-CM

## 2024-09-10 DIAGNOSIS — R82.90 BAD ODOR OF URINE: Primary | ICD-10-CM

## 2024-09-10 DIAGNOSIS — M54.50 ACUTE BILATERAL LOW BACK PAIN WITHOUT SCIATICA: ICD-10-CM

## 2024-09-10 DIAGNOSIS — I25.10 CORONARY ARTERY DISEASE, UNSPECIFIED VESSEL OR LESION TYPE, UNSPECIFIED WHETHER ANGINA PRESENT, UNSPECIFIED WHETHER NATIVE OR TRANSPLANTED HEART: ICD-10-CM

## 2024-09-10 DIAGNOSIS — I50.21 ACUTE SYSTOLIC HEART FAILURE: ICD-10-CM

## 2024-09-10 DIAGNOSIS — M54.50 ACUTE MIDLINE LOW BACK PAIN WITHOUT SCIATICA: ICD-10-CM

## 2024-09-10 DIAGNOSIS — N18.32 STAGE 3B CHRONIC KIDNEY DISEASE: ICD-10-CM

## 2024-09-10 DIAGNOSIS — E78.2 MIXED HYPERLIPIDEMIA: ICD-10-CM

## 2024-09-10 DIAGNOSIS — I10 ESSENTIAL HYPERTENSION: ICD-10-CM

## 2024-09-10 LAB
ALBUMIN SERPL BCP-MCNC: 3.6 G/DL (ref 3.5–5)
ALBUMIN/GLOB SERPL: 1.1 {RATIO}
ALP SERPL-CCNC: 310 U/L (ref 45–115)
ALT SERPL W P-5'-P-CCNC: 51 U/L (ref 16–61)
ANION GAP SERPL CALCULATED.3IONS-SCNC: 13 MMOL/L (ref 7–16)
AST SERPL W P-5'-P-CCNC: 55 U/L (ref 15–37)
BASOPHILS # BLD AUTO: 0.02 K/UL (ref 0–0.2)
BASOPHILS NFR BLD AUTO: 0.5 % (ref 0–1)
BILIRUB SERPL-MCNC: 1.3 MG/DL (ref ?–1.2)
BILIRUB SERPL-MCNC: NORMAL MG/DL
BLOOD URINE, POC: NEGATIVE
BUN SERPL-MCNC: 26 MG/DL (ref 7–18)
BUN/CREAT SERPL: 14 (ref 6–20)
CALCIUM SERPL-MCNC: 8.8 MG/DL (ref 8.5–10.1)
CHLORIDE SERPL-SCNC: 110 MMOL/L (ref 98–107)
CHOLEST SERPL-MCNC: 116 MG/DL (ref 0–200)
CHOLEST/HDLC SERPL: 1.9 {RATIO}
CLARITY, UA: CLEAR
CO2 SERPL-SCNC: 22 MMOL/L (ref 21–32)
COLOR, UA: YELLOW
CREAT SERPL-MCNC: 1.86 MG/DL (ref 0.7–1.3)
DIFFERENTIAL METHOD BLD: ABNORMAL
EGFR (NO RACE VARIABLE) (RUSH/TITUS): 35 ML/MIN/1.73M2
EOSINOPHIL # BLD AUTO: 0.07 K/UL (ref 0–0.5)
EOSINOPHIL NFR BLD AUTO: 1.6 % (ref 1–4)
ERYTHROCYTE [DISTWIDTH] IN BLOOD BY AUTOMATED COUNT: 15.8 % (ref 11.5–14.5)
GLOBULIN SER-MCNC: 3.4 G/DL (ref 2–4)
GLUCOSE SERPL-MCNC: 104 MG/DL (ref 74–106)
GLUCOSE UR QL STRIP: NEGATIVE
HCT VFR BLD AUTO: 40.8 % (ref 40–54)
HDLC SERPL-MCNC: 61 MG/DL (ref 40–60)
HGB BLD-MCNC: 12.5 G/DL (ref 13.5–18)
IMM GRANULOCYTES # BLD AUTO: 0.01 K/UL (ref 0–0.04)
IMM GRANULOCYTES NFR BLD: 0.2 % (ref 0–0.4)
KETONES UR QL STRIP: NEGATIVE
LDLC SERPL CALC-MCNC: 43 MG/DL
LEUKOCYTE ESTERASE URINE, POC: NEGATIVE
LYMPHOCYTES # BLD AUTO: 1.39 K/UL (ref 1–4.8)
LYMPHOCYTES NFR BLD AUTO: 32.5 % (ref 27–41)
MCH RBC QN AUTO: 31.5 PG (ref 27–31)
MCHC RBC AUTO-ENTMCNC: 30.6 G/DL (ref 32–36)
MCV RBC AUTO: 102.8 FL (ref 80–96)
MONOCYTES # BLD AUTO: 0.61 K/UL (ref 0–0.8)
MONOCYTES NFR BLD AUTO: 14.3 % (ref 2–6)
MPC BLD CALC-MCNC: 10.5 FL (ref 9.4–12.4)
NEUTROPHILS # BLD AUTO: 2.18 K/UL (ref 1.8–7.7)
NEUTROPHILS NFR BLD AUTO: 50.9 % (ref 53–65)
NITRITE, POC UA: NEGATIVE
NONHDLC SERPL-MCNC: 55 MG/DL
NRBC # BLD AUTO: 0 X10E3/UL
NRBC, AUTO (.00): 0 %
PH, POC UA: 6
PLATELET # BLD AUTO: 172 K/UL (ref 150–400)
POTASSIUM SERPL-SCNC: 4.7 MMOL/L (ref 3.5–5.1)
PROT SERPL-MCNC: 7 G/DL (ref 6.4–8.2)
PROTEIN, POC: 100
RBC # BLD AUTO: 3.97 M/UL (ref 4.6–6.2)
SODIUM SERPL-SCNC: 140 MMOL/L (ref 136–145)
SPECIFIC GRAVITY, POC UA: 1.02
TRIGL SERPL-MCNC: 61 MG/DL (ref 35–150)
UROBILINOGEN, POC UA: 1
VLDLC SERPL-MCNC: 12 MG/DL
WBC # BLD AUTO: 4.28 K/UL (ref 4.5–11)

## 2024-09-10 PROCEDURE — 85025 COMPLETE CBC W/AUTO DIFF WBC: CPT | Mod: ,,, | Performed by: CLINICAL MEDICAL LABORATORY

## 2024-09-10 PROCEDURE — 80053 COMPREHEN METABOLIC PANEL: CPT | Mod: ,,, | Performed by: CLINICAL MEDICAL LABORATORY

## 2024-09-10 PROCEDURE — 3078F DIAST BP <80 MM HG: CPT | Mod: ,,, | Performed by: NURSE PRACTITIONER

## 2024-09-10 PROCEDURE — 3074F SYST BP LT 130 MM HG: CPT | Mod: ,,, | Performed by: NURSE PRACTITIONER

## 2024-09-10 PROCEDURE — 80061 LIPID PANEL: CPT | Mod: ,,, | Performed by: CLINICAL MEDICAL LABORATORY

## 2024-09-10 PROCEDURE — 1101F PT FALLS ASSESS-DOCD LE1/YR: CPT | Mod: ,,, | Performed by: NURSE PRACTITIONER

## 2024-09-10 PROCEDURE — 81003 URINALYSIS AUTO W/O SCOPE: CPT | Mod: RHCUB | Performed by: NURSE PRACTITIONER

## 2024-09-10 PROCEDURE — 3288F FALL RISK ASSESSMENT DOCD: CPT | Mod: ,,, | Performed by: NURSE PRACTITIONER

## 2024-09-10 PROCEDURE — 99214 OFFICE O/P EST MOD 30 MIN: CPT | Mod: ,,, | Performed by: NURSE PRACTITIONER

## 2024-09-10 PROCEDURE — 1125F AMNT PAIN NOTED PAIN PRSNT: CPT | Mod: ,,, | Performed by: NURSE PRACTITIONER

## 2024-09-10 PROCEDURE — 1160F RVW MEDS BY RX/DR IN RCRD: CPT | Mod: ,,, | Performed by: NURSE PRACTITIONER

## 2024-09-10 PROCEDURE — 87086 URINE CULTURE/COLONY COUNT: CPT | Mod: ,,, | Performed by: CLINICAL MEDICAL LABORATORY

## 2024-09-10 PROCEDURE — 1159F MED LIST DOCD IN RCRD: CPT | Mod: ,,, | Performed by: NURSE PRACTITIONER

## 2024-09-10 NOTE — PROGRESS NOTES
Health Maintenance Due   Topic Date Due    Shingles Vaccine (1 of 2) Patient still unsure if he wants it.    RSV Vaccine (Age 60+ and Pregnant patients) (1 - 1-dose 60+ series) Patient still unsure if he wants it.    Influenza Vaccine (1) 09/01/2024    COVID-19 Vaccine (5 - 2023-24 season) 09/01/2024     I have discussed care gaps with patient.

## 2024-09-10 NOTE — PROGRESS NOTES
Swati Vicente NP   [facility]  [address]  [place]  [address]      PATIENT NAME: Trung Barboza  : 1940  DATE: 9/10/24  MRN: [id number]      Billing Provider: Swati Vicente NP  Level of Service: [place] OFFICE/OUTPT VISIT, EST, LEVL IV, 30-39 MIN  Patient PCP Information       Provider PCP Type    Swati Vicente NP General            Reason for Visit / Chief Complaint: Hyperlipidemia (Patient is Trung servin 83 y/o male that reports he is here for Hyperlipidemia and his 6 month follow -up. He states he has been doing ok. He denies needing any refills.) and Low-back Pain (Patient reports low back pain x 1 month. He also states that he has had some odor to his urine.)         History of Present Illness / Problem Focused Workflow     83 y/o male that reports he is here for Hyperlipidemia and his 6 month follow -up. He states he has been doing ok. He denies needing any refills.  Low-back Pain: Patient reports low back pain x 1 month. He also states that he has had some odor to his urine.            Review of Systems     @Review of Systems   Constitutional:  Negative for activity change, appetite change, fatigue and fever.   HENT:  Negative for nasal congestion, ear pain, rhinorrhea, sinus pressure/congestion and sore throat.    Eyes:  Negative for pain, redness, visual disturbance and eye dryness.   Respiratory:  Negative for cough and shortness of breath.    Cardiovascular:  Negative for chest pain and leg swelling.   Gastrointestinal:  Negative for abdominal distention, abdominal pain, constipation and diarrhea.   Endocrine: Negative for cold intolerance, heat intolerance and polyuria.   Genitourinary:  Negative for bladder incontinence, dysuria, frequency and urgency.   Musculoskeletal:  Positive for back pain. Negative for arthralgias, gait problem and myalgias.   Integumentary:  Negative for color change, rash and wound.   Allergic/Immunologic: Negative for environmental  allergies and food allergies.   Neurological:  Negative for dizziness, weakness, light-headedness and headaches.   Psychiatric/Behavioral:  Negative for behavioral problems and sleep disturbance.        Medical / Social / Family History     Past Medical History:   Diagnosis Date    Aortic regurgitation     BPH with urinary obstruction     Chronic kidney disease, stage 3b     GR 44     creat 1.8    Coronary artery disease     Essential hypertension 07/10/2021    Gastroesophageal reflux disease 09/15/2022    Gout, arthritis     Myocardial infarction     PVD (peripheral vascular disease) 02/01/2024    TIA (transient ischemic attack)        Past Surgical History:   Procedure Laterality Date    ANGIOGRAM, CORONARY, WITH LEFT HEART CATHETERIZATION N/A 12/31/2021    Procedure: ANGIOGRAM,CORONARY,WITH LEFT HEART CATHETERIZATION;  Surgeon: Mahad Villaseñor DO;  Location: [facility] CATH LAB;  Service: Cardiology;  Laterality: N/A;    ANGIOGRAM, CORONARY, WITH LEFT HEART CATHETERIZATION N/A 5/7/2024    Procedure: Angiogram, Coronary, with Left Heart Cath;  Surgeon: Mahad Villaseñor DO;  Location: [facility] CATH LAB;  Service: Cardiology;  Laterality: N/A;    BIOPSY WITH TRANSRECTAL ULTRASOUND (TRUS) GUIDANCE Bilateral 03/07/2018    CHOLECYSTECTOMY      CORONARY ANGIOGRAPHY INCLUDING BYPASS GRAFTS WITH CATHETERIZATION OF LEFT HEART N/A 6/4/2024    Procedure: ANGIOGRAM, CORONARY, INCLUDING BYPASS GRAFT, WITH LEFT HEART CATHETERIZATION;  Surgeon: Philip Torres MD;  Location: [facility] CATH LAB;  Service: Cardiology;  Laterality: N/A;    [facility]    [facility] HEART CATHETERIZATION Left 7/13/2021    Procedure: Left heart cath;  Surgeon: Philip Torres MD;  Location: [facility] CATH LAB;  Service: Cardiology;  Laterality: Left;    PERCUTANEOUS CORONARY INTERVENTION, ARTERY N/A 5/7/2024    Procedure: Percutaneous coronary intervention;  Surgeon: Mahad Villaseñor  DO;  Location: [facility] CATH LAB;  Service: Cardiology;  Laterality: N/A;       Medications and Allergies     Medications  Outpatient Medications Marked as Taking for the 9/10/24 encounter (Office Visit) with Swati Vicente NP   Medication Sig Dispense Refill    aspirin (ECOTRIN) 81 MG EC tablet Take 1 tablet (81 mg total) by mouth once daily. 30 tablet 0    atorvastatin (LIPITOR) 80 MG tablet Take 1 tablet (80 mg total) by mouth every evening. 90 tablet 6    clopidogreL (PLAVIX) 75 mg tablet Take 1 tablet (75 mg total) by mouth once daily. 90 tablet 6    colchicine (COLCRYS) 0.6 mg tablet Take 2 tablet at first sign of gout flare, then take 0.6 mg ( 1 tablet an hour later).  Then take 1 tablet once or twice daily until flare resolves. 60 tablet 1    furosemide (LASIX) 20 MG tablet Take 1 tablet (20 mg total) by mouth once daily. 90 tablet 3    isosorbide-hydrALAZINE 20-37.5 mg (BIDIL) 20-37.5 mg Tab Take 2 tablets by mouth 3 (three) times daily. 540 tablet 1    metoprolol succinate (TOPROL-XL) 50 MG 24 hr tablet Take 1 tablet (50 mg total) by mouth once daily. 90 tablet 3    multivitamin with minerals tablet Take 1 tablet by mouth once daily.      nitroGLYCERIN (NITROSTAT) 0.4 MG SL tablet Place 1 tablet (0.4 mg total) under the tongue every 5 (five) minutes as needed for Chest pain. 25 tablet 2    potassium chloride (MICRO-K) 10 MEQ CpSR Take 1 capsule (10 mEq total) by mouth once daily. 90 capsule 1       Allergies  Review of patient's allergies indicates:   Allergen Reactions    Lisinopril Swelling and Anaphylaxis       Physical Examination     Vitals:    09/10/24 0829   BP: 121/66   Pulse: (!) 56   Resp: 18   Temp: 98.4 °F (36.9 °C)     Physical Exam  Vitals and nursing note reviewed.   HENT:      Head: Normocephalic.      Nose: Nose normal.      Mouth/Throat:      Mouth: Mucous membranes are moist.      Pharynx: Oropharynx is clear. No posterior oropharyngeal erythema.   Eyes:      Conjunctiva/sclera:  Conjunctivae normal.   Cardiovascular:      Rate and Rhythm: Normal rate and regular rhythm.      Pulses: Normal pulses.      Heart sounds: Normal heart sounds.   Pulmonary:      Effort: Pulmonary effort is normal.      Breath sounds: Normal breath sounds.   Abdominal:      General: Abdomen is flat. Bowel sounds are normal. There is no distension.      Palpations: Abdomen is soft.   Musculoskeletal:         General: No swelling.      Cervical back: Normal range of motion.      Lumbar back: Spasms and tenderness present. Decreased range of motion.      Right lower leg: No edema.      Left lower leg: No edema.   Skin:     General: Skin is warm and dry.      Capillary Refill: Capillary refill takes less than 2 seconds.   Neurological:      Mental Status: He is alert. Mental status is at baseline.   Psychiatric:         Mood and Affect: Mood normal.         Behavior: Behavior normal.               Lab Results   Component Value Date    WBC 4.28 (L) 09/10/2024    HGB 12.5 (L) 09/10/2024    HCT 40.8 09/10/2024    .8 (H) 09/10/2024     09/10/2024        CMP  Sodium   Date Value Ref Range Status   09/10/2024 140 136 - 145 mmol/L Final     Potassium   Date Value Ref Range Status   09/10/2024 4.7 3.5 - 5.1 mmol/L Final     Chloride   Date Value Ref Range Status   09/10/2024 110 (H) 98 - 107 mmol/L Final     CO2   Date Value Ref Range Status   09/10/2024 22 21 - 32 mmol/L Final     Glucose   Date Value Ref Range Status   09/10/2024 104 74 - 106 mg/dL Final     BUN   Date Value Ref Range Status   09/10/2024 26 (H) 7 - 18 mg/dL Final     Creatinine   Date Value Ref Range Status   09/10/2024 1.86 (H) 0.70 - 1.30 mg/dL Final     Calcium   Date Value Ref Range Status   09/10/2024 8.8 8.5 - 10.1 mg/dL Final     Total Protein   Date Value Ref Range Status   09/10/2024 7.0 6.4 - 8.2 g/dL Final     Albumin   Date Value Ref Range Status   09/10/2024 3.6 3.5 - 5.0 g/dL Final     Bilirubin, Total   Date Value Ref Range Status    09/10/2024 1.3 (H) >0.0 - 1.2 mg/dL Final     Alk Phos   Date Value Ref Range Status   09/10/2024 310 (H) 45 - 115 U/L Final     AST   Date Value Ref Range Status   09/10/2024 55 (H) 15 - 37 U/L Final     ALT   Date Value Ref Range Status   09/10/2024 51 16 - 61 U/L Final     Anion Gap   Date Value Ref Range Status   09/10/2024 13 7 - 16 mmol/L Final     eGFR   Date Value Ref Range Status   09/10/2024 35 (L) >=60 mL/min/1.73m2 Final     Procedures   Assessment and Plan (including Health Maintenance)   :    Plan:     Problem List Items Addressed This Visit          Cardiac/Vascular    Essential hypertension    Current Assessment & Plan     Blood pressure well controlled. Continue current medications. Follow up with cardiology as scheduled.          HLD (hyperlipidemia)    Relevant Orders    CBC Auto Differential    Comprehensive Metabolic Panel (Completed)    Lipid Panel (Completed)       Renal/    CKD (chronic kidney disease)    Relevant Orders    CBC Auto Differential    Comprehensive Metabolic Panel (Completed)       Orthopedic    Acute midline low back pain without sciatica    Current Assessment & Plan     UA clear. Will send in Baclofen for him to use PRN for muscle spasms/back pain. Encouraged use of heating pad, biofreeze, blue emu oil, or other topical treatments. Follow up if symptoms persist or worsen.           Other Visit Diagnoses       Bad odor of urine    -  Primary    Relevant Orders    POCT URINALYSIS W/O SCOPE (Completed)    Urine culture (Completed)    Acute bilateral low back pain without sciatica        Relevant Orders    POCT URINALYSIS W/O SCOPE (Completed)    Coronary artery disease, unspecified vessel or lesion type, unspecified whether angina present, unspecified whether native or transplanted heart        Acute systolic heart failure        Other specified abnormal findings of blood chemistry        Relevant Orders    Vitamin B12 & Folate (Completed)    Iron and TIBC (Completed)     Deficiency of other specified B group vitamins        Relevant Orders    Vitamin B12 & Folate (Completed)            Health Maintenance Topics with due status: Not Due       Topic Last Completion Date    TETANUS VACCINE 12/11/2018    Lipid Panel 09/10/2024       Future Appointments   Date Time Provider Department Center   9/17/2024  3:20 PM Mahad Villaseñor DO [facility]   12/10/2024 10:20 AM Swati Vicente NP [facility]   1/16/2025  3:00 PM [facility] NURSE [facility]        Health Maintenance Due   Topic Date Due    Shingles Vaccine (1 of 2) Never done    RSV Vaccine (Age 60+ and Pregnant patients) (1 - 1-dose 60+ series) Never done    Influenza Vaccine (1) 09/01/2024    COVID-19 Vaccine (5 - 2023-24 season) 09/01/2024          Signature:  Swati Vicente NP  [facility]  [address] [place] [address]  [id number]    Date of encounter: 9/10/24

## 2024-09-11 LAB
FOLATE SERPL-MCNC: >20 NG/ML (ref 3.1–17.5)
IRON SATN MFR SERPL: 20 % (ref 14–50)
IRON SERPL-MCNC: 54 ΜG/DL (ref 65–175)
TIBC SERPL-MCNC: 265 ΜG/DL (ref 250–450)
VIT B12 SERPL-MCNC: 708 PG/ML (ref 193–986)

## 2024-09-11 PROCEDURE — 83540 ASSAY OF IRON: CPT | Mod: ,,, | Performed by: CLINICAL MEDICAL LABORATORY

## 2024-09-11 PROCEDURE — 82607 VITAMIN B-12: CPT | Mod: ,,, | Performed by: CLINICAL MEDICAL LABORATORY

## 2024-09-11 PROCEDURE — 83550 IRON BINDING TEST: CPT | Mod: ,,, | Performed by: CLINICAL MEDICAL LABORATORY

## 2024-09-11 PROCEDURE — 82746 ASSAY OF FOLIC ACID SERUM: CPT | Mod: ,,, | Performed by: CLINICAL MEDICAL LABORATORY

## 2024-09-11 NOTE — PROGRESS NOTES
Vitamin B12 and Folate normal. Iron slightly low. Encourage him to increase iron in diet. Foods high in iron include red meats, chick, turkey, eggs, salmon, tuna, liver, nuts and seeds, dried fruit, dark leafy green veggies such as spinach or broccoli, beans, and iron fortified cereals.

## 2024-09-11 NOTE — PROGRESS NOTES
Labs reviewed: Kidney function declined. Limit NSAID usage and Increase water intake. Liver enzymes elevated as they have been in the past. Limit Tylenol usage and avoid alcohol. Cholesterol looks good. Continue atorvastatin and low fat/low cholesterol diet. RBC including Hgb were slightly decreased. I have ordered a Vitamin B12 and Iron level. Hopefully they can add this on to what was drawn yesterday. Please let him know his Urine culture is still pending and we will notify when these results are received.

## 2024-09-12 PROBLEM — M54.50 ACUTE MIDLINE LOW BACK PAIN WITHOUT SCIATICA: Status: ACTIVE | Noted: 2024-09-12

## 2024-09-12 LAB — UA COMPLETE W REFLEX CULTURE PNL UR: NO GROWTH

## 2024-09-12 RX ORDER — BACLOFEN 10 MG/1
TABLET ORAL
Qty: 60 TABLET | Refills: 0 | Status: SHIPPED | OUTPATIENT
Start: 2024-09-12

## 2024-09-12 NOTE — PROGRESS NOTES
Urine culture showed no growth so he will not need antibiotics for this. If his low back pain continues please let me know and I will send something in for this.

## 2024-09-13 NOTE — ASSESSMENT & PLAN NOTE
UA clear. Will send in Baclofen for him to use PRN for muscle spasms/back pain. Encouraged use of heating pad, biofreeze, blue emu oil, or other topical treatments. Follow up if symptoms persist or worsen.

## 2024-09-30 ENCOUNTER — EXTERNAL CHRONIC CARE MANAGEMENT (OUTPATIENT)
Dept: FAMILY MEDICINE | Facility: CLINIC | Age: 84
End: 2024-09-30
Payer: MEDICARE

## 2024-09-30 PROCEDURE — G0511 CCM/BHI BY RHC/FQHC 20MIN MO: HCPCS | Mod: ,,, | Performed by: NURSE PRACTITIONER

## 2024-10-31 ENCOUNTER — EXTERNAL CHRONIC CARE MANAGEMENT (OUTPATIENT)
Dept: FAMILY MEDICINE | Facility: CLINIC | Age: 84
End: 2024-10-31
Payer: MEDICARE

## 2024-10-31 PROCEDURE — G0511 CCM/BHI BY RHC/FQHC 20MIN MO: HCPCS | Mod: ,,, | Performed by: NURSE PRACTITIONER

## 2024-11-13 ENCOUNTER — HOSPITAL ENCOUNTER (EMERGENCY)
Facility: HOSPITAL | Age: 84
Discharge: HOME | End: 2024-11-13
Payer: MEDICARE

## 2024-11-13 VITALS
HEIGHT: 72 IN | OXYGEN SATURATION: 100 % | TEMPERATURE: 99 F | WEIGHT: 220 LBS | RESPIRATION RATE: 10 BRPM | SYSTOLIC BLOOD PRESSURE: 153 MMHG | DIASTOLIC BLOOD PRESSURE: 85 MMHG | HEART RATE: 58 BPM | BODY MASS INDEX: 29.8 KG/M2

## 2024-11-13 DIAGNOSIS — J90 PLEURAL EFFUSION: Primary | ICD-10-CM

## 2024-11-13 DIAGNOSIS — R06.02 SHORTNESS OF BREATH: ICD-10-CM

## 2024-11-13 LAB
ALBUMIN SERPL BCP-MCNC: 3.8 G/DL (ref 3.4–4.8)
ALBUMIN/GLOB SERPL: 0.9 {RATIO}
ALP SERPL-CCNC: 317 U/L (ref 40–150)
ALT SERPL W P-5'-P-CCNC: 22 U/L (ref 0–55)
ANION GAP SERPL CALCULATED.3IONS-SCNC: 17 MMOL/L (ref 7–16)
AST SERPL W P-5'-P-CCNC: 54 U/L (ref 5–34)
BASOPHILS # BLD AUTO: 0.02 K/UL (ref 0–0.2)
BASOPHILS NFR BLD AUTO: 0.4 % (ref 0–1)
BILIRUB SERPL-MCNC: 2.3 MG/DL
BUN SERPL-MCNC: 23 MG/DL (ref 8–26)
BUN/CREAT SERPL: 13 (ref 6–20)
CALCIUM SERPL-MCNC: 9.6 MG/DL (ref 8.8–10)
CHLORIDE SERPL-SCNC: 106 MMOL/L (ref 98–107)
CO2 SERPL-SCNC: 19 MMOL/L (ref 23–31)
CREAT SERPL-MCNC: 1.84 MG/DL (ref 0.72–1.25)
DIFFERENTIAL METHOD BLD: ABNORMAL
EGFR (NO RACE VARIABLE) (RUSH/TITUS): 36 ML/MIN/1.73M2
EOSINOPHIL # BLD AUTO: 0.07 K/UL (ref 0–0.5)
EOSINOPHIL NFR BLD AUTO: 1.5 % (ref 1–4)
EOSINOPHIL NFR BLD MANUAL: 3 % (ref 1–4)
ERYTHROCYTE [DISTWIDTH] IN BLOOD BY AUTOMATED COUNT: 15.7 % (ref 11.5–14.5)
GLOBULIN SER-MCNC: 4.2 G/DL (ref 2–4)
GLUCOSE SERPL-MCNC: 88 MG/DL (ref 82–115)
HCT VFR BLD AUTO: 42 % (ref 40–54)
HGB BLD-MCNC: 13.7 G/DL (ref 13.5–18)
LACTATE SERPL-SCNC: 1.8 MMOL/L (ref 0.5–2.2)
LYMPHOCYTES # BLD AUTO: 1.74 K/UL (ref 1–4.8)
LYMPHOCYTES NFR BLD AUTO: 38.4 % (ref 27–41)
LYMPHOCYTES NFR BLD MANUAL: 45 % (ref 27–41)
MAGNESIUM SERPL-MCNC: 2.2 MG/DL (ref 1.6–2.6)
MCH RBC QN AUTO: 31.8 PG (ref 27–31)
MCHC RBC AUTO-ENTMCNC: 32.6 G/DL (ref 32–36)
MCV RBC AUTO: 97.4 FL (ref 80–96)
MONOCYTES # BLD AUTO: 0.73 K/UL (ref 0–0.8)
MONOCYTES NFR BLD AUTO: 16.1 % (ref 2–6)
MONOCYTES NFR BLD MANUAL: 12 % (ref 2–6)
MPC BLD CALC-MCNC: 9.9 FL (ref 9.4–12.4)
NEUTROPHILS # BLD AUTO: 1.97 K/UL (ref 1.8–7.7)
NEUTROPHILS NFR BLD AUTO: 43.6 % (ref 53–65)
NEUTS SEG NFR BLD MANUAL: 40 % (ref 50–62)
NRBC BLD MANUAL-RTO: ABNORMAL %
PLATELET # BLD AUTO: 188 K/UL (ref 150–400)
POTASSIUM SERPL-SCNC: 4.3 MMOL/L (ref 3.5–5.1)
PROT SERPL-MCNC: 8 G/DL (ref 5.8–7.6)
RBC # BLD AUTO: 4.31 M/UL (ref 4.6–6.2)
SODIUM SERPL-SCNC: 138 MMOL/L (ref 136–145)
TROPONIN I SERPL HS-MCNC: 246.7 NG/L
WBC # BLD AUTO: 4.53 K/UL (ref 4.5–11)

## 2024-11-13 PROCEDURE — 84484 ASSAY OF TROPONIN QUANT: CPT

## 2024-11-13 PROCEDURE — 99285 EMERGENCY DEPT VISIT HI MDM: CPT | Mod: 25

## 2024-11-13 PROCEDURE — 36415 COLL VENOUS BLD VENIPUNCTURE: CPT

## 2024-11-13 PROCEDURE — 80053 COMPREHEN METABOLIC PANEL: CPT

## 2024-11-13 PROCEDURE — 85025 COMPLETE CBC W/AUTO DIFF WBC: CPT

## 2024-11-13 PROCEDURE — 93005 ELECTROCARDIOGRAM TRACING: CPT

## 2024-11-13 PROCEDURE — 83605 ASSAY OF LACTIC ACID: CPT

## 2024-11-13 PROCEDURE — 63600175 PHARM REV CODE 636 W HCPCS

## 2024-11-13 PROCEDURE — 96372 THER/PROPH/DIAG INJ SC/IM: CPT

## 2024-11-13 PROCEDURE — 83735 ASSAY OF MAGNESIUM: CPT

## 2024-11-13 RX ORDER — FUROSEMIDE 10 MG/ML
40 INJECTION INTRAMUSCULAR; INTRAVENOUS
Status: COMPLETED | OUTPATIENT
Start: 2024-11-13 | End: 2024-11-13

## 2024-11-13 RX ORDER — FUROSEMIDE 10 MG/ML
40 INJECTION INTRAMUSCULAR; INTRAVENOUS
Status: DISCONTINUED | OUTPATIENT
Start: 2024-11-13 | End: 2024-11-13

## 2024-11-13 RX ADMIN — FUROSEMIDE 40 MG: 10 INJECTION, SOLUTION INTRAMUSCULAR; INTRAVENOUS at 08:11

## 2024-11-14 ENCOUNTER — TELEPHONE (OUTPATIENT)
Dept: CARDIOLOGY | Facility: CLINIC | Age: 84
End: 2024-11-14
Payer: MEDICARE

## 2024-11-14 NOTE — ED PROVIDER NOTES
Encounter Date: 11/13/2024       History     Chief Complaint   Patient presents with    Shortness of Breath    Cough     RP is a 83 y/o AAM with a a PMHx significant for multiple health related co-morbidities who presents to the ED POV with c/o shortness of breath. Patient stated that his shortness of breath as been ongoing for the past 3 days. Patient denies any nausea, vomting, and/or diarrhea. Patient also endorses a mild cough.         Review of patient's allergies indicates:   Allergen Reactions    Lisinopril Swelling and Anaphylaxis     Past Medical History:   Diagnosis Date    Aortic regurgitation     BPH with urinary obstruction     Chronic kidney disease, stage 3b     GR 44     creat 1.8    Coronary artery disease     Essential hypertension 07/10/2021    Gastroesophageal reflux disease 09/15/2022    Gout, arthritis     Myocardial infarction     PVD (peripheral vascular disease) 02/01/2024    TIA (transient ischemic attack)      Past Surgical History:   Procedure Laterality Date    ANGIOGRAM, CORONARY, WITH LEFT HEART CATHETERIZATION N/A 12/31/2021    Procedure: ANGIOGRAM,CORONARY,WITH LEFT HEART CATHETERIZATION;  Surgeon: Mahad Villaseñor DO;  Location: [facility] CATH LAB;  Service: Cardiology;  Laterality: N/A;    ANGIOGRAM, CORONARY, WITH LEFT HEART CATHETERIZATION N/A 5/7/2024    Procedure: Angiogram, Coronary, with Left Heart Cath;  Surgeon: Mahad Vilalseñor DO;  Location: [facility] CATH LAB;  Service: Cardiology;  Laterality: N/A;    BIOPSY WITH TRANSRECTAL ULTRASOUND (TRUS) GUIDANCE Bilateral 03/07/2018    CHOLECYSTECTOMY      CORONARY ANGIOGRAPHY INCLUDING BYPASS GRAFTS WITH CATHETERIZATION OF LEFT HEART N/A 6/4/2024    Procedure: ANGIOGRAM, CORONARY, INCLUDING BYPASS GRAFT, WITH LEFT HEART CATHETERIZATION;  Surgeon: Philip Torres MD;  Location: [facility] CATH LAB;  Service: Cardiology;  Laterality: N/A;    CORONARY ARTERY BYPASS GRAFT  01/07/2022    [facility]-Dr. Qureshi    LEFT HEART  CATHETERIZATION Left 2021    Procedure: Left heart cath;  Surgeon: Philip Torres MD;  Location: [facility] CATH LAB;  Service: Cardiology;  Laterality: Left;    PERCUTANEOUS CORONARY INTERVENTION, ARTERY N/A 2024    Procedure: Percutaneous coronary intervention;  Surgeon: Mahad Villaseñor DO;  Location: [facility] CATH LAB;  Service: Cardiology;  Laterality: N/A;     Family History   Problem Relation Name Age of Onset    Heart disease Mother      Hypertension Mother      No Known Problems Father      No Known Problems Sister      No Known Problems Sister      No Known Problems Sister      No Known Problems Sister      No Known Problems Brother      No Known Problems Brother      No Known Problems Brother      No Known Problems Brother      No Known Problems Daughter      Hypertension Son      No Known Problems Son      No Known Problems Son      No Known Problems Son      No Known Problems Son       Social History     Tobacco Use    Smoking status: Former     Current packs/day: 0.00     Average packs/day: 0.5 packs/day for 15.0 years (7.5 ttl pk-yrs)     Types: Cigarettes     Start date: 1973     Quit date: 1988     Years since quittin.3     Passive exposure: Never    Smokeless tobacco: Former     Types: Chew     Quit date:    Substance Use Topics    Alcohol use: Never    Drug use: Never     Review of Systems   Constitutional: Negative.    HENT: Negative.         Physical Exam     Initial Vitals [24]   BP Pulse Resp Temp SpO2   (!) 179/97 61 18 98.5 °F (36.9 °C) 98 %      MAP       --         Physical Exam    Nursing note and vitals reviewed.  Constitutional: Vital signs are normal. He appears well-developed and well-nourished. He is not diaphoretic. He is cooperative.  Non-toxic appearance. He does not have a sickly appearance. He does not appear ill. No distress.   Cardiovascular:  Regular rhythm, S1 normal, S2 normal, normal heart sounds, intact distal pulses and normal  pulses.   Bradycardia present.         Pulmonary/Chest: Effort normal and breath sounds normal.   Abdominal: Abdomen is soft and flat. Bowel sounds are normal. There is no abdominal tenderness. No hernia.     Neurological: He is alert and oriented to person, place, and time. He has normal strength and normal reflexes. He displays normal reflexes. No cranial nerve deficit or sensory deficit. He displays a negative Romberg sign. GCS eye subscore is 4. GCS verbal subscore is 5. GCS motor subscore is 6.   Skin: Skin is warm, dry and intact. Capillary refill takes less than 2 seconds. No rash noted.   Psychiatric: He has a normal mood and affect. His speech is normal and behavior is normal. Judgment and thought content normal. Cognition and memory are normal.         Medical Screening Exam   See Full Note    ED Course   Procedures  Labs Reviewed   COMPREHENSIVE METABOLIC PANEL - Abnormal       Result Value    Sodium 138      Potassium 4.3      Chloride 106      CO2 19 (*)     Anion Gap 17 (*)     Glucose 88      BUN 23      Creatinine 1.84 (*)     BUN/Creatinine Ratio 13      Calcium 9.6      Total Protein 8.0 (*)     Albumin 3.8      Globulin 4.2 (*)     A/G Ratio 0.9      Bilirubin, Total 2.3 (*)     Alk Phos 317 (*)     ALT 22      AST 54 (*)     eGFR 36 (*)    TROPONIN I - Abnormal    Troponin I High Sensitivity 246.7 (*)    CBC WITH DIFFERENTIAL - Abnormal    WBC 4.53      RBC 4.31 (*)     Hemoglobin 13.7      Hematocrit 42.0      MCV 97.4 (*)     MCH 31.8 (*)     MCHC 32.6      RDW 15.7 (*)     Platelet Count 188      MPV 9.9      Neutrophils % 43.6 (*)     Lymphocytes % 38.4      Neutrophils, Abs 1.97      Lymphocytes, Absolute 1.74      Diff Type Manual      Monocytes % 16.1 (*)     Eosinophils % 1.5      Basophils % 0.4      Monocytes, Absolute 0.73      Eosinophils, Absolute 0.07      Basophils, Absolute 0.02     MANUAL DIFFERENTIAL - Abnormal    Segmented Neutrophils, Man % 40 (*)     Lymphocytes, Man % 45 (*)      Monocytes, Man % 12 (*)     Eosinophils, Man % 3      nRBC, Manual       MAGNESIUM - Normal    Magnesium 2.2     LACTIC ACID, PLASMA - Normal    Lactic Acid 1.8     CBC W/ AUTO DIFFERENTIAL    Narrative:     The following orders were created for panel order CBC auto differential.  Procedure                               Abnormality         Status                     ---------                               -----------         ------                     CBC with Differential[[id number]]       Abnormal            Final result               Manual Differential[[id number]]         Abnormal            Final result                 Please view results for these tests on the individual orders.     EKG Readings: (Independently Interpreted)   Initial Reading: No STEMI. Rhythm: Sinus Bradycardia. Heart Rate: 59. Ectopy: No Ectopy. Conduction: Normal. ST Segments: Normal ST Segments. T Waves: Normal. Axis: Right Axis Deviation.   No changes from previous EKG performed on 6/19/2024     ECG Results              EKG 12-lead (In process)        Collection Time Result Time QRS Duration OHS QTC Calculation    11/13/24 19:32:17 11/13/24 20:25:02 132 471                     In process by Interface, Lab In [facility] (11/13/24 20:25:10)                   Narrative:    Test Reason : R06.02,    Vent. Rate :  59 BPM     Atrial Rate :  59 BPM     P-R Int : 320 ms          QRS Dur : 132 ms      QT Int : 476 ms       P-R-T Axes :    259  77 degrees    QTcB Int : 471 ms    Sinus bradycardia with 1st degree A-V block with occasional Premature  ventricular complexes and Premature atrial complexes  Right superior axis deviation  Nonspecific intraventricular block  Inferior infarct (cited on or before 06-Jun-2023)  Cannot rule out Anterior infarct (cited on or before 27-Sep-2022)  Abnormal ECG  When compared with ECG of 19-Jun-2024 09:54,  Questionable change in The axis  T wave inversion no longer evident in Inferior leads    Referred By:  AAAREFERRAL SELF           Confirmed By:                                   Imaging Results              X-Ray Chest 1 View (Final result)  Result time 11/13/24 20:12:26      Final result by Live Arce MD (11/13/24 20:12:26)                   Impression:      See above comments.  Follow-up recommend      Electronically signed by: Live Arce  Date:    11/13/2024  Time:    20:12               Narrative:    EXAMINATION:  XR CHEST 1 VIEW    CLINICAL HISTORY:  Shortness of breath    TECHNIQUE:  Single frontal view of the chest was performed.    COMPARISON:  06/02/2024    FINDINGS:  Sternotomy wires are present.  Cardiac silhouette is enlarged similar to the prior study.  No evidence of pneumothorax.    Probable small effusion at the right lung base.    No mass or consolidation.  No acute osseous abnormality.    Possible minimal ground-glass changes.  Minimal edema is not excluded.  Recommend follow-up.                                    X-Rays:   Independently Interpreted Readings:   Other Readings:  Reading Physician Reading Date Result Priority  Live Arce MD  [phone] 11/13/2024 STAT    Narrative & Impression  EXAMINATION:  XR CHEST 1 VIEW     CLINICAL HISTORY:  Shortness of breath     TECHNIQUE:  Single frontal view of the chest was performed.     COMPARISON:  06/02/2024     FINDINGS:  Sternotomy wires are present.  Cardiac silhouette is enlarged similar to the prior study.  No evidence of pneumothorax.     Probable small effusion at the right lung base.     No mass or consolidation.  No acute osseous abnormality.     Possible minimal ground-glass changes.  Minimal edema is not excluded.  Recommend follow-up.     Impression:     See above comments.  Follow-up recommend        Electronically signed by:Live Arce  Date:                                            11/13/2024  Time:                                           20:12              Medications   furosemide injection 40 mg (40 mg  Intramuscular Given 11/13/24 2042)     Medical Decision Making  RP is a 85 y/o AAM with a a PMHx significant for multiple health related co-morbidities who presents to the ED POV with c/o shortness of breath. Patient stated that his shortness of breath as been ongoing for the past 3 days. Patient denies any nausea, vomting, and/or diarrhea. Patient also endorses a mild cough.         Amount and/or Complexity of Data Reviewed  Labs: ordered. Decision-making details documented in ED Course.  Radiology: ordered. Decision-making details documented in ED Course.  Discussion of management or test interpretation with external provider(s): Chronically elevated Troponin  Small Pleural Effusion   CHF    Risk  Prescription drug management.               ED Course as of 11/13/24 2058 Wed Nov 13, 2024 2038 eGFR(!): 36 [AC]   2040 AST(!): 54 [AC]   2040 ALP(!): 317 [AC]   2040 BILIRUBIN TOTAL(!): 2.3 [AC]   2040 Globulin, Total(!): 4.2 [AC]   2040 PROTEIN TOTAL(!): 8.0 [AC]   2040 Creatinine(!): 1.84 [AC]   2040 Anion Gap(!): 17 [AC]   2040 CO2(!): 19 [AC]   2040 Mono %(!): 16.1 [AC]   2040 Neutrophils Relative(!): 43.6 [AC]   2040 RDW(!): 15.7 [AC]   2040 MCH(!): 31.8 [AC]   2040 MCV(!): 97.4 [AC]   2040 RBC(!): 4.31 [AC]   2055 Troponin I High Sensitivity(!!): 246.7  No change from previous labs on 6/2/2024 and 5/6/2024 [AC]   2055 Discharge instructions given along with strict return precautions, patient verbalizes understanding.   [AC]      ED Course User Index  [AC] Rambo Uribe FNP                           Clinical Impression:   Final diagnoses:  [R06.02] Shortness of breath  [J90] Pleural effusion (Primary)        ED Disposition Condition    Discharge Stable          ED Prescriptions    None       Follow-up Information       Follow up With Specialties Details Why Contact Info    Swati Vicente NP Family Medicine  As needed, If symptoms worsen [address] [place] [address] [place] [address] [phone]                Rambo Uribe, [facility]  11/13/24 2059

## 2024-11-14 NOTE — TELEPHONE ENCOUNTER
Spoke with the patient's daughter on today, patient's daughter explained that she took him to the ER last night and wanted him to be seen sooner.     Offered the patient to see one of our nurse practitioners due to Dr. Villaseñor being booked for this month.     Patient agreed to see Belle Jerrod Wesley on November 19th at 10:30 am.

## 2024-11-16 LAB
OHS QRS DURATION: 132 MS
OHS QTC CALCULATION: 471 MS

## 2024-11-18 ENCOUNTER — TELEPHONE (OUTPATIENT)
Dept: FAMILY MEDICINE | Facility: CLINIC | Age: 84
End: 2024-11-18
Payer: MEDICARE

## 2024-11-18 NOTE — TELEPHONE ENCOUNTER
Called and spoke with patient's daughter checking on patient's condition. Per patient's daughter,Jennifer,patient has improved a great amount. Patient has an appointment with cardiology tomorrow.Patient discussed with chronic care management nurse about oxygen for home use. Explained to patient's daughter, cardiology can order tomorrow if they feel patient requires oxygen or patient can come to our clinic for documentation for need of oxygen for home use. She voiced understanding. Provider is aware of patient's ER visit and phone call.

## 2024-11-19 ENCOUNTER — OFFICE VISIT (OUTPATIENT)
Dept: CARDIOLOGY | Facility: CLINIC | Age: 84
End: 2024-11-19
Payer: MEDICARE

## 2024-11-19 VITALS
OXYGEN SATURATION: 98 % | BODY MASS INDEX: 29.84 KG/M2 | HEIGHT: 72 IN | SYSTOLIC BLOOD PRESSURE: 130 MMHG | DIASTOLIC BLOOD PRESSURE: 76 MMHG | HEART RATE: 62 BPM

## 2024-11-19 DIAGNOSIS — I50.23 ACUTE ON CHRONIC SYSTOLIC HEART FAILURE: ICD-10-CM

## 2024-11-19 DIAGNOSIS — N18.31 STAGE 3A CHRONIC KIDNEY DISEASE: ICD-10-CM

## 2024-11-19 DIAGNOSIS — I25.10 CORONARY ARTERY DISEASE INVOLVING NATIVE HEART, UNSPECIFIED VESSEL OR LESION TYPE, UNSPECIFIED WHETHER ANGINA PRESENT: Primary | ICD-10-CM

## 2024-11-19 DIAGNOSIS — I10 ESSENTIAL HYPERTENSION: ICD-10-CM

## 2024-11-19 PROCEDURE — 99999 PR PBB SHADOW E&M-EST. PATIENT-LVL IV: CPT | Mod: PBBFAC,,, | Performed by: REGISTERED NURSE

## 2024-11-19 PROCEDURE — 93010 ELECTROCARDIOGRAM REPORT: CPT | Mod: S$PBB,,, | Performed by: INTERNAL MEDICINE

## 2024-11-19 PROCEDURE — 3078F DIAST BP <80 MM HG: CPT | Mod: CPTII,,, | Performed by: REGISTERED NURSE

## 2024-11-19 PROCEDURE — 99213 OFFICE O/P EST LOW 20 MIN: CPT | Mod: S$PBB,,, | Performed by: REGISTERED NURSE

## 2024-11-19 PROCEDURE — 1101F PT FALLS ASSESS-DOCD LE1/YR: CPT | Mod: CPTII,,, | Performed by: REGISTERED NURSE

## 2024-11-19 PROCEDURE — 99214 OFFICE O/P EST MOD 30 MIN: CPT | Mod: PBBFAC | Performed by: REGISTERED NURSE

## 2024-11-19 PROCEDURE — 1159F MED LIST DOCD IN RCRD: CPT | Mod: CPTII,,, | Performed by: REGISTERED NURSE

## 2024-11-19 PROCEDURE — 3075F SYST BP GE 130 - 139MM HG: CPT | Mod: CPTII,,, | Performed by: REGISTERED NURSE

## 2024-11-19 PROCEDURE — 93005 ELECTROCARDIOGRAM TRACING: CPT | Mod: PBBFAC | Performed by: INTERNAL MEDICINE

## 2024-11-19 PROCEDURE — 3288F FALL RISK ASSESSMENT DOCD: CPT | Mod: CPTII,,, | Performed by: REGISTERED NURSE

## 2024-11-19 RX ORDER — SPIRONOLACTONE 25 MG/1
25 TABLET ORAL DAILY
Qty: 30 TABLET | Refills: 11 | Status: SHIPPED | OUTPATIENT
Start: 2024-11-19 | End: 2025-11-19

## 2024-11-19 RX ORDER — ATORVASTATIN CALCIUM 40 MG/1
40 TABLET, FILM COATED ORAL DAILY
Qty: 90 TABLET | Refills: 3 | Status: SHIPPED | OUTPATIENT
Start: 2024-11-19 | End: 2025-11-19

## 2024-11-19 NOTE — PROGRESS NOTES
PCP: Swati Vicente NP    Referring Provider:     Subjective:   Trung Barboza is a 84 y.o. male with hx of  hx of MI, CAD s/p CABG with subsequent stents, TIA, CKD, HTN, HLD, gout, and GERD who presents for follow up    24: increased lower extremity edema w/ mils sob. No chest pain, palpations or orthopnea reported.     2024 Pt was hospitalized at [facility] ([facility] s/p PCI to prox LAD) in 2024 with unsuccessful PCI of D1. EF  35-40%. Today the patient denies lower extremity edema, palpitations, chest pain or shortness of breath.  No reported sublingual nitro use since discharge        Fhx:  Family History   Problem Relation Name Age of Onset    Heart disease Mother      Hypertension Mother      No Known Problems Father      No Known Problems Sister      No Known Problems Sister      No Known Problems Sister      No Known Problems Sister      No Known Problems Brother      No Known Problems Brother      No Known Problems Brother      No Known Problems Brother      No Known Problems Daughter      Hypertension Son      No Known Problems Son      No Known Problems Son      No Known Problems Son      No Known Problems Son       Shx:   Social History     Socioeconomic History    Marital status:     Number of children: 7   Occupational History    Occupation: retired   Tobacco Use    Smoking status: Former     Current packs/day: 0.00     Average packs/day: 0.5 packs/day for 15.0 years (7.5 ttl pk-yrs)     Types: Cigarettes     Start date: 1973     Quit date: 1988     Years since quittin.3     Passive exposure: Never    Smokeless tobacco: Former     Types: Chew     Quit date:    Substance and Sexual Activity    Alcohol use: Never    Drug use: Never    Sexual activity: Not Currently     Partners: Female   Social History Narrative    Patient lives alone. Patient has family close. His daughter Jennifer lives next door.      Social Drivers of Health     Financial Resource Strain: Low  Risk  (5/7/2024)    Overall Financial Resource Strain (CARDIA)     Difficulty of Paying Living Expenses: Not hard at all   Food Insecurity: No Food Insecurity (5/7/2024)    Hunger Vital Sign     Worried About Running Out of Food in the Last Year: Never true     Ran Out of Food in the Last Year: Never true   Transportation Needs: No Transportation Needs (5/7/2024)    TRANSPORTATION NEEDS     Transportation : No   Physical Activity: Insufficiently Active (5/7/2024)    Exercise Vital Sign     Days of Exercise per Week: 3 days     Minutes of Exercise per Session: 40 min   Stress: No Stress Concern Present (5/7/2024)    [place] [employer] - Occupational Stress Questionnaire     Feeling of Stress : Not at all   Housing Stability: Low Risk  (5/7/2024)    Housing Stability Vital Sign     Unable to Pay for Housing in the Last Year: No     Homeless in the Last Year: No       EKG   11/19/24 - sinus rhythm first-degree AV block  6/19/24 - sinus rhythm first-degree AV block with occasional PVC (similar to previous EKG)  ECHO Results for orders placed during the hospital encounter of 05/06/24    Echo    Interpretation Summary    Left Ventricle: The left ventricle is normal in size. Mildly increased wall thickness. There is concentric remodeling. Moderate global hypokinesis present. There is moderately reduced systolic function with a visually estimated ejection fraction of 35 - 40%. Biplane (2D) method of discs ejection fraction is 37%. Global longitudinal strain is -8.8%. Global longitudinal strain is reduced. Grade III diastolic dysfunction.    Right Ventricle: Mild right ventricular enlargement. Systolic function is mildly reduced.    Left Atrium: Left atrium is mildly dilated.    Right Atrium: Right atrium is moderately dilated.    Aortic Valve: The aortic valve is a trileaflet valve. Mildly calcified cusps. There is mild aortic regurgitation with an eccentrically directed jet.    Mitral Valve: There is  no stenosis. The mean pressure gradient across the mitral valve is 1 mmHg at a heart rate of  bpm. There is mild to moderate regurgitation with an eccentric jet.    Tricuspid Valve: There is moderate regurgitation with an eccentrically directed jet.    Pulmonary Artery: The estimated pulmonary artery systolic pressure is 62 mmHg.    IVC/SVC: Elevated venous pressure at 15 mmHg.    [facility] Results for orders placed during the hospital encounter of 06/02/24    Cardiac catheterization    Conclusion    The 1st Mrg lesion was 100% stenosed.    The 2nd Diag lesion was 100% stenosed.    The 1st Diag lesion was 70% stenosed.    The left ventricular end diastolic pressure was normal.    The pre-procedure left ventricular end diastolic pressure was 7.    The estimated blood loss was none.    There was two vessel coronary artery disease.    Two vessel CAD with branch vessel disease  LM - moderate size, with mild luminal irregularities  LAD - small size, patent stent in the prox-mid segment. The mid and distal LAD have moderate diffuse disease with negative remodeling. There is a large D1 with 70% ostial-prox stenosis. There is a small D2 with ostial ; there is an atretic SVG graft to the second diagonal with severe diffuse disease  Lcx - Moderate size with mild luminal irregularities. OM1 is a large branch that is 100% occluded (); it fills via robust R-L collaterals.  RCA - Dominant vessel with mild diffuse disease. The R-PDA gives off collateral to the OM1.  Atretic SVG to small D2 with severe disease at the anastomosis  Normal left sided filling pressures, LVEDP - 7mmHg    Plan:  Stop brillinta (shortness of breath); switch to Plavix  Stop chlorthalidone (CKD); and up titrate anti-anginal therapy - start metop 25mg bid. Consider ranexa    The procedure log was documented by Documenter: Cindy Rasmussen and verified by Philip Torres MD.    Date: 6/4/2024  Time: 3:36 PM        Lab Results   Component Value Date      11/13/2024    K 4.3 11/13/2024     11/13/2024    CO2 19 (L) 11/13/2024    BUN 23 11/13/2024    CREATININE 1.84 (H) 11/13/2024    CALCIUM 9.6 11/13/2024    ANIONGAP 17 (H) 11/13/2024    ESTGFRAFRICA 59 (L) 07/25/2021    EGFRNONAA 25 (L) 02/28/2022       Lab Results   Component Value Date    CHOL 116 09/10/2024    CHOL 197 03/08/2024    CHOL 225 (H) 02/09/2023     Lab Results   Component Value Date    HDL 61 (H) 09/10/2024    HDL 73 (H) 03/08/2024    HDL 61 (H) 02/09/2023     Lab Results   Component Value Date    LDLCALC 43 09/10/2024    LDLCALC 108 03/08/2024    LDLCALC 120 02/09/2023     Lab Results   Component Value Date    TRIG 61 09/10/2024    TRIG 80 03/08/2024    TRIG 220 (H) 02/09/2023     Lab Results   Component Value Date    CHOLHDL 1.9 09/10/2024    CHOLHDL 2.7 03/08/2024    CHOLHDL 3.7 02/09/2023       Lab Results   Component Value Date    WBC 4.53 11/13/2024    HGB 13.7 11/13/2024    HCT 42.0 11/13/2024    MCV 97.4 (H) 11/13/2024     11/13/2024           Current Outpatient Medications:     aspirin (ECOTRIN) 81 MG EC tablet, Take 1 tablet (81 mg total) by mouth once daily., Disp: 30 tablet, Rfl: 0    baclofen (LIORESAL) 10 MG tablet, Take one tab 3 times daily as needed for muscle spasms/back pain., Disp: 60 tablet, Rfl: 0    clopidogreL (PLAVIX) 75 mg tablet, Take 1 tablet (75 mg total) by mouth once daily., Disp: 90 tablet, Rfl: 6    colchicine (COLCRYS) 0.6 mg tablet, Take 2 tablet at first sign of gout flare, then take 0.6 mg ( 1 tablet an hour later).  Then take 1 tablet once or twice daily until flare resolves., Disp: 60 tablet, Rfl: 1    furosemide (LASIX) 20 MG tablet, Take 1 tablet (20 mg total) by mouth once daily., Disp: 90 tablet, Rfl: 3    isosorbide-hydrALAZINE 20-37.5 mg (BIDIL) 20-37.5 mg Tab, Take 2 tablets by mouth 3 (three) times daily., Disp: 540 tablet, Rfl: 1    metoprolol succinate (TOPROL-XL) 50 MG 24 hr tablet, Take 1 tablet (50 mg total) by mouth once daily.,  Disp: 90 tablet, Rfl: 3    multivitamin with minerals tablet, Take 1 tablet by mouth once daily., Disp: , Rfl:     nitroGLYCERIN (NITROSTAT) 0.4 MG SL tablet, Place 1 tablet (0.4 mg total) under the tongue every 5 (five) minutes as needed for Chest pain., Disp: 25 tablet, Rfl: 2    potassium chloride (MICRO-K) 10 MEQ CpSR, Take 1 capsule (10 mEq total) by mouth once daily., Disp: 90 capsule, Rfl: 1    atorvastatin (LIPITOR) 40 MG tablet, Take 1 tablet (40 mg total) by mouth once daily., Disp: 90 tablet, Rfl: 3    spironolactone (ALDACTONE) 25 MG tablet, Take 1 tablet (25 mg total) by mouth once daily., Disp: 30 tablet, Rfl: 11    Review of Systems   Respiratory:  Negative for shortness of breath.    Cardiovascular:  Negative for chest pain and leg swelling.   Gastrointestinal:  Negative for nausea.   Musculoskeletal:  Negative for back pain.   Neurological:  Negative for dizziness.   All other systems reviewed and are negative.          Objective:   /76 (BP Location: Left arm, Patient Position: Sitting)   Pulse 62   Ht 6' (1.829 m)   SpO2 98%   BMI 29.84 kg/m²     Physical Exam  Vitals reviewed.   Constitutional:       General: He is not in acute distress.     Appearance: He is not ill-appearing.   HENT:      Head: Normocephalic and atraumatic.      Mouth/Throat:      Mouth: Mucous membranes are moist.   Eyes:      Extraocular Movements: Extraocular movements intact.   Cardiovascular:      Rate and Rhythm: Normal rate and regular rhythm.      Pulses: Normal pulses.   Pulmonary:      Effort: Pulmonary effort is normal.      Breath sounds: Normal breath sounds.   Abdominal:      General: Bowel sounds are normal.      Palpations: Abdomen is soft.   Musculoskeletal:         General: No swelling. Normal range of motion.      Cervical back: Normal range of motion.   Skin:     General: Skin is warm and dry.      Capillary Refill: Capillary refill takes less than 2 seconds.   Neurological:      General: No focal  deficit present.      Mental Status: He is alert and oriented to person, place, and time.   Psychiatric:         Mood and Affect: Mood normal.         Behavior: Behavior normal.           Assessment:     1. Coronary artery disease involving native heart, unspecified vessel or lesion type, unspecified whether angina present  clopidogreL (PLAVIX) 75 mg tablet      2. Hyperlipidemia, unspecified hyperlipidemia type  atorvastatin (LIPITOR) 80 MG tablet      3. Essential hypertension  furosemide (LASIX) 20 MG tablet      4. Stage 3a chronic kidney disease  furosemide (LASIX) 20 MG tablet    Basic Metabolic Panel            Plan:   CAD  currently treated with ASA 81 mg, Plavix, Lipitor, beta-blocker  - BIDIL TID for afterload reduction due to CKD and anaphylaxis to Ace.  - EF 35 40%   - Aldactone 12.5 mg daily started    CHF  Discussed low sodium diet  Increased edema lower extremities into shin   Aldactone 25 mg added  Potassium stopped    HTN  Well controlled today  Continue medications

## 2024-11-19 NOTE — ASSESSMENT & PLAN NOTE
Discussed low sodium diet  Increased edema lower extremities into shin   Aldactone 25 mg added  Potassium stopped

## 2024-11-20 LAB
OHS QRS DURATION: 132 MS
OHS QTC CALCULATION: 452 MS

## 2024-11-30 ENCOUNTER — EXTERNAL CHRONIC CARE MANAGEMENT (OUTPATIENT)
Dept: FAMILY MEDICINE | Facility: CLINIC | Age: 84
End: 2024-11-30
Payer: MEDICARE

## 2024-11-30 PROCEDURE — G0511 CCM/BHI BY RHC/FQHC 20MIN MO: HCPCS | Mod: ,,, | Performed by: NURSE PRACTITIONER

## 2024-12-10 ENCOUNTER — OFFICE VISIT (OUTPATIENT)
Dept: FAMILY MEDICINE | Facility: CLINIC | Age: 84
End: 2024-12-10
Payer: MEDICARE

## 2024-12-10 VITALS
TEMPERATURE: 97 F | OXYGEN SATURATION: 98 % | WEIGHT: 220 LBS | BODY MASS INDEX: 29.8 KG/M2 | HEART RATE: 58 BPM | HEIGHT: 72 IN | DIASTOLIC BLOOD PRESSURE: 51 MMHG | SYSTOLIC BLOOD PRESSURE: 140 MMHG | RESPIRATION RATE: 20 BRPM

## 2024-12-10 DIAGNOSIS — E78.2 MIXED HYPERLIPIDEMIA: ICD-10-CM

## 2024-12-10 DIAGNOSIS — T50.2X5A DIURETIC-INDUCED HYPOKALEMIA: ICD-10-CM

## 2024-12-10 DIAGNOSIS — N18.31 STAGE 3A CHRONIC KIDNEY DISEASE: ICD-10-CM

## 2024-12-10 DIAGNOSIS — I25.10 CORONARY ARTERY DISEASE INVOLVING NATIVE HEART, UNSPECIFIED VESSEL OR LESION TYPE, UNSPECIFIED WHETHER ANGINA PRESENT: Primary | ICD-10-CM

## 2024-12-10 DIAGNOSIS — I50.23 ACUTE ON CHRONIC SYSTOLIC HEART FAILURE: ICD-10-CM

## 2024-12-10 DIAGNOSIS — I10 ESSENTIAL HYPERTENSION: ICD-10-CM

## 2024-12-10 DIAGNOSIS — E87.6 DIURETIC-INDUCED HYPOKALEMIA: ICD-10-CM

## 2024-12-10 LAB
ALBUMIN SERPL BCP-MCNC: 3.5 G/DL (ref 3.4–4.8)
ALBUMIN/GLOB SERPL: 1.1 {RATIO}
ALP SERPL-CCNC: 231 U/L (ref 40–150)
ALT SERPL W P-5'-P-CCNC: 26 U/L
ANION GAP SERPL CALCULATED.3IONS-SCNC: 14 MMOL/L (ref 7–16)
AST SERPL W P-5'-P-CCNC: 69 U/L (ref 5–34)
BILIRUB SERPL-MCNC: 1.9 MG/DL
BUN SERPL-MCNC: 28 MG/DL (ref 8–26)
BUN/CREAT SERPL: 16 (ref 6–20)
CALCIUM SERPL-MCNC: 8.9 MG/DL (ref 8.8–10)
CHLORIDE SERPL-SCNC: 107 MMOL/L (ref 98–107)
CO2 SERPL-SCNC: 18 MMOL/L (ref 23–31)
CREAT SERPL-MCNC: 1.74 MG/DL (ref 0.72–1.25)
EGFR (NO RACE VARIABLE) (RUSH/TITUS): 38 ML/MIN/1.73M2
GLOBULIN SER-MCNC: 3.3 G/DL (ref 2–4)
GLUCOSE SERPL-MCNC: 89 MG/DL (ref 82–115)
POTASSIUM SERPL-SCNC: 4 MMOL/L (ref 3.5–5.1)
PROT SERPL-MCNC: 6.8 G/DL (ref 5.8–7.6)
SODIUM SERPL-SCNC: 135 MMOL/L (ref 136–145)

## 2024-12-10 PROCEDURE — 1159F MED LIST DOCD IN RCRD: CPT | Mod: ,,, | Performed by: NURSE PRACTITIONER

## 2024-12-10 PROCEDURE — 3288F FALL RISK ASSESSMENT DOCD: CPT | Mod: ,,, | Performed by: NURSE PRACTITIONER

## 2024-12-10 PROCEDURE — 99213 OFFICE O/P EST LOW 20 MIN: CPT | Mod: ,,, | Performed by: NURSE PRACTITIONER

## 2024-12-10 PROCEDURE — 80053 COMPREHEN METABOLIC PANEL: CPT | Mod: ,,, | Performed by: CLINICAL MEDICAL LABORATORY

## 2024-12-10 PROCEDURE — 1101F PT FALLS ASSESS-DOCD LE1/YR: CPT | Mod: ,,, | Performed by: NURSE PRACTITIONER

## 2024-12-10 PROCEDURE — 1160F RVW MEDS BY RX/DR IN RCRD: CPT | Mod: ,,, | Performed by: NURSE PRACTITIONER

## 2024-12-10 PROCEDURE — 3077F SYST BP >= 140 MM HG: CPT | Mod: ,,, | Performed by: NURSE PRACTITIONER

## 2024-12-10 PROCEDURE — 3078F DIAST BP <80 MM HG: CPT | Mod: ,,, | Performed by: NURSE PRACTITIONER

## 2024-12-10 RX ORDER — POTASSIUM CHLORIDE 750 MG/1
10 CAPSULE, EXTENDED RELEASE ORAL DAILY
Qty: 90 CAPSULE | Refills: 1 | Status: CANCELLED | OUTPATIENT
Start: 2024-12-10

## 2024-12-10 RX ORDER — ISOSORBIDE DINITRATE AND HYDRALAZINE HYDROCHLORIDE 37.5; 2 MG/1; MG/1
2 TABLET ORAL 3 TIMES DAILY
Qty: 540 TABLET | Refills: 1 | Status: CANCELLED | OUTPATIENT
Start: 2024-12-10

## 2024-12-10 NOTE — PROGRESS NOTES
Swati Vicente NP   [facility]  [address] [address] [address]  [phone]      PATIENT NAME: Trung Barboza  : 1940  DATE: 12/10/24  MRN: [id number]      Billing Provider: Swati Vicente NP  Level of Service: [place] OFFICE/OUTPT VISIT, EST, LEVL III, 20-29 MIN  Patient PCP Information       Provider PCP Type    Swati Vicente NP General                Medications and Allergies     Medications  Outpatient Medications Marked as Taking for the 12/10/24 encounter (Office Visit) with Swati Vicente NP   Medication Sig Dispense Refill    aspirin (ECOTRIN) 81 MG EC tablet Take 1 tablet (81 mg total) by mouth once daily. 30 tablet 0    atorvastatin (LIPITOR) 40 MG tablet Take 1 tablet (40 mg total) by mouth once daily. 90 tablet 3    baclofen (LIORESAL) 10 MG tablet Take one tab 3 times daily as needed for muscle spasms/back pain. 60 tablet 0    clopidogreL (PLAVIX) 75 mg tablet Take 1 tablet (75 mg total) by mouth once daily. 90 tablet 6    colchicine (COLCRYS) 0.6 mg tablet Take 2 tablet at first sign of gout flare, then take 0.6 mg ( 1 tablet an hour later).  Then take 1 tablet once or twice daily until flare resolves. 60 tablet 1    furosemide (LASIX) 20 MG tablet Take 1 tablet (20 mg total) by mouth once daily. 90 tablet 3    isosorbide-hydrALAZINE 20-37.5 mg (BIDIL) 20-37.5 mg Tab Take 2 tablets by mouth 3 (three) times daily. 540 tablet 1    metoprolol succinate (TOPROL-XL) 50 MG 24 hr tablet Take 1 tablet (50 mg total) by mouth once daily. 90 tablet 3    multivitamin with minerals tablet Take 1 tablet by mouth once daily.      nitroGLYCERIN (NITROSTAT) 0.4 MG SL tablet Place 1 tablet (0.4 mg total) under the tongue every 5 (five) minutes as needed for Chest pain. 25 tablet 2    spironolactone (ALDACTONE) 25 MG tablet Take 1 tablet (25 mg total) by mouth once daily. 30 tablet 11    [DISCONTINUED] potassium chloride (MICRO-K) 10 MEQ CpSR Take 1 capsule  "(10 mEq total) by mouth once daily. 90 capsule 1       Allergies  Review of patient's allergies indicates:   Allergen Reactions    Lisinopril Swelling and Anaphylaxis       History of Present Illness    CHIEF COMPLAINT:  Patient presents today for a checkup and labs.    RECENT HOSPITAL VISIT AND FLUID MANAGEMENT:  He reports a ER visit 11/13/24 due to "fluid buildup." He was found to have pleural effusion- was given Lasix and discharged home with instructions to follow up with cardiology. He saw HEMANT Sparrow on 11/19 and spironolactone was added and potassium DC. His shortness of breath has improved since this visit. He denies current chest pain or tightness.  He has an upcoming appointment with Dr. Villaseñor on January 8th.    ROS:  General: -fever, -chills, -fatigue, -weight gain, -weight loss, -loss of appetite  Eyes: -vision changes, -redness, -discharge  ENT: -ear pain, -nasal congestion, -sore throat  Cardiovascular: -chest pain, -palpitations, -lower extremity edema  Respiratory: -cough, -shortness of breath  Gastrointestinal: -abdominal pain, -nausea, -vomiting, -diarrhea, -constipation, -blood in stool  Genitourinary: -dysuria, -hematuria, -frequency  Musculoskeletal: -joint pain, -muscle pain  Skin: -rash, -lesion  Neurological: -headache, -dizziness, -numbness, -tingling  Psychiatric: -anxiety, -depression, -sleep difficulty          Physical Exam    General: No acute distress. Well-developed. Well-nourished.  Eyes: EOMI. Sclerae anicteric.  HENT: Normocephalic. Atraumatic. Nares patent. Moist oral mucosa.  Ears: Bilateral TMs clear. Bilateral EACs clear.  Cardiovascular: Regular rate. Regular rhythm. No murmurs. No rubs. No gallops. Normal S1, S2.  Respiratory: Normal respiratory effort. Clear to auscultation bilaterally. No rales. No rhonchi. No wheezing.  Abdomen: Soft. Non-tender. Non-distended. Normoactive bowel sounds.  Musculoskeletal: No  obvious deformity.  Extremities: Minimal lower " extremity edema.  Neurological: Alert & oriented x3. No slurred speech. Normal gait.  Psychiatric: Normal mood. Normal affect. Good insight. Good judgment.  Skin: Warm. Dry. No rash.          Assessment & Plan    IMPRESSION:  - Reviewed recent ER visit for pleural effusion  - Assessed improvement in shortness of breath and chest tightness since hospital visit  - Evaluated current medication regimen for fluid management  - Will obtain CMP today.     CONGESTIVE HEART FAILURE:  - Monitored the patient's condition, noting improvement in shortness of breath and chest tightness since recent hospitalization for fluid buildup treated with laser therapy.  - Performed physical exam, including auscultation of the chest, which was found to be satisfactory.  - Examined the patient's feet and legs, noting no edema.  - Continued furosemide (Lasix) 20 mg daily and spironolactone (Aldactone) for fluid management.  - Emphasized the importance of monitoring for signs of fluid retention and seeking prompt medical attention.  - Instructed the patient to contact the office if experiencing increased dyspnea or signs of fluid retention.  - Patient to continue using compression socks to help with leg swelling.    HYPERLIPIDEMIA:  - Continued atorvastatin for hyperlipidemia management.        FOLLOW UP:  - Will review lab results and adjust medication if necessary.  - Follow up in 3 months or sooner if needed.        Health Maintenance Due   Topic Date Due    Shingles Vaccine (1 of 2) Never done    RSV Vaccine (Age 60+ and Pregnant patients) (1 - 1-dose 75+ series) Never done    Influenza Vaccine (1) 09/01/2024    COVID-19 Vaccine (5 - 2024-25 season) 09/01/2024       Problem List Items Addressed This Visit          Cardiac/Vascular    Essential hypertension    HLD (hyperlipidemia)    Acute on chronic systolic heart failure       Renal/    CKD (chronic kidney disease)     Other Visit Diagnoses       Coronary artery disease involving native  heart, unspecified vessel or lesion type, unspecified whether angina present    -  Primary    Diuretic-induced hypokalemia                Health Maintenance Topics with due status: Not Due       Topic Last Completion Date    TETANUS VACCINE 12/11/2018    Lipid Panel 09/10/2024       Future Appointments   Date Time Provider Department Center   1/8/2025  2:20 PM Mahad Villaseñor DO OB CARD [facility]   1/16/2025  3:00 PM AWV NURSE [facility]        This note was generated with the assistance of ambient listening technology. Verbal consent was obtained by the patient and accompanying visitor(s) for the recording of patient appointment to facilitate this note. I attest to having reviewed and edited the generated note for accuracy, though some syntax or spelling errors may persist. Please contact the author of this note for any clarification.     Signature:  Swati Vicente NP  [facility]  [address] [address] [address]  [phone]    Date of encounter: 12/10/24

## 2024-12-30 ENCOUNTER — OFFICE VISIT (OUTPATIENT)
Dept: FAMILY MEDICINE | Facility: CLINIC | Age: 84
End: 2024-12-30
Payer: MEDICARE

## 2024-12-30 VITALS
RESPIRATION RATE: 20 BRPM | BODY MASS INDEX: 28.8 KG/M2 | HEIGHT: 72 IN | SYSTOLIC BLOOD PRESSURE: 168 MMHG | DIASTOLIC BLOOD PRESSURE: 96 MMHG | TEMPERATURE: 98 F | WEIGHT: 212.63 LBS | OXYGEN SATURATION: 99 % | HEART RATE: 66 BPM

## 2024-12-30 DIAGNOSIS — R09.81 NASAL CONGESTION: ICD-10-CM

## 2024-12-30 DIAGNOSIS — I10 ESSENTIAL HYPERTENSION: ICD-10-CM

## 2024-12-30 DIAGNOSIS — J01.40 ACUTE NON-RECURRENT PANSINUSITIS: Primary | ICD-10-CM

## 2024-12-30 DIAGNOSIS — J02.9 SORE THROAT: ICD-10-CM

## 2024-12-30 LAB
CTP QC/QA: YES
MOLECULAR STREP A: NEGATIVE
POC MOLECULAR INFLUENZA A AGN: NEGATIVE
POC MOLECULAR INFLUENZA B AGN: NEGATIVE
SARS-COV-2 RDRP RESP QL NAA+PROBE: NEGATIVE

## 2024-12-30 PROCEDURE — 87502 INFLUENZA DNA AMP PROBE: CPT | Mod: RHCUB | Performed by: NURSE PRACTITIONER

## 2024-12-30 PROCEDURE — 3080F DIAST BP >= 90 MM HG: CPT | Mod: ,,, | Performed by: NURSE PRACTITIONER

## 2024-12-30 PROCEDURE — 87651 STREP A DNA AMP PROBE: CPT | Mod: RHCUB | Performed by: NURSE PRACTITIONER

## 2024-12-30 PROCEDURE — 87635 SARS-COV-2 COVID-19 AMP PRB: CPT | Mod: RHCUB | Performed by: NURSE PRACTITIONER

## 2024-12-30 PROCEDURE — 99214 OFFICE O/P EST MOD 30 MIN: CPT | Mod: 25,,, | Performed by: NURSE PRACTITIONER

## 2024-12-30 PROCEDURE — 96372 THER/PROPH/DIAG INJ SC/IM: CPT | Mod: ,,, | Performed by: NURSE PRACTITIONER

## 2024-12-30 PROCEDURE — 1159F MED LIST DOCD IN RCRD: CPT | Mod: ,,, | Performed by: NURSE PRACTITIONER

## 2024-12-30 PROCEDURE — 1101F PT FALLS ASSESS-DOCD LE1/YR: CPT | Mod: ,,, | Performed by: NURSE PRACTITIONER

## 2024-12-30 PROCEDURE — 3077F SYST BP >= 140 MM HG: CPT | Mod: ,,, | Performed by: NURSE PRACTITIONER

## 2024-12-30 PROCEDURE — 1160F RVW MEDS BY RX/DR IN RCRD: CPT | Mod: ,,, | Performed by: NURSE PRACTITIONER

## 2024-12-30 PROCEDURE — 1126F AMNT PAIN NOTED NONE PRSNT: CPT | Mod: ,,, | Performed by: NURSE PRACTITIONER

## 2024-12-30 PROCEDURE — 3288F FALL RISK ASSESSMENT DOCD: CPT | Mod: ,,, | Performed by: NURSE PRACTITIONER

## 2024-12-30 RX ORDER — DEXAMETHASONE SODIUM PHOSPHATE 4 MG/ML
4 INJECTION, SOLUTION INTRA-ARTICULAR; INTRALESIONAL; INTRAMUSCULAR; INTRAVENOUS; SOFT TISSUE
Status: COMPLETED | OUTPATIENT
Start: 2024-12-30 | End: 2024-12-30

## 2024-12-30 RX ORDER — CEFTRIAXONE 1 G/1
1 INJECTION, POWDER, FOR SOLUTION INTRAMUSCULAR; INTRAVENOUS
Status: COMPLETED | OUTPATIENT
Start: 2024-12-30 | End: 2024-12-30

## 2024-12-30 RX ORDER — AZITHROMYCIN 250 MG/1
TABLET, FILM COATED ORAL
Qty: 6 TABLET | Refills: 0 | Status: SHIPPED | OUTPATIENT
Start: 2024-12-30 | End: 2025-01-04

## 2024-12-30 RX ADMIN — CEFTRIAXONE 1 G: 1 INJECTION, POWDER, FOR SOLUTION INTRAMUSCULAR; INTRAVENOUS at 11:12

## 2024-12-30 RX ADMIN — DEXAMETHASONE SODIUM PHOSPHATE 4 MG: 4 INJECTION, SOLUTION INTRA-ARTICULAR; INTRALESIONAL; INTRAMUSCULAR; INTRAVENOUS; SOFT TISSUE at 11:12

## 2024-12-30 NOTE — PROGRESS NOTES
Swati Vicente NP   [place] [address] [facility] [address]  [place]  [address]      PATIENT NAME: Trung Barboza  : 1940  DATE: 24  MRN: [id number]      Billing Provider: Swati Vicente NP  Level of Service: [place] OFFICE/OUTPT VISIT, EST, LEVL IV, 30-39 MIN  Patient PCP Information       Provider PCP Type    Swati Vicente NP General            Reason for Visit / Chief Complaint: Cough (Patient is an 84 year old male who presents to the clinic related to cough since Friday.  Patient reports he is coughing up clear phlegm.  ), Nasal Congestion (Patient reports nasal congestion since Saturday.  ), Sore Throat (Patient reports sore throat since Friday, has improved today. ), and Dizziness (Patient reports mild dizziness since last night.  Patient has not taken blood pressure medication yet this morning, does not have it with him. )         History of Present Illness / Problem Focused Workflow     84 year old male presents to clinic with complaints of cough, nasal congestion, sore throat, and some dizziness over the last 3 days. States he had some polytussin at home from last time he was sick and has taken some of that which helped. He has not taken any other meds. His BP is elevated at today's visit. However, he has not taken any medication this AM.   Cough: presents to the clinic related to cough since Friday.  Patient reports he is coughing up clear phlegm.    Nasal Congestion: Patient reports nasal congestion since Saturday.    Sore Throat: Patient reports sore throat since Friday, has improved today.   Dizziness: Patient reports mild dizziness since last night.  Patient has not taken blood pressure medication yet this morning, does not have it with him.             Review of Systems     @Review of Systems   Constitutional:  Negative for activity change, appetite change, fatigue and fever.   HENT:  Positive for nasal congestion, rhinorrhea, sinus pressure/congestion and sore throat. Negative  for ear pain.    Eyes:  Negative for pain, redness, visual disturbance and eye dryness.   Respiratory:  Positive for cough. Negative for shortness of breath.    Cardiovascular:  Negative for chest pain and leg swelling.   Gastrointestinal:  Negative for abdominal distention, abdominal pain, constipation and diarrhea.   Endocrine: Negative for cold intolerance, heat intolerance and polyuria.   Genitourinary:  Negative for bladder incontinence, dysuria, frequency and urgency.   Musculoskeletal:  Negative for arthralgias, gait problem and myalgias.   Integumentary:  Negative for color change, rash and wound.   Allergic/Immunologic: Negative for environmental allergies and food allergies.   Neurological:  Positive for dizziness and headaches. Negative for weakness and light-headedness.   Psychiatric/Behavioral:  Negative for behavioral problems and sleep disturbance.        Medical / Social / Family History     Past Medical History:   Diagnosis Date    Aortic regurgitation     BPH with urinary obstruction     Chronic kidney disease, stage 3b     GR 44     creat 1.8    Coronary artery disease     Essential hypertension 07/10/2021    Gastroesophageal reflux disease 09/15/2022    Gout, arthritis     Myocardial infarction     PVD (peripheral vascular disease) 02/01/2024    TIA (transient ischemic attack)        Past Surgical History:   Procedure Laterality Date    ANGIOGRAM, CORONARY, WITH LEFT HEART CATHETERIZATION N/A 12/31/2021    Procedure: ANGIOGRAM,CORONARY,WITH LEFT HEART CATHETERIZATION;  Surgeon: Mahad Villaseñor DO;  Location: [facility] CATH LAB;  Service: Cardiology;  Laterality: N/A;    ANGIOGRAM, CORONARY, WITH LEFT HEART CATHETERIZATION N/A 5/7/2024    Procedure: Angiogram, Coronary, with Left Heart Cath;  Surgeon: Mahad Villaseñor DO;  Location: [facility] CATH LAB;  Service: Cardiology;  Laterality: N/A;    BIOPSY WITH TRANSRECTAL ULTRASOUND (TRUS) GUIDANCE Bilateral 03/07/2018    CHOLECYSTECTOMY      CORONARY  ANGIOGRAPHY INCLUDING BYPASS GRAFTS WITH CATHETERIZATION OF LEFT HEART N/A 6/4/2024    Procedure: ANGIOGRAM, CORONARY, INCLUDING BYPASS GRAFT, WITH LEFT HEART CATHETERIZATION;  Surgeon: Philip Torres MD;  Location: [facility] CATH LAB;  Service: Cardiology;  Laterality: N/A;    CORONARY ARTERY BYPASS GRAFT  01/07/2022    [facility]-Dr. Qureshi    LEFT HEART CATHETERIZATION Left 7/13/2021    Procedure: Left heart cath;  Surgeon: Philip Torres MD;  Location: [facility] CATH LAB;  Service: Cardiology;  Laterality: Left;    PERCUTANEOUS CORONARY INTERVENTION, ARTERY N/A 5/7/2024    Procedure: Percutaneous coronary intervention;  Surgeon: Mahad Villaseñor DO;  Location: [facility] CATH LAB;  Service: Cardiology;  Laterality: N/A;       Medications and Allergies     Medications  Outpatient Medications Marked as Taking for the 12/30/24 encounter (Office Visit) with Swati Vicente NP   Medication Sig Dispense Refill    aspirin (ECOTRIN) 81 MG EC tablet Take 1 tablet (81 mg total) by mouth once daily. 30 tablet 0    atorvastatin (LIPITOR) 40 MG tablet Take 1 tablet (40 mg total) by mouth once daily. 90 tablet 3    baclofen (LIORESAL) 10 MG tablet Take one tab 3 times daily as needed for muscle spasms/back pain. 60 tablet 0    clopidogreL (PLAVIX) 75 mg tablet Take 1 tablet (75 mg total) by mouth once daily. 90 tablet 6    colchicine (COLCRYS) 0.6 mg tablet Take 2 tablet at first sign of gout flare, then take 0.6 mg ( 1 tablet an hour later).  Then take 1 tablet once or twice daily until flare resolves. 60 tablet 1    furosemide (LASIX) 20 MG tablet Take 1 tablet (20 mg total) by mouth once daily. 90 tablet 3    isosorbide-hydrALAZINE 20-37.5 mg (BIDIL) 20-37.5 mg Tab Take 2 tablets by mouth 3 (three) times daily. 540 tablet 1    metoprolol succinate (TOPROL-XL) 50 MG 24 hr tablet Take 1 tablet (50 mg total) by mouth once daily. 90 tablet 3    multivitamin with minerals tablet Take 1 tablet by mouth once  daily.      nitroGLYCERIN (NITROSTAT) 0.4 MG SL tablet Place 1 tablet (0.4 mg total) under the tongue every 5 (five) minutes as needed for Chest pain. 25 tablet 2    spironolactone (ALDACTONE) 25 MG tablet Take 1 tablet (25 mg total) by mouth once daily. 30 tablet 11     Current Facility-Administered Medications for the 12/30/24 encounter (Office Visit) with Swati Vicente NP   Medication Dose Route Frequency Provider Last Rate Last Admin    [COMPLETED] cefTRIAXone injection 1 g  1 g Intramuscular 1 time in Clinic/HOD Swati Vicente NP   1 g at 12/30/24 1125    [COMPLETED] dexAMETHasone injection 4 mg  4 mg Intramuscular 1 time in Clinic/HOD Swati Vicente NP   4 mg at 12/30/24 1127       Allergies  Review of patient's allergies indicates:   Allergen Reactions    Lisinopril Swelling and Anaphylaxis       Physical Examination     Vitals:    12/30/24 1019   BP: (!) 168/96   Pulse: 66   Resp: 20   Temp: 97.9 °F (36.6 °C)     Physical Exam  Vitals and nursing note reviewed.   Constitutional:       Appearance: Normal appearance.   HENT:      Head: Normocephalic.      Right Ear: Tympanic membrane normal.      Left Ear: Tympanic membrane normal.      Nose: Congestion and rhinorrhea present. Rhinorrhea is purulent.      Right Turbinates: Pale.      Left Turbinates: Pale.      Right Sinus: Maxillary sinus tenderness and frontal sinus tenderness present.      Left Sinus: Maxillary sinus tenderness and frontal sinus tenderness present.      Mouth/Throat:      Lips: Pink.      Mouth: Mucous membranes are moist.      Pharynx: Oropharyngeal exudate (clear post nasal drainage.) and posterior oropharyngeal erythema present.   Eyes:      Conjunctiva/sclera: Conjunctivae normal.   Cardiovascular:      Rate and Rhythm: Normal rate and regular rhythm.      Pulses: Normal pulses.      Heart sounds: Normal heart sounds.   Pulmonary:      Effort: Pulmonary effort is normal.      Breath sounds: Normal breath sounds. No wheezing  or rhonchi.   Abdominal:      General: Abdomen is flat. Bowel sounds are normal. There is no distension.      Palpations: Abdomen is soft.      Tenderness: There is no abdominal tenderness.   Musculoskeletal:         General: Normal range of motion.      Cervical back: Normal range of motion.   Skin:     General: Skin is warm and dry.      Capillary Refill: Capillary refill takes less than 2 seconds.   Neurological:      General: No focal deficit present.      Mental Status: He is alert and oriented to person, place, and time. Mental status is at baseline.   Psychiatric:         Mood and Affect: Mood normal.         Behavior: Behavior normal.               Lab Results   Component Value Date    WBC 4.53 11/13/2024    HGB 13.7 11/13/2024    HCT 42.0 11/13/2024    MCV 97.4 (H) 11/13/2024     11/13/2024        CMP  Sodium   Date Value Ref Range Status   12/10/2024 135 (L) 136 - 145 mmol/L Final     Potassium   Date Value Ref Range Status   12/10/2024 4.0 3.5 - 5.1 mmol/L Final     Chloride   Date Value Ref Range Status   12/10/2024 107 98 - 107 mmol/L Final     CO2   Date Value Ref Range Status   12/10/2024 18 (L) 23 - 31 mmol/L Final     Glucose   Date Value Ref Range Status   12/10/2024 89 82 - 115 mg/dL Final     BUN   Date Value Ref Range Status   12/10/2024 28 (H) 8 - 26 mg/dL Final     Creatinine   Date Value Ref Range Status   12/10/2024 1.74 (H) 0.72 - 1.25 mg/dL Final     Calcium   Date Value Ref Range Status   12/10/2024 8.9 8.8 - 10.0 mg/dL Final     Total Protein   Date Value Ref Range Status   12/10/2024 6.8 5.8 - 7.6 g/dL Final     Albumin   Date Value Ref Range Status   12/10/2024 3.5 3.4 - 4.8 g/dL Final     Bilirubin, Total   Date Value Ref Range Status   12/10/2024 1.9 (H) <=1.5 mg/dL Final     Alk Phos   Date Value Ref Range Status   12/10/2024 231 (H) 40 - 150 U/L Final     AST   Date Value Ref Range Status   12/10/2024 69 (H) 5 - 34 U/L Final     ALT   Date Value Ref Range Status   12/10/2024  26 <=55 U/L Final     Anion Gap   Date Value Ref Range Status   12/10/2024 14 7 - 16 mmol/L Final     eGFR   Date Value Ref Range Status   12/10/2024 38 (L) >=60 mL/min/1.73m2 Final     Procedures   Assessment and Plan (including Health Maintenance)   :    Plan:     Problem List Items Addressed This Visit          ENT    Acute non-recurrent pansinusitis - Primary    Current Assessment & Plan     Rocephin and Decadron given IM in clinic.   Zithromax as ordered and continue Polytussin as needed.     Reviewed pathology of current symptoms, medication side effects/risk/benefits/directions on taking medications, and worsening or persistent symptoms that require follow up in next 2-3 days. Saline/steroid nasal sprays, antihistamine use, increase fluid intake, and multivitamin/elderberry/Zinc use were recommended. May take Tylenol or Motrin as needed for pain and/or fever. Patient was instructed to take antibiotic as directed, complete entire course to avoid antibiotic resistance, and take OTC probiotic with antibiotic to prevent GI upset. Patient verbalized understanding of treatment plan and denies any questions.           Relevant Orders    POCT COVID-19 Rapid Screening (Completed)    POCT Influenza A/B Molecular (Completed)    POCT Strep A, Molecular (Completed)       Cardiac/Vascular    Essential hypertension    Current Assessment & Plan     Elevated at today's viist. Has not taken meds. Instructed to take meds as soon as he gets home. He denies headache, blurred vision, chest pain, or other symptoms of HTN. Monitor BP at home and keep log. Follow up in 2 weeks for recheck of BP. Instructed to avoid OTC decongestant or cold medications as it may elevate BP.           Other Visit Diagnoses       Sore throat        Relevant Orders    POCT COVID-19 Rapid Screening (Completed)    POCT Influenza A/B Molecular (Completed)    POCT Strep A, Molecular (Completed)    Nasal congestion        Relevant Orders    POCT COVID-19  Rapid Screening (Completed)    POCT Influenza A/B Molecular (Completed)    POCT Strep A, Molecular (Completed)            Health Maintenance Topics with due status: Not Due       Topic Last Completion Date    TETANUS VACCINE 12/11/2018    Lipid Panel 09/10/2024       Future Appointments   Date Time Provider Department Center   1/8/2025  2:20 PM Mahad Villaseñor DO OBC CARD [facility]   4/17/2025  8:00 AM AWV NURSE [facility] MARY Godinez [facility]        Health Maintenance Due   Topic Date Due    Shingles Vaccine (1 of 2) Never done    RSV Vaccine (Age 60+ and Pregnant patients) (1 - 1-dose 75+ series) Never done    Influenza Vaccine (1) 09/01/2024    COVID-19 Vaccine (5 - 2024-25 season) 09/01/2024          Signature:  Swati Vicente NP  [facility]  [address] [place] [address], [place]  [address]    Date of encounter: 12/30/24

## 2024-12-30 NOTE — ASSESSMENT & PLAN NOTE
Rocephin and Decadron given IM in clinic.   Zithromax as ordered and continue Polytussin as needed.     Reviewed pathology of current symptoms, medication side effects/risk/benefits/directions on taking medications, and worsening or persistent symptoms that require follow up in next 2-3 days. Saline/steroid nasal sprays, antihistamine use, increase fluid intake, and multivitamin/elderberry/Zinc use were recommended. May take Tylenol or Motrin as needed for pain and/or fever. Patient was instructed to take antibiotic as directed, complete entire course to avoid antibiotic resistance, and take OTC probiotic with antibiotic to prevent GI upset. Patient verbalized understanding of treatment plan and denies any questions.

## 2024-12-30 NOTE — ASSESSMENT & PLAN NOTE
Elevated at today's viist. Has not taken meds. Instructed to take meds as soon as he gets home. He denies headache, blurred vision, chest pain, or other symptoms of HTN. Monitor BP at home and keep log. Follow up in 2 weeks for recheck of BP. Instructed to avoid OTC decongestant or cold medications as it may elevate BP.

## 2024-12-31 ENCOUNTER — EXTERNAL CHRONIC CARE MANAGEMENT (OUTPATIENT)
Dept: FAMILY MEDICINE | Facility: CLINIC | Age: 84
End: 2024-12-31
Payer: MEDICARE

## 2024-12-31 PROCEDURE — G0511 CCM/BHI BY RHC/FQHC 20MIN MO: HCPCS | Mod: ,,, | Performed by: NURSE PRACTITIONER

## 2025-01-03 ENCOUNTER — OFFICE VISIT (OUTPATIENT)
Dept: FAMILY MEDICINE | Facility: CLINIC | Age: 85
End: 2025-01-03
Payer: MEDICARE

## 2025-01-03 ENCOUNTER — APPOINTMENT (OUTPATIENT)
Dept: RADIOLOGY | Facility: CLINIC | Age: 85
End: 2025-01-03
Attending: NURSE PRACTITIONER
Payer: MEDICARE

## 2025-01-03 VITALS
HEART RATE: 64 BPM | RESPIRATION RATE: 22 BRPM | SYSTOLIC BLOOD PRESSURE: 160 MMHG | DIASTOLIC BLOOD PRESSURE: 82 MMHG | TEMPERATURE: 99 F | HEIGHT: 72 IN | BODY MASS INDEX: 28.99 KG/M2 | OXYGEN SATURATION: 99 % | WEIGHT: 214 LBS

## 2025-01-03 DIAGNOSIS — R05.8 PRODUCTIVE COUGH: ICD-10-CM

## 2025-01-03 DIAGNOSIS — J18.9 PNEUMONIA OF RIGHT MIDDLE LOBE DUE TO INFECTIOUS ORGANISM: Primary | ICD-10-CM

## 2025-01-03 DIAGNOSIS — I10 ESSENTIAL HYPERTENSION: ICD-10-CM

## 2025-01-03 PROCEDURE — 71046 X-RAY EXAM CHEST 2 VIEWS: CPT | Mod: TC,RHCUB,FY | Performed by: NURSE PRACTITIONER

## 2025-01-03 PROCEDURE — 71046 X-RAY EXAM CHEST 2 VIEWS: CPT | Mod: 26,,, | Performed by: RADIOLOGY

## 2025-01-03 RX ORDER — LIDOCAINE HYDROCHLORIDE 20 MG/ML
10 INJECTION, SOLUTION INFILTRATION; PERINEURAL ONCE
Qty: 10 ML | Refills: 0 | Status: SHIPPED | OUTPATIENT
Start: 2025-01-03 | End: 2025-01-03

## 2025-01-03 RX ORDER — AMOXICILLIN AND CLAVULANATE POTASSIUM 875; 125 MG/1; MG/1
1 TABLET, FILM COATED ORAL 2 TIMES DAILY
Qty: 20 TABLET | Refills: 0 | Status: SHIPPED | OUTPATIENT
Start: 2025-01-03

## 2025-01-03 RX ORDER — ALBUTEROL SULFATE 0.83 MG/ML
2.5 SOLUTION RESPIRATORY (INHALATION) EVERY 6 HOURS PRN
Qty: 100 EACH | Refills: 0 | Status: SHIPPED | OUTPATIENT
Start: 2025-01-03 | End: 2026-01-03

## 2025-01-03 RX ORDER — METHYLPREDNISOLONE ACETATE 40 MG/ML
40 INJECTION, SUSPENSION INTRA-ARTICULAR; INTRALESIONAL; INTRAMUSCULAR; SOFT TISSUE ONCE
Status: COMPLETED | OUTPATIENT
Start: 2025-01-03 | End: 2025-01-03

## 2025-01-03 RX ORDER — CEFTRIAXONE 1 G/1
1 INJECTION, POWDER, FOR SOLUTION INTRAMUSCULAR; INTRAVENOUS ONCE
Qty: 1 G | Refills: 0 | Status: SHIPPED | OUTPATIENT
Start: 2025-01-04 | End: 2025-01-04

## 2025-01-03 RX ORDER — CEFTRIAXONE 1 G/1
1 INJECTION, POWDER, FOR SOLUTION INTRAMUSCULAR; INTRAVENOUS
Status: COMPLETED | OUTPATIENT
Start: 2025-01-03 | End: 2025-01-03

## 2025-01-03 RX ADMIN — CEFTRIAXONE 1 G: 1 INJECTION, POWDER, FOR SOLUTION INTRAMUSCULAR; INTRAVENOUS at 01:01

## 2025-01-03 RX ADMIN — METHYLPREDNISOLONE ACETATE 40 MG: 40 INJECTION, SUSPENSION INTRA-ARTICULAR; INTRALESIONAL; INTRAMUSCULAR; SOFT TISSUE at 01:01

## 2025-01-03 NOTE — PROGRESS NOTES
"   Swati Vicente NP   [place] [address]  [place]  [address]      PATIENT NAME: Trung Barboza  : 1940  DATE: 1/3/25  MRN: [id number]      Billing Provider: Swati Vicente NP  Level of Service: [facility] OFFICE/OUTPT VISIT, EST, LEVL III, 20-29 MIN  Patient PCP Information       Provider PCP Type    Swati Vicente NP General            Reason for Visit / Chief Complaint: Cough (Productive cough with yellowish sputum. Patient was seen on 24. He has one more dose of zithromax left to take.), Nasal Congestion (Runny nose with sinus pressure. Watery eyes.), and Weakness (Patient reports feeling " weak". States he has no appetite x 1 week.)         History of Present Illness / Problem Focused Workflow     84 year old male presents to clinic with complaints of productive cough with yellow sputum production, nasal congestion and pressure, watery eyes, and weakness. He was seen on 24 and diagnosed with sinusitis- treated with Rocephin and Decadron IM in clinic and prescribed Zithromax. He reports he has one more Zithromax left to take.   Nasal Congestion: Runny nose with sinus pressure. Watery eyes.  Weakness: Patient reports feeling " weak". States he has no appetite x 1 week.          Review of Systems     @Review of Systems   Constitutional:  Negative for activity change, appetite change, fatigue and fever.   HENT:  Positive for nasal congestion, rhinorrhea and sinus pressure/congestion. Negative for ear pain and sore throat.    Eyes:  Negative for pain, redness, visual disturbance and eye dryness.   Respiratory:  Positive for cough, shortness of breath and wheezing.    Cardiovascular:  Negative for chest pain and leg swelling.   Gastrointestinal:  Negative for abdominal distention, abdominal pain, constipation and diarrhea.   Endocrine: Negative for cold intolerance, heat intolerance and polyuria.   Genitourinary:  Negative for bladder incontinence, dysuria, frequency and " urgency.   Musculoskeletal:  Negative for arthralgias, gait problem and myalgias.   Integumentary:  Negative for color change, rash and wound.   Allergic/Immunologic: Negative for environmental allergies and food allergies.   Neurological:  Negative for dizziness, weakness, light-headedness and headaches.   Psychiatric/Behavioral:  Negative for behavioral problems and sleep disturbance.        Medical / Social / Family History     Past Medical History:   Diagnosis Date    Aortic regurgitation     BPH with urinary obstruction     Chronic kidney disease, stage 3b     GR 44     creat 1.8    Coronary artery disease     Essential hypertension 07/10/2021    Gastroesophageal reflux disease 09/15/2022    Gout, arthritis     Myocardial infarction     PVD (peripheral vascular disease) 02/01/2024    TIA (transient ischemic attack)        Past Surgical History:   Procedure Laterality Date    ANGIOGRAM, CORONARY, WITH LEFT HEART CATHETERIZATION N/A 12/31/2021    Procedure: ANGIOGRAM,CORONARY,WITH LEFT HEART CATHETERIZATION;  Surgeon: Mahad Villaseñor DO;  Location: [facility] CATH LAB;  Service: Cardiology;  Laterality: N/A;    ANGIOGRAM, CORONARY, WITH LEFT HEART CATHETERIZATION N/A 5/7/2024    Procedure: Angiogram, Coronary, with Left Heart Cath;  Surgeon: Mahad Villaseñor DO;  Location: [facility] CATH LAB;  Service: Cardiology;  Laterality: N/A;    BIOPSY WITH TRANSRECTAL ULTRASOUND (TRUS) GUIDANCE Bilateral 03/07/2018    CHOLECYSTECTOMY      CORONARY ANGIOGRAPHY INCLUDING BYPASS GRAFTS WITH CATHETERIZATION OF LEFT HEART N/A 6/4/2024    Procedure: ANGIOGRAM, CORONARY, INCLUDING BYPASS GRAFT, WITH LEFT HEART CATHETERIZATION;  Surgeon: Philip Torres MD;  Location: [facility] CATH LAB;  Service: Cardiology;  Laterality: N/A;    [facility]    LEFT HEART CATHETERIZATION Left 7/13/2021    Procedure: Left heart cath;  Surgeon: Philip Torres MD;  Location: [facility] CATH LAB;  Service: Cardiology;  Laterality: Left;    PERCUTANEOUS CORONARY INTERVENTION, ARTERY N/A 5/7/2024    Procedure: Percutaneous coronary intervention;  Surgeon: Mahad Villaseñor DO;  Location: [facility] CATH LAB;  Service: Cardiology;  Laterality: N/A;       Medications and Allergies     Medications  Outpatient Medications Marked as Taking for the 1/3/25 encounter (Office Visit) with Swati Vicente NP   Medication Sig Dispense Refill    aspirin (ECOTRIN) 81 MG EC tablet Take 1 tablet (81 mg total) by mouth once daily. 30 tablet 0    atorvastatin (LIPITOR) 40 MG tablet Take 1 tablet (40 mg total) by mouth once daily. 90 tablet 3    azithromycin (Z-GONZALO) 250 MG tablet Take 2 tablets by mouth on day 1; Take 1 tablet by mouth on days 2-5 6 tablet 0    baclofen (LIORESAL) 10 MG tablet Take one tab 3 times daily as needed for muscle spasms/back pain. 60 tablet 0    clopidogreL (PLAVIX) 75 mg tablet Take 1 tablet (75 mg total) by mouth once daily. 90 tablet 6    colchicine (COLCRYS) 0.6 mg tablet Take 2 tablet at first sign of gout flare, then take 0.6 mg ( 1 tablet an hour later).  Then take 1 tablet once or twice daily until flare resolves. 60 tablet 1    furosemide (LASIX) 20 MG tablet Take 1 tablet (20 mg total) by mouth once daily. 90 tablet 3    isosorbide-hydrALAZINE 20-37.5 mg (BIDIL) 20-37.5 mg Tab Take 2 tablets by mouth 3 (three) times daily. 540 tablet 1    metoprolol succinate (TOPROL-XL) 50 MG 24 hr tablet Take 1 tablet (50 mg total) by mouth once daily. 90 tablet 3    multivitamin with minerals tablet Take 1 tablet by mouth once daily.      nitroGLYCERIN (NITROSTAT) 0.4 MG SL tablet Place 1 tablet (0.4 mg total) under the tongue every 5 (five) minutes as needed for Chest pain. 25 tablet 2    spironolactone (ALDACTONE) 25 MG tablet Take 1 tablet (25 mg total) by mouth once daily. 30 tablet 11     Current Facility-Administered Medications for the 1/3/25 encounter (Office Visit) with Jules  YAZAN Longoria   Medication Dose Route Frequency Provider Last Rate Last Admin    cefTRIAXone injection 1 g  1 g Intramuscular 1 time in Clinic/HOD Swati Vicente NP        methylPREDNISolone acetate injection 40 mg  40 mg Intramuscular Once Swati Vicente NP           Allergies  Review of patient's allergies indicates:   Allergen Reactions    Lisinopril Swelling and Anaphylaxis       Physical Examination     Vitals:    01/03/25 1253   BP: (!) 160/82   Pulse: 64   Resp: (!) 22   Temp: 99 °F (37.2 °C)     Physical Exam  Vitals and nursing note reviewed.   Constitutional:       Appearance: Normal appearance.   HENT:      Head: Normocephalic.      Right Ear: Tympanic membrane normal.      Left Ear: Tympanic membrane normal.      Nose: Congestion and rhinorrhea present. Rhinorrhea is purulent.      Right Turbinates: Pale.      Left Turbinates: Pale.      Right Sinus: Maxillary sinus tenderness and frontal sinus tenderness present.      Left Sinus: Maxillary sinus tenderness and frontal sinus tenderness present.      Mouth/Throat:      Lips: Pink.      Mouth: Mucous membranes are moist.      Pharynx: Oropharyngeal exudate (clear post nasal drainage.) and posterior oropharyngeal erythema present.   Eyes:      Conjunctiva/sclera: Conjunctivae normal.   Cardiovascular:      Rate and Rhythm: Normal rate and regular rhythm.      Pulses: Normal pulses.      Heart sounds: Normal heart sounds.   Pulmonary:      Effort: Pulmonary effort is normal.      Breath sounds: Wheezing and rhonchi present.   Abdominal:      General: Abdomen is flat. Bowel sounds are normal. There is no distension.      Palpations: Abdomen is soft.      Tenderness: There is no abdominal tenderness.   Musculoskeletal:         General: Normal range of motion.      Cervical back: Normal range of motion.   Skin:     General: Skin is warm and dry.      Capillary Refill: Capillary refill takes less than 2 seconds.   Neurological:      General: No focal  deficit present.      Mental Status: He is alert and oriented to person, place, and time. Mental status is at baseline.   Psychiatric:         Mood and Affect: Mood normal.         Behavior: Behavior normal.               Lab Results   Component Value Date    WBC 4.53 11/13/2024    HGB 13.7 11/13/2024    HCT 42.0 11/13/2024    MCV 97.4 (H) 11/13/2024     11/13/2024        CMP  Sodium   Date Value Ref Range Status   12/10/2024 135 (L) 136 - 145 mmol/L Final     Potassium   Date Value Ref Range Status   12/10/2024 4.0 3.5 - 5.1 mmol/L Final     Chloride   Date Value Ref Range Status   12/10/2024 107 98 - 107 mmol/L Final     CO2   Date Value Ref Range Status   12/10/2024 18 (L) 23 - 31 mmol/L Final     Glucose   Date Value Ref Range Status   12/10/2024 89 82 - 115 mg/dL Final     BUN   Date Value Ref Range Status   12/10/2024 28 (H) 8 - 26 mg/dL Final     Creatinine   Date Value Ref Range Status   12/10/2024 1.74 (H) 0.72 - 1.25 mg/dL Final     Calcium   Date Value Ref Range Status   12/10/2024 8.9 8.8 - 10.0 mg/dL Final     Total Protein   Date Value Ref Range Status   12/10/2024 6.8 5.8 - 7.6 g/dL Final     Albumin   Date Value Ref Range Status   12/10/2024 3.5 3.4 - 4.8 g/dL Final     Bilirubin, Total   Date Value Ref Range Status   12/10/2024 1.9 (H) <=1.5 mg/dL Final     Alk Phos   Date Value Ref Range Status   12/10/2024 231 (H) 40 - 150 U/L Final     AST   Date Value Ref Range Status   12/10/2024 69 (H) 5 - 34 U/L Final     ALT   Date Value Ref Range Status   12/10/2024 26 <=55 U/L Final     Anion Gap   Date Value Ref Range Status   12/10/2024 14 7 - 16 mmol/L Final     eGFR   Date Value Ref Range Status   12/10/2024 38 (L) >=60 mL/min/1.73m2 Final     Procedures   Assessment and Plan (including Health Maintenance)   :    Plan:     Problem List Items Addressed This Visit          Pulmonary    Pneumonia of right middle lobe due to infectious organism - Primary    Current Assessment & Plan     Patient has  completed all but one of his Zithromax. Will take last one today. CXR shows some patchy infiltrate in right upper and middle lobe. Will give another dose of Rocephin with Depomedrol today and start him on Augmentin. Will also start him on Albuterol treatments q 6hours. He was given Neb machine in clinic and shown how to use. Will also admit back to home health for monitoring and have them administer another dose of Rocephin tomorrow.   Follow up in clinic in 1 week for repeat chest Xray. Discussed with patient and son who is with him today that he is at high risk for decline due to his other comorbidities. If symptoms worsen over the weekend he is to go in to ER. They verbalized understanding.          Relevant Medications    cefTRIAXone injection 1 g    methylPREDNISolone acetate injection 40 mg (Start on 1/3/2025  2:45 PM)    amoxicillin-clavulanate 875-125mg (AUGMENTIN) 875-125 mg per tablet    albuterol (PROVENTIL) 2.5 mg /3 mL (0.083 %) nebulizer solution    Other Relevant Orders    Ambulatory referral/consult to Home Health     Other Visit Diagnoses       Productive cough        Relevant Orders    X-Ray Chest PA And Lateral            Health Maintenance Topics with due status: Not Due       Topic Last Completion Date    TETANUS VACCINE 12/11/2018    Lipid Panel 09/10/2024       Future Appointments   Date Time Provider Department Center   1/3/2025  4:20 PM Swati Vicente NP [facility] FAMMED [place]   1/8/2025  2:20 PM Mahad Villaseñor, DO OB CARD [facility]   1/15/2025 10:40 AM Swati Vicente NP [facility] FAMMED [place]   4/17/2025  8:00 AM AWV NURSE [facility]CL[facility] [place]        Health Maintenance Due   Topic Date Due    Shingles Vaccine (1 of 2) Never done    RSV Vaccine (Age 60+ and Pregnant patients) (1 - 1-dose 75+ series) Never done    Influenza Vaccine (1) 09/01/2024    COVID-19 Vaccine (5 - 2024-25 season) 09/01/2024          Signature:  YAZAN Mcfarland  [address] [place]  [address]    Date of encounter: 1/3/25

## 2025-01-03 NOTE — ASSESSMENT & PLAN NOTE
Patient has completed all but one of his Zithromax. Will take last one today. CXR shows some patchy infiltrate in right upper and middle lobe. Will give another dose of Rocephin with Depomedrol today and start him on Augmentin. Will also start him on Albuterol treatments q 6hours. He was given Neb machine in clinic and shown how to use. Will also admit back to home health for monitoring and have them administer another dose of Rocephin tomorrow.   Follow up in clinic in 1 week for repeat chest Xray. Discussed with patient and son who is with him today that he is at high risk for decline due to his other comorbidities. If symptoms worsen over the weekend he is to go in to ER. They verbalized understanding.

## 2025-01-03 NOTE — LETTER
January 3, 2025      [facility]  [address] [place] [address]  Phone: [phone]  Fax: [phone]       Patient: Trung Barboza   YOB: 1940  Date of Visit: 01/03/2025    To Whom It May Concern:    Milena Barboza  was at [facility] on 01/03/2025. His son, Paul Barboza was present with him for visit. Please excuse his absence on 1/3/25. He may return to work on 1/4/25.  If you have any questions or concerns, or if I can be of further assistance, please do not hesitate to contact me.    Sincerely,    Swati Vicente NP

## 2025-01-03 NOTE — ASSESSMENT & PLAN NOTE
Elevated at today's viist. Has not taken meds. Instructed to take meds as soon as he gets home. He denies headache, blurred vision, chest pain, or other symptoms of HTN. Monitor BP at home and keep log. Home health to monitor. Follow up in 2 weeks for recheck of BP. Instructed to avoid OTC decongestant or cold medications as it may elevate BP.

## 2025-01-08 ENCOUNTER — OFFICE VISIT (OUTPATIENT)
Dept: CARDIOLOGY | Facility: CLINIC | Age: 85
End: 2025-01-08
Payer: MEDICARE

## 2025-01-08 VITALS
SYSTOLIC BLOOD PRESSURE: 138 MMHG | BODY MASS INDEX: 29.53 KG/M2 | OXYGEN SATURATION: 98 % | WEIGHT: 218 LBS | DIASTOLIC BLOOD PRESSURE: 78 MMHG | HEIGHT: 72 IN | HEART RATE: 58 BPM

## 2025-01-08 DIAGNOSIS — I50.23 ACUTE ON CHRONIC SYSTOLIC HEART FAILURE: ICD-10-CM

## 2025-01-08 DIAGNOSIS — I06.1 RHEUMATIC AORTIC VALVE INSUFFICIENCY: ICD-10-CM

## 2025-01-08 DIAGNOSIS — I73.9 PVD (PERIPHERAL VASCULAR DISEASE): ICD-10-CM

## 2025-01-08 DIAGNOSIS — I10 ESSENTIAL HYPERTENSION: Primary | ICD-10-CM

## 2025-01-08 DIAGNOSIS — Z95.1 HX OF CABG: ICD-10-CM

## 2025-01-08 PROCEDURE — 3288F FALL RISK ASSESSMENT DOCD: CPT | Mod: CPTII,,, | Performed by: INTERNAL MEDICINE

## 2025-01-08 PROCEDURE — 99213 OFFICE O/P EST LOW 20 MIN: CPT | Mod: S$PBB,,, | Performed by: INTERNAL MEDICINE

## 2025-01-08 PROCEDURE — 99999 PR PBB SHADOW E&M-EST. PATIENT-LVL IV: CPT | Mod: PBBFAC,,, | Performed by: INTERNAL MEDICINE

## 2025-01-08 PROCEDURE — 99214 OFFICE O/P EST MOD 30 MIN: CPT | Mod: PBBFAC | Performed by: INTERNAL MEDICINE

## 2025-01-08 PROCEDURE — 1101F PT FALLS ASSESS-DOCD LE1/YR: CPT | Mod: CPTII,,, | Performed by: INTERNAL MEDICINE

## 2025-01-08 PROCEDURE — 1126F AMNT PAIN NOTED NONE PRSNT: CPT | Mod: CPTII,,, | Performed by: INTERNAL MEDICINE

## 2025-01-08 PROCEDURE — 3075F SYST BP GE 130 - 139MM HG: CPT | Mod: CPTII,,, | Performed by: INTERNAL MEDICINE

## 2025-01-08 PROCEDURE — 3078F DIAST BP <80 MM HG: CPT | Mod: CPTII,,, | Performed by: INTERNAL MEDICINE

## 2025-01-13 ENCOUNTER — OFFICE VISIT (OUTPATIENT)
Dept: FAMILY MEDICINE | Facility: CLINIC | Age: 85
End: 2025-01-13
Payer: MEDICARE

## 2025-01-13 VITALS
HEART RATE: 53 BPM | HEIGHT: 72 IN | TEMPERATURE: 99 F | BODY MASS INDEX: 29.12 KG/M2 | OXYGEN SATURATION: 100 % | WEIGHT: 215 LBS | SYSTOLIC BLOOD PRESSURE: 138 MMHG | RESPIRATION RATE: 19 BRPM | DIASTOLIC BLOOD PRESSURE: 68 MMHG

## 2025-01-13 DIAGNOSIS — J18.9 PNEUMONIA OF RIGHT MIDDLE LOBE DUE TO INFECTIOUS ORGANISM: Primary | ICD-10-CM

## 2025-01-13 PROCEDURE — 3075F SYST BP GE 130 - 139MM HG: CPT | Mod: ,,, | Performed by: NURSE PRACTITIONER

## 2025-01-13 PROCEDURE — 1126F AMNT PAIN NOTED NONE PRSNT: CPT | Mod: ,,, | Performed by: NURSE PRACTITIONER

## 2025-01-13 PROCEDURE — 1101F PT FALLS ASSESS-DOCD LE1/YR: CPT | Mod: ,,, | Performed by: NURSE PRACTITIONER

## 2025-01-13 PROCEDURE — 3288F FALL RISK ASSESSMENT DOCD: CPT | Mod: ,,, | Performed by: NURSE PRACTITIONER

## 2025-01-13 PROCEDURE — 1160F RVW MEDS BY RX/DR IN RCRD: CPT | Mod: ,,, | Performed by: NURSE PRACTITIONER

## 2025-01-13 PROCEDURE — 99212 OFFICE O/P EST SF 10 MIN: CPT | Mod: ,,, | Performed by: NURSE PRACTITIONER

## 2025-01-13 PROCEDURE — 1159F MED LIST DOCD IN RCRD: CPT | Mod: ,,, | Performed by: NURSE PRACTITIONER

## 2025-01-13 PROCEDURE — 3078F DIAST BP <80 MM HG: CPT | Mod: ,,, | Performed by: NURSE PRACTITIONER

## 2025-01-13 NOTE — PROGRESS NOTES
Swati Vicente NP   [facility]  [address]  [place]  [address]      PATIENT NAME: Trung Barboza  : 1940  DATE: 25  MRN: [id number]      Billing Provider: Swati Vicente NP  Level of Service: ME OFFICE/OUTPT VISIT, EST, LEVL II, 10-19 MIN  Patient PCP Information       Provider PCP Type    Swati Vicente NP General            Reason for Visit / Chief Complaint: Follow-up (Patient here for 2 week follow up. He is doing much better, patient reports coughing has gone away. Patient is very thankful and pleasant this morning. )         History of Present Illness / Problem Focused Workflow     84 year old male presents to clinic for 2 week follow up visit on pneumonia. He states he is feeling much better. Reports cough has resolved.         Review of Systems     @Review of Systems   Constitutional:  Negative for activity change, appetite change, fatigue and fever.   HENT:  Negative for nasal congestion, ear pain, rhinorrhea, sinus pressure/congestion and sore throat.    Eyes:  Negative for pain, redness, visual disturbance and eye dryness.   Respiratory:  Negative for cough and shortness of breath.    Cardiovascular:  Negative for chest pain and leg swelling.   Gastrointestinal:  Negative for abdominal distention, abdominal pain, constipation and diarrhea.   Endocrine: Negative for cold intolerance, heat intolerance and polyuria.   Genitourinary:  Negative for bladder incontinence, dysuria, frequency and urgency.   Musculoskeletal:  Negative for arthralgias, gait problem and myalgias.   Integumentary:  Negative for color change, rash and wound.   Allergic/Immunologic: Negative for environmental allergies and food allergies.   Neurological:  Negative for dizziness, weakness, light-headedness and headaches.   Psychiatric/Behavioral:  Negative for behavioral problems and sleep disturbance.        Medical / Social / Family History     Past Medical History:   Diagnosis Date    Aortic  regurgitation     BPH with urinary obstruction     Chronic kidney disease, stage 3b     GR 44     creat 1.8    Coronary artery disease     Essential hypertension 07/10/2021    Gastroesophageal reflux disease 09/15/2022    Gout, arthritis     Myocardial infarction     PVD (peripheral vascular disease) 02/01/2024    TIA (transient ischemic attack)        Past Surgical History:   Procedure Laterality Date    ANGIOGRAM, CORONARY, WITH LEFT HEART CATHETERIZATION N/A 12/31/2021    Procedure: ANGIOGRAM,CORONARY,WITH LEFT HEART CATHETERIZATION;  Surgeon: Mahad Villaseñor DO;  Location: [facility] CATH LAB;  Service: Cardiology;  Laterality: N/A;    ANGIOGRAM, CORONARY, WITH LEFT HEART CATHETERIZATION N/A 5/7/2024    Procedure: Angiogram, Coronary, with Left Heart Cath;  Surgeon: Mahad Villaseñor DO;  Location: [facility] CATH LAB;  Service: Cardiology;  Laterality: N/A;    BIOPSY WITH TRANSRECTAL ULTRASOUND (TRUS) GUIDANCE Bilateral 03/07/2018    CHOLECYSTECTOMY      CORONARY ANGIOGRAPHY INCLUDING BYPASS GRAFTS WITH CATHETERIZATION OF LEFT HEART N/A 6/4/2024    Procedure: ANGIOGRAM, CORONARY, INCLUDING BYPASS GRAFT, WITH LEFT HEART CATHETERIZATION;  Surgeon: Philip Torres MD;  Location: [facility] CATH LAB;  Service: Cardiology;  Laterality: N/A;    CORONARY [facility]  [facility]    LEFT HEART CATHETERIZATION Left 7/13/2021    Procedure: Left heart cath;  Surgeon: Philip Torres MD;  Location: [facility] CATH LAB;  Service: Cardiology;  Laterality: Left;    PERCUTANEOUS CORONARY INTERVENTION, ARTERY N/A 5/7/2024    Procedure: Percutaneous coronary intervention;  Surgeon: Mahad Villaseñor DO;  Location: [facility] CATH LAB;  Service: Cardiology;  Laterality: N/A;       Medications and Allergies     Medications  Outpatient Medications Marked as Taking for the 1/13/25 encounter (Office Visit) with Swati Vicente NP   Medication Sig Dispense Refill    albuterol (PROVENTIL)  2.5 mg /3 mL (0.083 %) nebulizer solution Take 3 mLs (2.5 mg total) by nebulization every 6 (six) hours as needed for Wheezing. Rescue 100 each 0    aspirin (ECOTRIN) 81 MG EC tablet Take 1 tablet (81 mg total) by mouth once daily. 30 tablet 0    atorvastatin (LIPITOR) 40 MG tablet Take 1 tablet (40 mg total) by mouth once daily. 90 tablet 3    clopidogreL (PLAVIX) 75 mg tablet Take 1 tablet (75 mg total) by mouth once daily. 90 tablet 6    colchicine (COLCRYS) 0.6 mg tablet Take 2 tablet at first sign of gout flare, then take 0.6 mg ( 1 tablet an hour later).  Then take 1 tablet once or twice daily until flare resolves. 60 tablet 1    furosemide (LASIX) 20 MG tablet Take 1 tablet (20 mg total) by mouth once daily. 90 tablet 3    isosorbide-hydrALAZINE 20-37.5 mg (BIDIL) 20-37.5 mg Tab Take 2 tablets by mouth 3 (three) times daily. 540 tablet 1    metoprolol succinate (TOPROL-XL) 50 MG 24 hr tablet Take 1 tablet (50 mg total) by mouth once daily. 90 tablet 3    multivitamin with minerals tablet Take 1 tablet by mouth once daily.      nitroGLYCERIN (NITROSTAT) 0.4 MG SL tablet Place 1 tablet (0.4 mg total) under the tongue every 5 (five) minutes as needed for Chest pain. 25 tablet 2    spironolactone (ALDACTONE) 25 MG tablet Take 1 tablet (25 mg total) by mouth once daily. 30 tablet 11       Allergies  Review of patient's allergies indicates:   Allergen Reactions    Lisinopril Swelling and Anaphylaxis       Physical Examination     Vitals:    01/13/25 1053   BP: 138/68   Pulse:    Resp:    Temp:      Physical Exam  Vitals and nursing note reviewed.   HENT:      Head: Normocephalic.      Nose: Nose normal.      Mouth/Throat:      Mouth: Mucous membranes are moist.      Pharynx: Oropharynx is clear. No posterior oropharyngeal erythema.   Eyes:      Conjunctiva/sclera: Conjunctivae normal.   Cardiovascular:      Rate and Rhythm: Normal rate and regular rhythm.      Pulses: Normal pulses.      Heart sounds: Normal heart  sounds.   Pulmonary:      Effort: Pulmonary effort is normal.      Breath sounds: Normal breath sounds.   Abdominal:      General: Abdomen is flat. Bowel sounds are normal. There is no distension.      Palpations: Abdomen is soft.   Musculoskeletal:         General: No swelling or tenderness. Normal range of motion.      Cervical back: Normal range of motion.      Right lower leg: No edema.      Left lower leg: No edema.   Skin:     General: Skin is warm and dry.      Capillary Refill: Capillary refill takes less than 2 seconds.   Neurological:      Mental Status: He is alert. Mental status is at baseline.      Gait: Gait abnormal.      Comments: Uses cane for ambulation   Psychiatric:         Mood and Affect: Mood normal.         Behavior: Behavior normal.               Lab Results   Component Value Date    WBC 4.53 11/13/2024    HGB 13.7 11/13/2024    HCT 42.0 11/13/2024    MCV 97.4 (H) 11/13/2024     11/13/2024        CMP  Sodium   Date Value Ref Range Status   12/10/2024 135 (L) 136 - 145 mmol/L Final     Potassium   Date Value Ref Range Status   12/10/2024 4.0 3.5 - 5.1 mmol/L Final     Chloride   Date Value Ref Range Status   12/10/2024 107 98 - 107 mmol/L Final     CO2   Date Value Ref Range Status   12/10/2024 18 (L) 23 - 31 mmol/L Final     Glucose   Date Value Ref Range Status   12/10/2024 89 82 - 115 mg/dL Final     BUN   Date Value Ref Range Status   12/10/2024 28 (H) 8 - 26 mg/dL Final     Creatinine   Date Value Ref Range Status   12/10/2024 1.74 (H) 0.72 - 1.25 mg/dL Final     Calcium   Date Value Ref Range Status   12/10/2024 8.9 8.8 - 10.0 mg/dL Final     Total Protein   Date Value Ref Range Status   12/10/2024 6.8 5.8 - 7.6 g/dL Final     Albumin   Date Value Ref Range Status   12/10/2024 3.5 3.4 - 4.8 g/dL Final     Bilirubin, Total   Date Value Ref Range Status   12/10/2024 1.9 (H) <=1.5 mg/dL Final     Alk Phos   Date Value Ref Range Status   12/10/2024 231 (H) 40 - 150 U/L Final     AST    Date Value Ref Range Status   12/10/2024 69 (H) 5 - 34 U/L Final     ALT   Date Value Ref Range Status   12/10/2024 26 <=55 U/L Final     Anion Gap   Date Value Ref Range Status   12/10/2024 14 7 - 16 mmol/L Final     eGFR   Date Value Ref Range Status   12/10/2024 38 (L) >=60 mL/min/1.73m2 Final     Procedures   Assessment and Plan (including Health Maintenance)   :    Plan:     Problem List Items Addressed This Visit          Pulmonary    Pneumonia of right middle lobe due to infectious organism - Primary    Current Assessment & Plan     Much improved. Lungs CTA. Cough resolved. Will have home health continue to monitor.             Health Maintenance Topics with due status: Not Due       Topic Last Completion Date    TETANUS VACCINE 12/11/2018    Lipid Panel 09/10/2024       Future Appointments   Date Time Provider Department Center   4/17/2025  8[facility]   7/17/2025  8:40 AM [facility], DO [facility]        Health Maintenance Due   Topic Date Due    Shingles Vaccine (1 of 2) Never done    RSV Vaccine (Age 60+ and Pregnant patients) (1 - 1-dose 75+ series) Never done    Influenza Vaccine (1) 09/01/2024    COVID-19 Vaccine (5 - 2024-25 season) 09/01/2024          Signature:  Swati Vicente NP  [address] [place]  [address]    Date of encounter: 1/13/25

## 2025-01-16 ENCOUNTER — EXTERNAL HOME HEALTH (OUTPATIENT)
Dept: HOME HEALTH SERVICES | Facility: HOSPITAL | Age: 85
End: 2025-01-16
Payer: MEDICARE

## 2025-01-16 NOTE — PROGRESS NOTES
PCP: Swati Vicente NP    Referring Provider:     Subjective:   Trung Barboza is a 85 y.o. male with hx of MI, CAD s/p CABG with subsequent stents, TIA, CKD, HTN, HLD, gout, and GERD who presents for follow up.    He reports nausea associated with   taking his pills on an empty stomach has resolved. He reports chest tightness has resolved, has not required sl ntg, is not associated with exertion. He is less active due to inclement weather,  No new complaints.intermittently over 3 days last week.  He is active with yard work and minimal walking around his house and to the mailbox. He denies any symptoms with activity.     Fhx:   Family History   Problem Relation Name Age of Onset    Heart disease Mother      Hypertension Mother      No Known Problems Father      No Known Problems Sister      No Known Problems Sister      No Known Problems Sister      No Known Problems Sister      No Known Problems Brother      No Known Problems Brother      No Known Problems Brother      No Known Problems Brother      No Known Problems Daughter      Hypertension Son      No Known Problems Son      No Known Problems Son      No Known Problems Son      No Known Problems Son        Shx:   Past Surgical History:   Procedure Laterality Date    ANGIOGRAM, CORONARY, WITH LEFT HEART CATHETERIZATION N/A 12/31/2021    Procedure: ANGIOGRAM,CORONARY,WITH LEFT HEART CATHETERIZATION;  Surgeon: Mahad Villaseñor DO;  Location: [facility] CATH LAB;  Service: Cardiology;  Laterality: N/A;    ANGIOGRAM, CORONARY, WITH LEFT HEART CATHETERIZATION N/A 5/7/2024    Procedure: Angiogram, Coronary, with Left Heart Cath;  Surgeon: Mahad Villaseñor DO;  Location: [facility] CATH LAB;  Service: Cardiology;  Laterality: N/A;    BIOPSY WITH TRANSRECTAL ULTRASOUND (TRUS) GUIDANCE Bilateral 03/07/2018    CHOLECYSTECTOMY      CORONARY ANGIOGRAPHY INCLUDING BYPASS GRAFTS WITH CATHETERIZATION OF LEFT HEART N/A 6/4/2024    Procedure: ANGIOGRAM, CORONARY, INCLUDING  BYPASS GRAFT, WITH LEFT HEART CATHETERIZATION;  Surgeon: Philip Torres MD;  Location: [facility] CATH LAB;  Service: Cardiology;  Laterality: N/A;    CORONARY ARTERY BYPASS GRAFT  01/07/2022    [facility]-Dr. Qureshi    LEFT HEART CATHETERIZATION Left 7/13/2021    Procedure: Left heart cath;  Surgeon: Philip Torres MD;  Location: [facility] CATH LAB;  Service: Cardiology;  Laterality: Left;    PERCUTANEOUS CORONARY INTERVENTION, ARTERY N/A 5/7/2024    Procedure: Percutaneous coronary intervention;  Surgeon: Mahad Villaseñor DO;  Location: [facility] CATH LAB;  Service: Cardiology;  Laterality: N/A;      .     ECHO - Results for orders placed during the hospital encounter of 05/06/24    Echo    Interpretation Summary    Left Ventricle: The left ventricle is normal in size. Mildly increased wall thickness. There is concentric remodeling. Moderate global hypokinesis present. There is moderately reduced systolic function with a visually estimated ejection fraction of 35 - 40%. Biplane (2D) method of discs ejection fraction is 37%. Global longitudinal strain is -8.8%. Global longitudinal strain is reduced. Grade III diastolic dysfunction.    Right Ventricle: Mild right ventricular enlargement. Systolic function is mildly reduced.    Left Atrium: Left atrium is mildly dilated.    Right Atrium: Right atrium is moderately dilated.    Aortic Valve: The aortic valve is a trileaflet valve. Mildly calcified cusps. There is mild aortic regurgitation with an eccentrically directed jet.    Mitral Valve: There is no stenosis. The mean pressure gradient across the mitral valve is 1 mmHg at a heart rate of  bpm. There is mild to moderate regurgitation with an eccentric jet.    Tricuspid Valve: There is moderate regurgitation with an eccentrically directed jet.    Pulmonary Artery: The estimated pulmonary artery systolic pressure is 62 mmHg.    IVC/SVC: Elevated venous pressure at 15 mmHg.       CATH - Results for  orders placed during the hospital encounter of 06/02/24    Cardiac catheterization    Conclusion    The 1st Mrg lesion was 100% stenosed.    The 2nd Diag lesion was 100% stenosed.    The 1st Diag lesion was 70% stenosed.    The left ventricular end diastolic pressure was normal.    The pre-procedure left ventricular end diastolic pressure was 7.    The estimated blood loss was none.    There was two vessel coronary artery disease.    Two vessel CAD with branch vessel disease  LM - moderate size, with mild luminal irregularities  LAD - small size, patent stent in the prox-mid segment. The mid and distal LAD have moderate diffuse disease with negative remodeling. There is a large D1 with 70% ostial-prox stenosis. There is a small D2 with ostial ; there is an atretic SVG graft to the second diagonal with severe diffuse disease  Lcx - Moderate size with mild luminal irregularities. OM1 is a large branch that is 100% occluded (); it fills via robust R-L collaterals.  RCA - Dominant vessel with mild diffuse disease. The R-PDA gives off collateral to the OM1.  Atretic SVG to small D2 with severe disease at the anastomosis  Normal left sided filling pressures, LVEDP - 7mmHg    Plan:  Stop brillinta (shortness of breath); switch to Plavix  Stop chlorthalidone (CKD); and up titrate anti-anginal therapy - start metop 25mg bid. Consider ranexa    The procedure log was documented by Documenter: Cindy Rasmussen and verified by Philip Torres MD.    Date: 6/4/2024  Time: 3:36 PM       Stress - Results for orders placed during the hospital encounter of 07/10/21    Nuclear Stress Test    Interpretation Summary    The EKG portion of this study is positive for ischemia.    The patient reported chest pain during the stress test.    During stress, occasional PVCs are noted.       Lab Results   Component Value Date     (L) 12/10/2024    K 4.0 12/10/2024     12/10/2024    CO2 18 (L) 12/10/2024    BUN 28 (H)  12/10/2024    CREATININE 1.74 (H) 12/10/2024    CALCIUM 8.9 12/10/2024    ANIONGAP 14 12/10/2024    ESTGFRAFRICA 59 (L) 07/25/2021    EGFRNONAA 25 (L) 02/28/2022       Lab Results   Component Value Date    CHOL 116 09/10/2024    CHOL 197 03/08/2024    CHOL 225 (H) 02/09/2023     Lab Results   Component Value Date    HDL 61 (H) 09/10/2024    HDL 73 (H) 03/08/2024    HDL 61 (H) 02/09/2023     Lab Results   Component Value Date    LDLCALC 43 09/10/2024    LDLCALC 108 03/08/2024    LDLCALC 120 02/09/2023     Lab Results   Component Value Date    TRIG 61 09/10/2024    TRIG 80 03/08/2024    TRIG 220 (H) 02/09/2023     Lab Results   Component Value Date    CHOLHDL 1.9 09/10/2024    CHOLHDL 2.7 03/08/2024    CHOLHDL 3.7 02/09/2023       Lab Results   Component Value Date    WBC 4.53 11/13/2024    HGB 13.7 11/13/2024    HCT 42.0 11/13/2024    MCV 97.4 (H) 11/13/2024     11/13/2024           Current Outpatient Medications:     albuterol (PROVENTIL) 2.5 mg /3 mL (0.083 %) nebulizer solution, Take 3 mLs (2.5 mg total) by nebulization every 6 (six) hours as needed for Wheezing. Rescue, Disp: 100 each, Rfl: 0    aspirin (ECOTRIN) 81 MG EC tablet, Take 1 tablet (81 mg total) by mouth once daily., Disp: 30 tablet, Rfl: 0    atorvastatin (LIPITOR) 40 MG tablet, Take 1 tablet (40 mg total) by mouth once daily., Disp: 90 tablet, Rfl: 3    baclofen (LIORESAL) 10 MG tablet, Take one tab 3 times daily as needed for muscle spasms/back pain., Disp: 60 tablet, Rfl: 0    clopidogreL (PLAVIX) 75 mg tablet, Take 1 tablet (75 mg total) by mouth once daily., Disp: 90 tablet, Rfl: 6    colchicine (COLCRYS) 0.6 mg tablet, Take 2 tablet at first sign of gout flare, then take 0.6 mg ( 1 tablet an hour later).  Then take 1 tablet once or twice daily until flare resolves., Disp: 60 tablet, Rfl: 1    furosemide (LASIX) 20 MG tablet, Take 1 tablet (20 mg total) by mouth once daily., Disp: 90 tablet, Rfl: 3    isosorbide-hydrALAZINE 20-37.5 mg  (BIDIL) 20-37.5 mg Tab, Take 2 tablets by mouth 3 (three) times daily., Disp: 540 tablet, Rfl: 1    metoprolol succinate (TOPROL-XL) 50 MG 24 hr tablet, Take 1 tablet (50 mg total) by mouth once daily., Disp: 90 tablet, Rfl: 3    multivitamin with minerals tablet, Take 1 tablet by mouth once daily., Disp: , Rfl:     nitroGLYCERIN (NITROSTAT) 0.4 MG SL tablet, Place 1 tablet (0.4 mg total) under the tongue every 5 (five) minutes as needed for Chest pain., Disp: 25 tablet, Rfl: 2    spironolactone (ALDACTONE) 25 MG tablet, Take 1 tablet (25 mg total) by mouth once daily., Disp: 30 tablet, Rfl: 11    Review of Systems   Constitutional: Positive for decreased appetite and weight gain. Negative for chills and fever.        Gained 6-7 lbs over 6 weeks.    HENT:  Negative for congestion, ear pain and sore throat.    Eyes:  Negative for blurred vision and pain.   Cardiovascular:  Positive for chest pain. Negative for leg swelling and palpitations.   Respiratory:  Positive for shortness of breath. Negative for cough.    Skin:  Negative for dry skin, itching and rash.   Musculoskeletal:  Positive for back pain and joint pain. Negative for neck pain.        Right wrist pain secondary to gout.    Gastrointestinal:  Positive for nausea. Negative for abdominal pain, constipation, diarrhea and vomiting.   Genitourinary:  Negative for dysuria, frequency and hematuria.   Neurological:  Negative for dizziness, headaches, numbness and paresthesias.   Psychiatric/Behavioral:  Negative for depression. The patient is not nervous/anxious.           Objective:   /78 (BP Location: Right arm, Patient Position: Sitting)   Pulse (!) 58   Ht 6' (1.829 m)   Wt 98.9 kg (218 lb)   SpO2 98%   BMI 29.57 kg/m²       Physical Exam  Constitutional:       Appearance: Normal appearance. He is normal weight.   HENT:      Head: Normocephalic and atraumatic.      Nose: Nose normal.      Mouth/Throat:      Mouth: Mucous membranes are moist.    Eyes:      Pupils: Pupils are equal, round, and reactive to light.   Cardiovascular:      Rate and Rhythm: Normal rate and regular rhythm.      Pulses: Normal pulses.      Heart sounds: Normal heart sounds.      Comments: Chest pain reproducible,   Pulmonary:      Effort: Pulmonary effort is normal.      Breath sounds: Normal breath sounds.   Abdominal:      Palpations: Abdomen is soft.   Musculoskeletal:         General: Normal range of motion.      Cervical back: Normal range of motion.      Right lower leg: Edema present.      Left lower leg: Edema present.   Skin:     General: Skin is warm and dry.      Findings: Erythema present.      Comments: Erythematous right wrist secondary to gout.    Neurological:      General: No focal deficit present.      Mental Status: He is alert and oriented to person, place, and time.   Psychiatric:         Mood and Affect: Mood normal.         Thought Content: Thought content normal.         Judgment: Judgment normal.         Assessment:   CAD: severe triple vessel disease, post CABG,   2.  stable class 1 angina, remains asymptomatic  3.  Hypertension: controlled                           4. Deconditioning , due to inclement weather, encourage ambulation in the house, goal five minutes walking AM and PM, record on diary.   Plan:   Follow up in six months, sooner if symptoms change.

## 2025-01-31 ENCOUNTER — EXTERNAL CHRONIC CARE MANAGEMENT (OUTPATIENT)
Dept: FAMILY MEDICINE | Facility: CLINIC | Age: 85
End: 2025-01-31
Payer: MEDICARE

## 2025-01-31 PROCEDURE — G0511 CCM/BHI BY RHC/FQHC 20MIN MO: HCPCS | Mod: ,,, | Performed by: NURSE PRACTITIONER

## 2025-02-11 ENCOUNTER — DOCUMENTATION ONLY (OUTPATIENT)
Dept: FAMILY MEDICINE | Facility: CLINIC | Age: 85
End: 2025-02-11
Payer: MEDICARE

## 2025-02-11 NOTE — PROGRESS NOTES
Received notification from Maxine Dupree RN with [facility] on 2/10/25:    PATIENT HAS HAD A SIGNIFICANT WEIGHT INCREASE. TODAY 215 FROM 210 LAST MONDAY. SWELLING LOOKS ABOUT THE SAME AS USUAL, +1 TO LOWER EXTREMITIES. LUNGS SOUND CLEAR. HE DENIES ANY INCREASE IN SOB. HE HAS TAKEN ALL MEDS OUT OF [facility] THAT SN FIXED EXCEPT HIS NOON DOSES OF ISOSORBIDE 20 MG-HYDRALAZINE 37.5 MG TABLET. HE STATES HE FEELS LIKE IT TAKES ALL HIS ENERGY.     WE DISCUSSED SODIUM INTAKE. HE STATED HE ATE DEER SAUSAGE THIS MORNING BUT BOILED IT FIRST TO TRY AND REMOVE SODIUM AND GREASE.     Blood pressure at visit was 150/84     SBP has ranged from 138-150   DBP 66-88     Discussed with SHANIQUE Goodman and requested home health make an additional visit later this week to reassess. Instruct on low sodium diet.

## 2025-02-18 NOTE — PROGRESS NOTES
"Received notification from KATY Dempsey RN with [facility]:   From 2/17/25 SN VISIT:   BP TODAY 168/90. PATIENT STATES HE TOOK ALL MORNING MEDS BUT HAS BEEN CARRYING WOOD INSIDE FROM OUTSIDE.     PATIENT WEIGHT TODAY  FROM 215. HE HAS +1 EDEMA TO LOWER EXTREMITIES.  HE STATES THAT HE FEELS OKAY, JUST "BEEN EATING ALOT." HE DENIES ANY MORE SOB THAN USUAL.     CC'D WITH CORY MATTHEWS FNP:     CONTINUE TO MONITOR BLOOD PRESSURE, SN TO EDUCATE ON NO SALT DIET AND MEDICATION COMPLIANCE. NEW ORDERS RECD FOR PATIENT TO TAKE AN EXTRA LASIX 20MG DAILY WITH ROUTINE LASIX 20MG TO EQUAL 40MG DAILY X3 DAYS. CONTINUE TO MONITOR AND NOTIFY IF PATIENT CONTINUES TO HAVE SOB, EDEMA, WEIGHT GAIN.     "

## 2025-02-28 ENCOUNTER — EXTERNAL CHRONIC CARE MANAGEMENT (OUTPATIENT)
Dept: FAMILY MEDICINE | Facility: CLINIC | Age: 85
End: 2025-02-28
Payer: MEDICARE

## 2025-02-28 PROCEDURE — G0511 CCM/BHI BY RHC/FQHC 20MIN MO: HCPCS | Mod: ,,, | Performed by: NURSE PRACTITIONER

## 2025-03-10 ENCOUNTER — TELEPHONE (OUTPATIENT)
Dept: CARDIOLOGY | Facility: CLINIC | Age: 85
End: 2025-03-10
Payer: MEDICARE

## 2025-03-10 NOTE — TELEPHONE ENCOUNTER
Spoke with Merna on today from [facility], was calling to inquire about if there were an alternative for Isosorbide Hydralazine. Medication is over $100 and is $300 with [facility]. Informed her that Dr. Villaseñor is currently in the cath lab, but I will ask him about this when he gets to a stopping point. Merna verbalized understanding. ----- Message from [employer] sent at 3/10/2025 10:11 AM CDT -----  Who Called: Merna with [facility] is requesting assistance/information from provider's office.He is unable to afford his Isosorbide hydralazine and needs to see if any other alternative is available. Please call Preferred Method of Contact: Phone CallBest Call Back Number, if different: [phone]

## 2025-03-13 RX ORDER — HYDRALAZINE HYDROCHLORIDE 25 MG/1
37.5 TABLET, FILM COATED ORAL 3 TIMES DAILY
Qty: 135 TABLET | Refills: 11 | Status: SHIPPED | OUTPATIENT
Start: 2025-03-13 | End: 2026-03-13

## 2025-03-13 RX ORDER — ISOSORBIDE MONONITRATE 30 MG/1
30 TABLET, EXTENDED RELEASE ORAL DAILY
Qty: 30 TABLET | Refills: 11 | Status: SHIPPED | OUTPATIENT
Start: 2025-03-13 | End: 2026-03-13

## 2025-03-13 NOTE — TELEPHONE ENCOUNTER
Bidil too expensive.  Spoke with Dr. Villaseñor and will change to generic equivalent.  Alfredo'stacey notifying home health.

## 2025-03-19 ENCOUNTER — TELEPHONE (OUTPATIENT)
Dept: FAMILY MEDICINE | Facility: CLINIC | Age: 85
End: 2025-03-19
Payer: MEDICARE

## 2025-03-19 NOTE — TELEPHONE ENCOUNTER
----- Message from Criselda sent at 3/19/2025  1:52 PM CDT -----  Mrs Jennifer Page would like for you to give her a call when you get time to discuss her dads medicine.[phone]

## 2025-03-19 NOTE — TELEPHONE ENCOUNTER
"Pt's daughter reports patient has been having "stomach issues". She reports he has been having nausea with pain off and on for awhile. Patient believes it maybe some of his medication making him ill. Patient's daughter believes patient may need a scope, due to he has been on the same medications for at least 1 year. Advised her to have patient return to the clinic for an appointment. She will discuss this with patient and give us a call.   "

## 2025-03-24 ENCOUNTER — TELEPHONE (OUTPATIENT)
Dept: FAMILY MEDICINE | Facility: CLINIC | Age: 85
End: 2025-03-24
Payer: MEDICARE

## 2025-03-24 ENCOUNTER — OFFICE VISIT (OUTPATIENT)
Dept: FAMILY MEDICINE | Facility: CLINIC | Age: 85
End: 2025-03-24
Payer: MEDICARE

## 2025-03-24 VITALS
WEIGHT: 213 LBS | BODY MASS INDEX: 28.85 KG/M2 | OXYGEN SATURATION: 99 % | SYSTOLIC BLOOD PRESSURE: 147 MMHG | RESPIRATION RATE: 17 BRPM | HEIGHT: 72 IN | TEMPERATURE: 98 F | DIASTOLIC BLOOD PRESSURE: 73 MMHG | HEART RATE: 59 BPM

## 2025-03-24 DIAGNOSIS — I10 ESSENTIAL HYPERTENSION: Primary | ICD-10-CM

## 2025-03-24 PROBLEM — J01.40 ACUTE NON-RECURRENT PANSINUSITIS: Status: RESOLVED | Noted: 2023-08-07 | Resolved: 2025-03-24

## 2025-03-24 PROCEDURE — 3288F FALL RISK ASSESSMENT DOCD: CPT | Mod: ,,, | Performed by: NURSE PRACTITIONER

## 2025-03-24 PROCEDURE — 1101F PT FALLS ASSESS-DOCD LE1/YR: CPT | Mod: ,,, | Performed by: NURSE PRACTITIONER

## 2025-03-24 PROCEDURE — 1159F MED LIST DOCD IN RCRD: CPT | Mod: ,,, | Performed by: NURSE PRACTITIONER

## 2025-03-24 PROCEDURE — 3077F SYST BP >= 140 MM HG: CPT | Mod: ,,, | Performed by: NURSE PRACTITIONER

## 2025-03-24 PROCEDURE — 1160F RVW MEDS BY RX/DR IN RCRD: CPT | Mod: ,,, | Performed by: NURSE PRACTITIONER

## 2025-03-24 PROCEDURE — 3078F DIAST BP <80 MM HG: CPT | Mod: ,,, | Performed by: NURSE PRACTITIONER

## 2025-03-24 PROCEDURE — 1126F AMNT PAIN NOTED NONE PRSNT: CPT | Mod: ,,, | Performed by: NURSE PRACTITIONER

## 2025-03-24 PROCEDURE — 99213 OFFICE O/P EST LOW 20 MIN: CPT | Mod: ,,, | Performed by: NURSE PRACTITIONER

## 2025-03-24 RX ORDER — ATORVASTATIN CALCIUM 80 MG/1
80 TABLET, FILM COATED ORAL DAILY
COMMUNITY
Start: 2025-01-12

## 2025-03-24 RX ORDER — HYDRALAZINE HYDROCHLORIDE 25 MG/1
37.5 TABLET, FILM COATED ORAL 2 TIMES DAILY
Qty: 90 TABLET | Refills: 11 | Status: SHIPPED | OUTPATIENT
Start: 2025-03-24 | End: 2025-03-24

## 2025-03-24 RX ORDER — HYDRALAZINE HYDROCHLORIDE 25 MG/1
37.5 TABLET, FILM COATED ORAL 2 TIMES DAILY
Start: 2025-03-24 | End: 2026-03-24

## 2025-03-24 NOTE — TELEPHONE ENCOUNTER
----- Message from Julita sent at 3/24/2025 11:18 AM CDT -----  Regarding: Follow up with Appt for today  Who Called: Jennifer Page (Daughter)Does the patient know what this is regarding?:Follow up with today's apptPreferred Method of Contact: Phone CallBest Call Back Number, if different: [phone] Information: Jennifer called in to see if Nurse Miranda could give her a call back regarding some follow up information on her dad's appt with  on today. Please give her a courtesy call back. Thanks.

## 2025-03-24 NOTE — PROGRESS NOTES
"   Swati Vicente NP   [place] [address] [facility] [address]  [place]  [address]      PATIENT NAME: Trung Barboza  : 1940  DATE: 3/24/25  MRN: [id number]      Billing Provider: Swati Vicente NP  Level of Service: [place] OFFICE/OUTPT VISIT, EST, LEVL III, 20-29 MIN  Patient PCP Information       Provider PCP Type    Swati Vicente NP General            Reason for Visit / Chief Complaint: Allergic Reaction (Patient reports his medication was recently changed to hydralazine 25mg take 1 &1/2 by mouth 3x per day. This caused him to start burning and stinging all over . States he took last dose of medication on Friday morning. States he does not want to take this medication "if it causes him this much harm". This has also caused stomach aches and acid on his stomach. He has tried eating yogurt and drinking milk but this has not helped.) and Health Maintenance (Shingles Vaccine(1 of 2) Never done/RSV Vaccine (Age 60+ and Pregnant patients)(1 - 1-dose 75+ series) Never done/Influenza Vaccine(1) due on 2024/COVID-19 Vaccine(2024- season) due on 2024/Patient declined all vaccines today)         History of Present Illness / Problem Focused Workflow     85 year old male presents to clinic for medication follow up.  Patient reports his Bidil was recently changed to hydralazine 25mg take 1 &1/2 by mouth 3x per day and Imdur 30mg daily. He states the Hydralazine is causing his stomach to burn and have itching, burning pain "all over." States he took last dose of Hydralazine on Friday morning. States he does not want to take this medication "if it causes him this much harm". BP elevated at today's visit, however he has not had Hydralazine since Friday.          Review of Systems     @Review of Systems   Constitutional:  Negative for activity change, appetite change, fatigue and fever.   HENT:  Negative for nasal congestion, ear pain, rhinorrhea, sinus pressure/congestion and sore throat.    Eyes: "  Negative for pain, redness, visual disturbance and eye dryness.   Respiratory:  Negative for cough and shortness of breath.    Cardiovascular:  Negative for chest pain and leg swelling.   Gastrointestinal:  Positive for abdominal pain. Negative for abdominal distention, constipation and diarrhea.   Endocrine: Negative for cold intolerance, heat intolerance and polyuria.   Genitourinary:  Negative for bladder incontinence, dysuria, frequency and urgency.   Musculoskeletal:  Positive for myalgias. Negative for arthralgias and gait problem.   Integumentary:  Negative for color change, rash and wound.   Allergic/Immunologic: Negative for environmental allergies and food allergies.   Neurological:  Negative for dizziness, weakness, light-headedness and headaches.   Psychiatric/Behavioral:  Negative for behavioral problems and sleep disturbance.        Medical / Social / Family History     Past Medical History:   Diagnosis Date    Aortic regurgitation     BPH with urinary obstruction     Chronic kidney disease, stage 3b     GR 44     creat 1.8    Coronary artery disease     Essential hypertension 07/10/2021    Gastroesophageal reflux disease 09/15/2022    Gout, arthritis     Myocardial infarction     PVD (peripheral vascular disease) 02/01/2024    TIA (transient ischemic attack)        Past Surgical History:   Procedure Laterality Date    ANGIOGRAM, CORONARY, WITH LEFT HEART CATHETERIZATION N/A 12/31/2021    Procedure: ANGIOGRAM,CORONARY,WITH LEFT HEART CATHETERIZATION;  Surgeon: Mahad Villaseñor DO;  Location: [facility] CATH LAB;  Service: Cardiology;  Laterality: N/A;    ANGIOGRAM, CORONARY, WITH LEFT HEART CATHETERIZATION N/A 5/7/2024    Procedure: Angiogram, Coronary, with Left Heart Cath;  Surgeon: Mahad Villaseñor DO;  Location: [facility] CATH LAB;  Service: Cardiology;  Laterality: N/A;    BIOPSY WITH TRANSRECTAL ULTRASOUND (TRUS) GUIDANCE Bilateral 03/07/2018    CHOLECYSTECTOMY      CORONARY ANGIOGRAPHY INCLUDING  BYPASS GRAFTS WITH CATHETERIZATION OF LEFT HEART N/A 6/4/2024    Procedure: ANGIOGRAM, CORONARY, INCLUDING BYPASS GRAFT, WITH LEFT HEART CATHETERIZATION;  Surgeon: Philip Torres MD;  Location: [facility] CATH LAB;  Service: Cardiology;  Laterality: N/A;    CORONARY ARTERY BYPASS GRAFT  01/07/2022    [facility]-Dr. Qureshi    LEFT HEART CATHETERIZATION Left 7/13/2021    Procedure: Left heart cath;  Surgeon: Philip Torres MD;  Location: [facility] CATH LAB;  Service: Cardiology;  Laterality: Left;    PERCUTANEOUS CORONARY INTERVENTION, ARTERY N/A 5/7/2024    Procedure: Percutaneous coronary intervention;  Surgeon: Mahad Villaseñor DO;  Location: [facility] CATH LAB;  Service: Cardiology;  Laterality: N/A;       Medications and Allergies     Medications  Outpatient Medications Marked as Taking for the 3/24/25 encounter (Office Visit) with Swati Vicente NP   Medication Sig Dispense Refill    albuterol (PROVENTIL) 2.5 mg /3 mL (0.083 %) nebulizer solution Take 3 mLs (2.5 mg total) by nebulization every 6 (six) hours as needed for Wheezing. Rescue 100 each 0    aspirin (ECOTRIN) 81 MG EC tablet Take 1 tablet (81 mg total) by mouth once daily. 30 tablet 0    atorvastatin (LIPITOR) 80 MG tablet Take 80 mg by mouth once daily.      baclofen (LIORESAL) 10 MG tablet Take one tab 3 times daily as needed for muscle spasms/back pain. 60 tablet 0    clopidogreL (PLAVIX) 75 mg tablet Take 1 tablet (75 mg total) by mouth once daily. 90 tablet 6    colchicine (COLCRYS) 0.6 mg tablet Take 2 tablet at first sign of gout flare, then take 0.6 mg ( 1 tablet an hour later).  Then take 1 tablet once or twice daily until flare resolves. 60 tablet 1    furosemide (LASIX) 20 MG tablet Take 1 tablet (20 mg total) by mouth once daily. 90 tablet 3    isosorbide mononitrate (IMDUR) 30 MG 24 hr tablet Take 1 tablet (30 mg total) by mouth once daily. 30 tablet 11    multivitamin with minerals tablet Take 1 tablet by mouth once  daily.      nitroGLYCERIN (NITROSTAT) 0.4 MG SL tablet Place 1 tablet (0.4 mg total) under the tongue every 5 (five) minutes as needed for Chest pain. 25 tablet 2    spironolactone (ALDACTONE) 25 MG tablet Take 1 tablet (25 mg total) by mouth once daily. 30 tablet 11    [DISCONTINUED] atorvastatin (LIPITOR) 40 MG tablet Take 1 tablet (40 mg total) by mouth once daily. 90 tablet 3       Allergies  Review of patient's allergies indicates:   Allergen Reactions    Lisinopril Swelling and Anaphylaxis       Physical Examination     Vitals:    03/24/25 1106   BP: (!) 147/73   Pulse:    Resp:    Temp:      Physical Exam  Vitals and nursing note reviewed.   HENT:      Head: Normocephalic.      Nose: Nose normal.      Mouth/Throat:      Mouth: Mucous membranes are moist.      Pharynx: Oropharynx is clear. No posterior oropharyngeal erythema.   Eyes:      Conjunctiva/sclera: Conjunctivae normal.   Cardiovascular:      Rate and Rhythm: Normal rate and regular rhythm.      Pulses: Normal pulses.      Heart sounds: Normal heart sounds.   Pulmonary:      Effort: Pulmonary effort is normal.      Breath sounds: Normal breath sounds.   Abdominal:      General: Abdomen is flat. Bowel sounds are normal. There is no distension.      Palpations: Abdomen is soft.   Musculoskeletal:         General: No swelling or tenderness. Normal range of motion.      Cervical back: Normal range of motion.      Right lower leg: No edema.      Left lower leg: No edema.   Skin:     General: Skin is warm and dry.      Capillary Refill: Capillary refill takes less than 2 seconds.   Neurological:      Mental Status: He is alert. Mental status is at baseline.   Psychiatric:         Mood and Affect: Mood normal.         Behavior: Behavior normal.               Lab Results   Component Value Date    WBC 4.53 11/13/2024    HGB 13.7 11/13/2024    HCT 42.0 11/13/2024    MCV 97.4 (H) 11/13/2024     11/13/2024        CMP  Sodium   Date Value Ref Range Status    12/10/2024 135 (L) 136 - 145 mmol/L Final     Potassium   Date Value Ref Range Status   12/10/2024 4.0 3.5 - 5.1 mmol/L Final     Chloride   Date Value Ref Range Status   12/10/2024 107 98 - 107 mmol/L Final     CO2   Date Value Ref Range Status   12/10/2024 18 (L) 23 - 31 mmol/L Final     Glucose   Date Value Ref Range Status   12/10/2024 89 82 - 115 mg/dL Final     BUN   Date Value Ref Range Status   12/10/2024 28 (H) 8 - 26 mg/dL Final     Creatinine   Date Value Ref Range Status   12/10/2024 1.74 (H) 0.72 - 1.25 mg/dL Final     Calcium   Date Value Ref Range Status   12/10/2024 8.9 8.8 - 10.0 mg/dL Final     Total Protein   Date Value Ref Range Status   12/10/2024 6.8 5.8 - 7.6 g/dL Final     Albumin   Date Value Ref Range Status   12/10/2024 3.5 3.4 - 4.8 g/dL Final     Bilirubin, Total   Date Value Ref Range Status   12/10/2024 1.9 (H) <=1.5 mg/dL Final     Alk Phos   Date Value Ref Range Status   12/10/2024 231 (H) 40 - 150 U/L Final     AST   Date Value Ref Range Status   12/10/2024 69 (H) 5 - 34 U/L Final     ALT   Date Value Ref Range Status   12/10/2024 26 <=55 U/L Final     Anion Gap   Date Value Ref Range Status   12/10/2024 14 7 - 16 mmol/L Final     eGFR   Date Value Ref Range Status   12/10/2024 38 (L) >=60 mL/min/1.73m2 Final     Procedures   Assessment and Plan (including Health Maintenance)   :    Plan:     Problem List Items Addressed This Visit       Essential hypertension - Primary    Current Assessment & Plan   Elevated at today's viist. Has not taken Hydralazine since Friday. Instructed he does need to start back on Hydralazine but we can decrease to 37.5mg BID. He denies headache, blurred vision, chest pain, or other symptoms of HTN. Monitor BP at home and keep log. Home health to monitor. If symptoms of stomach upset and burning continue will consider decreasing Hydralazine further depending on BP. Patient's daughter concerned he may have other GI issues. If symptoms persist will refer to  GI.             Health Maintenance Topics with due status: Not Due       Topic Last Completion Date    TETANUS VACCINE 12/11/2018    Lipid Panel 09/10/2024       Future Appointments   Date Time Provider Department Center   4/17/2025  8:00 AM [facility] NURSE [facility] MARY Gonzalez   7/21/2025 11:00 AM Mahad Villaseñor, DO [facility]        Health Maintenance Due   Topic Date Due    Shingles Vaccine (1 of 2) Never done    RSV Vaccine (Age 60+ and Pregnant patients) (1 - 1-dose 75+ series) Never done    COVID-19 Vaccine (5 - 2024-25 season) 09/01/2024          Signature:  Swati Vicente NP  [facility]  [address]  [address]    Date of encounter: 3/24/25

## 2025-03-24 NOTE — TELEPHONE ENCOUNTER
Spoke with patient's daughter, Jennifer. She had some questions about patient's office visit today in our clinic. Pt reported to NP he was not having GI pain/nausea after stopping hydralazine. Pt's daughter notified hydralazine was decreased to 37.5 mg BID. Swati has notified home health, if pt continues to have any GI issues to report these to our clinic. Pt's daughter will also call the clinic if pt continues to have pain and nausea.

## 2025-03-25 NOTE — ASSESSMENT & PLAN NOTE
Elevated at today's viist. Has not taken Hydralazine since Friday. Instructed he does need to start back on Hydralazine but we can decrease to 37.5mg BID. He denies headache, blurred vision, chest pain, or other symptoms of HTN. Monitor BP at home and keep log. Home health to monitor. If symptoms of stomach upset and burning continue will consider decreasing Hydralazine further depending on BP. Patient's daughter concerned he may have other GI issues. If symptoms persist will refer to GI.

## 2025-03-26 ENCOUNTER — DOCUMENT SCAN (OUTPATIENT)
Dept: HOME HEALTH SERVICES | Facility: HOSPITAL | Age: 85
End: 2025-03-26
Payer: MEDICARE

## 2025-03-31 ENCOUNTER — EXTERNAL CHRONIC CARE MANAGEMENT (OUTPATIENT)
Dept: FAMILY MEDICINE | Facility: CLINIC | Age: 85
End: 2025-03-31
Payer: MEDICARE

## 2025-03-31 PROCEDURE — G0511 CCM/BHI BY RHC/FQHC 20MIN MO: HCPCS | Mod: ,,, | Performed by: NURSE PRACTITIONER

## 2025-04-07 ENCOUNTER — DOCUMENT SCAN (OUTPATIENT)
Dept: HOME HEALTH SERVICES | Facility: HOSPITAL | Age: 85
End: 2025-04-07
Payer: MEDICARE

## 2025-04-08 ENCOUNTER — TELEPHONE (OUTPATIENT)
Dept: FAMILY MEDICINE | Facility: CLINIC | Age: 85
End: 2025-04-08
Payer: MEDICARE

## 2025-04-08 DIAGNOSIS — R10.9 RECURRENT ABDOMINAL PAIN: Primary | ICD-10-CM

## 2025-04-08 NOTE — TELEPHONE ENCOUNTER
Received notification from KATY Culp RN with [facility]:  PATIENTS WEIGHT BELOW PARAMETERS  TODAY. LAST WEIGHT 211 LAST MONDAY. PATIENT COMPLAINING OF DECREASED APPETITE. PATIENT STATES HE HAS HAD AN UPSET STOMACH STILL EVEN WITH DECREASING HYDRALAZINE. HE MISSED 2 DAYS OF MORNING MEDS AND 5 DAYS OF PM MEDS BASED ON PLANNER.      Called and discussed with his daughter, Jennifer Page who is concerned about continued abdominal pain and decreased appetite. She is requesting referral to GI. No preference in provider.

## 2025-04-08 NOTE — TELEPHONE ENCOUNTER
"Spoke with patient. Our office received message from [facility] nurse regarding patient's complaint of chest pain. Pt states he feels okay today. The chest pain comes and goes. Pt states he has not had any chest pain " in a long time now". Clarified if this means no chest pain for several weeks and patient stated yes. He states " I feel alright now, if not I would be up there to see you." Instructed patient to go to the ER if chest pain reoccurs. Pt voiced understanding. Provider aware of phone encounter.  "

## 2025-04-14 ENCOUNTER — TELEPHONE (OUTPATIENT)
Dept: FAMILY MEDICINE | Facility: CLINIC | Age: 85
End: 2025-04-14
Payer: MEDICARE

## 2025-04-14 DIAGNOSIS — R10.9 ABDOMINAL PAIN, UNSPECIFIED ABDOMINAL LOCATION: Primary | ICD-10-CM

## 2025-04-14 NOTE — TELEPHONE ENCOUNTER
Patient scheduled to see GI on 5/8/25. However, he continues to have abdominal pain and weight loss. I am going to go ahead and send him for CT Abd and Pelvis to rule out other causes.

## 2025-04-14 NOTE — TELEPHONE ENCOUNTER
Received notification from KATY Culp RN with [facility]:   PATIENTS WEIGHT BELOW PARAMETERS  TODAY. LAST WEIGHT 207 LAST MONDAY.     PATIENT STATES HE HAD GOUT ATTACK OVER THE WEEKEND AND COULD NOT TOLERATE TAKING MEDICATIONS WITH COLCHICINE DUE TO STOMACH PAIN. HE MISSED 2 MORNING AND 4 NIGHTS OF MEDS.     SN INSTRUCTED ON LOW PURINE DIET.     Blood Pressure today 160/72   Pulse 60     Please encourage patient to take meds as ordered.

## 2025-04-15 ENCOUNTER — RESULTS FOLLOW-UP (OUTPATIENT)
Dept: FAMILY MEDICINE | Facility: CLINIC | Age: 85
End: 2025-04-15

## 2025-04-16 ENCOUNTER — TELEPHONE (OUTPATIENT)
Dept: FAMILY MEDICINE | Facility: CLINIC | Age: 85
End: 2025-04-16
Payer: MEDICARE

## 2025-04-16 NOTE — PROGRESS NOTES
Creatinine still slightly elevated. WE will go ahead and schedule his CT without IV contrast. Increase fluids.

## 2025-04-19 ENCOUNTER — HOSPITAL ENCOUNTER (EMERGENCY)
Facility: HOSPITAL | Age: 85
Discharge: HOME | End: 2025-04-19
Payer: MEDICARE

## 2025-04-19 VITALS
SYSTOLIC BLOOD PRESSURE: 175 MMHG | DIASTOLIC BLOOD PRESSURE: 99 MMHG | BODY MASS INDEX: 25.49 KG/M2 | TEMPERATURE: 98 F | HEART RATE: 67 BPM | WEIGHT: 205 LBS | OXYGEN SATURATION: 100 % | HEIGHT: 75 IN | RESPIRATION RATE: 20 BRPM

## 2025-04-19 DIAGNOSIS — R07.9 CHEST PAIN: ICD-10-CM

## 2025-04-19 LAB
ALBUMIN SERPL BCP-MCNC: 3.2 G/DL (ref 3.4–4.8)
ALBUMIN/GLOB SERPL: 0.7 {RATIO}
ALP SERPL-CCNC: 321 U/L (ref 40–150)
ALT SERPL W P-5'-P-CCNC: 21 U/L
ANION GAP SERPL CALCULATED.3IONS-SCNC: 17 MMOL/L (ref 7–16)
AST SERPL W P-5'-P-CCNC: 51 U/L (ref 11–45)
BASOPHILS # BLD AUTO: 0.02 K/UL (ref 0–0.2)
BASOPHILS NFR BLD AUTO: 0.3 % (ref 0–1)
BILIRUB SERPL-MCNC: 3.4 MG/DL
BUN SERPL-MCNC: 23 MG/DL (ref 8–26)
BUN/CREAT SERPL: 14 (ref 6–20)
CALCIUM SERPL-MCNC: 9.4 MG/DL (ref 8.8–10)
CHLORIDE SERPL-SCNC: 107 MMOL/L (ref 98–107)
CO2 SERPL-SCNC: 20 MMOL/L (ref 23–31)
CREAT SERPL-MCNC: 1.67 MG/DL (ref 0.72–1.25)
DIFFERENTIAL METHOD BLD: ABNORMAL
EGFR (NO RACE VARIABLE) (RUSH/TITUS): 40 ML/MIN/1.73M2
EOSINOPHIL # BLD AUTO: 0.11 K/UL (ref 0–0.5)
EOSINOPHIL NFR BLD AUTO: 1.8 % (ref 1–4)
ERYTHROCYTE [DISTWIDTH] IN BLOOD BY AUTOMATED COUNT: 13.8 % (ref 11.5–14.5)
GLOBULIN SER-MCNC: 4.5 G/DL (ref 2–4)
GLUCOSE SERPL-MCNC: 105 MG/DL (ref 82–115)
HCT VFR BLD AUTO: 39.3 % (ref 40–54)
HGB BLD-MCNC: 13.1 G/DL (ref 13.5–18)
LYMPHOCYTES # BLD AUTO: 1.29 K/UL (ref 1–4.8)
LYMPHOCYTES NFR BLD AUTO: 21.1 % (ref 27–41)
MAGNESIUM SERPL-MCNC: 2 MG/DL (ref 1.6–2.6)
MCH RBC QN AUTO: 32.8 PG (ref 27–31)
MCHC RBC AUTO-ENTMCNC: 33.3 G/DL (ref 32–36)
MCV RBC AUTO: 98.3 FL (ref 80–96)
MONOCYTES # BLD AUTO: 0.91 K/UL (ref 0–0.8)
MONOCYTES NFR BLD AUTO: 14.9 % (ref 2–6)
MPC BLD CALC-MCNC: 9.3 FL (ref 9.4–12.4)
NEUTROPHILS # BLD AUTO: 3.78 K/UL (ref 1.8–7.7)
NEUTROPHILS NFR BLD AUTO: 61.9 % (ref 53–65)
NT-PROBNP SERPL-MCNC: 5516 PG/ML (ref 1–450)
PLATELET # BLD AUTO: 267 K/UL (ref 150–400)
POTASSIUM SERPL-SCNC: 4.1 MMOL/L (ref 3.5–5.1)
PROT SERPL-MCNC: 7.7 G/DL (ref 5.8–7.6)
RBC # BLD AUTO: 4 M/UL (ref 4.6–6.2)
SODIUM SERPL-SCNC: 140 MMOL/L (ref 136–145)
TROPONIN I SERPL HS-MCNC: 185.1 NG/L
TROPONIN I SERPL HS-MCNC: 202.4 NG/L
WBC # BLD AUTO: 6.11 K/UL (ref 4.5–11)

## 2025-04-19 PROCEDURE — 25000003 PHARM REV CODE 250: Performed by: NURSE PRACTITIONER

## 2025-04-19 PROCEDURE — 99284 EMERGENCY DEPT VISIT MOD MDM: CPT | Mod: GF,,, | Performed by: NURSE PRACTITIONER

## 2025-04-19 PROCEDURE — 99285 EMERGENCY DEPT VISIT HI MDM: CPT | Mod: 25

## 2025-04-19 PROCEDURE — 93010 ELECTROCARDIOGRAM REPORT: CPT | Mod: ,,, | Performed by: INTERNAL MEDICINE

## 2025-04-19 PROCEDURE — 85025 COMPLETE CBC W/AUTO DIFF WBC: CPT | Performed by: NURSE PRACTITIONER

## 2025-04-19 PROCEDURE — 83880 ASSAY OF NATRIURETIC PEPTIDE: CPT | Performed by: NURSE PRACTITIONER

## 2025-04-19 PROCEDURE — 93005 ELECTROCARDIOGRAM TRACING: CPT

## 2025-04-19 PROCEDURE — 80053 COMPREHEN METABOLIC PANEL: CPT | Performed by: NURSE PRACTITIONER

## 2025-04-19 PROCEDURE — 36415 COLL VENOUS BLD VENIPUNCTURE: CPT | Performed by: NURSE PRACTITIONER

## 2025-04-19 PROCEDURE — 83735 ASSAY OF MAGNESIUM: CPT | Performed by: NURSE PRACTITIONER

## 2025-04-19 PROCEDURE — 84484 ASSAY OF TROPONIN QUANT: CPT | Performed by: NURSE PRACTITIONER

## 2025-04-19 RX ORDER — ASPIRIN 325 MG
325 TABLET ORAL
Status: COMPLETED | OUTPATIENT
Start: 2025-04-19 | End: 2025-04-19

## 2025-04-19 RX ADMIN — ASPIRIN 325 MG: 325 TABLET ORAL at 02:04

## 2025-04-19 NOTE — ED PROVIDER NOTES
Encounter Date: 4/19/2025       History     Chief Complaint   Patient presents with    Chest Pain     84 y/o male is brought to the ED by his family via POV for evaluation of chest pain that started 2 hours ago while sitting in chair resting. Describes mid-sternal chest pain as stabbing type pain, rates pain max 10, improved to 6/10. No radiation of pain. Pain worse with palpation of chest per patient. Denies SOB, dizziness, nausea or vomiting. Patient has increased activity at home since weather has become warmer. He used his weed eater first of week and cut yard on riding  on Thursday. Denies any injury that he is aware.    The history is provided by the patient.   Chest Pain  The current episode started 1 to 2 hours ago. Duration of episode(s) is 2 hours. Chest pain occurs constantly. The chest pain is improving. Associated with: at rest. At its most intense, the chest pain is at 9/10. The chest pain is currently at 6/10. The quality of the pain is described as stabbing. The pain does not radiate. Exacerbated by: palpation of chest wall. Pertinent negatives for primary symptoms include no fever, no fatigue, no syncope, no shortness of breath, no cough, no wheezing, no palpitations, no abdominal pain, no nausea, no vomiting, no dizziness and no altered mental status.   Pertinent negatives for associated symptoms include no claudication, no diaphoresis, no lower extremity edema, no near-syncope, no numbness, no orthopnea, no paroxysmal nocturnal dyspnea and no weakness. Risk factors include being elderly, male gender and sedentary lifestyle.   His past medical history is significant for CAD, hypertension, MI, PVD and TIA.   Procedure history is positive for cardiac catheterization.     Review of patient's allergies indicates:   Allergen Reactions    Lisinopril Swelling and Anaphylaxis     Past Medical History:   Diagnosis Date    Aortic regurgitation     BPH with urinary obstruction     Chronic kidney  disease, stage 3b     GR 44     creat 1.8    Coronary artery disease     Essential hypertension 07/10/2021    Gastroesophageal reflux disease 09/15/2022    Gout, arthritis     Myocardial infarction     PVD (peripheral vascular disease) 02/01/2024    TIA (transient ischemic attack)      Past Surgical History:   Procedure Laterality Date    ANGIOGRAM, CORONARY, WITH LEFT HEART CATHETERIZATION N/A 12/31/2021    Procedure: ANGIOGRAM,CORONARY,WITH LEFT HEART CATHETERIZATION;  Surgeon: Mahad Villaseñor DO;  Location: [facility] CATH LAB;  Service: Cardiology;  Laterality: N/A;    ANGIOGRAM, CORONARY, WITH LEFT HEART CATHETERIZATION N/A 5/7/2024    Procedure: Angiogram, Coronary, with Left Heart Cath;  Surgeon: Mahad Villaseñor DO;  Location: [facility] CATH LAB;  Service: Cardiology;  Laterality: N/A;    BIOPSY WITH TRANSRECTAL ULTRASOUND (TRUS) GUIDANCE Bilateral 03/07/2018    CHOLECYSTECTOMY      CORONARY ANGIOGRAPHY INCLUDING BYPASS GRAFTS WITH CATHETERIZATION OF LEFT HEART N/A 6/4/2024    Procedure: ANGIOGRAM, CORONARY, INCLUDING BYPASS GRAFT, WITH LEFT HEART CATHETERIZATION;  Surgeon: Philip Torres MD;  Location: [facility] CATH LAB;  Service: Cardiology;  Laterality: N/A;    [facility]    LEFT HEART CATHETERIZATION Left 7/13/2021    Procedure: Left heart cath;  Surgeon: Philip Torres MD;  Location: [facility] CATH LAB;  Service: Cardiology;  Laterality: Left;    PERCUTANEOUS CORONARY INTERVENTION, ARTERY N/A 5/7/2024    Procedure: Percutaneous coronary intervention;  Surgeon: Mahad Villaseñor DO;  Location: [facility] CATH LAB;  Service: Cardiology;  Laterality: N/A;     Family History   Problem Relation Name Age of Onset    Heart disease Mother      Hypertension Mother      No Known Problems Father      No Known Problems Sister      No Known Problems Sister      No Known Problems Sister      No Known Problems Sister      No Known Problems Brother       No Known Problems Brother      No Known Problems Brother      No Known Problems Brother      No Known Problems Daughter      Hypertension Son      No Known Problems Son      No Known Problems Son      No Known Problems Son      No Known Problems Son       Social History[1]  Review of Systems   Constitutional:  Negative for activity change, appetite change, diaphoresis, fatigue and fever.   HENT:  Negative for congestion, drooling, ear discharge, ear pain, postnasal drip, rhinorrhea, sinus pressure, sinus pain, sore throat and trouble swallowing.    Eyes:  Negative for photophobia, pain and visual disturbance.   Respiratory:  Negative for cough, chest tightness, shortness of breath and wheezing.    Cardiovascular:  Positive for chest pain. Negative for palpitations, orthopnea, claudication, leg swelling, syncope and near-syncope.   Gastrointestinal:  Negative for abdominal distention, abdominal pain, constipation, nausea and vomiting.   Genitourinary:  Negative for dysuria, flank pain, frequency and hematuria.   Musculoskeletal:  Negative for arthralgias, gait problem, myalgias and neck pain.   Neurological:  Negative for dizziness, syncope, facial asymmetry, speech difficulty, weakness, light-headedness, numbness and headaches.   Psychiatric/Behavioral:  Negative for agitation, behavioral problems, confusion, self-injury and suicidal ideas. The patient is not nervous/anxious.    All other systems reviewed and are negative.      Physical Exam     Initial Vitals [04/19/25 1406]   BP Pulse Resp Temp SpO2   (!) 164/93 70 17 98.1 °F (36.7 °C) 97 %      MAP       --         Physical Exam    Nursing note and vitals reviewed.  Constitutional: Vital signs are normal. He appears well-developed and well-nourished. He is cooperative. He does not have a sickly appearance. He does not appear ill. No distress.   HENT:   Head: Normocephalic and atraumatic.   Right Ear: External ear normal.   Left Ear: External ear normal.    Nose: Nose normal. Mouth/Throat: Mucous membranes are normal.   Eyes: Conjunctivae, EOM and lids are normal. Pupils are equal, round, and reactive to light.   Neck: Neck supple.   Normal range of motion.   Full passive range of motion without pain.     Cardiovascular:  Normal rate, regular rhythm, normal heart sounds and intact distal pulses.           No pitting edema present   Pulmonary/Chest: Effort normal and breath sounds normal. He exhibits tenderness and bony tenderness.     Chest pain reproduced with palpation.    Abdominal: Abdomen is soft. Bowel sounds are normal. There is no abdominal tenderness.   Musculoskeletal:         General: Normal range of motion.      Cervical back: Full passive range of motion without pain, normal range of motion and neck supple.     Lymphadenopathy:     He has no cervical adenopathy.   Neurological: He is alert and oriented to person, place, and time. Gait normal.   Skin: Skin is warm, dry and intact. Capillary refill takes less than 2 seconds. No rash noted.   Psychiatric: He has a normal mood and affect. His speech is normal and behavior is normal. Judgment normal.         Medical Screening Exam   See Full Note    ED Course   Procedures  Labs Reviewed   COMPREHENSIVE METABOLIC PANEL - Abnormal       Result Value    Sodium 140      Potassium 4.1      Chloride 107      CO2 20 (*)     Anion Gap 17 (*)     Glucose 105      BUN 23      Creatinine 1.67 (*)     BUN/Creatinine Ratio 14      Calcium 9.4      Total Protein 7.7 (*)     Albumin 3.2 (*)     Globulin 4.5 (*)     A/G Ratio 0.7      Bilirubin, Total 3.4 (*)     Alk Phos 321 (*)     ALT 21      AST 51 (*)     eGFR 40 (*)    TROPONIN I - Abnormal    Troponin I High Sensitivity 185.1 (*)    NT-PRO NATRIURETIC PEPTIDE - Abnormal    ProBNP 5,516 (*)    CBC WITH DIFFERENTIAL - Abnormal    WBC 6.11      RBC 4.00 (*)     Hemoglobin 13.1 (*)     Hematocrit 39.3 (*)     MCV 98.3 (*)     MCH 32.8 (*)     MCHC 33.3      RDW 13.8       Platelet Count 267      MPV 9.3 (*)     Neutrophils % 61.9      Lymphocytes % 21.1 (*)     Neutrophils, Abs 3.78      Lymphocytes, Absolute 1.29      Diff Type Auto      Monocytes % 14.9 (*)     Eosinophils % 1.8      Basophils % 0.3      Monocytes, Absolute 0.91 (*)     Eosinophils, Absolute 0.11      Basophils, Absolute 0.02     TROPONIN I - Abnormal    Troponin I High Sensitivity 202.4 (*)    MAGNESIUM - Normal    Magnesium 2.0     CBC W/ AUTO DIFFERENTIAL    Narrative:     The following orders were created for panel order CBC auto differential.  Procedure                               Abnormality         Status                     ---------                               -----------         ------                     CBC with Differential[[id number]]       Abnormal            Final result                 Please view results for these tests on the individual orders.        ECG Results              EKG 12-lead (In process)        Collection Time Result Time QRS Duration OHS QTC Calculation    04/19/25 14:03:47 04/19/25 14:15:16 128 454                     In process by Interface, Lab In [facility] (04/19/25 14:15:19)                   Narrative:    Test Reason : R07.9,    Vent. Rate :  69 BPM     Atrial Rate :  69 BPM     P-R Int : 312 ms          QRS Dur : 128 ms      QT Int : 424 ms       P-R-T Axes :  77 270  76 degrees    QTcB Int : 454 ms    Sinus rhythm with marked sinus arrythmia with 1st degree A-V block  Right superior axis deviation  Nonspecific intraventricular block  Inferior infarct (cited on or before 06-Jun-2023)  Cannot rule out Anterior infarct (cited on or before 27-Sep-2022)  Abnormal ECG  When compared with ECG of 19-Nov-2024 09:54,  Questionable change in The axis    Referred By: AAAREFERRAL SELF           Confirmed By:                                   Imaging Results              X-Ray Chest AP Portable (Final result)  Result time 04/19/25 14:45:28      Final result by Jose Gerard MD (04/19/25  14:45:28)                   Impression:      No detrimental change or radiographic acute intrathoracic process seen on this single view.      Electronically signed by: Jose Gerard MD  Date:    04/19/2025  Time:    14:45               Narrative:    EXAMINATION:  XR CHEST AP PORTABLE    CLINICAL HISTORY:  Chest Pain;    TECHNIQUE:  Single frontal view of the chest was performed.    COMPARISON:  Chest radiograph 01/03/2025, CTA chest 07/11/2021    FINDINGS:  Monitoring leads overlie the chest.  Patient is slightly rotated.    Sternotomy wires as before.  Cardiomediastinal silhouette is midline and prominent similar to prior.  Similar mild prominence of the central pulmonary vasculature.  Hilar contours are within normal limits, stable.  Mild scattered platelike scarring versus atelectasis.  The lungs are otherwise well expanded without consolidation, pleural effusion or pneumothorax.  No acute osseous process seen.  PA and lateral views can be obtained.                                       Medications   aspirin tablet 325 mg (325 mg Oral Given 4/19/25 1413)     Medical Decision Making  84 y/o male is brought to the ED by his family via POV for evaluation of chest pain that started 2 hours ago while sitting in chair resting. Describes mid-sternal chest pain as stabbing type pain, rates pain max 10, improved to 6/10. No radiation of pain. Pain worse with palpation of chest per patient. Denies SOB, dizziness, nausea or vomiting. Patient has increased activity at home since weather has become warmer. He used his weed eater first of week and cut yard on riding  on Thursday. Denies any injury that he is aware.    HEART Score is 6, but troponin    Problems Addressed:  Chest pain: acute illness or injury     Details: EKG changed from previous. Troponin(s): 185.1 and 202.4 which are less than baseline. Chest pain resolved while in the ED just after arrival to the ED.  Chest pain is reproducible with palpation. Chest  pain most likely musculoskeletal in nature related to weed eating/cutting grass earlier this week. He will take Tylenol as directed for pain if needed.To call and schedule follow up with PCP and Dr. Villaseñor. Reviewed discharge instructions with patient. Detailed strict return precautions. He agreed to treatment plan and verbalized understanding.     Amount and/or Complexity of Data Reviewed  External Data Reviewed: notes.     Details:    ECHO - Results for orders placed during the hospital encounter of 05/06/24     Echo     Interpretation Summary  ·  Left Ventricle: The left ventricle is normal in size. Mildly increased wall thickness. There is concentric remodeling. Moderate global hypokinesis present. There is moderately reduced systolic function with a visually estimated ejection fraction of 35 - 40%. Biplane (2D) method of discs ejection fraction is 37%. Global longitudinal strain is -8.8%. Global longitudinal strain is reduced. Grade III diastolic dysfunction.  ·  Right Ventricle: Mild right ventricular enlargement. Systolic function is mildly reduced.  ·  Left Atrium: Left atrium is mildly dilated.  ·  Right Atrium: Right atrium is moderately dilated.  ·  Aortic Valve: The aortic valve is a trileaflet valve. Mildly calcified cusps. There is mild aortic regurgitation with an eccentrically directed jet.  ·  Mitral Valve: There is no stenosis. The mean pressure gradient across the mitral valve is 1 mmHg at a heart rate of  bpm. There is mild to moderate regurgitation with an eccentric jet.  ·  Tricuspid Valve: There is moderate regurgitation with an eccentrically directed jet.  ·  Pulmonary Artery: The estimated pulmonary artery systolic pressure is 62 mmHg.  ·  IVC/SVC: Elevated venous pressure at 15 mmHg.        CATH - Results for orders placed during the hospital encounter of 06/02/24     Cardiac catheterization     Conclusion  ·  The 1st Mrg lesion was 100% stenosed.  ·  The 2nd Diag lesion was 100%  stenosed.  ·  The 1st Diag lesion was 70% stenosed.  ·  The left ventricular end diastolic pressure was normal.  ·  The pre-procedure left ventricular end diastolic pressure was 7.  ·  The estimated blood loss was none.  ·  There was two vessel coronary artery disease.     Two vessel CAD with branch vessel disease  LM - moderate size, with mild luminal irregularities  LAD - small size, patent stent in the prox-mid segment. The mid and distal LAD have moderate diffuse disease with negative remodeling. There is a large D1 with 70% ostial-prox stenosis. There is a small D2 with ostial ; there is an atretic SVG graft to the second diagonal with severe diffuse disease  Lcx - Moderate size with mild luminal irregularities. OM1 is a large branch that is 100% occluded (); it fills via robust R-L collaterals.  RCA - Dominant vessel with mild diffuse disease. The R-PDA gives off collateral to the OM1.  Atretic SVG to small D2 with severe disease at the anastomosis  Normal left sided filling pressures, LVEDP - 7mmHg     Plan:  Stop brillinta (shortness of breath); switch to Plavix  Stop chlorthalidone (CKD); and up titrate anti-anginal therapy - start metop 25mg bid. Consider ranexa     The procedure log was documented by Documenter: Cindy Rasmussen and verified by Philip Torres MD.     Date: 6/4/2024  Time: 3:36 PM        Stress - Results for orders placed during the hospital encounter of 07/10/21     Nuclear Stress Test     Interpretation Summary  ·  The EKG portion of this study is positive for ischemia.  ·  The patient reported chest pain during the stress test.  ·  During stress, occasional PVCs are noted.       Labs: ordered. Decision-making details documented in ED Course.     Details: Troponin is 185.1, baseline Troponin 226.9  BNP 5,516, baseline 5,058  Radiology: ordered. Decision-making details documented in ED Course.     Details: CXR  ECG/medicine tests: ordered. Decision-making details documented  in ED Course.     Details: EKG today revealed NO STEMI and no significant changes from 2024    Risk  OTC drugs.      Additional MDM:   Heart Score:    History:          Slightly suspicious.  ECG:             Normal  Age:               >65 years  Risk factors: >= 3 risk factors or history of atherosclerotic disease  Troponin:       >2x normal limit  Heart Score = 6                                       Clinical Impression:   Final diagnoses:  [R07.9] Chest pain        ED Disposition Condition    Discharge Good          ED Prescriptions    None       Follow-up Information       Follow up With Specialties Details Why Contact Info    Swati Vicente NP Family Medicine In 2 days schedule appointment to follow up from your ER visit [id number] [address] [place] [address]  [phone]      Mahad Villaseñor DO Interventional Cardiology, Cardiology Call in 2 days call to schedule appointment to follow up from your ER visit [address] [place] [address] [place] [address]  [phone]                 [1]   Social History  Tobacco Use    Smoking status: Former     Current packs/day: 0.00     Average packs/day: 0.5 packs/day for 15.0 years (7.5 ttl pk-yrs)     Types: Cigarettes     Start date: 1973     Quit date: 1988     Years since quittin.8     Passive exposure: Never    Smokeless tobacco: Former     Types: Chew     Quit date:    Substance Use Topics    Alcohol use: Never    Drug use: Never        Nu [place], REENA  25 1744

## 2025-04-19 NOTE — ED TRIAGE NOTES
States he started hurting in his chest about 2 hours while resting at home.  States no nausea, sweating or SOB

## 2025-04-19 NOTE — DISCHARGE INSTRUCTIONS
-Take routine medications as directed  -Schedule appointment to follow up from your ER visit  -Monitor blood pressure at home. Follow up with YAZAN Vicente regarding your blood pressure.  -Take Tylenol as directed over the counter for pain if needed.

## 2025-04-20 LAB
OHS QRS DURATION: 128 MS
OHS QTC CALCULATION: 454 MS

## 2025-04-21 ENCOUNTER — TELEPHONE (OUTPATIENT)
Dept: FAMILY MEDICINE | Facility: CLINIC | Age: 85
End: 2025-04-21
Payer: MEDICARE

## 2025-04-21 NOTE — TELEPHONE ENCOUNTER
----- Message from Nu sent at 4/21/2025  8:44 AM CDT -----  Daughter Jennifer calling from [phone]. Pt was in ER on Sat and told to follow up today. He is sick and unable to come and Radha needs to know what to do. Who Called: Jennifer for [facility] is requesting assistance/information from provider's office.Patient's Preferred Phone Number on File: [phone] Best Call Back Number, if different:Additional Information:

## 2025-04-21 NOTE — TELEPHONE ENCOUNTER
I called and left a message for patient's daughter to call us back or to reschedule the patient an appointment some time this week.

## 2025-04-21 NOTE — TELEPHONE ENCOUNTER
Received notification from KATY Culp RN with [facility]:   PATIENT WENT TO [facility] ON SATURDAY FOR CHEST PAIN. PATIENT STATES HE DIDNT FEEL BAD BUT KEPT HAVING SHARP INTERMITTENT CHEST PAIN. BASED OFF OK PAPERWORK, HE WASNT KEPT AND DOESNT APPEAR TO OF HAD A HEART ATTACK.     PATIENTS WEIGHT BELOW PARAMETERS  LB. LAST WEEK, 205.     I TRIED TO MAKE A FOLLOWUP APPT BUT THEY DIDNT HAVE ANYTHING FOR 2 WEEKS. I TOLD HIM HE COULD BE A WALK-IN?     HR 66   /78     I have scheduled this patient for tomorrow at 4:40pm. Notified patient and family.

## 2025-04-22 ENCOUNTER — EXTERNAL HOME HEALTH (OUTPATIENT)
Dept: HOME HEALTH SERVICES | Facility: HOSPITAL | Age: 85
End: 2025-04-22
Payer: MEDICARE

## 2025-04-22 ENCOUNTER — OFFICE VISIT (OUTPATIENT)
Dept: FAMILY MEDICINE | Facility: CLINIC | Age: 85
End: 2025-04-22
Payer: MEDICARE

## 2025-04-22 DIAGNOSIS — R10.9 ABDOMINAL PAIN, UNSPECIFIED ABDOMINAL LOCATION: ICD-10-CM

## 2025-04-22 DIAGNOSIS — I10 ESSENTIAL HYPERTENSION: Primary | ICD-10-CM

## 2025-04-22 DIAGNOSIS — I25.119 CORONARY ARTERY DISEASE INVOLVING NATIVE HEART WITH ANGINA PECTORIS, UNSPECIFIED VESSEL OR LESION TYPE: ICD-10-CM

## 2025-04-22 DIAGNOSIS — I50.23 ACUTE ON CHRONIC SYSTOLIC HEART FAILURE: ICD-10-CM

## 2025-04-22 PROCEDURE — 99213 OFFICE O/P EST LOW 20 MIN: CPT | Mod: ,,, | Performed by: NURSE PRACTITIONER

## 2025-04-22 PROCEDURE — 3079F DIAST BP 80-89 MM HG: CPT | Mod: ,,, | Performed by: NURSE PRACTITIONER

## 2025-04-22 PROCEDURE — 1101F PT FALLS ASSESS-DOCD LE1/YR: CPT | Mod: ,,, | Performed by: NURSE PRACTITIONER

## 2025-04-22 PROCEDURE — 3288F FALL RISK ASSESSMENT DOCD: CPT | Mod: ,,, | Performed by: NURSE PRACTITIONER

## 2025-04-22 PROCEDURE — 1126F AMNT PAIN NOTED NONE PRSNT: CPT | Mod: ,,, | Performed by: NURSE PRACTITIONER

## 2025-04-22 PROCEDURE — 3075F SYST BP GE 130 - 139MM HG: CPT | Mod: ,,, | Performed by: NURSE PRACTITIONER

## 2025-04-23 ENCOUNTER — HOSPITAL ENCOUNTER (OUTPATIENT)
Dept: RADIOLOGY | Facility: HOSPITAL | Age: 85
Discharge: HOME | End: 2025-04-23
Attending: NURSE PRACTITIONER
Payer: MEDICARE

## 2025-04-23 VITALS
HEART RATE: 62 BPM | RESPIRATION RATE: 17 BRPM | OXYGEN SATURATION: 99 % | DIASTOLIC BLOOD PRESSURE: 84 MMHG | BODY MASS INDEX: 26.11 KG/M2 | TEMPERATURE: 99 F | WEIGHT: 210 LBS | SYSTOLIC BLOOD PRESSURE: 138 MMHG | HEIGHT: 75 IN

## 2025-04-23 DIAGNOSIS — R10.9 ABDOMINAL PAIN, UNSPECIFIED ABDOMINAL LOCATION: ICD-10-CM

## 2025-04-23 PROCEDURE — 74176 CT ABD & PELVIS W/O CONTRAST: CPT | Mod: TC

## 2025-04-23 NOTE — PROGRESS NOTES
Swati Vicente NP   [facility]  [address]  [place]  [address]      PATIENT NAME: Trung Barboza  : 1940  DATE: 25  MRN: [id number]      Billing Provider: Swati Vicente NP  Level of Service: [facility] OFFICE/OUTPT VISIT, EST, LEVL III, 20-29 MIN  Patient PCP Information       Provider PCP Type    Swati Vicente NP General            Reason for Visit / Chief Complaint: ER Follow UP (Pt is an 86 yo male who presents to the clinic for ER follow up. Pt was treated at [facility] on 2025 for chest pain and was diagnosed with muscle spasm in his chest. He denies any chest pain since that day. )         History of Present Illness / Problem Focused Workflow     86 yo male who presents to the clinic for ER follow up. Pt was treated at [facility] on 2025 for chest pain and was diagnosed with muscle spasm in his chest. He denies any chest pain since that day. However, his enzymes were very elevated in ER. I feel it would be beneficial for him to follow up with Cardiology. Contacted Cardiology clinic and appt made for  BIANCA Almodovar NP on Thursday. Notified patient/family of appt date and time.   He continues to complain of abdominal pain and states he cannot really eat anything except yogurt. He has lost 7lbs since last visit. He is scheduled for CT abdomen tomorrow. He is also scheduled for follow up with GI in a couple weeks.         Review of Systems     @Review of Systems   Constitutional:  Negative for activity change, appetite change, fatigue and fever.   HENT:  Negative for nasal congestion, ear pain, rhinorrhea, sinus pressure/congestion and sore throat.    Eyes:  Negative for pain, redness, visual disturbance and eye dryness.   Respiratory:  Negative for cough and shortness of breath.    Cardiovascular:  Negative for chest pain and leg swelling.   Gastrointestinal:  Positive for abdominal pain. Negative for abdominal distention, constipation and diarrhea.   Endocrine: Negative for  cold intolerance, heat intolerance and polyuria.   Genitourinary:  Negative for bladder incontinence, dysuria, frequency and urgency.   Musculoskeletal:  Negative for arthralgias, gait problem and myalgias.   Integumentary:  Negative for color change, rash and wound.   Allergic/Immunologic: Negative for environmental allergies and food allergies.   Neurological:  Negative for dizziness, weakness, light-headedness and headaches.   Psychiatric/Behavioral:  Negative for behavioral problems and sleep disturbance.        Medical / Social / Family History     Past Medical History:   Diagnosis Date    Aortic regurgitation     BPH with urinary obstruction     Chronic kidney disease, stage 3b     GR 44     creat 1.8    Coronary artery disease     Essential hypertension 07/10/2021    Gastroesophageal reflux disease 09/15/2022    Gout, arthritis     Myocardial infarction     PVD (peripheral vascular disease) 02/01/2024    TIA (transient ischemic attack)        Past Surgical History:   Procedure Laterality Date    ANGIOGRAM, CORONARY, WITH LEFT HEART CATHETERIZATION N/A 12/31/2021    Procedure: ANGIOGRAM,CORONARY,WITH LEFT HEART CATHETERIZATION;  Surgeon: Mahad Villaseñor DO;  Location: [facility] CATH LAB;  Service: Cardiology;  Laterality: N/A;    ANGIOGRAM, CORONARY, WITH LEFT HEART CATHETERIZATION N/A 5/7/2024    Procedure: Angiogram, Coronary, with Left Heart Cath;  Surgeon: Mahad Villaseñor DO;  Location: [facility] CATH LAB;  Service: Cardiology;  Laterality: N/A;    BIOPSY WITH TRANSRECTAL ULTRASOUND (TRUS) GUIDANCE Bilateral 03/07/2018    CHOLECYSTECTOMY      CORONARY ANGIOGRAPHY INCLUDING BYPASS GRAFTS WITH CATHETERIZATION OF LEFT HEART N/A 6/4/2024    Procedure: ANGIOGRAM, CORONARY, INCLUDING BYPASS GRAFT, WITH LEFT HEART CATHETERIZATION;  Surgeon: Philip Torres MD;  Location: [facility] CATH LAB;  Service: Cardiology;  Laterality: N/A;    CORONARY ARTERY BYPASS GRAFT  01/07/2022    [facility]-Dr. Qureshi     LEFT HEART CATHETERIZATION Left 7/13/2021    Procedure: Left heart cath;  Surgeon: Philip Torres MD;  Location: [facility] CATH LAB;  Service: Cardiology;  Laterality: Left;    PERCUTANEOUS CORONARY INTERVENTION, ARTERY N/A 5/7/2024    Procedure: Percutaneous coronary intervention;  Surgeon: Mahad Villaseñor DO;  Location: [facility] CATH LAB;  Service: Cardiology;  Laterality: N/A;       Medications and Allergies     Medications  Outpatient Medications Marked as Taking for the 4/22/25 encounter (Office Visit) with Swati Vicente NP   Medication Sig Dispense Refill    albuterol (PROVENTIL) 2.5 mg /3 mL (0.083 %) nebulizer solution Take 3 mLs (2.5 mg total) by nebulization every 6 (six) hours as needed for Wheezing. Rescue 100 each 0    aspirin (ECOTRIN) 81 MG EC tablet Take 1 tablet (81 mg total) by mouth once daily. 30 tablet 0    atorvastatin (LIPITOR) 80 MG tablet Take 80 mg by mouth once daily.      baclofen (LIORESAL) 10 MG tablet Take one tab 3 times daily as needed for muscle spasms/back pain. 60 tablet 0    clopidogreL (PLAVIX) 75 mg tablet Take 1 tablet (75 mg total) by mouth once daily. 90 tablet 6    colchicine (COLCRYS) 0.6 mg tablet Take 2 tablet at first sign of gout flare, then take 0.6 mg ( 1 tablet an hour later).  Then take 1 tablet once or twice daily until flare resolves. 60 tablet 1    furosemide (LASIX) 20 MG tablet Take 1 tablet (20 mg total) by mouth once daily. 90 tablet 3    hydrALAZINE (APRESOLINE) 25 MG tablet Take 1.5 tablets (37.5 mg total) by mouth 2 (two) times a day.      isosorbide mononitrate (IMDUR) 30 MG 24 hr tablet Take 1 tablet (30 mg total) by mouth once daily. 30 tablet 11    metoprolol succinate (TOPROL-XL) 50 MG 24 hr tablet Take 1 tablet (50 mg total) by mouth once daily. 90 tablet 3    multivitamin with minerals tablet Take 1 tablet by mouth once daily.      nitroGLYCERIN (NITROSTAT) 0.4 MG SL tablet Place 1 tablet (0.4 mg total) under the tongue every 5  (five) minutes as needed for Chest pain. 25 tablet 2    spironolactone (ALDACTONE) 25 MG tablet Take 1 tablet (25 mg total) by mouth once daily. 30 tablet 11       Allergies  Review of patient's allergies indicates:   Allergen Reactions    Lisinopril Swelling and Anaphylaxis       Physical Examination     Vitals:    04/23/25 1142   BP: 138/84   Pulse: 62   Resp: 17   Temp: 98.7 °F (37.1 °C)     Physical Exam  Vitals and nursing note reviewed.   HENT:      Head: Normocephalic.      Nose: Nose normal.      Mouth/Throat:      Mouth: Mucous membranes are moist.      Pharynx: Oropharynx is clear. No posterior oropharyngeal erythema.   Eyes:      Conjunctiva/sclera: Conjunctivae normal.   Cardiovascular:      Rate and Rhythm: Normal rate and regular rhythm.      Pulses: Normal pulses.      Heart sounds: Normal heart sounds.   Pulmonary:      Effort: Pulmonary effort is normal.      Breath sounds: Normal breath sounds.   Abdominal:      General: Abdomen is flat. Bowel sounds are normal. There is no distension.      Palpations: Abdomen is soft.      Tenderness: There is abdominal tenderness in the epigastric area.   Musculoskeletal:         General: No swelling or tenderness. Normal range of motion.      Cervical back: Normal range of motion.      Right lower leg: No edema.      Left lower leg: No edema.   Skin:     General: Skin is warm and dry.      Capillary Refill: Capillary refill takes less than 2 seconds.   Neurological:      Mental Status: He is alert. Mental status is at baseline.   Psychiatric:         Mood and Affect: Mood normal.         Behavior: Behavior normal.               Lab Results   Component Value Date    WBC 6.11 04/19/2025    HGB 13.1 (L) 04/19/2025    HCT 39.3 (L) 04/19/2025    MCV 98.3 (H) 04/19/2025     04/19/2025        CMP  Sodium   Date Value Ref Range Status   04/19/2025 140 136 - 145 mmol/L Final     Potassium   Date Value Ref Range Status   04/19/2025 4.1 3.5 - 5.1 mmol/L Final      Chloride   Date Value Ref Range Status   04/19/2025 107 98 - 107 mmol/L Final     CO2   Date Value Ref Range Status   04/19/2025 20 (L) 23 - 31 mmol/L Final     Glucose   Date Value Ref Range Status   04/19/2025 105 82 - 115 mg/dL Final     BUN   Date Value Ref Range Status   04/19/2025 23 8 - 26 mg/dL Final     Creatinine   Date Value Ref Range Status   04/19/2025 1.67 (H) 0.72 - 1.25 mg/dL Final     Calcium   Date Value Ref Range Status   04/19/2025 9.4 8.8 - 10.0 mg/dL Final     Total Protein   Date Value Ref Range Status   04/19/2025 7.7 (H) 5.8 - 7.6 g/dL Final     Albumin   Date Value Ref Range Status   04/19/2025 3.2 (L) 3.4 - 4.8 g/dL Final     Bilirubin, Total   Date Value Ref Range Status   04/19/2025 3.4 (H) <=1.5 mg/dL Final     Alk Phos   Date Value Ref Range Status   04/19/2025 321 (H) 40 - 150 U/L Final     AST   Date Value Ref Range Status   04/19/2025 51 (H) 11 - 45 U/L Final     ALT   Date Value Ref Range Status   04/19/2025 21 <=55 U/L Final     Anion Gap   Date Value Ref Range Status   04/19/2025 17 (H) 7 - 16 mmol/L Final     eGFR   Date Value Ref Range Status   04/19/2025 40 (L) >=60 mL/min/1.73m2 Final     Comment:     Estimated GFR calculated using the CKD-EPI creatinine (2021) equation.     Procedures   Assessment and Plan (including Health Maintenance)   :    Plan:     Problem List Items Addressed This Visit       Essential hypertension - Primary    Current Assessment & Plan   BP well controlled at today's visit. Continue current meds.          Acute on chronic systolic heart failure    Coronary artery disease involving native heart     Other Visit Diagnoses         Abdominal pain, unspecified abdominal location                Health Maintenance Topics with due status: Not Due       Topic Last Completion Date    TETANUS VACCINE 12/11/2018    Lipid Panel 09/10/2024       Future Appointments   Date Time Provider Department Center   5/6/2025  8:00 AM [place] [address] HEART [facility]     5/6/2025  9:00 AM [facility] STRESS [facility] CD[facility]   5/6/2025 10:00 AM R[facility] FN NM3 HEART RFNDH NM [facility]   5/8/2025 12:40 PM Brenda Gomes, NP RASCC GASTR [facility]   7/21/2025 11:00 AM Mahad Villaseñor, DO RMOBC CARD [facility]   5/7/2026 10:00 AM [facility]        Health Maintenance Due   Topic Date Due    Shingles Vaccine (1 of 2) Never done    RSV Vaccine (Age 60+ and Pregnant patients) (1 - 1-dose 75+ series) Never done    COVID-19 Vaccine (5 - 2024-25 season) 09/01/2024          Signature:  Swati Vicente NP  [facility]  [address] [place]  [address]    Date of encounter: 4/22/25

## 2025-04-24 ENCOUNTER — OFFICE VISIT (OUTPATIENT)
Dept: CARDIOLOGY | Facility: CLINIC | Age: 85
End: 2025-04-24
Payer: MEDICARE

## 2025-04-24 VITALS
DIASTOLIC BLOOD PRESSURE: 70 MMHG | WEIGHT: 209 LBS | SYSTOLIC BLOOD PRESSURE: 130 MMHG | BODY MASS INDEX: 25.99 KG/M2 | HEART RATE: 54 BPM | HEIGHT: 75 IN | OXYGEN SATURATION: 98 %

## 2025-04-24 DIAGNOSIS — R07.9 CHEST PAIN, UNSPECIFIED TYPE: ICD-10-CM

## 2025-04-24 DIAGNOSIS — I25.10 CORONARY ARTERY DISEASE INVOLVING NATIVE HEART, UNSPECIFIED VESSEL OR LESION TYPE, UNSPECIFIED WHETHER ANGINA PRESENT: Primary | ICD-10-CM

## 2025-04-24 DIAGNOSIS — I10 ESSENTIAL HYPERTENSION: ICD-10-CM

## 2025-04-24 DIAGNOSIS — I50.23 ACUTE ON CHRONIC SYSTOLIC HEART FAILURE: ICD-10-CM

## 2025-04-24 PROCEDURE — 99215 OFFICE O/P EST HI 40 MIN: CPT | Mod: PBBFAC | Performed by: NURSE PRACTITIONER

## 2025-04-24 PROCEDURE — 1101F PT FALLS ASSESS-DOCD LE1/YR: CPT | Mod: CPTII,,, | Performed by: NURSE PRACTITIONER

## 2025-04-24 PROCEDURE — 99999 PR PBB SHADOW E&M-EST. PATIENT-LVL V: CPT | Mod: PBBFAC,,, | Performed by: NURSE PRACTITIONER

## 2025-04-24 PROCEDURE — 3075F SYST BP GE 130 - 139MM HG: CPT | Mod: CPTII,,, | Performed by: NURSE PRACTITIONER

## 2025-04-24 PROCEDURE — 1126F AMNT PAIN NOTED NONE PRSNT: CPT | Mod: CPTII,,, | Performed by: NURSE PRACTITIONER

## 2025-04-24 PROCEDURE — 3078F DIAST BP <80 MM HG: CPT | Mod: CPTII,,, | Performed by: NURSE PRACTITIONER

## 2025-04-24 PROCEDURE — 1159F MED LIST DOCD IN RCRD: CPT | Mod: CPTII,,, | Performed by: NURSE PRACTITIONER

## 2025-04-24 PROCEDURE — 99214 OFFICE O/P EST MOD 30 MIN: CPT | Mod: S$PBB,,, | Performed by: NURSE PRACTITIONER

## 2025-04-24 PROCEDURE — 3288F FALL RISK ASSESSMENT DOCD: CPT | Mod: CPTII,,, | Performed by: NURSE PRACTITIONER

## 2025-04-24 NOTE — ASSESSMENT & PLAN NOTE
S/p CABG in 2022 and subsequent stents (last in 5/2024)  Now with c/o anginal equivalent chest pain  EKG unremarkable  Troponin from ER visit elevated and flat  Lexiscan as outpatient - pt walks with a cane, not a treadmill candidate  Continue ASA, plavix, lipitor, metoprolol, IMDUR  SL NTG prn chest pain

## 2025-04-24 NOTE — PROGRESS NOTES
PCP: Swati Vicente NP    Referring Provider:     Subjective:   Trung Barboza is a 85 y.o. male with hx of MI, CAD s/p CABG 2022 with subsequent stents, TIA, CKD, HTN, HLD, gout, and GERD who presents for follow up for NSTEMI.     4/24/25 - patient had a recent ER visit (04/19/2025) with complaint of chest pain that occurred at rest, rated 10/10 on pain scale.  Patient described the chest pain is midsternal, stabbing and sore to touch.  Patient was given 325 mg aspirin.  Chest pain improved to 6/10.  EKG was unremarkable.  Troponins were elevated and flat at 185, 202 (troponin was 246 on 11/13/2024).  Patient was discharged home to follow-up with cardiology.    Today, patient states chest pain has improved, now rates chest pain 3/10 and is no longer sore to touch.  Patient is active with yard work without chest pain.  However, prior to his stent 05/2024, patient had complaint of acute onset, sharp, midsternal chest pain. He denies any associated symptoms.         1/8/25 - He reports nausea associated with   taking his pills on an empty stomach has resolved. He reports chest tightness has resolved, has not required sl ntg, is not associated with exertion. He is less active due to inclement weather,  No new complaints.intermittently over 3 days last week.  He is active with yard work and minimal walking around his house and to the mailbox. He denies any symptoms with activity.     11/19/24 - increased lower extremity edema w/ mils sob. No chest pain, palpations or orthopnea reported.     8/7/24 - He reports being nauseated that he attributes to his medications. He reports feeling nauseated over the last couple months. He daughter states that the patient has a poor appetite and thinks he may be taking his pills on an empty stomach. He reports having chest tightness intermittently over 3 days last week. The pain was substernal, nonradiating, 6/10, improved with rubbing his chest. He also reports associated shortness  of breath. Since the tightness improved he has since had some tenderness on his chest with palpation in the same area. The patient denies palpitations, leg pain, leg swelling,      He is active with yard work and minimal walking around his house and to the mailbox. He denies any symptoms with activity.     6/19/24 - Pt was hospitalized at [facility] 5/6/24 ([facility] s/p PCI to prox LAD) in 06/03/2024 with unsuccessful PCI of D1. EF  35-40%.   Today the patient denies lower extremity edema, palpitations, chest pain or shortness of breath.  No reported sublingual nitro use since discharge    5/29/25 - Pt was hospitalized here at [facility] 5/6/24 for NSTEMI and underwent [facility] s/p PCI to prox LAD with recommendation for staged PCI of D1. Echo showed EF of 35-40%. He was discharged on DAPT with ASA and Brilinta. He was started on BIDIL TID for afterload reduction due to CKD (no ACE/ARB, ARNI, MRA or SGLT2i). He was also discharged on statin, Toprol XL, norvasc, lasix and potassium chloride.     Today, pt denies any chest pain or SOB on exam. He states he had mild chest pain a few days ago but it was nothing like the chest pain he had prior to his last hospitalization. He has c/o SOB with exertion. Denies edema, orthopnea or PND. BP and HR are well controlled today. Of note, his wife passed away in January and family states he has declined since that time.         Fhx: Mother - heart disease, HTN  Shx: Former smoker (quit 1988), no etoh or drug use    EKG   Results for orders placed or performed during the hospital encounter of 04/19/25   EKG 12-lead    Collection Time: 04/19/25  2:03 PM   Result Value Ref Range    QRS Duration 128 ms    [facility] QTC Calculation 454 ms    Narrative    Test Reason : R07.9,    Vent. Rate :  69 BPM     Atrial Rate :  69 BPM     P-R Int : 312 ms          QRS Dur : 128 ms      QT Int : 424 ms       P-R-T Axes :  77 270  76 degrees    QTcB Int : 454 ms    Sinus rhythm with marked sinus arrythmia with  1st degree A-V block  Right superior axis deviation  Nonspecific intraventricular block  Inferior infarct (cited on or before 06-Jun-2023)  Cannot rule out Anterior infarct (cited on or before 27-Sep-2022)  Abnormal ECG  When compared with ECG of 19-Nov-2024 09:54,  Questionable change in The axis  Confirmed by Mahad Villaseñor ([id number]) on 4/20/2025 10:47:45 AM    Referred By: AAAREFERRAL SELF           Confirmed By: Mahad Villaseñor     ECHO Results for orders placed during the hospital encounter of 05/06/24    Echo    Interpretation Summary    Left Ventricle: The left ventricle is normal in size. Mildly increased wall thickness. There is concentric remodeling. Moderate global hypokinesis present. There is moderately reduced systolic function with a visually estimated ejection fraction of 35 - 40%. Biplane (2D) method of discs ejection fraction is 37%. Global longitudinal strain is -8.8%. Global longitudinal strain is reduced. Grade III diastolic dysfunction.    Right Ventricle: Mild right ventricular enlargement. Systolic function is mildly reduced.    Left Atrium: Left atrium is mildly dilated.    Right Atrium: Right atrium is moderately dilated.    Aortic Valve: The aortic valve is a trileaflet valve. Mildly calcified cusps. There is mild aortic regurgitation with an eccentrically directed jet.    Mitral Valve: There is no stenosis. The mean pressure gradient across the mitral valve is 1 mmHg at a heart rate of  bpm. There is mild to moderate regurgitation with an eccentric jet.    Tricuspid Valve: There is moderate regurgitation with an eccentrically directed jet.    Pulmonary Artery: The estimated pulmonary artery systolic pressure is 62 mmHg.    IVC/SVC: Elevated venous pressure at 15 mmHg.    [facility] Results for orders placed during the hospital encounter of 06/02/24    Cardiac catheterization    Conclusion    The 1st Mrg lesion was 100% stenosed.    The 2nd Diag lesion was 100% stenosed.    The 1st Diag lesion was 70%  stenosed.    The left ventricular end diastolic pressure was normal.    The pre-procedure left ventricular end diastolic pressure was 7.    The estimated blood loss was none.    There was two vessel coronary artery disease.    Two vessel CAD with branch vessel disease  LM - moderate size, with mild luminal irregularities  LAD - small size, patent stent in the prox-mid segment. The mid and distal LAD have moderate diffuse disease with negative remodeling. There is a large D1 with 70% ostial-prox stenosis. There is a small D2 with ostial ; there is an atretic SVG graft to the second diagonal with severe diffuse disease  Lcx - Moderate size with mild luminal irregularities. OM1 is a large branch that is 100% occluded (); it fills via robust R-L collaterals.  RCA - Dominant vessel with mild diffuse disease. The R-PDA gives off collateral to the OM1.  Atretic SVG to small D2 with severe disease at the anastomosis  Normal left sided filling pressures, LVEDP - 7mmHg    Plan:  Stop brillinta (shortness of breath); switch to Plavix  Stop chlorthalidone (CKD); and up titrate anti-anginal therapy - start metop 25mg bid. Consider ranexa    The procedure log was documented by Documenter: Cindy Rasmussen and verified by Philip Torres MD.    Date: 6/4/2024  Time: 3:36 PM        Lab Results   Component Value Date     04/19/2025    K 4.1 04/19/2025     04/19/2025    CO2 20 (L) 04/19/2025    BUN 23 04/19/2025    CREATININE 1.67 (H) 04/19/2025    CALCIUM 9.4 04/19/2025    ANIONGAP 17 (H) 04/19/2025    ESTGFRAFRICA 59 (L) 07/25/2021    EGFRNONAA 25 (L) 02/28/2022       Lab Results   Component Value Date    CHOL 116 09/10/2024    CHOL 197 03/08/2024    CHOL 225 (H) 02/09/2023     Lab Results   Component Value Date    HDL 61 (H) 09/10/2024    HDL 73 (H) 03/08/2024    HDL 61 (H) 02/09/2023     Lab Results   Component Value Date    LDLCALC 43 09/10/2024    LDLCALC 108 03/08/2024    LDLCALC 120 02/09/2023     Lab  Results   Component Value Date    TRIG 61 09/10/2024    TRIG 80 03/08/2024    TRIG 220 (H) 02/09/2023     Lab Results   Component Value Date    CHOLHDL 1.9 09/10/2024    CHOLHDL 2.7 03/08/2024    CHOLHDL 3.7 02/09/2023       Lab Results   Component Value Date    WBC 6.11 04/19/2025    HGB 13.1 (L) 04/19/2025    HCT 39.3 (L) 04/19/2025    MCV 98.3 (H) 04/19/2025     04/19/2025           Current Outpatient Medications:     aspirin (ECOTRIN) 81 MG EC tablet, Take 1 tablet (81 mg total) by mouth once daily., Disp: 30 tablet, Rfl: 0    atorvastatin (LIPITOR) 80 MG tablet, Take 80 mg by mouth once daily., Disp: , Rfl:     baclofen (LIORESAL) 10 MG tablet, Take one tab 3 times daily as needed for muscle spasms/back pain., Disp: 60 tablet, Rfl: 0    clopidogreL (PLAVIX) 75 mg tablet, Take 1 tablet (75 mg total) by mouth once daily., Disp: 90 tablet, Rfl: 6    colchicine (COLCRYS) 0.6 mg tablet, Take 2 tablet at first sign of gout flare, then take 0.6 mg ( 1 tablet an hour later).  Then take 1 tablet once or twice daily until flare resolves., Disp: 60 tablet, Rfl: 1    furosemide (LASIX) 20 MG tablet, Take 1 tablet (20 mg total) by mouth once daily., Disp: 90 tablet, Rfl: 3    hydrALAZINE (APRESOLINE) 25 MG tablet, Take 1.5 tablets (37.5 mg total) by mouth 2 (two) times a day., Disp: , Rfl:     isosorbide mononitrate (IMDUR) 30 MG 24 hr tablet, Take 1 tablet (30 mg total) by mouth once daily., Disp: 30 tablet, Rfl: 11    metoprolol succinate (TOPROL-XL) 50 MG 24 hr tablet, Take 1 tablet (50 mg total) by mouth once daily., Disp: 90 tablet, Rfl: 3    multivitamin with minerals tablet, Take 1 tablet by mouth once daily., Disp: , Rfl:     nitroGLYCERIN (NITROSTAT) 0.4 MG SL tablet, Place 1 tablet (0.4 mg total) under the tongue every 5 (five) minutes as needed for Chest pain., Disp: 25 tablet, Rfl: 2    spironolactone (ALDACTONE) 25 MG tablet, Take 1 tablet (25 mg total) by mouth once daily., Disp: 30 tablet, Rfl: 11     "albuterol (PROVENTIL) 2.5 mg /3 mL (0.083 %) nebulizer solution, Take 3 mLs (2.5 mg total) by nebulization every 6 (six) hours as needed for Wheezing. Rescue (Patient not taking: Reported on 4/24/2025), Disp: 100 each, Rfl: 0    Review of Systems   Constitutional:  Negative for chills, diaphoresis, fever and malaise/fatigue.   Respiratory:  Negative for cough and shortness of breath.    Cardiovascular:  Positive for chest pain. Negative for palpitations, orthopnea, leg swelling and PND.        SOB on exertion   Gastrointestinal:  Negative for abdominal pain, nausea and vomiting.   Musculoskeletal:  Negative for falls.   Neurological:  Negative for focal weakness and weakness.         Objective:   /70 (BP Location: Left arm, Patient Position: Sitting)   Pulse (!) 54   Ht 6' 3" (1.905 m)   Wt 94.8 kg (209 lb)   SpO2 98%   BMI 26.12 kg/m²     Physical Exam  Constitutional:       General: He is not in acute distress.     Appearance: Normal appearance. He is not ill-appearing.   Cardiovascular:      Rate and Rhythm: Normal rate and regular rhythm.      Pulses: Normal pulses.      Heart sounds: Normal heart sounds.   Pulmonary:      Effort: Pulmonary effort is normal.      Breath sounds: Normal breath sounds.   Musculoskeletal:      Cervical back: Neck supple. No rigidity.      Right lower leg: No edema.      Left lower leg: No edema.   Skin:     General: Skin is warm and dry.   Neurological:      Mental Status: He is alert and oriented to person, place, and time.   Psychiatric:         Mood and Affect: Mood normal.         Behavior: Behavior normal.           Assessment:     1. Coronary artery disease involving native heart, unspecified vessel or lesion type, unspecified whether angina present  NM Myocardial Perfusion Spect Multi Pharmacologic    Nuclear Stress Test      2. Chest pain, unspecified type  NM Myocardial Perfusion Spect Multi Pharmacologic    Nuclear Stress Test      3. Essential hypertension      "   4. Acute on chronic systolic heart failure                Plan:   Essential hypertension  Currently controlled    Acute on chronic systolic heart failure  EF 35-40%  Euvolemic  Continue lasix  GDMT: Toprol XL 50mg daily, spironolactone 25mg daily  - not on ACE/ARB/ARNI due to CKD  Also on hydralazine 37.5mg bid and IMDUR 30mg daily    Coronary artery disease involving native heart  S/p CABG in 2022 and subsequent stents (last in 5/2024)  Now with c/o anginal equivalent chest pain  EKG unremarkable  Troponin from ER visit elevated and flat  Lexiscan as outpatient - pt walks with a cane, not a treadmill candidate  Continue ASA, plavix, lipitor, metoprolol, IMDUR  SL NTG prn chest pain      We will call with the stress test results  Follow up with Dr. Villaseñor in 3 months, sooner if needed

## 2025-04-24 NOTE — ASSESSMENT & PLAN NOTE
EF 35-40%  Euvolemic  Continue lasix  GDMT: Toprol XL 50mg daily, spironolactone 25mg daily  - not on ACE/ARB/ARNI due to CKD  Also on hydralazine 37.5mg bid and IMDUR 30mg daily

## 2025-04-24 NOTE — PATIENT INSTRUCTIONS
Lexiscan NM stress test as outpatient  We will call you with the results  Follow up with Dr. Villaseñor in 3 months

## 2025-04-30 ENCOUNTER — EXTERNAL CHRONIC CARE MANAGEMENT (OUTPATIENT)
Dept: FAMILY MEDICINE | Facility: CLINIC | Age: 85
End: 2025-04-30
Payer: MEDICARE

## 2025-04-30 PROCEDURE — 99439 CHRNC CARE MGMT STAF EA ADDL: CPT | Mod: ,,, | Performed by: NURSE PRACTITIONER

## 2025-04-30 PROCEDURE — 99490 CHRNC CARE MGMT STAFF 1ST 20: CPT | Mod: ,,, | Performed by: NURSE PRACTITIONER

## 2025-04-30 NOTE — PROGRESS NOTES
CT Abdomen reviewed and showed so small bilat pleural effusions which could suggest fluid buildup. His liver showed fatty liver disease. This can be improved by following low fat/low cholesterol diet. His pancreas showed mild atrophy which is normal at his age. No evidence of aneurysm.   Absence of gallbladder. Appendix normal. No bowel wall thickening or inflammatory change.   There was a small cyst on his right kidney which may have been there for some time. We will continue to monitor this and make sure it does not grow.   His bladder walls were thickened which indicates he may have had chronic issues with bladder outlet obstruction. Prostate is enlarged. There is a fluid filled pouch to the side of the bladder that is a questionable diverticulum.     I have contacted patient's daughter and discussed findings with her. Encouraged them to keep appt with GI on 5/8/25. She verbalized understanding.

## 2025-04-30 NOTE — PROGRESS NOTES
"  Trung Barboza presented for a follow-up Medicare AWV today. The following components were reviewed and updated:    Medical history  Family History  Social history  Allergies and Current Medications  Health Risk Assessment  Health Maintenance  Care Team    **See Completed Assessments for Annual Wellness visit with in the encounter summary    The following assessments were completed:  Depression Screening  Cognitive function Screening  Timed Get Up Test  Whisper Test      Opioid documentation:      Patient does not have a current opioid prescription.          Vitals:    05/02/25 1001   BP: 128/74   BP Location: Left arm   Patient Position: Sitting   Pulse: 76   Resp: 20   Temp: 98.4 °F (36.9 °C)   TempSrc: Oral   SpO2: 99%   Weight: 92.1 kg (203 lb)   Height: 6' 1" (1.854 m)     Body mass index is 26.78 kg/m².       Physical Exam  Vitals and nursing note reviewed.   Constitutional:       Appearance: Normal appearance.   HENT:      Head: Normocephalic and atraumatic.      Nose: Nose normal.      Mouth/Throat:      Mouth: Mucous membranes are moist.      Pharynx: Oropharynx is clear.   Eyes:      Extraocular Movements: Extraocular movements intact.      Conjunctiva/sclera: Conjunctivae normal.      Pupils: Pupils are equal, round, and reactive to light.   Cardiovascular:      Rate and Rhythm: Normal rate and regular rhythm.      Pulses: Normal pulses.      Heart sounds: Normal heart sounds.   Pulmonary:      Effort: Pulmonary effort is normal.      Breath sounds: Normal breath sounds.   Abdominal:      General: Abdomen is flat. Bowel sounds are normal.      Palpations: Abdomen is soft.   Musculoskeletal:         General: Normal range of motion.      Cervical back: Normal range of motion and neck supple.   Skin:     General: Skin is warm and dry.      Capillary Refill: Capillary refill takes less than 2 seconds.   Neurological:      Mental Status: He is alert and oriented to person, place, and time.   Psychiatric:    " "     Mood and Affect: Mood normal.         Behavior: Behavior normal.         Thought Content: Thought content normal.         Judgment: Judgment normal.         1. Encounter for subsequent annual wellness visit (AWV) in Medicare patient  Assessment & Plan:  Assessment WNL      2. Essential hypertension  Assessment & Plan:  Monitor BP daily and keep BP daily log to bring to next visit. Exercise at least 5 times a week. Keep scheduled appts. RTC sooner if BP is staying >140/90. Dash diet.    DASH- includes foods rich in K+, calcium and Magnesium.The diet limits foods high in sodium, saturated fats and added sugars. This is a flexible balanced eating plan that helps create heart healthy eating style for life. Diet is rich in vegetable, whole grains and fruits. It includes fat free or low fat dairy products, fish, poultry, nuts. Chose foods high in K+, calcium, magnesium, fiber, protein, and low in saturated fats and sodium.       3. Coronary artery disease involving native heart, unspecified vessel or lesion type, unspecified whether angina present  Assessment & Plan:  Symptoms currently controlled. Keep next scheduled appointment with cardiology       4. Stage 3b chronic kidney disease  Assessment & Plan:  Symptoms stable at present.       5. Mixed hyperlipidemia  Assessment & Plan:   Goal LDL is less than 70. Continue current meds and low fat/low cholesterol diet with increased exercise as tolerated. Will follow up with labs. Patient to follow up in 3 months or as needed    A few changes in your diet can reduce cholesterol and improve your heart health. Saturated fats, found primarily in red meat and full-fat dairy products, raise your total cholesterol. Decreasing your consumption of saturated fats can reduce your low-density lipoprotein (LDL) cholesterol. rans fats, sometimes listed on food labels as "partially hydrogenated vegetable oil," are often used in margarines and store-bought cookies, crackers and cakes. "     Trans fats raise overall cholesterol levels. Omega-3 fatty acids don't affect LDL cholesterol. But they have other heart-healthy benefits, including reducing blood pressure. Foods with omega-3 fatty acids include salmon, mackerel, herring, walnuts and flaxseeds.Soluble fiber can reduce the absorption of cholesterol into your bloodstream. Soluble fiber is found in such foods as oatmeal, kidney beans, [place] sprouts, apples and pears.  at least 30 minutes of exercise five times a week or vigorous aerobic activity for 20 minutes three times a week if tolerated.        6. Gout, arthritis  Assessment & Plan:  Symptoms controlled at present. Continue current medication regimen      7. BMI 26.0-26.9,adult  Assessment & Plan:  Diet and exercise discussed with pt      8. Acute on chronic systolic heart failure  Assessment & Plan:  Symptoms currently controlled. Keep next scheduled appointment with cardiology         Health Maintenance Due   Topic Date Due    Shingles Vaccine (1 of 2) Pt reports has had in the past, declined    RSV Vaccine (Age 60+ and Pregnant patients) (1 - 1-dose 75+ series) Pt instructed can get at pharmacy    COVID-19 Vaccine (5 - 2024-25 season) Declined          Provided Trung with a 5-10 year written screening schedule and personal prevention plan. Recommendations were developed using the USPSTF age appropriate recommendations. Education, counseling, and referrals were provided as needed.  After Visit Summary printed and given to patient which includes a list of additional screenings\tests needed.    Follow up for yearly annual wellness visit    I offered to discuss advanced care planning, including how to pick a person who would make decisions for you if you were unable to make them for yourself, called a health care power of , and what kind of decisions you might make such as use of life sustaining treatments such as ventilators and tube feeding when faced with a life limiting illness  recorded on a living will that they will need to know. (How you want to be cared for as you near the end of your natural life)     X  Patient has advanced directives written and agrees to provide copies to the institution.

## 2025-04-30 NOTE — PATIENT INSTRUCTIONS
Counseling and Referral of Other Preventative  (Italic type indicates deductible and co-insurance are waived)    Patient Name: Trung Barboza  Today's Date: 5/2/2025    Health Maintenance       Date Due Completion Date    Shingles Vaccine (1 of 2) Never done ---    RSV Vaccine (Age 60+ and Pregnant patients) (1 - 1-dose 75+ series) Never done ---    COVID-19 Vaccine (5 - 2024-25 season) 09/01/2024 10/4/2022    Lipid Panel 09/10/2025 9/10/2024    TETANUS VACCINE 12/11/2028 12/11/2018        No orders of the defined types were placed in this encounter.      Counseling and Referral of Other Preventative  (Italic type indicates deductible and co-insurance are waived)    Patient Name: Trung Barboza  Today's Date: 5/2/2025    Health Maintenance       Date Due Completion Date    Shingles Vaccine (1 of 2) Never done ---    RSV Vaccine (Age 60+ and Pregnant patients) (1 - 1-dose 75+ series) Never done ---    COVID-19 Vaccine (5 - 2024-25 season) 09/01/2024 10/4/2022    Lipid Panel 09/10/2025 9/10/2024    TETANUS VACCINE 12/11/2028 12/11/2018        No orders of the defined types were placed in this encounter.      The following information is provided to all patients.  This information is to help you find resources for any of the problems found today that may be affecting your health:                  Living healthy guide: ms.gov    Understanding Diabetes: www.diabetes.org      Eating healthy: www.cdc.gov/healthyweight      CDC home safety checklist: www.cdc.gov/steadi/patient.html      Agency on Aging: ms.gov    Alcoholics anonymous (AA): www.aa.org      Physical Activity: www.marjorie.nih.gov/hg8hqvu      Tobacco use: ms.gov

## 2025-05-02 ENCOUNTER — OFFICE VISIT (OUTPATIENT)
Dept: FAMILY MEDICINE | Facility: CLINIC | Age: 85
End: 2025-05-02
Payer: MEDICARE

## 2025-05-02 VITALS
HEART RATE: 76 BPM | OXYGEN SATURATION: 99 % | HEIGHT: 73 IN | WEIGHT: 203 LBS | RESPIRATION RATE: 20 BRPM | BODY MASS INDEX: 26.9 KG/M2 | SYSTOLIC BLOOD PRESSURE: 128 MMHG | DIASTOLIC BLOOD PRESSURE: 74 MMHG | TEMPERATURE: 98 F

## 2025-05-02 DIAGNOSIS — Z00.00 ENCOUNTER FOR SUBSEQUENT ANNUAL WELLNESS VISIT (AWV) IN MEDICARE PATIENT: Primary | ICD-10-CM

## 2025-05-02 DIAGNOSIS — E78.2 MIXED HYPERLIPIDEMIA: ICD-10-CM

## 2025-05-02 DIAGNOSIS — I25.10 CORONARY ARTERY DISEASE INVOLVING NATIVE HEART, UNSPECIFIED VESSEL OR LESION TYPE, UNSPECIFIED WHETHER ANGINA PRESENT: ICD-10-CM

## 2025-05-02 DIAGNOSIS — I50.23 ACUTE ON CHRONIC SYSTOLIC HEART FAILURE: ICD-10-CM

## 2025-05-02 DIAGNOSIS — M10.9 GOUT, ARTHRITIS: ICD-10-CM

## 2025-05-02 DIAGNOSIS — I10 ESSENTIAL HYPERTENSION: ICD-10-CM

## 2025-05-02 DIAGNOSIS — N18.32 STAGE 3B CHRONIC KIDNEY DISEASE: ICD-10-CM

## 2025-05-02 PROCEDURE — 1126F AMNT PAIN NOTED NONE PRSNT: CPT | Mod: ,,,

## 2025-05-02 PROCEDURE — 1101F PT FALLS ASSESS-DOCD LE1/YR: CPT | Mod: ,,,

## 2025-05-02 PROCEDURE — 1158F ADVNC CARE PLAN TLK DOCD: CPT | Mod: ,,,

## 2025-05-02 PROCEDURE — 3078F DIAST BP <80 MM HG: CPT | Mod: ,,,

## 2025-05-02 PROCEDURE — 3074F SYST BP LT 130 MM HG: CPT | Mod: ,,,

## 2025-05-02 PROCEDURE — G0439 PPPS, SUBSEQ VISIT: HCPCS | Mod: ,,,

## 2025-05-02 PROCEDURE — 3288F FALL RISK ASSESSMENT DOCD: CPT | Mod: ,,,

## 2025-05-03 PROBLEM — J18.9 PNEUMONIA OF RIGHT MIDDLE LOBE DUE TO INFECTIOUS ORGANISM: Status: RESOLVED | Noted: 2025-01-03 | Resolved: 2025-05-03

## 2025-05-05 ENCOUNTER — TELEPHONE (OUTPATIENT)
Dept: CARDIOLOGY | Facility: HOSPITAL | Age: 85
End: 2025-05-05
Payer: MEDICARE

## 2025-05-06 ENCOUNTER — HOSPITAL ENCOUNTER (OUTPATIENT)
Dept: RADIOLOGY | Facility: HOSPITAL | Age: 85
Discharge: HOME | End: 2025-05-06
Attending: NURSE PRACTITIONER
Payer: MEDICARE

## 2025-05-06 ENCOUNTER — HOSPITAL ENCOUNTER (OUTPATIENT)
Dept: CARDIOLOGY | Facility: HOSPITAL | Age: 85
Discharge: HOME | End: 2025-05-06
Attending: NURSE PRACTITIONER
Payer: MEDICARE

## 2025-05-06 ENCOUNTER — TELEPHONE (OUTPATIENT)
Dept: GASTROENTEROLOGY | Facility: CLINIC | Age: 85
End: 2025-05-06
Payer: MEDICARE

## 2025-05-06 VITALS — SYSTOLIC BLOOD PRESSURE: 154 MMHG | HEART RATE: 56 BPM | DIASTOLIC BLOOD PRESSURE: 87 MMHG

## 2025-05-06 DIAGNOSIS — R07.9 CHEST PAIN, UNSPECIFIED TYPE: ICD-10-CM

## 2025-05-06 DIAGNOSIS — I25.10 CORONARY ARTERY DISEASE INVOLVING NATIVE HEART, UNSPECIFIED VESSEL OR LESION TYPE, UNSPECIFIED WHETHER ANGINA PRESENT: ICD-10-CM

## 2025-05-06 LAB
CV STRESS BASE HR: 56 BPM
DIASTOLIC BLOOD PRESSURE: 87 MMHG
OHS CV CPX 1 MINUTE RECOVERY HEART RATE: 50 BPM
OHS CV CPX 85 PERCENT MAX PREDICTED HEART RATE MALE: 115
OHS CV CPX MAX PREDICTED HEART RATE: 135
OHS CV CPX PATIENT IS FEMALE: 0
OHS CV CPX PATIENT IS MALE: 1
OHS CV CPX PEAK DIASTOLIC BLOOD PRESSURE: 93 MMHG
OHS CV CPX PEAK HEAR RATE: 56 BPM
OHS CV CPX PEAK RATE PRESSURE PRODUCT: 9520
OHS CV CPX PEAK SYSTOLIC BLOOD PRESSURE: 170 MMHG
OHS CV CPX PERCENT MAX PREDICTED HEART RATE ACHIEVED: 41
OHS CV CPX RATE PRESSURE PRODUCT PRESENTING: 8624
SYSTOLIC BLOOD PRESSURE: 154 MMHG

## 2025-05-06 PROCEDURE — 93017 CV STRESS TEST TRACING ONLY: CPT

## 2025-05-06 PROCEDURE — 63600175 PHARM REV CODE 636 W HCPCS: Performed by: NURSE PRACTITIONER

## 2025-05-06 PROCEDURE — A9500 TC99M SESTAMIBI: HCPCS | Performed by: NURSE PRACTITIONER

## 2025-05-06 PROCEDURE — 93016 CV STRESS TEST SUPVJ ONLY: CPT | Mod: ,,, | Performed by: INTERNAL MEDICINE

## 2025-05-06 PROCEDURE — 93018 CV STRESS TEST I&R ONLY: CPT | Mod: ,,, | Performed by: INTERNAL MEDICINE

## 2025-05-06 PROCEDURE — 78452 HT MUSCLE IMAGE SPECT MULT: CPT | Mod: TC

## 2025-05-06 PROCEDURE — 78452 HT MUSCLE IMAGE SPECT MULT: CPT | Mod: 26,,, | Performed by: INTERNAL MEDICINE

## 2025-05-06 RX ORDER — TETRAKIS(2-METHOXYISOBUTYLISOCYANIDE)COPPER(I) TETRAFLUOROBORATE 1 MG/ML
10.1 INJECTION, POWDER, LYOPHILIZED, FOR SOLUTION INTRAVENOUS
Status: COMPLETED | OUTPATIENT
Start: 2025-05-06 | End: 2025-05-06

## 2025-05-06 RX ORDER — REGADENOSON 0.08 MG/ML
0.4 INJECTION, SOLUTION INTRAVENOUS ONCE
Status: COMPLETED | OUTPATIENT
Start: 2025-05-06 | End: 2025-05-06

## 2025-05-06 RX ORDER — TETRAKIS(2-METHOXYISOBUTYLISOCYANIDE)COPPER(I) TETRAFLUOROBORATE 1 MG/ML
33 INJECTION, POWDER, LYOPHILIZED, FOR SOLUTION INTRAVENOUS
Status: COMPLETED | OUTPATIENT
Start: 2025-05-06 | End: 2025-05-06

## 2025-05-06 RX ADMIN — REGADENOSON 0.4 MG: 0.08 INJECTION, SOLUTION INTRAVENOUS at 10:05

## 2025-05-06 RX ADMIN — KIT FOR THE PREPARATION OF TECHNETIUM TC99M SESTAMIBI 10.1 MILLICURIE: 1 INJECTION, POWDER, LYOPHILIZED, FOR SOLUTION PARENTERAL at 08:05

## 2025-05-06 RX ADMIN — KIT FOR THE PREPARATION OF TECHNETIUM TC99M SESTAMIBI 33 MILLICURIE: 1 INJECTION, POWDER, LYOPHILIZED, FOR SOLUTION PARENTERAL at 10:05

## 2025-05-07 ENCOUNTER — RESULTS FOLLOW-UP (OUTPATIENT)
Dept: CARDIOLOGY | Facility: CLINIC | Age: 85
End: 2025-05-07

## 2025-05-07 ENCOUNTER — TELEPHONE (OUTPATIENT)
Dept: CARDIOLOGY | Facility: CLINIC | Age: 85
End: 2025-05-07
Payer: MEDICARE

## 2025-05-07 NOTE — TELEPHONE ENCOUNTER
Called patient, s/w daughter Samantha gave her normal stress test results    ----- Message from JONAH Swann sent at 5/7/2025 11:41 AM CDT -----  Please call and let him know his stress test is normal  ----- Message -----  From: Interface, Rad Results In  Sent: 5/7/2025   9:32 AM CDT  To: JONAH White

## 2025-05-08 ENCOUNTER — LAB VISIT (OUTPATIENT)
Dept: LAB | Facility: CLINIC | Age: 85
End: 2025-05-08
Payer: MEDICARE

## 2025-05-08 ENCOUNTER — OFFICE VISIT (OUTPATIENT)
Dept: GASTROENTEROLOGY | Facility: CLINIC | Age: 85
End: 2025-05-08
Payer: MEDICARE

## 2025-05-08 VITALS
WEIGHT: 202 LBS | HEART RATE: 68 BPM | SYSTOLIC BLOOD PRESSURE: 146 MMHG | BODY MASS INDEX: 26.77 KG/M2 | HEIGHT: 73 IN | OXYGEN SATURATION: 100 % | DIASTOLIC BLOOD PRESSURE: 71 MMHG

## 2025-05-08 DIAGNOSIS — R17 ELEVATED BILIRUBIN: ICD-10-CM

## 2025-05-08 DIAGNOSIS — R74.01 ELEVATED AST (SGOT): ICD-10-CM

## 2025-05-08 DIAGNOSIS — R93.2 ABNORMAL FINDINGS ON DIAGNOSTIC IMAGING OF LIVER AND BILIARY TRACT: ICD-10-CM

## 2025-05-08 DIAGNOSIS — R10.9 RECURRENT ABDOMINAL PAIN: ICD-10-CM

## 2025-05-08 DIAGNOSIS — K76.0 FATTY LIVER: ICD-10-CM

## 2025-05-08 DIAGNOSIS — R74.01 ELEVATED AST (SGOT): Primary | ICD-10-CM

## 2025-05-08 LAB
A1AT SERPL NEPH-MCNC: 221 MG/DL (ref 90–200)
AFP-TM SERPL-MCNC: 2.7 NG/ML
ALBUMIN SERPL BCP-MCNC: 3.3 G/DL (ref 3.4–4.8)
ALP SERPL-CCNC: 357 U/L (ref 40–150)
ALT SERPL W P-5'-P-CCNC: 19 U/L
AMYLASE SERPL-CCNC: 91 U/L (ref 20–160)
ANION GAP SERPL CALCULATED.3IONS-SCNC: 15 MMOL/L (ref 7–16)
AST SERPL W P-5'-P-CCNC: 61 U/L (ref 11–45)
BILIRUB DIRECT SERPL-MCNC: 2.1 MG/DL
BILIRUB SERPL-MCNC: 2.8 MG/DL
BUN SERPL-MCNC: 23 MG/DL (ref 8–26)
BUN/CREAT SERPL: 12 (ref 6–20)
CALCIUM SERPL-MCNC: 9.4 MG/DL (ref 8.8–10)
CERULOPLASMIN SERPL-MCNC: 43 MG/DL (ref 20–60)
CHLORIDE SERPL-SCNC: 105 MMOL/L (ref 98–107)
CO2 SERPL-SCNC: 23 MMOL/L (ref 23–31)
CREAT SERPL-MCNC: 1.9 MG/DL (ref 0.72–1.25)
EGFR (NO RACE VARIABLE) (RUSH/TITUS): 34 ML/MIN/1.73M2
FERRITIN SERPL-MCNC: 427 NG/ML (ref 22–275)
GGT SERPL-CCNC: 525 U/L (ref 12–64)
GLUCOSE SERPL-MCNC: 86 MG/DL (ref 82–115)
HBV CORE IGM SER QL: NORMAL
HBV SURFACE AB SER-ACNC: NORMAL M[IU]/ML
HBV SURFACE AG SERPL QL IA: NORMAL
HCV AB SER QL: NORMAL
IGG SERPL-MCNC: 1693 MG/DL (ref 540–1822)
IGM SERPL-MCNC: 117 MG/DL (ref 22–240)
IRON SATN MFR SERPL: 16 % (ref 20–50)
IRON SERPL-MCNC: 35 UG/DL (ref 65–175)
LIPASE SERPL-CCNC: 56 U/L
POTASSIUM SERPL-SCNC: 4.6 MMOL/L (ref 3.5–5.1)
PROT SERPL-MCNC: 8.5 G/DL (ref 5.8–7.6)
SODIUM SERPL-SCNC: 138 MMOL/L (ref 136–145)
TIBC SERPL-MCNC: 181 UG/DL (ref 69–240)
TIBC SERPL-MCNC: 216 UG/DL (ref 250–450)
TRANSFERRIN SERPL-MCNC: 197 MG/DL

## 2025-05-08 PROCEDURE — 99999 PR PBB SHADOW E&M-EST. PATIENT-LVL V: CPT | Mod: PBBFAC,,,

## 2025-05-08 PROCEDURE — 99215 OFFICE O/P EST HI 40 MIN: CPT | Mod: PBBFAC

## 2025-05-08 NOTE — PROGRESS NOTES
CC:  Fatty liver and elevated bilirubin and alkaline phos      HPI 85 y.o. AA male presents today for referral for recurrent abdominal pain.  Patient states that his abdominal pain has now stopped.  Patient does admit that the pain he was having was right upper quadrant.  He denies any tenderness, swelling, bloating.  Labs reviewed bilirubin 3.4 alkaline phos 321 ALT 21 AST 51, hemoglobin 13.1 hematocrit 39.3 platelets 267, sodium 140.  Discuss this patient with Dr. Pineda we will plan liver lab workup along with MRI MRCP looking for any kind of common bile duct stone or possible pancreatic lesion.  Patient does have a history of a cholecystectomy.  Denies drinking alcohol.  Recent CT does show fatty liver.  He does have chronic kidney disease, peripheral vascular disease history of CABG.  Patient states it has been a very long awhile since he has had a colonoscopy.  Patient is not sure if he is willing to have a colonoscopy at this time.  Patient denies any jaundice, pruritus, abdominal distention or swelling, he does have occasional lower extremity swelling but he can progress feet up and it goes away.  Patient does admit to pale colored stools at time however no hematochezia or melena.  Patient denies nausea vomiting or dysphagia.  Interval  Medical records reviewed. Additional history supplemented by nursing.     Past Medical History:   Diagnosis Date    Aortic regurgitation     BPH with urinary obstruction     Chronic kidney disease, stage 3b     GR 44     creat 1.8    Coronary artery disease     Essential hypertension 07/10/2021    Gastroesophageal reflux disease 09/15/2022    Gout, arthritis     Myocardial infarction     PVD (peripheral vascular disease) 02/01/2024    TIA (transient ischemic attack)          Past Surgical History:   Procedure Laterality Date    ANGIOGRAM, CORONARY, WITH LEFT HEART CATHETERIZATION N/A 12/31/2021    Procedure: ANGIOGRAM,CORONARY,WITH LEFT HEART CATHETERIZATION;  Surgeon:  Mahad Villaseñor DO;  Location: [facility] CATH LAB;  Service: Cardiology;  Laterality: N/A;    ANGIOGRAM, CORONARY, WITH LEFT HEART CATHETERIZATION N/A 5/7/2024    Procedure: Angiogram, Coronary, with Left Heart Cath;  Surgeon: Mahad Villaseñor DO;  Location: [facility] CATH LAB;  Service: Cardiology;  Laterality: N/A;    BIOPSY WITH TRANSRECTAL ULTRASOUND (TRUS) GUIDANCE Bilateral 03/07/2018    CHOLECYSTECTOMY      CORONARY ANGIOGRAPHY INCLUDING BYPASS GRAFTS WITH CATHETERIZATION OF LEFT HEART N/A 6/4/2024    Procedure: ANGIOGRAM, CORONARY, INCLUDING BYPASS GRAFT, WITH LEFT HEART CATHETERIZATION;  Surgeon: Philip Torres MD;  Location: [facility] CATH LAB;  Service: Cardiology;  Laterality: N/A;    [facility]    LEFT HEART CATHETERIZATION Left 7/13/2021    Procedure: Left heart cath;  Surgeon: Philip Torres MD;  Location: [facility] CATH LAB;  Service: Cardiology;  Laterality: Left;    PERCUTANEOUS CORONARY INTERVENTION, ARTERY N/A 5/7/2024    Procedure: Percutaneous coronary intervention;  Surgeon: Mahad Villaseñor DO;  Location: [facility] CATH LAB;  Service: Cardiology;  Laterality: N/A;       Social History  Social History[1]      Family History   Problem Relation Name Age of Onset    Heart disease Mother      Hypertension Mother      No Known Problems Father      No Known Problems Sister      No Known Problems Sister      No Known Problems Sister      No Known Problems Sister      No Known Problems Brother      No Known Problems Brother      No Known Problems Brother      No Known Problems Brother      No Known Problems Daughter      Hypertension Son      No Known Problems Son      No Known Problems Son      No Known Problems Son      No Known Problems Son         Review of Systems  General ROS: negative for chills, fever or weight loss  Psychological ROS: negative for hallucination, depression or suicidal ideation  Ophthalmic ROS: negative  for blurry vision, photophobia or eye pain  ENT ROS: negative for epistaxis, sore throat or rhinorrhea  Respiratory ROS: no cough, shortness of breath, or wheezing  Cardiovascular ROS: no chest pain or dyspnea on exertion  Gastrointestinal ROS: no abdominal pain, change in bowel habits, or black/ bloody stools  Genito-Urinary ROS: no dysuria, trouble voiding, or hematuria  Musculoskeletal ROS: negative for gait disturbance or muscular weakness  Neurological ROS: no syncope or seizures; no ataxia  Dermatological ROS: negative for pruritis, rash and jaundice    Physical Exam  Vitals reviewed. Exam conducted with a chaperone present.   Constitutional:       Appearance: Normal appearance.   Abdominal:      General: There is no distension.      Palpations: Abdomen is soft.      Tenderness: There is no abdominal tenderness.   Musculoskeletal:         General: Normal range of motion.      Right lower leg: No edema.      Left lower leg: No edema.   Skin:     Coloration: Skin is not jaundiced.      Findings: No bruising.   Neurological:      General: No focal deficit present.      Mental Status: He is alert and oriented to person, place, and time.   Psychiatric:         Mood and Affect: Mood normal.         Behavior: Behavior normal.          Labs:  Lab Results   Component Value Date    WBC 6.11 04/19/2025    HGB 13.1 (L) 04/19/2025    HCT 39.3 (L) 04/19/2025    MCV 98.3 (H) 04/19/2025     04/19/2025       CMP  Sodium   Date Value Ref Range Status   04/19/2025 140 136 - 145 mmol/L Final     Potassium   Date Value Ref Range Status   04/19/2025 4.1 3.5 - 5.1 mmol/L Final     Chloride   Date Value Ref Range Status   04/19/2025 107 98 - 107 mmol/L Final     CO2   Date Value Ref Range Status   04/19/2025 20 (L) 23 - 31 mmol/L Final     Glucose   Date Value Ref Range Status   04/19/2025 105 82 - 115 mg/dL Final     BUN   Date Value Ref Range Status   04/19/2025 23 8 - 26 mg/dL Final     Creatinine   Date Value Ref Range  Status   04/19/2025 1.67 (H) 0.72 - 1.25 mg/dL Final     Calcium   Date Value Ref Range Status   04/19/2025 9.4 8.8 - 10.0 mg/dL Final     Total Protein   Date Value Ref Range Status   04/19/2025 7.7 (H) 5.8 - 7.6 g/dL Final     Albumin   Date Value Ref Range Status   04/19/2025 3.2 (L) 3.4 - 4.8 g/dL Final     Bilirubin, Total   Date Value Ref Range Status   04/19/2025 3.4 (H) <=1.5 mg/dL Final     Alk Phos   Date Value Ref Range Status   04/19/2025 321 (H) 40 - 150 U/L Final     AST   Date Value Ref Range Status   04/19/2025 51 (H) 11 - 45 U/L Final     ALT   Date Value Ref Range Status   04/19/2025 21 <=55 U/L Final     Anion Gap   Date Value Ref Range Status   04/19/2025 17 (H) 7 - 16 mmol/L Final     eGFR    Date Value Ref Range Status   07/25/2021 59 (L) >=60 mL/min/1.73m² Final     eGFR   Date Value Ref Range Status   02/28/2022 25 (L) >=60 mL/min/1.73m² Final       Imaging:  CT ABDOMEN PELVIS WITHOUT CONTRAST     CLINICAL HISTORY:  Abdominal pain, acute, nonlocalized; Unspecified abdominal pain     TECHNIQUE:  Low dose axial images, sagittal and coronal reformations were obtained from the lung bases to the pubic symphysis.  Study with PO contrast with the patient reportedly not able to tolerate all the oral contrast.     COMPARISON:  None     FINDINGS:  Small bilateral pleural effusions.  Mild posterior bibasilar airspace opacification suggesting mild hypoventilatory change or infiltrate.     Trace ascites with trace perihepatic fluid and small amount of free fluid in the pelvis.  Mild diffuse fatty infiltration of the liver.  No masses evident on the noncontrast study.  Spleen not enlarged.  Adrenal glands; slight diffuse thickening.  Pancreas mildly atrophied.  Abdominal aorta no aneurysm.     Cholecystectomy clips.  No biliary duct dilatation     Tiny fat containing umbilical hernia     Normal appearance of the appendix no free intraperitoneal air.  No appreciable bowel wall thickening or  inflammatory change     No hydronephrosis opaque renal or ureteral stone or ureteral obstruction.  4.3 cm right renal cyst.  Urinary bladder just mildly distended.  Even allowing for the incomplete degree of is distention the wall appears mildly diffusely thick suggesting changes of cystitis or chronic bladder outlet obstruction.  The prostate gland is enlarged and measures 4.9 cm transversely.  There is thin walled homogeneous 4.2 by 3.1 cm fluid density/cystic appearing structure abutting the right side of the urinary bladder questioning bladder diverticulum although direct communication not definitely seen.  Other consideration is seroma.  No appreciable non fluid density component.     Osseous structures show degenerative changes median sternotomy sutures     Impression:     Small bilateral pleural effusions and mild bibasilar opacification with findings questioning mild CHF.  Incomplete visualization of the chest on the abdominal study     Trace ascites which also may be on a basis of CHF     Enlarged prostate gland.  Mild diffuse bladder wall thickening suggesting changes of chronic bladder outlet obstruction.  Fluid density structure abutting the right side of the urinary bladder questioning bladder diverticulum although direct communication is not seen.  Cholecystectomy.  Right renal cyst        Electronically signed by:Criselda Bright MD  Date:                                            04/29/2025    Independently reviewed    Assessment:  Trung Barboza 84 yo AAM here with:  Elevated alkaline phos  Elevated AST  Elevated total bilirubin  Recurrent abdominal pain  Fatty liver    Plan:   Liver Labs today, GGT, pes, fractionated alkaline phos in hepatic functions  MRI MRCP of the abdomen for possible common bile duct stone or pancreatic lesion  We will discuss colonoscopy at next visit  Follow-up in three months or sooner    I spent a total of 45 minutes on the day of the visit.This includes face to face time  and non-face to face time preparing to see the patient (eg, review of tests), obtaining and/or reviewing separately obtained history, documenting clinical information in the electronic or other health record, independently interpreting results and communicating results to the patient/family/caregiver, or care coordinator.     Carla Adams, FNP [facility]           [1]   Social History  Tobacco Use    Smoking status: Former     Current packs/day: 0.00     Average packs/day: 0.5 packs/day for 15.0 years (7.5 ttl pk-yrs)     Types: Cigarettes     Start date: 1973     Quit date: 1988     Years since quittin.8     Passive exposure: Never    Smokeless tobacco: Former     Types: Chew     Quit date:    Substance Use Topics    Alcohol use: Never    Drug use: Never

## 2025-05-09 LAB
ALBUMIN PE, BLOOD: 4.4 G/DL (ref 3.5–5.2)
ALPHA1 GLOB SERPL ELPH-MCNC: 0.3 G/DL (ref 0.1–0.4)
ALPHA2 GLOB SERPL ELPH-MCNC: 0.8 G/DL (ref 0.4–1.3)
B-GLOBULIN SERPL ELPH-MCNC: 0.8 G/DL (ref 0.5–1.5)
GAMMA GLOB SERPL ELPH-MCNC: 1.6 G/DL (ref 0.5–1.8)
PATH INTERP BLD-IMP: ABNORMAL
PROT SERPL-MCNC: 7.8 G/DL (ref 5.8–7.6)

## 2025-05-12 ENCOUNTER — TELEPHONE (OUTPATIENT)
Dept: GASTROENTEROLOGY | Facility: CLINIC | Age: 85
End: 2025-05-12
Payer: MEDICARE

## 2025-05-12 ENCOUNTER — RESULTS FOLLOW-UP (OUTPATIENT)
Dept: GASTROENTEROLOGY | Facility: CLINIC | Age: 85
End: 2025-05-12
Payer: MEDICARE

## 2025-05-12 DIAGNOSIS — K21.9 GASTROESOPHAGEAL REFLUX DISEASE, UNSPECIFIED WHETHER ESOPHAGITIS PRESENT: Primary | ICD-10-CM

## 2025-05-12 RX ORDER — PANTOPRAZOLE SODIUM 40 MG/1
40 TABLET, DELAYED RELEASE ORAL DAILY
Qty: 90 TABLET | Refills: 3 | Status: SHIPPED | OUTPATIENT
Start: 2025-05-12 | End: 2026-05-12

## 2025-05-12 NOTE — TELEPHONE ENCOUNTER
Spoke w/ pt daughter - requested results of lab results - advised once reviewed er provider I will reach out to her to review results - good vu noted

## 2025-05-12 NOTE — PROGRESS NOTES
Patient's daughter called stating that patient is still feeling nauseated in stomach is hurting.  Patient does not currently take a PPI we will plan to start patient on a PPI to see if this will help his stomach pain.  Daughter is agreeable to this plan.

## 2025-05-12 NOTE — PROGRESS NOTES
I have spoken with the patient's daughter with lab results.  Patient complaining of stomach pain and difficulty taking his pills.  States that it hurts after he takes his medicines.  Patient is currently not taking all his medication due to this issue.  I have sent Protonix 40 mg p.o. daily for patient to try to see if this helps with the stomach pains.  Patient to have MRI MRCP May 23rd 2025 at 7:00 a.m..

## 2025-05-16 PROBLEM — Z00.00 ENCOUNTER FOR SUBSEQUENT ANNUAL WELLNESS VISIT (AWV) IN MEDICARE PATIENT: Status: ACTIVE | Noted: 2025-05-16

## 2025-05-16 NOTE — ASSESSMENT & PLAN NOTE
" Goal LDL is less than 70. Continue current meds and low fat/low cholesterol diet with increased exercise as tolerated. Will follow up with labs. Patient to follow up in 3 months or as needed    A few changes in your diet can reduce cholesterol and improve your heart health. Saturated fats, found primarily in red meat and full-fat dairy products, raise your total cholesterol. Decreasing your consumption of saturated fats can reduce your low-density lipoprotein (LDL) cholesterol. rans fats, sometimes listed on food labels as "partially hydrogenated vegetable oil," are often used in margarines and store-bought cookies, crackers and cakes.     Trans fats raise overall cholesterol levels. Omega-3 fatty acids don't affect LDL cholesterol. But they have other heart-healthy benefits, including reducing blood pressure. Foods with omega-3 fatty acids include salmon, mackerel, herring, walnuts and flaxseeds.Soluble fiber can reduce the absorption of cholesterol into your bloodstream. Soluble fiber is found in such foods as oatmeal, kidney beans, [place] sprouts, apples and pears.  at least 30 minutes of exercise five times a week or vigorous aerobic activity for 20 minutes three times a week if tolerated.    "

## 2025-05-22 DIAGNOSIS — F41.9 ANXIETY: Primary | ICD-10-CM

## 2025-05-22 RX ORDER — LORAZEPAM 1 MG/1
0.5 TABLET ORAL
Qty: 1 TABLET | Refills: 0 | Status: SHIPPED | OUTPATIENT
Start: 2025-05-22

## 2025-05-22 NOTE — PROGRESS NOTES
PCP Bindu Vicente message me about patient having severe anxiety with getting in the MRI machine.  Daughter states they had to hold him down on the table last time he tried to take the machine down to get out due to anxiety and claustrophobia.  We will plan to prescribe Ativan 0.5 mg 1 hour before imaging procedure and then take the other half of the tablet upon arrival to imaging.

## 2025-05-23 ENCOUNTER — TELEPHONE (OUTPATIENT)
Dept: GASTROENTEROLOGY | Facility: CLINIC | Age: 85
End: 2025-05-23
Payer: MEDICARE

## 2025-05-23 NOTE — TELEPHONE ENCOUNTER
----- Message from Brenda Gomes NP sent at 5/22/2025  9:04 AM CDT -----  There is some liver disease occurring the MRI was show us further indications as to what is going on in the liver.  Patient needs to stop drinking alcohol completely.  ----- Message -----  From: Lab, Background User  Sent: 5/8/2025   3:50 PM CDT  To: Brenda Gomes NP

## 2025-05-23 NOTE — TELEPHONE ENCOUNTER
Spoke w/ pt daughter - results and recommendation reviewed w// good vu noted - updated on rescheduled appts for US for 6/3/25 at 1030 - aware to be npo after mn prior to procedure - appt given for MRI for 6/12/25 at 0830 - daughter stated pt was very claustrophobic and the ativan po was not going to work and that pt would require sedation for the imaging - that the MRI needed to be canceled because he is not going to be able to do the imaging w/o being sedated - advised I would consult w/ provider - good vu noted

## 2025-05-31 ENCOUNTER — EXTERNAL CHRONIC CARE MANAGEMENT (OUTPATIENT)
Dept: FAMILY MEDICINE | Facility: CLINIC | Age: 85
End: 2025-05-31
Payer: MEDICARE

## 2025-05-31 PROCEDURE — 99490 CHRNC CARE MGMT STAFF 1ST 20: CPT | Mod: ,,, | Performed by: NURSE PRACTITIONER

## 2025-06-03 ENCOUNTER — HOSPITAL ENCOUNTER (OUTPATIENT)
Dept: RADIOLOGY | Facility: HOSPITAL | Age: 85
Discharge: HOME | End: 2025-06-03
Payer: MEDICARE

## 2025-06-03 DIAGNOSIS — R74.01 ELEVATED AST (SGOT): ICD-10-CM

## 2025-06-03 DIAGNOSIS — R17 ELEVATED BILIRUBIN: ICD-10-CM

## 2025-06-03 DIAGNOSIS — K76.0 FATTY LIVER: ICD-10-CM

## 2025-06-03 PROCEDURE — 76981 USE PARENCHYMA: CPT | Mod: TC

## 2025-06-09 ENCOUNTER — TELEPHONE (OUTPATIENT)
Dept: GASTROENTEROLOGY | Facility: CLINIC | Age: 85
End: 2025-06-09
Payer: MEDICARE

## 2025-06-09 DIAGNOSIS — R17 TOTAL BILIRUBIN, ELEVATED: Primary | ICD-10-CM

## 2025-06-09 DIAGNOSIS — R74.8 ELEVATED LIVER ENZYMES: ICD-10-CM

## 2025-06-09 DIAGNOSIS — R74.8 ELEVATED ALKALINE PHOSPHATASE LEVEL: ICD-10-CM

## 2025-06-09 NOTE — PROGRESS NOTES
Discuss this patient with Dr. Pineda patient is unable to do MRI MRCP due to sedation issues p.o. sedation does not help patient is severely claustrophobic and can not stay in MRI.  We will plan to repeat labs.  If bilirubin is still elevated discussed with Dr. Pineda and she would like for him to go to the emergency room to make sure there is no biliary stricture versus stone.

## 2025-06-09 NOTE — TELEPHONE ENCOUNTER
Call returned to pt daughter - advised of MRI cancellation and of new order of lab - aware if Bilirubin is elevated pt needs to go to ED

## 2025-06-10 ENCOUNTER — TELEPHONE (OUTPATIENT)
Dept: GASTROENTEROLOGY | Facility: CLINIC | Age: 85
End: 2025-06-10
Payer: MEDICARE

## 2025-06-10 NOTE — TELEPHONE ENCOUNTER
----- Message from Brenda Gomes NP sent at 6/9/2025  4:49 PM CDT -----  Normal to mild fibrosis (F0-F1 METAVIR score), with minimal risk of clinically significant fibrosis. No followup is required.   Exercise 150 minutes per week  Weight loss of 7-10% body weight  Diet low is saturated fats and carbohydrates  Good glucose and cholesterol control  No alcohol  -Fibrosis/fatty liver means scarring of the liver.  After a period of time (usually many years) fibrosis can become so severe that it spreads and connects to other liver tissue leading too widespread   scarring. This is caused cirrhosis.  Scarring of the liver can eventually lead to decreased functioning liver.   ----- Message -----  From: Interface, Rad Results In  Sent: 6/8/2025  11:09 AM CDT  To: Brenda Gomes NP

## 2025-06-22 DIAGNOSIS — N18.31 STAGE 3A CHRONIC KIDNEY DISEASE: ICD-10-CM

## 2025-06-22 DIAGNOSIS — I10 ESSENTIAL HYPERTENSION: ICD-10-CM

## 2025-06-27 RX ORDER — FUROSEMIDE 20 MG/1
20 TABLET ORAL
Qty: 90 TABLET | Refills: 3 | Status: SHIPPED | OUTPATIENT
Start: 2025-06-27

## 2025-06-30 ENCOUNTER — EXTERNAL CHRONIC CARE MANAGEMENT (OUTPATIENT)
Dept: FAMILY MEDICINE | Facility: CLINIC | Age: 85
End: 2025-06-30
Payer: MEDICARE

## 2025-06-30 ENCOUNTER — TELEPHONE (OUTPATIENT)
Dept: GASTROENTEROLOGY | Facility: CLINIC | Age: 85
End: 2025-06-30
Payer: MEDICARE

## 2025-06-30 PROCEDURE — 99490 CHRNC CARE MGMT STAFF 1ST 20: CPT | Mod: ,,, | Performed by: NURSE PRACTITIONER

## 2025-06-30 NOTE — TELEPHONE ENCOUNTER
Call returned - spoke w/ pt daughter - advised that I would have to consult w/ provider regarding any appt time changes r/t we have a new scheduled system - but advised if can not accommodate needed change may have to reschedule appt to a diff date - good vu noted

## 2025-07-01 ENCOUNTER — TELEPHONE (OUTPATIENT)
Dept: CARDIOLOGY | Facility: CLINIC | Age: 85
End: 2025-07-01
Payer: MEDICARE

## 2025-07-01 ENCOUNTER — TELEPHONE (OUTPATIENT)
Dept: GASTROENTEROLOGY | Facility: CLINIC | Age: 85
End: 2025-07-01
Payer: MEDICARE

## 2025-07-01 NOTE — TELEPHONE ENCOUNTER
Spoke with the patient's daughter on today, patient's daughter wanted to know if she could keep her dad's appt as scheduled but change it to another time. Informed the daughter that  schedule is full. Offered her to r/s her see the NP on that day but at a different time. Patient's daughter declined and informed me that she will keep the patient's appt for 7/21/25 at 11:00. I verbalized understanding.

## 2025-07-01 NOTE — TELEPHONE ENCOUNTER
Call returned to pt daughter regarding appt for 7/21/22 - advised that provider was booked and did not have the opening as she was suggesting at 9 but that we could see the pt 7/22/25 at 3 - stated she would f/u up w/ cardiology first to see if any time change could be made to that appt for that day and give a call back to clinic

## 2025-07-09 ENCOUNTER — EXTERNAL HOME HEALTH (OUTPATIENT)
Dept: HOME HEALTH SERVICES | Facility: HOSPITAL | Age: 85
End: 2025-07-09
Payer: MEDICARE

## 2025-07-13 ENCOUNTER — HOSPITAL ENCOUNTER (EMERGENCY)
Facility: HOSPITAL | Age: 85
Discharge: HOME | End: 2025-07-13
Payer: MEDICARE

## 2025-07-13 VITALS
WEIGHT: 222 LBS | TEMPERATURE: 98 F | DIASTOLIC BLOOD PRESSURE: 97 MMHG | RESPIRATION RATE: 21 BRPM | HEIGHT: 72 IN | HEART RATE: 57 BPM | BODY MASS INDEX: 30.07 KG/M2 | OXYGEN SATURATION: 100 % | SYSTOLIC BLOOD PRESSURE: 182 MMHG

## 2025-07-13 DIAGNOSIS — R06.02 SOB (SHORTNESS OF BREATH): ICD-10-CM

## 2025-07-13 DIAGNOSIS — E87.70 HYPERVOLEMIA, UNSPECIFIED HYPERVOLEMIA TYPE: Primary | ICD-10-CM

## 2025-07-13 LAB
ALBUMIN SERPL BCP-MCNC: 3.6 G/DL (ref 3.4–4.8)
ALBUMIN/GLOB SERPL: 1 {RATIO}
ALP SERPL-CCNC: 204 U/L (ref 40–150)
ALT SERPL W P-5'-P-CCNC: 13 U/L
ANION GAP SERPL CALCULATED.3IONS-SCNC: 13 MMOL/L (ref 7–16)
AST SERPL W P-5'-P-CCNC: 41 U/L (ref 11–45)
BASOPHILS # BLD AUTO: 0.02 K/UL (ref 0–0.2)
BASOPHILS NFR BLD AUTO: 0.5 % (ref 0–1)
BILIRUB SERPL-MCNC: 2.9 MG/DL
BUN SERPL-MCNC: 31 MG/DL (ref 8–26)
BUN/CREAT SERPL: 16 (ref 6–20)
CALCIUM SERPL-MCNC: 9.3 MG/DL (ref 8.8–10)
CHLORIDE SERPL-SCNC: 108 MMOL/L (ref 98–107)
CO2 SERPL-SCNC: 23 MMOL/L (ref 23–31)
CREAT SERPL-MCNC: 1.99 MG/DL (ref 0.72–1.25)
DIFFERENTIAL METHOD BLD: ABNORMAL
EGFR (NO RACE VARIABLE) (RUSH/TITUS): 32 ML/MIN/1.73M2
EOSINOPHIL # BLD AUTO: 0.08 K/UL (ref 0–0.5)
EOSINOPHIL NFR BLD AUTO: 2.1 % (ref 1–4)
ERYTHROCYTE [DISTWIDTH] IN BLOOD BY AUTOMATED COUNT: 16.3 % (ref 11.5–14.5)
GLOBULIN SER-MCNC: 3.6 G/DL (ref 2–4)
GLUCOSE SERPL-MCNC: 101 MG/DL (ref 82–115)
HCT VFR BLD AUTO: 39.3 % (ref 40–54)
HGB BLD-MCNC: 13.1 G/DL (ref 13.5–18)
LACTATE SERPL-SCNC: 1.7 MMOL/L (ref 0.5–2.2)
LYMPHOCYTES # BLD AUTO: 1.31 K/UL (ref 1–4.8)
LYMPHOCYTES NFR BLD AUTO: 33.8 % (ref 27–41)
MAGNESIUM SERPL-MCNC: 1.9 MG/DL (ref 1.6–2.6)
MCH RBC QN AUTO: 32.3 PG (ref 27–31)
MCHC RBC AUTO-ENTMCNC: 33.3 G/DL (ref 32–36)
MCV RBC AUTO: 96.8 FL (ref 80–96)
MONOCYTES # BLD AUTO: 0.57 K/UL (ref 0–0.8)
MONOCYTES NFR BLD AUTO: 14.7 % (ref 2–6)
MPC BLD CALC-MCNC: 10.5 FL (ref 9.4–12.4)
NEUTROPHILS # BLD AUTO: 1.9 K/UL (ref 1.8–7.7)
NEUTROPHILS NFR BLD AUTO: 48.9 % (ref 53–65)
NT-PROBNP SERPL-MCNC: 4042 PG/ML (ref 1–450)
PLATELET # BLD AUTO: 171 K/UL (ref 150–400)
POTASSIUM SERPL-SCNC: 4.1 MMOL/L (ref 3.5–5.1)
PROT SERPL-MCNC: 7.2 G/DL (ref 5.8–7.6)
RBC # BLD AUTO: 4.06 M/UL (ref 4.6–6.2)
SODIUM SERPL-SCNC: 140 MMOL/L (ref 136–145)
TROPONIN I SERPL HS-MCNC: 204.8 NG/L
TROPONIN I SERPL HS-MCNC: 206.4 NG/L
WBC # BLD AUTO: 3.88 K/UL (ref 4.5–11)

## 2025-07-13 PROCEDURE — 83735 ASSAY OF MAGNESIUM: CPT | Performed by: NURSE PRACTITIONER

## 2025-07-13 PROCEDURE — 99284 EMERGENCY DEPT VISIT MOD MDM: CPT | Mod: ,,, | Performed by: NURSE PRACTITIONER

## 2025-07-13 PROCEDURE — 83880 ASSAY OF NATRIURETIC PEPTIDE: CPT | Performed by: NURSE PRACTITIONER

## 2025-07-13 PROCEDURE — 63600175 PHARM REV CODE 636 W HCPCS: Performed by: NURSE PRACTITIONER

## 2025-07-13 PROCEDURE — 80053 COMPREHEN METABOLIC PANEL: CPT | Performed by: NURSE PRACTITIONER

## 2025-07-13 PROCEDURE — 99285 EMERGENCY DEPT VISIT HI MDM: CPT | Mod: 25

## 2025-07-13 PROCEDURE — 93005 ELECTROCARDIOGRAM TRACING: CPT

## 2025-07-13 PROCEDURE — 36415 COLL VENOUS BLD VENIPUNCTURE: CPT | Performed by: NURSE PRACTITIONER

## 2025-07-13 PROCEDURE — 83605 ASSAY OF LACTIC ACID: CPT | Performed by: NURSE PRACTITIONER

## 2025-07-13 PROCEDURE — 84484 ASSAY OF TROPONIN QUANT: CPT | Performed by: NURSE PRACTITIONER

## 2025-07-13 PROCEDURE — 96372 THER/PROPH/DIAG INJ SC/IM: CPT | Performed by: NURSE PRACTITIONER

## 2025-07-13 PROCEDURE — 93010 ELECTROCARDIOGRAM REPORT: CPT | Mod: ,,, | Performed by: INTERNAL MEDICINE

## 2025-07-13 PROCEDURE — 85025 COMPLETE CBC W/AUTO DIFF WBC: CPT | Performed by: NURSE PRACTITIONER

## 2025-07-13 RX ORDER — FUROSEMIDE 10 MG/ML
40 INJECTION INTRAMUSCULAR; INTRAVENOUS
Status: COMPLETED | OUTPATIENT
Start: 2025-07-13 | End: 2025-07-13

## 2025-07-13 RX ADMIN — FUROSEMIDE 40 MG: 10 INJECTION, SOLUTION INTRAMUSCULAR; INTRAVENOUS at 02:07

## 2025-07-13 NOTE — ED PROVIDER NOTES
Encounter Date: 7/13/2025       History     Chief Complaint   Patient presents with    Leg Swelling    Shortness of Breath     84 y/o male with PMHx of CAD, TIA, Aortic Regurg, GERD, CKD stage 3b, PVD, MI, BHP and PVD arrived to the ED via POV with complaint BLE swelling and SOB that started today. Got too hot on Tuesday, felt bad on Wednesday. Began drink Gatorade, ate pizza and crackers. Today noticed increase swelling in BLE and felt SOB. Denies chest pain, dizziness, nausea or diaphoresis.  Denies fever or sick contacts.    Followed by Dr. Villaseñor with [facility]. Nex appointment is scheduled on 07/21/25.     The history is provided by the patient.     Review of patient's allergies indicates:   Allergen Reactions    Lisinopril Swelling and Anaphylaxis     Past Medical History:   Diagnosis Date    Aortic regurgitation     BPH with urinary obstruction     Chronic kidney disease, stage 3b     GR 44     creat 1.8    Coronary artery disease     Essential hypertension 07/10/2021    Gastroesophageal reflux disease 09/15/2022    Gout, arthritis     Myocardial infarction     PVD (peripheral vascular disease) 02/01/2024    TIA (transient ischemic attack)      Past Surgical History:   Procedure Laterality Date    ANGIOGRAM, CORONARY, WITH LEFT HEART CATHETERIZATION N/A 12/31/2021    Procedure: ANGIOGRAM,CORONARY,WITH LEFT HEART CATHETERIZATION;  Surgeon: Mahad Villaseñor DO;  Location: [facility] CATH LAB;  Service: Cardiology;  Laterality: N/A;    ANGIOGRAM, CORONARY, WITH LEFT HEART CATHETERIZATION N/A 5/7/2024    Procedure: Angiogram, Coronary, with Left Heart Cath;  Surgeon: Mahad Villaseñor DO;  Location: [facility] CATH LAB;  Service: Cardiology;  Laterality: N/A;    BIOPSY WITH TRANSRECTAL ULTRASOUND (TRUS) GUIDANCE Bilateral 03/07/2018    CHOLECYSTECTOMY      CORONARY ANGIOGRAPHY INCLUDING BYPASS GRAFTS WITH CATHETERIZATION OF LEFT HEART N/A 6/4/2024    Procedure: ANGIOGRAM, CORONARY, INCLUDING BYPASS  GRAFT, WITH LEFT HEART CATHETERIZATION;  Surgeon: Philip Torres MD;  Location: [facility] CATH LAB;  Service: Cardiology;  Laterality: N/A;    CORONARY ARTERY BYPASS GRAFT  01/07/2022    [facility]-Dr. Qureshi    LEFT HEART CATHETERIZATION Left 7/13/2021    Procedure: Left heart cath;  Surgeon: Philip Torres MD;  Location: [facility] CATH LAB;  Service: Cardiology;  Laterality: Left;    PERCUTANEOUS CORONARY INTERVENTION, ARTERY N/A 5/7/2024    Procedure: Percutaneous coronary intervention;  Surgeon: Mahad Villaseñor DO;  Location: [facility] CATH LAB;  Service: Cardiology;  Laterality: N/A;     Family History   Problem Relation Name Age of Onset    Heart disease Mother      Hypertension Mother      No Known Problems Father      No Known Problems Sister      No Known Problems Sister      No Known Problems Sister      No Known Problems Sister      No Known Problems Brother      No Known Problems Brother      No Known Problems Brother      No Known Problems Brother      No Known Problems Daughter      Hypertension Son      No Known Problems Son      No Known Problems Son      No Known Problems Son      No Known Problems Son       Social History[1]  Review of Systems   Constitutional:  Negative for activity change, appetite change, fatigue and fever.   HENT:  Negative for congestion, ear discharge, ear pain, postnasal drip, rhinorrhea, sinus pressure, sinus pain, sore throat and trouble swallowing.    Eyes:  Negative for pain, redness and visual disturbance.   Respiratory:  Positive for cough and shortness of breath. Negative for chest tightness.    Cardiovascular:  Positive for leg swelling. Negative for chest pain and palpitations.   Gastrointestinal:  Negative for abdominal pain, constipation, diarrhea, nausea and vomiting.   Genitourinary:  Negative for dysuria, flank pain and frequency.   Musculoskeletal:  Negative for arthralgias, back pain, gait problem and myalgias.   Skin:  Negative for rash.    Neurological:  Negative for dizziness, syncope, facial asymmetry, speech difficulty, weakness, light-headedness, numbness and headaches.   Hematological:  Does not bruise/bleed easily.   Psychiatric/Behavioral:  Negative for behavioral problems. The patient is not nervous/anxious.        Physical Exam     Initial Vitals [07/13/25 1305]   BP Pulse Resp Temp SpO2   (!) 164/88 69 20 97.5 °F (36.4 °C) 100 %      MAP       --         Physical Exam    Nursing note reviewed.  Constitutional: Vital signs are normal. He appears well-developed and well-nourished. He is cooperative.  Non-toxic appearance. He does not have a sickly appearance. He does not appear ill. No distress.   HENT:   Head: Normocephalic and atraumatic.   Right Ear: External ear normal.   Left Ear: External ear normal. Mouth/Throat: Uvula is midline, oropharynx is clear and moist and mucous membranes are normal.   Eyes: Conjunctivae and lids are normal.   Neck: Neck supple. No JVD present.   Normal range of motion.  Cardiovascular:  Normal rate, regular rhythm, normal heart sounds and intact distal pulses.           1+ edema to BLE   Pulmonary/Chest: Effort normal. He has rales (Faint).   Abdominal: Abdomen is soft. Bowel sounds are normal. There is no abdominal tenderness.   Musculoskeletal:         General: Normal range of motion.      Cervical back: Normal range of motion and neck supple.     Lymphadenopathy:     He has no cervical adenopathy.   Neurological: He is alert and oriented to person, place, and time.   Skin: Skin is warm, dry and intact. Capillary refill takes less than 2 seconds. No rash noted.   Psychiatric: He has a normal mood and affect. His speech is normal and behavior is normal. Judgment normal.         Medical Screening Exam   See Full Note    ED Course   Procedures  Labs Reviewed   COMPREHENSIVE METABOLIC PANEL - Abnormal       Result Value    Sodium 140      Potassium 4.1      Chloride 108 (*)     CO2 23      Anion Gap 13       Glucose 101      BUN 31 (*)     Creatinine 1.99 (*)     BUN/Creatinine Ratio 16      Calcium 9.3      Total Protein 7.2      Albumin 3.6      Globulin 3.6      A/G Ratio 1.0      Bilirubin, Total 2.9 (*)     Alk Phos 204 (*)     ALT 13      AST 41      eGFR 32 (*)    NT-PRO NATRIURETIC PEPTIDE - Abnormal    ProBNP 4,042 (*)    TROPONIN I - Abnormal    Troponin I High Sensitivity 204.8 (*)    CBC WITH DIFFERENTIAL - Abnormal    WBC 3.88 (*)     RBC 4.06 (*)     Hemoglobin 13.1 (*)     Hematocrit 39.3 (*)     MCV 96.8 (*)     MCH 32.3 (*)     MCHC 33.3      RDW 16.3 (*)     Platelet Count 171      MPV 10.5      Neutrophils % 48.9 (*)     Lymphocytes % 33.8      Neutrophils, Abs 1.90      Lymphocytes, Absolute 1.31      Diff Type Auto      Monocytes % 14.7 (*)     Eosinophils % 2.1      Basophils % 0.5      Monocytes, Absolute 0.57      Eosinophils, Absolute 0.08      Basophils, Absolute 0.02     TROPONIN I - Abnormal    Troponin I High Sensitivity 206.4 (*)    LACTIC ACID, PLASMA - Normal    Lactic Acid 1.7     MAGNESIUM - Normal    Magnesium 1.9     CBC W/ AUTO DIFFERENTIAL    Narrative:     The following orders were created for panel order CBC auto differential.  Procedure                               Abnormality         Status                     ---------                               -----------         ------                     CBC with Differential[[id number]]       Abnormal            Final result                 Please view results for these tests on the individual orders.        ECG Results              EKG 12-lead (In process)        Collection Time Result Time QRS Duration OHS QTC Calculation    07/13/25 13:24:52 07/13/25 14:01:21 134 469                     In process by Interface, Lab In [facility] (07/13/25 14:01:26)                   Narrative:    Test Reason : R06.02,    Vent. Rate :  67 BPM     Atrial Rate :  67 BPM     P-R Int : 370 ms          QRS Dur : 134 ms      QT Int : 444 ms       P-R-T Axes :   78 -50  72 degrees    QTcB Int : 469 ms    Sinus rhythm with 1st degree A-V block  Left axis deviation  Nonspecific intraventricular block  Inferior infarct (cited on or before 06-Jun-2023)  Cannot rule out Anterior infarct (cited on or before 27-Sep-2022)  Abnormal ECG  When compared with ECG of 19-Apr-2025 14:03,  No significant change was found    Referred By: AAAREFERRAL SELF           Confirmed By:                                   Imaging Results              X-Ray Chest AP Portable (Final result)  Result time 07/13/25 13:49:22      Final result by Man Nunez MD (07/13/25 13:49:22)                   Impression:      Cardiomegaly mild pulmonary congestion similar to previous study.      Electronically signed by: Man Nunez  Date:    07/13/2025  Time:    13:49               Narrative:    EXAMINATION:  XR CHEST AP PORTABLE    CLINICAL HISTORY:  SOB;    TECHNIQUE:  XR CHEST AP PORTABLE    COMPARISON:  April 19, 2025    FINDINGS:  Lines and tubes: None.    Heart is enlarged.Median sternotomy change.  Mild pulmonary congestion.  No effusion.  No acute bony abnormality                                       Medications   furosemide injection 40 mg (40 mg Intramuscular Given 7/13/25 1447)     Medical Decision Making  86 y/o male with PMHx of CAD, TIA, Aortic Regurg, GERD, CKD stage 3b, PVD, MI, BHP and PVD arrived to the ED via POV with complaint BLE swelling and SOB that started today. Got too hot on Tuesday, felt bad on Wednesday. Began drink Gatorade, ate pizza and crackers. Today noticed increase swelling in BLE and felt SOB. Denies chest pain, dizziness, nausea or diaphoresis.  Denies fever or sick contacts.    Followed by Dr. Villaseñor with [facility]. Nex appointment is scheduled on 07/21/25.     EKG: NO STEMI similar in appearance to EKG from 4/19/25 and 11/19/24. Flat Troponin. 1st Troponin 204.8 with 2nd Troponin of  206.4  . Previous troponin of 246.6 (11/13/24), 185.1 and 202.4 (04/19/25).  Denies chest pain. CXR revealed mild pulmonary congestion. BNP up to 4,042, has pitting edema to BLE. Lasix 40 mg given IM. Patient had good urine output 500+ clear yellow urine. Reports feeling better. O2 sats has ranged from % on room air.     Problems Addressed:  Hypervolemia, unspecified hypervolemia type:     Details: -Continue routine medications as prescribed. Call Dr. Villaseñor office in am to see if they wish to see him sooner. Reviewed discharge instructions with patient. Detailed strict return precautions such as increased swelling, SOB or chest pain. Patient agreed to treatment plan and verbalized understanding.     Amount and/or Complexity of Data Reviewed  External Data Reviewed: notes.     Details: 05/07/24 Echocardiogram:   Left Ventricle: The left ventricle is normal in size. Mildly increased wall thickness. There is concentric remodeling. Moderate global hypokinesis present. There is moderately reduced systolic function with a visually estimated ejection fraction of 35 - 40%. Biplane (2D) method of discs ejection fraction is 37%. Global longitudinal strain is -8.8%. Global longitudinal strain is reduced. Grade III diastolic dysfunction.  ·  Right Ventricle: Mild right ventricular enlargement. Systolic function is mildly reduced.  ·  Left Atrium: Left atrium is mildly dilated.  ·  Right Atrium: Right atrium is moderately dilated.  ·  Aortic Valve: The aortic valve is a trileaflet valve. Mildly calcified cusps. There is mild aortic regurgitation with an eccentrically directed jet.  ·  Mitral Valve: There is no stenosis. The mean pressure gradient across the mitral valve is 1 mmHg at a heart rate of  bpm. There is mild to moderate regurgitation with an eccentric jet.  ·  Tricuspid Valve: There is moderate regurgitation with an eccentrically directed jet.  ·  Pulmonary Artery: The estimated pulmonary artery systolic pressure is 62 mmHg.  ·  IVC/SVC: Elevated venous pressure at 15 mmHg.      06/24/2024: Cardiac  catheterization:  ·  The 1st Mrg lesion was 100% stenosed.  ·  The 2nd Diag lesion was 100% stenosed.  ·  The 1st Diag lesion was 70% stenosed.  ·  The left ventricular end diastolic pressure was normal.  ·  The pre-procedure left ventricular end diastolic pressure was 7.  ·  The estimated blood loss was none.  ·  There was two vessel coronary artery disease.     1. Two vessel CAD with branch vessel disease  a. LM - moderate size, with mild luminal irregularities  b. LAD - small size, patent stent in the prox-mid segment. The mid and distal LAD have moderate diffuse disease with negative remodeling. There is a large D1 with 70% ostial-prox stenosis. There is a small D2 with ostial ; there is an atretic SVG graft to the second diagonal with severe diffuse disease  c. Lcx - Moderate size with mild luminal irregularities. OM1 is a large branch that is 100% occluded (); it fills via robust R-L collaterals.   d. RCA - Dominant vessel with mild diffuse disease. The R-PDA gives off collateral to the OM1.   2. Atretic SVG to small D2 with severe disease at the anastomosis  3. Normal left sided filling pressures, LVEDP - 7mmHg     Plan:  1. Stop brillinta (shortness of breath); switch to Plavix  2. Stop chlorthalidone (CKD); and up titrate anti-anginal therapy - start metop 25mg bid. Consider ranexa     The procedure log was documented by Documenter: Cindy Rasmussen and verified by Philip Torres MD.     Date: 6/4/2024  Time: 3:36 PM    Labs: ordered.  Radiology: ordered.    Risk  Prescription drug management.                                      Clinical Impression:   Final diagnoses:  [R06.02] SOB (shortness of breath)  [E87.70] Hypervolemia, unspecified hypervolemia type (Primary)        ED Disposition Condition    Discharge Stable          ED Prescriptions    None       Follow-up Information       Follow up With Specialties Details Why Contact Info    Swati Vicente NP Family  Medicine Call in 1 day schedule follow up from your ER visit [id number] [address] [place] [address]  [phone]                 [place], MIAH  25 1602         [1]   Social History  Tobacco Use    Smoking status: Former     Current packs/day: 0.00     Average packs/day: 0.5 packs/day for 15.0 years (7.5 ttl pk-yrs)     Types: Cigarettes     Start date: 1973     Quit date: 1988     Years since quittin.0     Passive exposure: Never    Smokeless tobacco: Former     Types: Chew     Quit date:    Substance Use Topics    Alcohol use: Never    Drug use: Never        Nu [place], JONAH  25 1632

## 2025-07-13 NOTE — DISCHARGE INSTRUCTIONS
-Limit/Avoid salt intake  -Elevate BLE  -Take routine medications as prescribed.  -Call Dr. Ramírez's office in the morning to see if they wish to see you sooner than 07/21 due to your recent ER visit. Otherwise, keep your appointment as scheduled.

## 2025-07-15 ENCOUNTER — TELEPHONE (OUTPATIENT)
Dept: EMERGENCY MEDICINE | Facility: HOSPITAL | Age: 85
End: 2025-07-15
Payer: MEDICARE

## 2025-07-15 ENCOUNTER — OFFICE VISIT (OUTPATIENT)
Dept: FAMILY MEDICINE | Facility: CLINIC | Age: 85
End: 2025-07-15
Payer: MEDICARE

## 2025-07-15 ENCOUNTER — APPOINTMENT (OUTPATIENT)
Dept: RADIOLOGY | Facility: CLINIC | Age: 85
End: 2025-07-15
Attending: NURSE PRACTITIONER
Payer: MEDICARE

## 2025-07-15 DIAGNOSIS — M54.50 CHRONIC MIDLINE LOW BACK PAIN WITHOUT SCIATICA: ICD-10-CM

## 2025-07-15 DIAGNOSIS — N18.32 STAGE 3B CHRONIC KIDNEY DISEASE: ICD-10-CM

## 2025-07-15 DIAGNOSIS — G89.29 CHRONIC MIDLINE LOW BACK PAIN WITHOUT SCIATICA: ICD-10-CM

## 2025-07-15 DIAGNOSIS — I10 ESSENTIAL HYPERTENSION: ICD-10-CM

## 2025-07-15 DIAGNOSIS — I50.23 ACUTE ON CHRONIC SYSTOLIC HEART FAILURE: Primary | ICD-10-CM

## 2025-07-15 PROCEDURE — 3288F FALL RISK ASSESSMENT DOCD: CPT | Mod: ,,, | Performed by: NURSE PRACTITIONER

## 2025-07-15 PROCEDURE — 3077F SYST BP >= 140 MM HG: CPT | Mod: ,,, | Performed by: NURSE PRACTITIONER

## 2025-07-15 PROCEDURE — 72100 X-RAY EXAM L-S SPINE 2/3 VWS: CPT | Mod: 26,,, | Performed by: INTERNAL MEDICINE

## 2025-07-15 PROCEDURE — 99214 OFFICE O/P EST MOD 30 MIN: CPT | Mod: ,,, | Performed by: NURSE PRACTITIONER

## 2025-07-15 PROCEDURE — 1101F PT FALLS ASSESS-DOCD LE1/YR: CPT | Mod: ,,, | Performed by: NURSE PRACTITIONER

## 2025-07-15 PROCEDURE — 3079F DIAST BP 80-89 MM HG: CPT | Mod: ,,, | Performed by: NURSE PRACTITIONER

## 2025-07-15 PROCEDURE — 1160F RVW MEDS BY RX/DR IN RCRD: CPT | Mod: ,,, | Performed by: NURSE PRACTITIONER

## 2025-07-15 PROCEDURE — 1159F MED LIST DOCD IN RCRD: CPT | Mod: ,,, | Performed by: NURSE PRACTITIONER

## 2025-07-15 PROCEDURE — 72100 X-RAY EXAM L-S SPINE 2/3 VWS: CPT | Mod: TC,RHCUB | Performed by: NURSE PRACTITIONER

## 2025-07-16 RX ORDER — ATORVASTATIN CALCIUM 80 MG/1
80 TABLET, FILM COATED ORAL NIGHTLY
Qty: 90 TABLET | Refills: 0 | Status: SHIPPED | OUTPATIENT
Start: 2025-07-16

## 2025-07-17 ENCOUNTER — TELEPHONE (OUTPATIENT)
Dept: FAMILY MEDICINE | Facility: CLINIC | Age: 85
End: 2025-07-17
Payer: MEDICARE

## 2025-07-17 LAB
OHS QRS DURATION: 134 MS
OHS QTC CALCULATION: 469 MS

## 2025-07-17 NOTE — TELEPHONE ENCOUNTER
Attempted to call patient to review xray results, no answer, left message to return our call.  Lumbar Xray showed degenerative changes as discussed during office visit. Follow up as needed.     Patient's daughter returned call, reviewed results as stated above.  Patient's daughter voiced understanding.

## 2025-07-20 VITALS
SYSTOLIC BLOOD PRESSURE: 142 MMHG | WEIGHT: 216.38 LBS | BODY MASS INDEX: 29.31 KG/M2 | OXYGEN SATURATION: 99 % | HEART RATE: 55 BPM | HEIGHT: 72 IN | RESPIRATION RATE: 20 BRPM | TEMPERATURE: 98 F | DIASTOLIC BLOOD PRESSURE: 84 MMHG

## 2025-07-21 ENCOUNTER — OFFICE VISIT (OUTPATIENT)
Dept: CARDIOLOGY | Facility: CLINIC | Age: 85
End: 2025-07-21
Payer: MEDICARE

## 2025-07-21 VITALS
SYSTOLIC BLOOD PRESSURE: 132 MMHG | DIASTOLIC BLOOD PRESSURE: 80 MMHG | WEIGHT: 217 LBS | HEART RATE: 53 BPM | BODY MASS INDEX: 29.39 KG/M2 | HEIGHT: 72 IN | OXYGEN SATURATION: 99 %

## 2025-07-21 DIAGNOSIS — I10 ESSENTIAL HYPERTENSION: ICD-10-CM

## 2025-07-21 DIAGNOSIS — I25.111 CORONARY ARTERY DISEASE INVOLVING NATIVE CORONARY ARTERY OF NATIVE HEART WITH ANGINA PECTORIS WITH DOCUMENTED SPASM: ICD-10-CM

## 2025-07-21 DIAGNOSIS — I50.23 ACUTE ON CHRONIC SYSTOLIC HEART FAILURE: Primary | ICD-10-CM

## 2025-07-21 DIAGNOSIS — I50.21 ACUTE SYSTOLIC HEART FAILURE: ICD-10-CM

## 2025-07-21 DIAGNOSIS — N18.31 STAGE 3A CHRONIC KIDNEY DISEASE: ICD-10-CM

## 2025-07-21 PROCEDURE — 1159F MED LIST DOCD IN RCRD: CPT | Mod: CPTII,,, | Performed by: INTERNAL MEDICINE

## 2025-07-21 PROCEDURE — 1126F AMNT PAIN NOTED NONE PRSNT: CPT | Mod: CPTII,,, | Performed by: INTERNAL MEDICINE

## 2025-07-21 PROCEDURE — 3079F DIAST BP 80-89 MM HG: CPT | Mod: CPTII,,, | Performed by: INTERNAL MEDICINE

## 2025-07-21 PROCEDURE — 99215 OFFICE O/P EST HI 40 MIN: CPT | Mod: PBBFAC | Performed by: INTERNAL MEDICINE

## 2025-07-21 PROCEDURE — 3075F SYST BP GE 130 - 139MM HG: CPT | Mod: CPTII,,, | Performed by: INTERNAL MEDICINE

## 2025-07-21 PROCEDURE — 3288F FALL RISK ASSESSMENT DOCD: CPT | Mod: CPTII,,, | Performed by: INTERNAL MEDICINE

## 2025-07-21 PROCEDURE — 99999 PR PBB SHADOW E&M-EST. PATIENT-LVL V: CPT | Mod: PBBFAC,,, | Performed by: INTERNAL MEDICINE

## 2025-07-21 PROCEDURE — 1160F RVW MEDS BY RX/DR IN RCRD: CPT | Mod: CPTII,,, | Performed by: INTERNAL MEDICINE

## 2025-07-21 PROCEDURE — 1101F PT FALLS ASSESS-DOCD LE1/YR: CPT | Mod: CPTII,,, | Performed by: INTERNAL MEDICINE

## 2025-07-21 PROCEDURE — 99214 OFFICE O/P EST MOD 30 MIN: CPT | Mod: S$PBB,,, | Performed by: INTERNAL MEDICINE

## 2025-07-21 NOTE — PROGRESS NOTES
PCP: Swati Vicente NP    Referring Provider:     Subjective:   Trung Barboza is a 85 y.o. male with hx of MI, CAD s/p CABG 2022 with subsequent stents, TIA, CKD, HTN, HLD, gout, and GERD who presents for follow up for NSTEMI.                              7/21/25    Pt presents for evaluation of shortness of breath and lower est swelling, He notes on 724/25 - patient had a recent ER visit (04/19/2025) with complaint of chest pain that occurred at rest, rated 10/10 on pain scale.  Patient described the chest pain is midsternal, stabbing and sore to touch.  Patient was given 325 mg aspirin.  Chest pain improved to 6/10.  EKG was unremarkable.  Troponins were elevated and flat at 185, 202 (troponin was 246 on 11/13/2024).  Patient was discharged home to follow-up with cardiology.    Today, patient states chest pain has improved, now rates chest pain 3/10 and is no longer sore to touch.  Patient is active with yard work without chest pain.  However, prior to his stent 05/2024, patient had complaint of acute onset, sharp, midsternal chest pain. He denies any associated symptoms.         1/8/25 - He reports nausea associated with   taking his pills on an empty stomach has resolved. He reports chest tightness has resolved, has not required sl ntg, is not associated with exertion. He is less active due to inclement weather,  No new complaints.intermittently over 3 days last week.  He is active with yard work and minimal walking around his house and to the mailbox. He denies any symptoms with activity.     11/19/24 - increased lower extremity edema w/ mils sob. No chest pain, palpations or orthopnea reported.     8/7/24 - He reports being nauseated that he attributes to his medications. He reports feeling nauseated over the last couple months. He daughter states that the patient has a poor appetite and thinks he may be taking his pills on an empty stomach. He reports having chest tightness intermittently over 3 days last  week. The pain was substernal, nonradiating, 6/10, improved with rubbing his chest. He also reports associated shortness of breath. Since the tightness improved he has since had some tenderness on his chest with palpation in the same area. The patient denies palpitations, leg pain, leg swelling,      He is active with yard work and minimal walking around his house and to the mailbox. He denies any symptoms with activity.     6/19/24 - Pt was hospitalized at [facility] 5/6/24 ([facility] s/p PCI to prox LAD) in 06/03/2024 with unsuccessful PCI of D1. EF  35-40%.   Today the patient denies lower extremity edema, palpitations, chest pain or shortness of breath.  No reported sublingual nitro use since discharge    5/29/25 - Pt was hospitalized here at [facility] 5/6/24 for NSTEMI and underwent [facility] s/p PCI to prox LAD with recommendation for staged PCI of D1. Echo showed EF of 35-40%. He was discharged on DAPT with ASA and Brilinta. He was started on BIDIL TID for afterload reduction due to CKD (no ACE/ARB, ARNI, MRA or SGLT2i). He was also discharged on statin, Toprol XL, norvasc, lasix and potassium chloride.     Today, pt denies any chest pain or SOB on exam. He states he had mild chest pain a few days ago but it was nothing like the chest pain he had prior to his last hospitalization. He has c/o SOB with exertion. Denies edema, orthopnea or PND. BP and HR are well controlled today. Of note, his wife passed away in January and family states he has declined since that time.         Fhx: Mother - heart disease, HTN  Shx: Former smoker (quit 1988), no etoh or drug use    EKG   Results for orders placed or performed during the hospital encounter of 07/13/25   EKG 12-lead    Collection Time: 07/13/25  1:24 PM   Result Value Ref Range    QRS Duration 134 ms    [facility] QTC Calculation 469 ms    Narrative    Test Reason : R06.02,    Vent. Rate :  67 BPM     Atrial Rate :  67 BPM     P-R Int : 370 ms          QRS Dur : 134 ms      QT  Int : 444 ms       P-R-T Axes :  78 -50  72 degrees    QTcB Int : 469 ms    Sinus rhythm with 1st degree A-V block  Left axis deviation  Nonspecific intraventricular block  Inferior infarct (cited on or before 06-Jun-2023)  Cannot rule out Anterior infarct (cited on or before 27-Sep-2022)  Abnormal ECG  When compared with ECG of 19-Apr-2025 14:03,  No significant change was found  Confirmed by Mahad Villaseñor ([id number]) on 7/17/2025 1:15:37 PM    Referred By: AAAREFERRAL SELF           Confirmed By: Mahad Villaseñor     ECHO Results for orders placed during the hospital encounter of 05/06/24    Echo    Interpretation Summary    Left Ventricle: The left ventricle is normal in size. Mildly increased wall thickness. There is concentric remodeling. Moderate global hypokinesis present. There is moderately reduced systolic function with a visually estimated ejection fraction of 35 - 40%. Biplane (2D) method of discs ejection fraction is 37%. Global longitudinal strain is -8.8%. Global longitudinal strain is reduced. Grade III diastolic dysfunction.    Right Ventricle: Mild right ventricular enlargement. Systolic function is mildly reduced.    Left Atrium: Left atrium is mildly dilated.    Right Atrium: Right atrium is moderately dilated.    Aortic Valve: The aortic valve is a trileaflet valve. Mildly calcified cusps. There is mild aortic regurgitation with an eccentrically directed jet.    Mitral Valve: There is no stenosis. The mean pressure gradient across the mitral valve is 1 mmHg at a heart rate of  bpm. There is mild to moderate regurgitation with an eccentric jet.    Tricuspid Valve: There is moderate regurgitation with an eccentrically directed jet.    Pulmonary Artery: The estimated pulmonary artery systolic pressure is 62 mmHg.    IVC/SVC: Elevated venous pressure at 15 mmHg.    C Results for orders placed during the hospital encounter of 06/02/24    Cardiac catheterization    Conclusion    The 1st Mrg lesion was 100%  stenosed.    The 2nd Diag lesion was 100% stenosed.    The 1st Diag lesion was 70% stenosed.    The left ventricular end diastolic pressure was normal.    The pre-procedure left ventricular end diastolic pressure was 7.    The estimated blood loss was none.    There was two vessel coronary artery disease.    Two vessel CAD with branch vessel disease  LM - moderate size, with mild luminal irregularities  LAD - small size, patent stent in the prox-mid segment. The mid and distal LAD have moderate diffuse disease with negative remodeling. There is a large D1 with 70% ostial-prox stenosis. There is a small D2 with ostial ; there is an atretic SVG graft to the second diagonal with severe diffuse disease  Lcx - Moderate size with mild luminal irregularities. OM1 is a large branch that is 100% occluded (); it fills via robust R-L collaterals.  RCA - Dominant vessel with mild diffuse disease. The R-PDA gives off collateral to the OM1.  Atretic SVG to small D2 with severe disease at the anastomosis  Normal left sided filling pressures, LVEDP - 7mmHg    Plan:  Stop brillinta (shortness of breath); switch to Plavix  Stop chlorthalidone (CKD); and up titrate anti-anginal therapy - start metop 25mg bid. Consider ranexa    The procedure log was documented by Documenter: Cindy Rasmussen and verified by Philip Torres MD.    Date: 6/4/2024  Time: 3:36 PM        Lab Results   Component Value Date     07/13/2025    K 4.1 07/13/2025     (H) 07/13/2025    CO2 23 07/13/2025    BUN 31 (H) 07/13/2025    CREATININE 1.99 (H) 07/13/2025    CALCIUM 9.3 07/13/2025    ANIONGAP 13 07/13/2025    ESTGFRAFRICA 59 (L) 07/25/2021    EGFRNONAA 25 (L) 02/28/2022       Lab Results   Component Value Date    CHOL 116 09/10/2024    CHOL 197 03/08/2024    CHOL 225 (H) 02/09/2023     Lab Results   Component Value Date    HDL 61 (H) 09/10/2024    HDL 73 (H) 03/08/2024    HDL 61 (H) 02/09/2023     Lab Results   Component Value Date     LDLCALC 43 09/10/2024    LDLCALC 108 03/08/2024    LDLCALC 120 02/09/2023     Lab Results   Component Value Date    TRIG 61 09/10/2024    TRIG 80 03/08/2024    TRIG 220 (H) 02/09/2023     Lab Results   Component Value Date    CHOLHDL 1.9 09/10/2024    CHOLHDL 2.7 03/08/2024    CHOLHDL 3.7 02/09/2023       Lab Results   Component Value Date    WBC 3.88 (L) 07/13/2025    HGB 13.1 (L) 07/13/2025    HCT 39.3 (L) 07/13/2025    MCV 96.8 (H) 07/13/2025     07/13/2025           Current Outpatient Medications:     albuterol (PROVENTIL) 2.5 mg /3 mL (0.083 %) nebulizer solution, Take 3 mLs (2.5 mg total) by nebulization every 6 (six) hours as needed for Wheezing. Rescue, Disp: 100 each, Rfl: 0    aspirin (ECOTRIN) 81 MG EC tablet, Take 1 tablet (81 mg total) by mouth once daily., Disp: 30 tablet, Rfl: 0    baclofen (LIORESAL) 10 MG tablet, Take one tab 3 times daily as needed for muscle spasms/back pain., Disp: 60 tablet, Rfl: 0    clopidogreL (PLAVIX) 75 mg tablet, Take 1 tablet (75 mg total) by mouth once daily., Disp: 90 tablet, Rfl: 6    hydrALAZINE (APRESOLINE) 25 MG tablet, Take 1.5 tablets (37.5 mg total) by mouth 2 (two) times a day., Disp: , Rfl:     isosorbide mononitrate (IMDUR) 30 MG 24 hr tablet, Take 1 tablet (30 mg total) by mouth once daily., Disp: 30 tablet, Rfl: 11    metoprolol succinate (TOPROL-XL) 50 MG 24 hr tablet, Take 1 tablet (50 mg total) by mouth once daily., Disp: 90 tablet, Rfl: 3    pantoprazole (PROTONIX) 40 MG tablet, Take 1 tablet (40 mg total) by mouth once daily., Disp: 90 tablet, Rfl: 3    spironolactone (ALDACTONE) 25 MG tablet, Take 1 tablet (25 mg total) by mouth once daily., Disp: 30 tablet, Rfl: 11    atorvastatin (LIPITOR) 80 MG tablet, Take 1 tablet by mouth in the evening, Disp: 90 tablet, Rfl: 0    colchicine (COLCRYS) 0.6 mg tablet, Take 2 tablet at first sign of gout flare, then take 0.6 mg ( 1 tablet an hour later).  Then take 1 tablet once or twice daily until flare  resolves., Disp: 60 tablet, Rfl: 1    furosemide (LASIX) 20 MG tablet, TAKE TWO TABLETS (40MG) ON MONDAY AND THURSDAY. TAKE ONE TABLET (20MG) REST OF THE WEEK., Disp: 36 tablet, Rfl: 5    LORazepam (ATIVAN) 1 MG tablet, Take 0.5 tablets (0.5 mg total) by mouth as needed for Anxiety. Take 1/2 tablet on the day of your MRI 1 hour before imaging with a sip of water and then take the other 1/2 tablet upon arrival for imaging with a sip of water., Disp: 1 tablet, Rfl: 0    multivitamin with minerals tablet, Take 1 tablet by mouth once daily., Disp: , Rfl:     nitroGLYCERIN (NITROSTAT) 0.4 MG SL tablet, Place 1 tablet (0.4 mg total) under the tongue every 5 (five) minutes as needed for Chest pain., Disp: 25 tablet, Rfl: 2    Review of Systems   Constitutional:  Negative for chills, diaphoresis, fever and malaise/fatigue.   Respiratory:  Negative for cough and shortness of breath.    Cardiovascular:  Positive for chest pain. Negative for palpitations, orthopnea, leg swelling and PND.        SOB on exertion   Gastrointestinal:  Negative for abdominal pain, nausea and vomiting.   Musculoskeletal:  Negative for falls.   Neurological:  Negative for focal weakness and weakness.         Objective:   /80 (BP Location: Left arm, Patient Position: Sitting)   Pulse (!) 53   Ht 6' (1.829 m)   Wt 98.4 kg (217 lb)   SpO2 99%   BMI 29.43 kg/m²     Physical Exam  Constitutional:       General: He is not in acute distress.     Appearance: Normal appearance. He is not ill-appearing.   Cardiovascular:      Rate and Rhythm: Normal rate and regular rhythm.      Pulses: Normal pulses.      Heart sounds: Normal heart sounds.   Pulmonary:      Effort: Pulmonary effort is normal.      Breath sounds: Normal breath sounds.   Musculoskeletal:      Cervical back: Neck supple. No rigidity.      Right lower leg: No edema.      Left lower leg: No edema.   Skin:     General: Skin is warm and dry.   Neurological:      Mental Status: He is alert  and oriented to person, place, and time.   Psychiatric:         Mood and Affect: Mood normal.         Behavior: Behavior normal.           Assessment:     1. Acute on chronic systolic heart failure      well compensated chronic systolic heart failure on current meds, EF 37%.      2. Essential hypertension  furosemide (LASIX) 20 MG tablet    controlled on current meds, continue same, reviewed low sodium diet recommendations.      3. Stage 3a chronic kidney disease  furosemide (LASIX) 20 MG tablet    stable, following with nephrology      4. Acute systolic heart failure      has resolved, now well compensated on current meds.      5. Coronary artery disease involving native coronary artery of native heart with angina pectoris with documented spasm      severe triple vessel CAD with failing SVG to D1, diffuse CAD, no targets for PCi, continue to optimized medical management.                Plan:   Acute on chronic systolic heart failure  Well compensated acute on chronic systolic heart failure on current medications, continue same.     Coronary artery disease involving native heart  Stable class 1 angina on current meds with increasing exercise tolerance, continue current meds, encourage exercise,  increasing as tolerated.         We will call with the stress test results  Follow up with Dr. Villaseñor in 3 months, sooner if needed      PCP: Swati Vicente NP    Referring Provider:       EKG - EKG Result Image Print Only Version     Printable Image Version  EKG 12-lead  Order: [id number]   Status: Final result       Next appt: 09/02/2025 at 09:40 AM in Cardiology ([facility])       Dx: SOB (shortness of breath)    Test Result Released: Yes (not seen)    0 Result Notes            Component  Ref Range & Units (hover) 2 wk ago  (7/13/25) 3 mo ago  (4/19/25) 8 mo ago  (11/19/24) 8 mo ago  (11/13/24) 1 yr ago  (6/19/24) 1 yr ago  (6/2/24) 1 yr ago  (5/7/24)   QRS Duration 134 128 132 132 128 124 126   OHS QTC  Calculation 469 454 452 471 449 439 485   Resulting Agency [facility] GEMUSE GEMUSE GEMUSE GEMUSE GEMUSE GEMUSE              Narrative  Performed by: [facility]  Test Reason : R06.02,    Vent. Rate :  67 BPM     Atrial Rate :  67 BPM     P-R Int : 370 ms          QRS Dur : 134 ms      QT Int : 444 ms       P-R-T Axes :  78 -50  72 degrees    QTcB Int : 469 ms    Sinus rhythm with 1st degree A-V block  Left axis deviation  Nonspecific intraventricular block  Inferior infarct (cited on or before 06-Jun-2023)  Cannot rule out Anterior infarct (cited on or before 27-Sep-2022)  Abnormal ECG  When compared with ECG of 19-Apr-2025 14:03,  No significant change was found  Confirmed by Mahad Villaseñor ([id number]) on 7/17/2025 1:15:37 PM    Referred By: AAAREFERRAL SELF           Confirmed By: Mahad Villaseñor   Specimen Collected: 07/13/25 13:24 CDT Last Resulted: 07/17/25 13:15 CDT           ECHO - Results for orders placed during the hospital encounter of 05/06/24    Echo    Interpretation Summary    Left Ventricle: The left ventricle is normal in size. Mildly increased wall thickness. There is concentric remodeling. Moderate global hypokinesis present. There is moderately reduced systolic function with a visually estimated ejection fraction of 35 - 40%. Biplane (2D) method of discs ejection fraction is 37%. Global longitudinal strain is -8.8%. Global longitudinal strain is reduced. Grade III diastolic dysfunction.    Right Ventricle: Mild right ventricular enlargement. Systolic function is mildly reduced.    Left Atrium: Left atrium is mildly dilated.    Right Atrium: Right atrium is moderately dilated.    Aortic Valve: The aortic valve is a trileaflet valve. Mildly calcified cusps. There is mild aortic regurgitation with an eccentrically directed jet.    Mitral Valve: There is no stenosis. The mean pressure gradient across the mitral valve is 1 mmHg at a heart rate of  bpm. There is mild to moderate regurgitation with an eccentric jet.     Tricuspid Valve: There is moderate regurgitation with an eccentrically directed jet.    Pulmonary Artery: The estimated pulmonary artery systolic pressure is 62 mmHg.    IVC/SVC: Elevated venous pressure at 15 mmHg.       CATH - Results for orders placed during the hospital encounter of 06/02/24    Cardiac catheterization    Conclusion    The 1st Mrg lesion was 100% stenosed.    The 2nd Diag lesion was 100% stenosed.    The 1st Diag lesion was 70% stenosed.    The left ventricular end diastolic pressure was normal.    The pre-procedure left ventricular end diastolic pressure was 7.    The estimated blood loss was none.    There was two vessel coronary artery disease.    Two vessel CAD with branch vessel disease  LM - moderate size, with mild luminal irregularities  LAD - small size, patent stent in the prox-mid segment. The mid and distal LAD have moderate diffuse disease with negative remodeling. There is a large D1 with 70% ostial-prox stenosis. There is a small D2 with ostial ; there is an atretic SVG graft to the second diagonal with severe diffuse disease  Lcx - Moderate size with mild luminal irregularities. OM1 is a large branch that is 100% occluded (); it fills via robust R-L collaterals.  RCA - Dominant vessel with mild diffuse disease. The R-PDA gives off collateral to the OM1.  Atretic SVG to small D2 with severe disease at the anastomosis  Normal left sided filling pressures, LVEDP - 7mmHg    Plan:  Stop brillinta (shortness of breath); switch to Plavix  Stop chlorthalidone (CKD); and up titrate anti-anginal therapy - start metop 25mg bid. Consider ranexa    The procedure log was documented by Documenter: Cindy Rasmussen and verified by Philip Torres MD.    Date: 6/4/2024  Time: 3:36 PM       Stress - Results for orders placed during the hospital encounter of 05/06/25    Nuclear Stress Test    Interpretation Summary    The ECG portion of the study is abnormal but not diagnostic.    The  patient reported no chest pain during the stress test.    Await nuclear images       Lab Results   Component Value Date     07/13/2025    K 4.1 07/13/2025     (H) 07/13/2025    CO2 23 07/13/2025    BUN 31 (H) 07/13/2025    CREATININE 1.99 (H) 07/13/2025    CALCIUM 9.3 07/13/2025    ANIONGAP 13 07/13/2025    ESTGFRAFRICA 59 (L) 07/25/2021    EGFRNONAA 25 (L) 02/28/2022       Lab Results   Component Value Date    CHOL 116 09/10/2024    CHOL 197 03/08/2024    CHOL 225 (H) 02/09/2023     Lab Results   Component Value Date    HDL 61 (H) 09/10/2024    HDL 73 (H) 03/08/2024    HDL 61 (H) 02/09/2023     Lab Results   Component Value Date    LDLCALC 43 09/10/2024    LDLCALC 108 03/08/2024    LDLCALC 120 02/09/2023     Lab Results   Component Value Date    TRIG 61 09/10/2024    TRIG 80 03/08/2024    TRIG 220 (H) 02/09/2023     Lab Results   Component Value Date    CHOLHDL 1.9 09/10/2024    CHOLHDL 2.7 03/08/2024    CHOLHDL 3.7 02/09/2023       Lab Results   Component Value Date    WBC 3.88 (L) 07/13/2025    HGB 13.1 (L) 07/13/2025    HCT 39.3 (L) 07/13/2025    MCV 96.8 (H) 07/13/2025     07/13/2025         Current Medications[1]    Review of Systems   Constitutional: Negative for chills, diaphoresis, fever and malaise/fatigue.   Cardiovascular:  Positive for chest pain. Negative for leg swelling, orthopnea, palpitations and paroxysmal nocturnal dyspnea.        SOB on exertion   Respiratory:  Negative for cough and shortness of breath.    Musculoskeletal:  Negative for falls.   Gastrointestinal:  Negative for abdominal pain, nausea and vomiting.   Neurological:  Negative for focal weakness and weakness.          Objective:   /80 (BP Location: Left arm, Patient Position: Sitting)   Pulse (!) 53   Ht 6' (1.829 m)   Wt 98.4 kg (217 lb)   SpO2 99%   BMI 29.43 kg/m²       Physical Exam  Constitutional:       General: He is not in acute distress.     Appearance: Normal appearance. He is not  ill-appearing.   Cardiovascular:      Rate and Rhythm: Normal rate and regular rhythm.      Pulses: Normal pulses.      Heart sounds: Normal heart sounds.   Pulmonary:      Effort: Pulmonary effort is normal.      Breath sounds: Normal breath sounds.   Musculoskeletal:      Cervical back: Neck supple. No rigidity.      Right lower leg: No edema.      Left lower leg: No edema.   Skin:     General: Skin is warm and dry.   Neurological:      Mental Status: He is alert and oriented to person, place, and time.   Psychiatric:         Mood and Affect: Mood normal.         Behavior: Behavior normal.         Assessment:     1. Acute on chronic systolic heart failure      well compensated chronic systolic heart failure on current meds, EF 37%.      2. Essential hypertension  furosemide (LASIX) 20 MG tablet    controlled on current meds, continue same, reviewed low sodium diet recommendations.      3. Stage 3a chronic kidney disease  furosemide (LASIX) 20 MG tablet    stable, following with nephrology      4. Acute systolic heart failure      has resolved, now well compensated on current meds.      5. Coronary artery disease involving native coronary artery of native heart with angina pectoris with documented spasm      severe triple vessel CAD with failing SVG to D1, diffuse CAD, no targets for PCi, continue to optimized medical management.            Plan:   Continue current meds, follow up in six months, sooner if symptoms change.                    [1]   Current Outpatient Medications:     albuterol (PROVENTIL) 2.5 mg /3 mL (0.083 %) nebulizer solution, Take 3 mLs (2.5 mg total) by nebulization every 6 (six) hours as needed for Wheezing. Rescue, Disp: 100 each, Rfl: 0    aspirin (ECOTRIN) 81 MG EC tablet, Take 1 tablet (81 mg total) by mouth once daily., Disp: 30 tablet, Rfl: 0    baclofen (LIORESAL) 10 MG tablet, Take one tab 3 times daily as needed for muscle spasms/back pain., Disp: 60 tablet, Rfl: 0    clopidogreL  (PLAVIX) 75 mg tablet, Take 1 tablet (75 mg total) by mouth once daily., Disp: 90 tablet, Rfl: 6    hydrALAZINE (APRESOLINE) 25 MG tablet, Take 1.5 tablets (37.5 mg total) by mouth 2 (two) times a day., Disp: , Rfl:     isosorbide mononitrate (IMDUR) 30 MG 24 hr tablet, Take 1 tablet (30 mg total) by mouth once daily., Disp: 30 tablet, Rfl: 11    metoprolol succinate (TOPROL-XL) 50 MG 24 hr tablet, Take 1 tablet (50 mg total) by mouth once daily., Disp: 90 tablet, Rfl: 3    pantoprazole (PROTONIX) 40 MG tablet, Take 1 tablet (40 mg total) by mouth once daily., Disp: 90 tablet, Rfl: 3    spironolactone (ALDACTONE) 25 MG tablet, Take 1 tablet (25 mg total) by mouth once daily., Disp: 30 tablet, Rfl: 11    atorvastatin (LIPITOR) 80 MG tablet, Take 1 tablet by mouth in the evening, Disp: 90 tablet, Rfl: 0    colchicine (COLCRYS) 0.6 mg tablet, Take 2 tablet at first sign of gout flare, then take 0.6 mg ( 1 tablet an hour later).  Then take 1 tablet once or twice daily until flare resolves., Disp: 60 tablet, Rfl: 1    furosemide (LASIX) 20 MG tablet, TAKE TWO TABLETS (40MG) ON MONDAY AND THURSDAY. TAKE ONE TABLET (20MG) REST OF THE WEEK., Disp: 36 tablet, Rfl: 5    LORazepam (ATIVAN) 1 MG tablet, Take 0.5 tablets (0.5 mg total) by mouth as needed for Anxiety. Take 1/2 tablet on the day of your MRI 1 hour before imaging with a sip of water and then take the other 1/2 tablet upon arrival for imaging with a sip of water., Disp: 1 tablet, Rfl: 0    multivitamin with minerals tablet, Take 1 tablet by mouth once daily., Disp: , Rfl:     nitroGLYCERIN (NITROSTAT) 0.4 MG SL tablet, Place 1 tablet (0.4 mg total) under the tongue every 5 (five) minutes as needed for Chest pain., Disp: 25 tablet, Rfl: 2

## 2025-07-21 NOTE — PROGRESS NOTES
Swati Vicente NP   [facility]  [address]  [place]  [address]      PATIENT NAME: Trung Barboza  : 1940  DATE: 7/15/25  MRN: [id number]      Level of Service: [place] OFFICE/OUTPT VISIT, FLORESITA GOMEZ IV, 30-39 MIN  PCP: Swati Vicente NP     Reason for Visit / Chief Complaint: ER follow up (ER follow up from 25 for Hypervolemia. Patient reported he was given lasix and has been cutting back on pork. )     History of Present Illness / Review of Systems   History of Present Illness    CHIEF COMPLAINT:  Patient presents for follow-up after a recent ER visit for shortness of breath and fluid retention, and to discuss ongoing back pain.    HPI:  Patient is an 85-year-old male who reports a recent episode of severe shortness of breath that occurred on a Saturday night around 11:00 PM. He had significant difficulty breathing and noticed swelling in his feet and legs. He went to the ER, where he was diagnosed with fluid buildup. He received an injection of Lasix in the ER, resulting in significant urination and relief of symptoms.    He reports feeling significantly improved since the fluid was removed, with some residual leg swelling, though not as severe as during the ER visit. He acknowledges he was drinking a lot of Gatorade prior to ER visit and thinks this contributing to swelling. States he did not realize it contains high levels of sodium.    He also complains of ongoing low back pain that has been present for an extended period. The pain spans across his back and is exacerbated by bending over and physical activities such as moving wood. He has been using Salonpas patches for pain relief, which he reports as effective.  He denies any radiation of back pain down his legs.      ROS:  General: -fever, -chills, -fatigue, -weight gain, -weight loss  Eyes: -vision changes, -redness, -discharge  ENT: -ear pain, -nasal congestion, -sore throat  Cardiovascular: -chest pain, -palpitations,  +lower extremity edema  Respiratory: -cough, -shortness of breath, +difficulty breathing  Gastrointestinal: -abdominal pain, -nausea, -vomiting, -diarrhea, -constipation, -blood in stool  Genitourinary: -dysuria, -hematuria, -frequency  Musculoskeletal: -joint pain, -muscle pain, +back pain  Skin: -rash, -lesion  Neurological: -headache, -dizziness, -numbness, -tingling  Psychiatric: -anxiety, -depression, -sleep difficulty          Medical / Social / Family History     Past Medical History:   Diagnosis Date    Aortic regurgitation     BPH with urinary obstruction     Chronic kidney disease, stage 3b     GR 44     creat 1.8    Coronary artery disease     Essential hypertension 07/10/2021    Gastroesophageal reflux disease 09/15/2022    Gout, arthritis     Myocardial infarction     PVD (peripheral vascular disease) 02/01/2024    TIA (transient ischemic attack)        Past Surgical History:   Procedure Laterality Date    ANGIOGRAM, CORONARY, WITH LEFT HEART CATHETERIZATION N/A 12/31/2021    Procedure: ANGIOGRAM,CORONARY,WITH LEFT HEART CATHETERIZATION;  Surgeon: Mahad Villaseñor DO;  Location: [facility] CATH LAB;  Service: Cardiology;  Laterality: N/A;    ANGIOGRAM, CORONARY, WITH LEFT HEART CATHETERIZATION N/A 5/7/2024    Procedure: Angiogram, Coronary, with Left Heart Cath;  Surgeon: Mahad Villaseñor DO;  Location: [facility] CATH LAB;  Service: Cardiology;  Laterality: N/A;    BIOPSY WITH TRANSRECTAL ULTRASOUND (TRUS) GUIDANCE Bilateral 03/07/2018    CHOLECYSTECTOMY      CORONARY ANGIOGRAPHY INCLUDING BYPASS GRAFTS WITH CATHETERIZATION OF LEFT HEART N/A 6/4/2024    Procedure: ANGIOGRAM, CORONARY, INCLUDING BYPASS GRAFT, WITH LEFT HEART CATHETERIZATION;  Surgeon: Philip Torres MD;  Location: [facility] CATH LAB;  Service: Cardiology;  Laterality: N/A;    CORONARY ARTERY BYPASS GRAFT  01/07/2022    [facility]-Dr. Qureshi    LEFT HEART CATHETERIZATION Left 7/13/2021    Procedure: Left heart cath;  Surgeon:  Philip Torres MD;  Location: [facility] CATH LAB;  Service: Cardiology;  Laterality: Left;    PERCUTANEOUS CORONARY INTERVENTION, ARTERY N/A 5/7/2024    Procedure: Percutaneous coronary intervention;  Surgeon: Mahad Villaseñor DO;  Location: [facility] CATH LAB;  Service: Cardiology;  Laterality: N/A;       Medications and Allergies     Outpatient Medications Marked as Taking for the 7/15/25 encounter (Office Visit) with Swati Vicente NP   Medication Sig Dispense Refill    aspirin (ECOTRIN) 81 MG EC tablet Take 1 tablet (81 mg total) by mouth once daily. 30 tablet 0    baclofen (LIORESAL) 10 MG tablet Take one tab 3 times daily as needed for muscle spasms/back pain. 60 tablet 0    clopidogreL (PLAVIX) 75 mg tablet Take 1 tablet (75 mg total) by mouth once daily. 90 tablet 6    furosemide (LASIX) 20 MG tablet Take 1 tablet by mouth once daily 90 tablet 3    hydrALAZINE (APRESOLINE) 25 MG tablet Take 1.5 tablets (37.5 mg total) by mouth 2 (two) times a day.      isosorbide mononitrate (IMDUR) 30 MG 24 hr tablet Take 1 tablet (30 mg total) by mouth once daily. 30 tablet 11    metoprolol succinate (TOPROL-XL) 50 MG 24 hr tablet Take 1 tablet (50 mg total) by mouth once daily. 90 tablet 3    multivitamin with minerals tablet Take 1 tablet by mouth once daily.      pantoprazole (PROTONIX) 40 MG tablet Take 1 tablet (40 mg total) by mouth once daily. 90 tablet 3    spironolactone (ALDACTONE) 25 MG tablet Take 1 tablet (25 mg total) by mouth once daily. 30 tablet 11    [DISCONTINUED] atorvastatin (LIPITOR) 80 MG tablet Take 80 mg by mouth once daily.         Review of patient's allergies indicates:   Allergen Reactions    Lisinopril Swelling and Anaphylaxis       Physical Examination     Vitals:    07/15/25 1530   BP: (!) 142/84   Pulse:    Resp:    Temp:      Physical Exam  Vitals and nursing note reviewed.   HENT:      Head: Normocephalic.      Nose: Nose normal.      Mouth/Throat:      Mouth: Mucous membranes  are moist.      Pharynx: Oropharynx is clear. No posterior oropharyngeal erythema.   Eyes:      Conjunctiva/sclera: Conjunctivae normal.   Cardiovascular:      Rate and Rhythm: Normal rate and regular rhythm.      Pulses: Normal pulses.      Heart sounds: Normal heart sounds.   Pulmonary:      Effort: Pulmonary effort is normal.      Breath sounds: Normal breath sounds.   Abdominal:      General: Abdomen is flat. Bowel sounds are normal. There is no distension.      Palpations: Abdomen is soft.   Musculoskeletal:         General: No swelling.      Cervical back: Normal range of motion.      Lumbar back: Tenderness and bony tenderness present. Decreased range of motion.      Right lower le+ Edema present.      Left lower le+ Edema present.   Skin:     General: Skin is warm and dry.      Capillary Refill: Capillary refill takes less than 2 seconds.   Neurological:      Mental Status: He is alert. Mental status is at baseline.      Gait: Gait abnormal.      Comments: Slow, unsteady gait. Uses cane for ambulation.    Psychiatric:         Mood and Affect: Mood normal.         Behavior: Behavior normal.                 Assessment and Plan      Problem List Items Addressed This Visit       Essential hypertension    CKD (chronic kidney disease)    Acute on chronic systolic heart failure - Primary     Other Visit Diagnoses         Chronic midline low back pain without sciatica        Relevant Orders    X-Ray Lumbar Spine AP And Lateral (Completed)            Assessment & Plan    IMPRESSION:  Assessed recent ER visit for fluid retention and shortness of breath, likely exacerbated by excessive salt intake and Gatorade consumption.  Evaluated current fluid status, noting improvement in leg swelling since ER visit.  Ongoing low back pain, potentially related to arthritis given age and recent physical activity.    GENERALIZED EDEMA:  - Patient's leg swelling has improved significantly since receiving Lasix in the ER,  though some mild swelling persists.  - Educated patient on the high salt content in Gatorade and its contribution to fluid retention, explaining the relationship between excessive salt intake, fluid retention, and shortness of breath.  - Advised to reduce salt and pork consumption to manage swelling.  - Instructed to take Lasix 20 mg daily at home and continue monitoring weight daily.    LOW BACK PAIN:  - Patient reports intermittent low back pain that worsens with activity,  - Assessment suggests arthritis as the likely cause given patient's age and physical activities.  - Ordered XR Lower Back to evaluate underlying issues.  - Recommend topical treatments such as Salonpas patches for pain management, specifically advising against NSAIDs due to kidney function.     ESSENTIAL HYPERTENSION:  - Blood pressure has improved to 142/84 from previously elevated readings in the ER.  - Advised reduction in salt intake to help manage hypertension.  - Lasix 20 mg daily will serve dual purpose for both edema and blood pressure management.           FOLLOW UP  Follow up in 3 months or as needed.      Health Maintenance     Health Maintenance Topics with due status: Not Due       Topic Last Completion Date    TETANUS VACCINE 12/11/2018    Lipid Panel 09/10/2024    Influenza Vaccine 03/24/2025    Aspirin/Antiplatelet Therapy 07/15/2025       Health Maintenance Due   Topic Date Due    Shingles Vaccine (1 of 2) Never done    RSV Vaccine (Age 60+ and Pregnant patients) (1 - 1-dose 75+ series) Never done    COVID-19 Vaccine (5 - 2024-25 season) 09/01/2024          Signature:  Swati Vicente NP  [facility]  [address] [address] [place]  [address]    Date of encounter: 7/15/25    This note was generated with the assistance of ambient listening technology. Verbal consent was obtained by the patient and accompanying visitor(s) for the recording of patient appointment to facilitate this note. I attest to having reviewed and  edited the generated note for accuracy, though some syntax or spelling errors may persist. Please contact the author of this note for any clarification.

## 2025-07-21 NOTE — PATIENT INSTRUCTIONS
INCREASE LASIX TO TWO TABLETS (40MG) ON MONDAY AND THURSDAY. CONTINUE 20MG REST OF WEEK.  FOLLOW-UP WITH YAZAN BRIONES IN 6 WEEKS  FOLLOW-UP WITH  IN 6 MONTHS

## 2025-07-22 RX ORDER — FUROSEMIDE 20 MG/1
TABLET ORAL
Qty: 36 TABLET | Refills: 5 | Status: SHIPPED | OUTPATIENT
Start: 2025-07-22

## 2025-07-30 ENCOUNTER — TELEPHONE (OUTPATIENT)
Dept: FAMILY MEDICINE | Facility: CLINIC | Age: 85
End: 2025-07-30
Payer: MEDICARE

## 2025-07-30 NOTE — TELEPHONE ENCOUNTER
"Received notification from KATY Culp RN with [facility]:   PATIENTS WEIGHT HAS INCREASED FROM 203  LB. OUT OF PARAMETERS. PATIENT DENIES ANY INCREASED IN SOB BUT STATES HE HAS BEEN ON HIS FEET ALOT MORE THROUGH THE WEEKEND. PATIENT HAS +2 TO LOWER EXTREMITIES. PATIENT SAYS "ITS FROM EATING."     ASSOCIATED MEDS:   LASIX 20 MG DAILY, TAKE EXTRA 20 MG (40 MG) ON MONDAY AND THURSDAY     Patient saw cardiology last week and changed the lasix.     Instructed continue Lasix as cardiology has ordered. Instruct on low sodium diet and keeping feet elevated as much as possible.   "

## 2025-07-31 ENCOUNTER — EXTERNAL CHRONIC CARE MANAGEMENT (OUTPATIENT)
Dept: FAMILY MEDICINE | Facility: CLINIC | Age: 85
End: 2025-07-31
Payer: MEDICARE

## 2025-07-31 PROCEDURE — 99490 CHRNC CARE MGMT STAFF 1ST 20: CPT | Mod: ,,, | Performed by: NURSE PRACTITIONER

## 2025-07-31 NOTE — ASSESSMENT & PLAN NOTE
Stable class 1 angina on current meds with increasing exercise tolerance, continue current meds, encourage exercise,  increasing as tolerated.

## 2025-08-29 DIAGNOSIS — I06.1 RHEUMATIC AORTIC VALVE INSUFFICIENCY: ICD-10-CM

## 2025-08-29 DIAGNOSIS — I25.10 CORONARY ARTERY DISEASE INVOLVING NATIVE HEART, UNSPECIFIED VESSEL OR LESION TYPE, UNSPECIFIED WHETHER ANGINA PRESENT: Primary | ICD-10-CM

## 2025-08-29 RX ORDER — METOPROLOL SUCCINATE 50 MG/1
50 TABLET, EXTENDED RELEASE ORAL DAILY
Qty: 90 TABLET | Refills: 0 | Status: SHIPPED | OUTPATIENT
Start: 2025-08-29

## 2025-09-02 ENCOUNTER — OFFICE VISIT (OUTPATIENT)
Dept: CARDIOLOGY | Facility: CLINIC | Age: 85
End: 2025-09-02
Payer: MEDICARE

## 2025-09-02 VITALS
DIASTOLIC BLOOD PRESSURE: 72 MMHG | BODY MASS INDEX: 28.71 KG/M2 | HEIGHT: 72 IN | WEIGHT: 212 LBS | SYSTOLIC BLOOD PRESSURE: 128 MMHG | HEART RATE: 61 BPM | OXYGEN SATURATION: 100 %

## 2025-09-02 DIAGNOSIS — I50.23 ACUTE ON CHRONIC SYSTOLIC HEART FAILURE: ICD-10-CM

## 2025-09-02 DIAGNOSIS — N18.31 STAGE 3A CHRONIC KIDNEY DISEASE: ICD-10-CM

## 2025-09-02 DIAGNOSIS — E78.2 MIXED HYPERLIPIDEMIA: Primary | ICD-10-CM

## 2025-09-02 DIAGNOSIS — I10 ESSENTIAL HYPERTENSION: ICD-10-CM

## 2025-09-02 DIAGNOSIS — I25.111 CORONARY ARTERY DISEASE INVOLVING NATIVE CORONARY ARTERY OF NATIVE HEART WITH ANGINA PECTORIS WITH DOCUMENTED SPASM: ICD-10-CM

## 2025-09-02 PROCEDURE — 1126F AMNT PAIN NOTED NONE PRSNT: CPT | Mod: CPTII,,, | Performed by: NURSE PRACTITIONER

## 2025-09-02 PROCEDURE — 1159F MED LIST DOCD IN RCRD: CPT | Mod: CPTII,,, | Performed by: NURSE PRACTITIONER

## 2025-09-02 PROCEDURE — 3074F SYST BP LT 130 MM HG: CPT | Mod: CPTII,,, | Performed by: NURSE PRACTITIONER

## 2025-09-02 PROCEDURE — 99214 OFFICE O/P EST MOD 30 MIN: CPT | Mod: S$PBB,,, | Performed by: NURSE PRACTITIONER

## 2025-09-02 PROCEDURE — 99215 OFFICE O/P EST HI 40 MIN: CPT | Mod: PBBFAC | Performed by: NURSE PRACTITIONER

## 2025-09-02 PROCEDURE — 99999 PR PBB SHADOW E&M-EST. PATIENT-LVL V: CPT | Mod: PBBFAC,,, | Performed by: NURSE PRACTITIONER

## 2025-09-02 PROCEDURE — 3078F DIAST BP <80 MM HG: CPT | Mod: CPTII,,, | Performed by: NURSE PRACTITIONER

## 2025-09-02 RX ORDER — FUROSEMIDE 20 MG/1
TABLET ORAL
Qty: 60 TABLET | Refills: 5 | Status: SHIPPED | OUTPATIENT
Start: 2025-09-02

## (undated) DEVICE — KIT ANGIO MANIFOLD CUSTOM RFH LEFT HEART KIT

## (undated) DEVICE — TOWEL OR DISP STRL BLUE 4/PK

## (undated) DEVICE — GUIDEWIRE RUNTHROUGH NS 180CM CORONARY

## (undated) DEVICE — KIT MICROINTRODUCER 4FR MINI STICK II

## (undated) DEVICE — CATH IMPULSE 6FR FL3.5

## (undated) DEVICE — GLOVE SENSICARE PI SURG 8

## (undated) DEVICE — BOWL FLUID - BACK STOP

## (undated) DEVICE — Device

## (undated) DEVICE — GUIDEWIRE OMNI J TIP 185CM

## (undated) DEVICE — URINAL MALE W/ LID DISP

## (undated) DEVICE — PROTECTOR ULNAR NERVE FOAM

## (undated) DEVICE — GLOVE SURGICAL PROTEXIS PI SIZE 6

## (undated) DEVICE — CATH DIAG IMPULSE 6FR FL4

## (undated) DEVICE — SHEATH INTRODUCER 6FR 11CM

## (undated) DEVICE — SET IV PRIMARY ALARIS (PRIMARY)

## (undated) DEVICE — DRESSING IV TEGADERM 10X12CM

## (undated) DEVICE — DEVICE RADIAL TR BAND REG

## (undated) DEVICE — CHLORAPREP 10.5 ML APPLICATOR

## (undated) DEVICE — SENSOR PULSE OX ADULT

## (undated) DEVICE — GLOVE SURG BIOGEL SZ 7.5

## (undated) DEVICE — SET IV PRIMARY

## (undated) DEVICE — CATH DIAG IMPULSE 6FR FR4

## (undated) DEVICE — CLOTH READYPREP 2%CHG 9X10.5IN

## (undated) DEVICE — CONTRAST ISOVUE 370 100ML

## (undated) DEVICE — CDS ANGIOGRAPHY PACK

## (undated) DEVICE — CLIPPER BLADE MOD 4406 (CAREF)

## (undated) DEVICE — CATH DIAG OPTITORQUE 6.5FR JACKY 3.5

## (undated) DEVICE — DRAPE BRACHIAL ANGIOGRAPHY TIBURON

## (undated) DEVICE — STOPCOCK 3-WAY ROTATING

## (undated) DEVICE — CATHETER IMAGING IVUS EAGLE EYE PLATINUM

## (undated) DEVICE — DRAPE ANGIO BRACH 38X44IN

## (undated) DEVICE — BAG-A-JET FLUID DISPENSER SYSTEM

## (undated) DEVICE — DEVICE BLEEDBACK CONTROL VALVE COPILOT

## (undated) DEVICE — POSITIONER ULNAR NERVE

## (undated) DEVICE — GUIDE VISTA 6FR JR 4

## (undated) DEVICE — ETCO2 NC MICROSTR FEM ST ADLT

## (undated) DEVICE — APPLICATOR CHLORAPREP LITE ORANGE 10.5ML STERILE

## (undated) DEVICE — ISOVUE 370 100ML

## (undated) DEVICE — GLOVE SURG ULTRA TOUCH 6

## (undated) DEVICE — WIRE CHOICE PT X SUPP 014X300

## (undated) DEVICE — BALLOON MINI TREK RX 2.0X12

## (undated) DEVICE — GUIDE VISTA 6FR JL 4.0

## (undated) DEVICE — OXISENSOR ADULT DIGIT N/S

## (undated) DEVICE — INTRODUCER KIT MICRO 4FR

## (undated) DEVICE — DRESSING TRANS 4X4 TEGADERM

## (undated) DEVICE — CLIPPER SURGICAL BLADE ASSEMBLY STERILE SNGL USE

## (undated) DEVICE — DEVICE BLUE DIAMOND INFL 20ML

## (undated) DEVICE — GLOVE SURGICAL PROTEXIS PI SIZE 8.0

## (undated) DEVICE — TUBING CAPNOLINE PLUS SMART 02 CONNECTOR(ORDER 65110)

## (undated) DEVICE — CATH COR GUIDE 6FR JR4 100CM

## (undated) DEVICE — TOWEL OR STERILE BLUE 4/PK 20PK/CS

## (undated) DEVICE — SET IV EXTENSION 42IN W/2 PORT CLEARLINK

## (undated) DEVICE — GLOVE BIOGEL SKINSENSE PI 7.5

## (undated) DEVICE — CATH IMPULSE 5FR FL4

## (undated) DEVICE — DEVICE INFLATION BLUE DIAMOND IN7112

## (undated) DEVICE — GLIDESHEATH SLENDER INTRODUCER 6FR

## (undated) DEVICE — SEAL ANGIO 6FR VIP - VASCULAR CLOSURE DEVICE BX/10

## (undated) DEVICE — DECANTER FLUID TRNSF WHITE 9IN

## (undated) DEVICE — CATH COR GUIDE 6FR JL4

## (undated) DEVICE — GLOVE SENSICARE PI SURG 7

## (undated) DEVICE — CATH IMPULSE 6FR MULTIPACK

## (undated) DEVICE — SET EXTENSION CLEARLINK 2INJ

## (undated) DEVICE — SHEATH INTRODUCER 6FR 10CM X 0.038 PINNACLE

## (undated) DEVICE — CATH IMPULSE 5FR FR4

## (undated) DEVICE — GUIDEWIRE EXCHANGE J TIP .035X260CM